# Patient Record
Sex: FEMALE | Race: BLACK OR AFRICAN AMERICAN | NOT HISPANIC OR LATINO | Employment: UNEMPLOYED | URBAN - METROPOLITAN AREA
[De-identification: names, ages, dates, MRNs, and addresses within clinical notes are randomized per-mention and may not be internally consistent; named-entity substitution may affect disease eponyms.]

---

## 2018-02-20 ENCOUNTER — HOSPITAL ENCOUNTER (OUTPATIENT)
Dept: RADIOLOGY | Facility: HOSPITAL | Age: 63
Discharge: HOME | End: 2018-02-20
Attending: NURSE PRACTITIONER
Payer: MEDICARE

## 2018-02-20 DIAGNOSIS — R06.02 SHORTNESS OF BREATH: ICD-10-CM

## 2018-02-20 DIAGNOSIS — R06.02 SHORTNESS OF BREATH: Primary | ICD-10-CM

## 2018-02-20 PROCEDURE — 71046 X-RAY EXAM CHEST 2 VIEWS: CPT | Mod: 26,,, | Performed by: RADIOLOGY

## 2018-02-20 PROCEDURE — 71046 X-RAY EXAM CHEST 2 VIEWS: CPT | Mod: TC,PO

## 2019-03-07 ENCOUNTER — HOSPITAL ENCOUNTER (EMERGENCY)
Facility: HOSPITAL | Age: 64
Discharge: HOME | End: 2019-03-07
Attending: EMERGENCY MEDICINE
Payer: MEDICARE

## 2019-03-07 VITALS
OXYGEN SATURATION: 99 % | TEMPERATURE: 98 F | SYSTOLIC BLOOD PRESSURE: 176 MMHG | BODY MASS INDEX: 35.16 KG/M2 | HEART RATE: 87 BPM | RESPIRATION RATE: 20 BRPM | HEIGHT: 64 IN | DIASTOLIC BLOOD PRESSURE: 88 MMHG | WEIGHT: 205.94 LBS

## 2019-03-07 DIAGNOSIS — R60.9 EDEMA: ICD-10-CM

## 2019-03-07 PROCEDURE — 99284 EMERGENCY DEPT VISIT MOD MDM: CPT | Mod: 25,ER

## 2019-03-07 RX ORDER — IRBESARTAN 150 MG/1
150 TABLET ORAL NIGHTLY
Refills: 3 | Status: ON HOLD | COMMUNITY
Start: 2019-02-06 | End: 2023-01-06 | Stop reason: HOSPADM

## 2019-03-07 RX ORDER — ESCITALOPRAM OXALATE 5 MG/1
5 TABLET ORAL DAILY
Refills: 3 | COMMUNITY
Start: 2019-03-01

## 2019-03-07 RX ORDER — FAMOTIDINE 10 MG/1
10 TABLET ORAL 2 TIMES DAILY
Status: ON HOLD | COMMUNITY
End: 2023-01-06 | Stop reason: SDUPTHER

## 2019-03-07 RX ORDER — ALBUTEROL SULFATE 90 UG/1
2 AEROSOL, METERED RESPIRATORY (INHALATION) 3 TIMES DAILY PRN
Refills: 6 | COMMUNITY
Start: 2019-03-03

## 2019-03-07 RX ORDER — AMLODIPINE BESYLATE 5 MG/1
2.5 TABLET ORAL DAILY
Refills: 6 | Status: ON HOLD | COMMUNITY
Start: 2019-02-15 | End: 2023-01-06 | Stop reason: HOSPADM

## 2019-03-07 NOTE — ED PROVIDER NOTES
Encounter Date: 3/7/2019       History     Chief Complaint   Patient presents with    Leg Swelling     emiliano leg swelling sky left since jan. no change just concerned about wants to make sure left leg does not have clot.     Underlying history of hypertension, end-stage renal disease on dialysis for about 11 years, Monday/ Wednesday/ Friday.  Reports good compliance with dialysis including a normal session yesterday.  Has been experiencing relatively consistent bilateral lower extremity swelling for the last 2 or 3 months without other symptoms.  Denies chest pain, shortness of breath, hemoptysis, fever, trauma, pleurisy or other complaints.  No history of heart failure or venous thromboembolic disease.  Amlodipine was suspected as possibly contributing, dose was recently cut in half without improvement of edema.  Here to make sure she does have a blood clot.  No recent change in edema and no other complaints.      The history is provided by the patient and the spouse. No  was used.     Review of patient's allergies indicates:   Allergen Reactions    Amoxicillin Rash     Past Medical History:   Diagnosis Date    ESRD (end stage renal disease) on dialysis     GERD (gastroesophageal reflux disease)     Hypertension      History reviewed. No pertinent surgical history.  History reviewed. No pertinent family history.  Social History     Tobacco Use    Smoking status: Former Smoker    Smokeless tobacco: Never Used   Substance Use Topics    Alcohol use: Yes     Comment: occassionally    Drug use: No     Review of Systems   Constitutional: Negative for activity change, fatigue and fever.   HENT: Negative for congestion, ear pain, facial swelling, nosebleeds, sinus pressure and sore throat.    Eyes: Negative for pain, discharge, redness and visual disturbance.   Respiratory: Negative for cough, choking, chest tightness, shortness of breath and wheezing.    Cardiovascular: Positive for leg  swelling. Negative for chest pain and palpitations.   Gastrointestinal: Negative for abdominal distention, abdominal pain, nausea and vomiting.   Endocrine: Negative for heat intolerance, polydipsia and polyuria.   Genitourinary: Negative for difficulty urinating, dysuria, flank pain, hematuria and urgency.   Musculoskeletal: Negative for back pain, gait problem, joint swelling and myalgias.   Skin: Negative for color change and rash.   Allergic/Immunologic: Negative for environmental allergies and food allergies.   Neurological: Negative for dizziness, weakness, numbness and headaches.   Hematological: Negative for adenopathy. Does not bruise/bleed easily.   Psychiatric/Behavioral: Negative for agitation and behavioral problems. The patient is not nervous/anxious.    All other systems reviewed and are negative.      Physical Exam     Initial Vitals [03/07/19 1047]   BP Pulse Resp Temp SpO2   (!) 168/94 90 16 98.3 °F (36.8 °C) --      MAP       --         Physical Exam    Nursing note and vitals reviewed.  Constitutional: She appears well-developed and well-nourished. She is not diaphoretic. No distress.   HENT:   Head: Normocephalic and atraumatic.   Mouth/Throat: No oropharyngeal exudate.   Eyes: Conjunctivae and EOM are normal. Pupils are equal, round, and reactive to light. Right eye exhibits no discharge. Left eye exhibits no discharge. No scleral icterus.   Neck: Normal range of motion. Neck supple. No thyromegaly present. No tracheal deviation present. No JVD present.   Cardiovascular: Normal rate, regular rhythm, normal heart sounds and intact distal pulses. Exam reveals no gallop and no friction rub.    No murmur heard.  LUE AV shunt with good thrill/ bruit; no sign of problem   Pulmonary/Chest: Breath sounds normal. No stridor. No respiratory distress. She has no wheezes. She has no rhonchi. She has no rales. She exhibits no tenderness.   Abdominal: Soft. Bowel sounds are normal. She exhibits no distension  and no mass. There is no tenderness. There is no rebound and no guarding.   Musculoskeletal: Normal range of motion. She exhibits edema. She exhibits no tenderness.   2-3(+) symmetric pitting edema BLE's to mid-thighs   Neurological: She is alert and oriented to person, place, and time. She has normal strength.   Skin: Skin is warm and dry. No rash and no abscess noted. No erythema.   Psychiatric: She has a normal mood and affect. Her behavior is normal. Judgment and thought content normal.         ED Course   Procedures  Labs Reviewed - No data to display       Imaging Results          US Lower Extremity Veins Bilateral (In process)               X-Rays:   Independently Interpreted Readings:   Other Readings:  No DVT by u/s.                     12:31 PM Stable, counseled in detail, appropriate for continued outpatient management.             Clinical Impression:       1. Edema            Disposition:   Disposition: Discharged  Condition: Stable                        Tariq Adams MD  03/07/19 4757

## 2019-03-07 NOTE — DISCHARGE INSTRUCTIONS
_________________        As discussed, ultrasound shows no evidence for blood clot in either leg.  Please review this information with your regular doctor or doctors and continue to pursue other approaches for reducing the swelling in your legs.  It is possible that stopping the amlodipine altogether might be a reasonable next step, please discussed this with your doctor.      _________________

## 2019-03-19 ENCOUNTER — HOSPITAL ENCOUNTER (EMERGENCY)
Facility: HOSPITAL | Age: 64
Discharge: HOME | End: 2019-03-19
Attending: EMERGENCY MEDICINE
Payer: MEDICARE

## 2019-03-19 VITALS
BODY MASS INDEX: 34.89 KG/M2 | SYSTOLIC BLOOD PRESSURE: 162 MMHG | WEIGHT: 203.25 LBS | TEMPERATURE: 98 F | DIASTOLIC BLOOD PRESSURE: 74 MMHG | RESPIRATION RATE: 18 BRPM | OXYGEN SATURATION: 97 % | HEART RATE: 76 BPM

## 2019-03-19 DIAGNOSIS — N95.0 POST-MENOPAUSAL BLEEDING: Primary | ICD-10-CM

## 2019-03-19 LAB
ALBUMIN SERPL BCP-MCNC: 3.6 G/DL
ALP SERPL-CCNC: 75 U/L
ALT SERPL W/O P-5'-P-CCNC: 14 U/L
ANION GAP SERPL CALC-SCNC: 8 MMOL/L
APTT BLDCRRT: 28.5 SEC
AST SERPL-CCNC: 25 U/L
BASOPHILS # BLD AUTO: 0.01 K/UL
BASOPHILS NFR BLD: 0.2 %
BILIRUB SERPL-MCNC: 1.3 MG/DL
BUN SERPL-MCNC: 15 MG/DL
CALCIUM SERPL-MCNC: 9.3 MG/DL
CHLORIDE SERPL-SCNC: 102 MMOL/L
CO2 SERPL-SCNC: 29 MMOL/L
CREAT SERPL-MCNC: 5.8 MG/DL
DIFFERENTIAL METHOD: ABNORMAL
EOSINOPHIL # BLD AUTO: 0.1 K/UL
EOSINOPHIL NFR BLD: 0.9 %
ERYTHROCYTE [DISTWIDTH] IN BLOOD BY AUTOMATED COUNT: 18.9 %
EST. GFR  (AFRICAN AMERICAN): 8.3 ML/MIN/1.73 M^2
EST. GFR  (NON AFRICAN AMERICAN): 7.2 ML/MIN/1.73 M^2
GLUCOSE SERPL-MCNC: 137 MG/DL
HCT VFR BLD AUTO: 37.7 %
HGB BLD-MCNC: 12 G/DL
INR PPP: 1.1
LYMPHOCYTES # BLD AUTO: 0.8 K/UL
LYMPHOCYTES NFR BLD: 14.2 %
MCH RBC QN AUTO: 29.4 PG
MCHC RBC AUTO-ENTMCNC: 31.8 G/DL
MCV RBC AUTO: 92 FL
MONOCYTES # BLD AUTO: 0.5 K/UL
MONOCYTES NFR BLD: 8.8 %
NEUTROPHILS # BLD AUTO: 4.2 K/UL
NEUTROPHILS NFR BLD: 75.9 %
PLATELET # BLD AUTO: 116 K/UL
PMV BLD AUTO: 10.6 FL
POTASSIUM SERPL-SCNC: 4.1 MMOL/L
PROT SERPL-MCNC: 7 G/DL
PROTHROMBIN TIME: 10.9 SEC
RBC # BLD AUTO: 4.08 M/UL
SODIUM SERPL-SCNC: 139 MMOL/L
WBC # BLD AUTO: 5.56 K/UL

## 2019-03-19 PROCEDURE — 80053 COMPREHEN METABOLIC PANEL: CPT | Mod: ER

## 2019-03-19 PROCEDURE — 85730 THROMBOPLASTIN TIME PARTIAL: CPT | Mod: ER

## 2019-03-19 PROCEDURE — 99284 EMERGENCY DEPT VISIT MOD MDM: CPT | Mod: ER

## 2019-03-19 PROCEDURE — 85025 COMPLETE CBC W/AUTO DIFF WBC: CPT | Mod: ER

## 2019-03-19 PROCEDURE — 85610 PROTHROMBIN TIME: CPT | Mod: ER

## 2019-03-19 NOTE — ED PROVIDER NOTES
Encounter Date: 3/19/2019       History     Chief Complaint   Patient presents with    Vaginal Bleeding     c/o vag bleeding and nose bleeding that began 15-20 mins ago pta.      States she had an episode of vaginal bleeding about a year ago that ceased spontaneously.  States it happened again today after using the restroom.  Denies any pain.      The history is provided by the patient.   Vaginal Bleeding   This is a recurrent problem. The current episode started less than 1 hour ago. The problem occurs rarely. The problem has been resolved. Pertinent negatives include no chest pain, no abdominal pain, no headaches and no shortness of breath. Nothing aggravates the symptoms. Nothing relieves the symptoms.     Review of patient's allergies indicates:   Allergen Reactions    Amoxicillin Rash     Past Medical History:   Diagnosis Date    ESRD (end stage renal disease) on dialysis     GERD (gastroesophageal reflux disease)     Hypertension      Past Surgical History:   Procedure Laterality Date     SECTION      PLACEMENT OF DIALYSIS ACCESS       History reviewed. No pertinent family history.  Social History     Tobacco Use    Smoking status: Former Smoker    Smokeless tobacco: Never Used   Substance Use Topics    Alcohol use: No     Frequency: Never     Comment: occassionally    Drug use: No     Review of Systems   Constitutional: Negative for fever.   HENT: Negative for sore throat.    Respiratory: Negative for shortness of breath.    Cardiovascular: Negative for chest pain.   Gastrointestinal: Negative for abdominal pain and nausea.   Genitourinary: Positive for vaginal bleeding. Negative for dysuria.   Musculoskeletal: Negative for back pain.   Skin: Negative for rash.   Neurological: Negative for weakness and headaches.   Hematological: Does not bruise/bleed easily.       Physical Exam     Initial Vitals [19 1320]   BP Pulse Resp Temp SpO2   (!) 193/93 101 20 98 °F (36.7 °C) 97 %      MAP        --         Physical Exam    Nursing note and vitals reviewed.  Constitutional: She appears well-developed and well-nourished. No distress.   HENT:   Head: Normocephalic and atraumatic.   Nose: No mucosal edema, rhinorrhea or sinus tenderness. No epistaxis.   Mouth/Throat: Oropharynx is clear and moist.   Eyes: Conjunctivae and EOM are normal. Pupils are equal, round, and reactive to light.   Neck: Normal range of motion. Neck supple.   Cardiovascular: Normal rate, regular rhythm and normal heart sounds. Exam reveals no gallop and no friction rub.    No murmur heard.  Pulmonary/Chest: Breath sounds normal. No respiratory distress. She has no wheezes. She has no rhonchi. She has no rales.   Abdominal: Soft. Bowel sounds are normal. She exhibits no distension and no mass. There is no tenderness. There is no rebound and no guarding.   Genitourinary: There is bleeding (Mild amount of old blood in the vault.  No active bleeding.) in the vagina. No erythema in the vagina. No foreign body in the vagina. No signs of injury around the vagina. No vaginal discharge found.   Musculoskeletal: Normal range of motion. She exhibits no edema or tenderness.   Neurological: She is alert and oriented to person, place, and time. She has normal strength.   Skin: Skin is warm and dry. No rash noted.   Psychiatric: She has a normal mood and affect. Thought content normal.         ED Course   Procedures  Labs Reviewed   COMPREHENSIVE METABOLIC PANEL - Abnormal; Notable for the following components:       Result Value    Glucose 137 (*)     Creatinine 5.8 (*)     Total Bilirubin 1.3 (*)     eGFR if  8.3 (*)     eGFR if non  7.2 (*)     All other components within normal limits   CBC W/ AUTO DIFFERENTIAL - Abnormal; Notable for the following components:    MCHC 31.8 (*)     RDW 18.9 (*)     Platelets 116 (*)     Lymph # 0.8 (*)     Gran% 75.9 (*)     Lymph% 14.2 (*)     All other components within normal  limits   PROTIME-INR   APTT          Imaging Results    None              ED Vital Signs:  Vitals:    03/19/19 1320 03/19/19 1345 03/19/19 1347 03/19/19 1349   BP: (!) 193/93 (!) 171/87 (!) 187/88 (!) 173/90   Pulse: 101 87 88 92   Resp: 20      Temp: 98 °F (36.7 °C)      TempSrc: Oral      SpO2: 97%      Weight: 92.2 kg (203 lb 4.2 oz)            Abnormal Lab Results:  Labs Reviewed   COMPREHENSIVE METABOLIC PANEL - Abnormal; Notable for the following components:       Result Value    Glucose 137 (*)     Creatinine 5.8 (*)     Total Bilirubin 1.3 (*)     eGFR if  8.3 (*)     eGFR if non  7.2 (*)     All other components within normal limits   CBC W/ AUTO DIFFERENTIAL - Abnormal; Notable for the following components:    MCHC 31.8 (*)     RDW 18.9 (*)     Platelets 116 (*)     Lymph # 0.8 (*)     Gran% 75.9 (*)     Lymph% 14.2 (*)     All other components within normal limits   PROTIME-INR   APTT          All Lab Results:  Results for orders placed or performed during the hospital encounter of 03/19/19   Comprehensive metabolic panel   Result Value Ref Range    Sodium 139 136 - 145 mmol/L    Potassium 4.1 3.5 - 5.1 mmol/L    Chloride 102 95 - 110 mmol/L    CO2 29 23 - 29 mmol/L    Glucose 137 (H) 70 - 110 mg/dL    BUN, Bld 15 8 - 23 mg/dL    Creatinine 5.8 (H) 0.5 - 1.4 mg/dL    Calcium 9.3 8.7 - 10.5 mg/dL    Total Protein 7.0 6.0 - 8.4 g/dL    Albumin 3.6 3.5 - 5.2 g/dL    Total Bilirubin 1.3 (H) 0.1 - 1.0 mg/dL    Alkaline Phosphatase 75 55 - 135 U/L    AST 25 10 - 40 U/L    ALT 14 10 - 44 U/L    Anion Gap 8 8 - 16 mmol/L    eGFR if African American 8.3 (A) >60 mL/min/1.73 m^2    eGFR if non  7.2 (A) >60 mL/min/1.73 m^2   CBC auto differential   Result Value Ref Range    WBC 5.56 3.90 - 12.70 K/uL    RBC 4.08 4.00 - 5.40 M/uL    Hemoglobin 12.0 12.0 - 16.0 g/dL    Hematocrit 37.7 37.0 - 48.5 %    MCV 92 82 - 98 fL    MCH 29.4 27.0 - 31.0 pg    MCHC 31.8 (L) 32.0 -  36.0 g/dL    RDW 18.9 (H) 11.5 - 14.5 %    Platelets 116 (L) 150 - 350 K/uL    MPV 10.6 9.2 - 12.9 fL    Gran # (ANC) 4.2 1.8 - 7.7 K/uL    Lymph # 0.8 (L) 1.0 - 4.8 K/uL    Mono # 0.5 0.3 - 1.0 K/uL    Eos # 0.1 0.0 - 0.5 K/uL    Baso # 0.01 0.00 - 0.20 K/uL    Gran% 75.9 (H) 38.0 - 73.0 %    Lymph% 14.2 (L) 18.0 - 48.0 %    Mono% 8.8 4.0 - 15.0 %    Eosinophil% 0.9 0.0 - 8.0 %    Basophil% 0.2 0.0 - 1.9 %    Differential Method Automated    Protime-INR   Result Value Ref Range    Prothrombin Time 10.9 9.0 - 12.5 sec    INR 1.1 0.8 - 1.2   APTT   Result Value Ref Range    aPTT 28.5 21.0 - 32.0 sec           Imaging Results:  Imaging Results    None            The Emergency Provider reviewed the vital signs and test results, which are outlined above.    ED Discussions:  2:53 PM: Reassessed pt at this time.  Pt states her condition has improved at this time. Discussed with pt all pertinent ED information and results. Discussed pt dx of post-menopausal bleeding and plan of tx. Gave pt all f/u and return to the ED instructions. All questions and concerns were addressed at this time. Pt expresses understanding of information and instructions, and is comfortable with plan to discharge. Pt is stable for discharge.                              Clinical Impression:       ICD-10-CM ICD-9-CM   1. Post-menopausal bleeding N95.0 627.1           Disposition:   Disposition: Discharged  Condition: Stable                        Noah Kahn MD  03/19/19 0849

## 2019-05-31 ENCOUNTER — HOSPITAL ENCOUNTER (EMERGENCY)
Facility: HOSPITAL | Age: 64
Discharge: HOME | End: 2019-05-31
Attending: EMERGENCY MEDICINE
Payer: MEDICARE

## 2019-05-31 VITALS
HEIGHT: 64 IN | SYSTOLIC BLOOD PRESSURE: 149 MMHG | TEMPERATURE: 99 F | BODY MASS INDEX: 33.64 KG/M2 | HEART RATE: 83 BPM | RESPIRATION RATE: 18 BRPM | OXYGEN SATURATION: 95 % | DIASTOLIC BLOOD PRESSURE: 86 MMHG | WEIGHT: 197.06 LBS

## 2019-05-31 DIAGNOSIS — R31.9 URINARY TRACT INFECTION WITH HEMATURIA, SITE UNSPECIFIED: Primary | ICD-10-CM

## 2019-05-31 DIAGNOSIS — R10.9 RIGHT FLANK PAIN: ICD-10-CM

## 2019-05-31 DIAGNOSIS — N20.1 URETEROLITHIASIS: ICD-10-CM

## 2019-05-31 DIAGNOSIS — N39.0 URINARY TRACT INFECTION WITH HEMATURIA, SITE UNSPECIFIED: Primary | ICD-10-CM

## 2019-05-31 LAB
ALBUMIN SERPL BCP-MCNC: 3.5 G/DL (ref 3.5–5.2)
ALP SERPL-CCNC: 106 U/L (ref 55–135)
ALT SERPL W/O P-5'-P-CCNC: 17 U/L (ref 10–44)
ANION GAP SERPL CALC-SCNC: 11 MMOL/L (ref 8–16)
AST SERPL-CCNC: 29 U/L (ref 10–40)
BACTERIA #/AREA URNS AUTO: ABNORMAL /HPF
BASOPHILS # BLD AUTO: 0.01 K/UL (ref 0–0.2)
BASOPHILS NFR BLD: 0.2 % (ref 0–1.9)
BILIRUB SERPL-MCNC: 1.6 MG/DL (ref 0.1–1)
BILIRUB UR QL STRIP: ABNORMAL
BUN SERPL-MCNC: 11 MG/DL (ref 8–23)
CALCIUM SERPL-MCNC: 8.8 MG/DL (ref 8.7–10.5)
CHLORIDE SERPL-SCNC: 102 MMOL/L (ref 95–110)
CLARITY UR REFRACT.AUTO: ABNORMAL
CO2 SERPL-SCNC: 24 MMOL/L (ref 23–29)
COLOR UR AUTO: ABNORMAL
CREAT SERPL-MCNC: 3.5 MG/DL (ref 0.5–1.4)
DIFFERENTIAL METHOD: ABNORMAL
EOSINOPHIL # BLD AUTO: 0.1 K/UL (ref 0–0.5)
EOSINOPHIL NFR BLD: 1.3 % (ref 0–8)
ERYTHROCYTE [DISTWIDTH] IN BLOOD BY AUTOMATED COUNT: 18.4 % (ref 11.5–14.5)
EST. GFR  (AFRICAN AMERICAN): 15.1 ML/MIN/1.73 M^2
EST. GFR  (NON AFRICAN AMERICAN): 13.1 ML/MIN/1.73 M^2
GLUCOSE SERPL-MCNC: 80 MG/DL (ref 70–110)
GLUCOSE UR QL STRIP: ABNORMAL
HCT VFR BLD AUTO: 38.2 % (ref 37–48.5)
HGB BLD-MCNC: 12.2 G/DL (ref 12–16)
HGB UR QL STRIP: ABNORMAL
HYALINE CASTS UR QL AUTO: 0 /LPF
KETONES UR QL STRIP: ABNORMAL
LEUKOCYTE ESTERASE UR QL STRIP: ABNORMAL
LYMPHOCYTES # BLD AUTO: 0.9 K/UL (ref 1–4.8)
LYMPHOCYTES NFR BLD: 17.8 % (ref 18–48)
MCH RBC QN AUTO: 29.7 PG (ref 27–31)
MCHC RBC AUTO-ENTMCNC: 31.9 G/DL (ref 32–36)
MCV RBC AUTO: 93 FL (ref 82–98)
MICROSCOPIC COMMENT: ABNORMAL
MONOCYTES # BLD AUTO: 0.5 K/UL (ref 0.3–1)
MONOCYTES NFR BLD: 10.9 % (ref 4–15)
NEUTROPHILS # BLD AUTO: 3.3 K/UL (ref 1.8–7.7)
NEUTROPHILS NFR BLD: 69.6 % (ref 38–73)
NITRITE UR QL STRIP: POSITIVE
NON-SQ EPI CELLS #/AREA URNS AUTO: 0 /HPF
PH UR STRIP: 8 [PH] (ref 5–8)
PLATELET # BLD AUTO: 148 K/UL (ref 150–350)
PMV BLD AUTO: 10.9 FL (ref 9.2–12.9)
POTASSIUM SERPL-SCNC: 4.5 MMOL/L (ref 3.5–5.1)
PROT SERPL-MCNC: 7.6 G/DL (ref 6–8.4)
PROT UR QL STRIP: ABNORMAL
RBC # BLD AUTO: 4.11 M/UL (ref 4–5.4)
RBC #/AREA URNS AUTO: >100 /HPF (ref 0–4)
SODIUM SERPL-SCNC: 137 MMOL/L (ref 136–145)
SP GR UR STRIP: 1.01 (ref 1–1.03)
SQUAMOUS #/AREA URNS AUTO: 2 /HPF
URN SPEC COLLECT METH UR: ABNORMAL
UROBILINOGEN UR STRIP-ACNC: <2 EU/DL
WBC # BLD AUTO: 4.77 K/UL (ref 3.9–12.7)
WBC #/AREA URNS AUTO: 6 /HPF (ref 0–5)

## 2019-05-31 PROCEDURE — 96375 TX/PRO/DX INJ NEW DRUG ADDON: CPT | Mod: ER

## 2019-05-31 PROCEDURE — 99285 EMERGENCY DEPT VISIT HI MDM: CPT | Mod: 25,ER

## 2019-05-31 PROCEDURE — P9612 CATHETERIZE FOR URINE SPEC: HCPCS | Mod: ER

## 2019-05-31 PROCEDURE — 85025 COMPLETE CBC W/AUTO DIFF WBC: CPT | Mod: ER

## 2019-05-31 PROCEDURE — 25000003 PHARM REV CODE 250: Mod: ER | Performed by: PHYSICIAN ASSISTANT

## 2019-05-31 PROCEDURE — 81000 URINALYSIS NONAUTO W/SCOPE: CPT | Mod: ER

## 2019-05-31 PROCEDURE — 63600175 PHARM REV CODE 636 W HCPCS: Mod: ER | Performed by: PHYSICIAN ASSISTANT

## 2019-05-31 PROCEDURE — 96361 HYDRATE IV INFUSION ADD-ON: CPT | Mod: ER

## 2019-05-31 PROCEDURE — 96374 THER/PROPH/DIAG INJ IV PUSH: CPT | Mod: ER

## 2019-05-31 PROCEDURE — 80053 COMPREHEN METABOLIC PANEL: CPT | Mod: ER

## 2019-05-31 RX ORDER — MORPHINE SULFATE 4 MG/ML
2 INJECTION, SOLUTION INTRAMUSCULAR; INTRAVENOUS
Status: COMPLETED | OUTPATIENT
Start: 2019-05-31 | End: 2019-05-31

## 2019-05-31 RX ORDER — HYDROCODONE BITARTRATE AND ACETAMINOPHEN 5; 325 MG/1; MG/1
1 TABLET ORAL EVERY 4 HOURS PRN
Qty: 10 TABLET | Refills: 0 | Status: ON HOLD | OUTPATIENT
Start: 2019-05-31 | End: 2023-01-06 | Stop reason: HOSPADM

## 2019-05-31 RX ORDER — ONDANSETRON 2 MG/ML
4 INJECTION INTRAMUSCULAR; INTRAVENOUS
Status: COMPLETED | OUTPATIENT
Start: 2019-05-31 | End: 2019-05-31

## 2019-05-31 RX ORDER — LEVOFLOXACIN 250 MG/1
250 TABLET ORAL DAILY
Qty: 5 TABLET | Refills: 0 | Status: SHIPPED | OUTPATIENT
Start: 2019-05-31 | End: 2019-06-05

## 2019-05-31 RX ADMIN — SODIUM CHLORIDE 250 ML: 0.9 INJECTION, SOLUTION INTRAVENOUS at 01:05

## 2019-05-31 RX ADMIN — ONDANSETRON 4 MG: 2 INJECTION INTRAMUSCULAR; INTRAVENOUS at 12:05

## 2019-05-31 RX ADMIN — MORPHINE SULFATE 2 MG: 4 INJECTION, SOLUTION INTRAMUSCULAR; INTRAVENOUS at 12:05

## 2019-05-31 NOTE — ED NOTES
Pt sitting up in stretcher, NAD. Resp e/u. Family member at bedside. Aware of discharge plan of care. No further questions at this time.

## 2019-05-31 NOTE — ED PROVIDER NOTES
History      Chief Complaint   Patient presents with    Flank Pain     R flank pain x2 days; intermittent pain       Review of patient's allergies indicates:   Allergen Reactions    Amoxicillin Rash        HPI   HPI    2019, 1:13 PM   History obtained from the patient      History of Present Illness: Meaghan Potter is a 64 y.o. female patient who presents to the Emergency Department for right flank pain for a month, worse over last 2 days.  Dialysis patient M,W,F, just finished dialysis session today. She says she does still urinate several times per day.  Symptoms are constant and moderate in severity.  The patient describes the symptoms as achy.  Denies bladder/bowel dysfunction, fever, n/v, saddle anesthesia, or focal weakness.   No further complaints or concerns at this time.           PCP: Tiesha Mueller MD       Past Medical History:  Past Medical History:   Diagnosis Date    ESRD (end stage renal disease) on dialysis     GERD (gastroesophageal reflux disease)     Hypertension          Past Surgical History:  Past Surgical History:   Procedure Laterality Date     SECTION      PLACEMENT OF DIALYSIS ACCESS             Family History:  No family history on file.        Social History:  Social History     Tobacco Use    Smoking status: Former Smoker    Smokeless tobacco: Never Used   Substance and Sexual Activity    Alcohol use: No     Frequency: Never     Comment: occassionally    Drug use: No    Sexual activity: Yes       ROS   Review of Systems   Constitutional: Negative for chills and fever.   HENT: Negative for sore throat.    Respiratory: Negative for shortness of breath.    Cardiovascular: Negative for chest pain.   Gastrointestinal: Negative for nausea and vomiting.   Genitourinary: Negative for dysuria and vaginal discharge.   Musculoskeletal: Positive for flank pain. Negative for neck stiffness.   Skin: Negative for rash and wound.   Neurological: Negative for weakness and  "numbness.   Hematological: Does not bruise/bleed easily.   All other systems reviewed and are negative.    Review of Systems    Physical Exam      Initial Vitals [05/31/19 1218]   BP Pulse Resp Temp SpO2   (!) 181/87 84 18 97.8 °F (36.6 °C) 98 %      MAP       --         Physical Exam  Vital signs and nursing notes reviewed.  Constitutional: Patient is in NAD. Awake and alert. Well-developed and well-nourished.  Head: Atraumatic. Normocephalic.  Eyes: PERRL. EOM intact. Conjunctivae nl. No scleral icterus.  ENT: Mucous membranes are moist. Oropharynx is clear.  Neck: Supple. No JVD. No lymphadenopathy.  No meningismus.  Nontender  Cardiovascular: Regular rate and rhythm. No murmurs, rubs, or gallops. Distal pulses are 2+ and symmetric.  Pulmonary/Chest: No respiratory distress. Clear to auscultation bilaterally. No wheezing, rales, or rhonchi.  Abdominal: Soft. Non-distended. No TTP. No rebound, guarding, or rigidity. Good bowel sounds.  Genitourinary: No CVA tenderness  Musculoskeletal: Moves all extremities. No edema.   Non tender c/t spine.  Lumbar spine with mild bilateral paraspinous ttp, no midline ttp or step.  Skin: Warm and dry.  Neurological: Awake and alert. No acute focal neurological deficits are appreciated.  5/5 x 4 strength.  Strong and equal foot plantar and dorsiflexion.  Psychiatric: Normal affect. Good eye contact. Appropriate in content.      ED Course      Procedures  ED Vital Signs:  Vitals:    05/31/19 1218 05/31/19 1330 05/31/19 1457   BP: (!) 181/87 (!) 172/90 (!) 149/86   Pulse: 84 86 83   Resp: 18 18 18   Temp: 97.8 °F (36.6 °C)  98.6 °F (37 °C)   TempSrc: Oral  Oral   SpO2: 98% 96% 95%   Weight: 89.4 kg (197 lb 1.5 oz)     Height: 5' 4" (1.626 m)           Results for orders placed or performed during the hospital encounter of 05/31/19   CBC auto differential   Result Value Ref Range    WBC 4.77 3.90 - 12.70 K/uL    RBC 4.11 4.00 - 5.40 M/uL    Hemoglobin 12.2 12.0 - 16.0 g/dL    " Hematocrit 38.2 37.0 - 48.5 %    Mean Corpuscular Volume 93 82 - 98 fL    Mean Corpuscular Hemoglobin 29.7 27.0 - 31.0 pg    Mean Corpuscular Hemoglobin Conc 31.9 (L) 32.0 - 36.0 g/dL    RDW 18.4 (H) 11.5 - 14.5 %    Platelets 148 (L) 150 - 350 K/uL    MPV 10.9 9.2 - 12.9 fL    Gran # (ANC) 3.3 1.8 - 7.7 K/uL    Lymph # 0.9 (L) 1.0 - 4.8 K/uL    Mono # 0.5 0.3 - 1.0 K/uL    Eos # 0.1 0.0 - 0.5 K/uL    Baso # 0.01 0.00 - 0.20 K/uL    Gran% 69.6 38.0 - 73.0 %    Lymph% 17.8 (L) 18.0 - 48.0 %    Mono% 10.9 4.0 - 15.0 %    Eosinophil% 1.3 0.0 - 8.0 %    Basophil% 0.2 0.0 - 1.9 %    Differential Method Automated    Comprehensive metabolic panel   Result Value Ref Range    Sodium 137 136 - 145 mmol/L    Potassium 4.5 3.5 - 5.1 mmol/L    Chloride 102 95 - 110 mmol/L    CO2 24 23 - 29 mmol/L    Glucose 80 70 - 110 mg/dL    BUN, Bld 11 8 - 23 mg/dL    Creatinine 3.5 (H) 0.5 - 1.4 mg/dL    Calcium 8.8 8.7 - 10.5 mg/dL    Total Protein 7.6 6.0 - 8.4 g/dL    Albumin 3.5 3.5 - 5.2 g/dL    Total Bilirubin 1.6 (H) 0.1 - 1.0 mg/dL    Alkaline Phosphatase 106 55 - 135 U/L    AST 29 10 - 40 U/L    ALT 17 10 - 44 U/L    Anion Gap 11 8 - 16 mmol/L    eGFR if African American 15.1 (A) >60 mL/min/1.73 m^2    eGFR if non  13.1 (A) >60 mL/min/1.73 m^2   Urinalysis, Reflex to Urine Culture Urine, Catheterized   Result Value Ref Range    Specimen UA Urine, Clean Catch     Color, UA Red (A) Yellow, Straw, Melissa    Appearance, UA Hazy (A) Clear    pH, UA 8.0 5.0 - 8.0    Specific Gravity, UA 1.015 1.005 - 1.030    Protein, UA 3+ (A) Negative    Glucose, UA 1+ (A) Negative    Ketones, UA Trace (A) Negative    Bilirubin (UA) 2+ (A) Negative    Occult Blood UA 3+ (A) Negative    Nitrite, UA Positive (A) Negative    Urobilinogen, UA <2.0 <2.0 EU/dL    Leukocytes, UA 3+ (A) Negative   Urinalysis Microscopic   Result Value Ref Range    RBC, UA >100 (H) 0 - 4 /hpf    WBC, UA 6 (H) 0 - 5 /hpf    Bacteria Few (A) None-Occ /hpf    Squam  Epithel, UA 2 /hpf    Non-Squam Epith 0 <1/hpf /hpf    Hyaline Casts, UA 0 0-1/lpf /lpf    Microscopic Comment SEE COMMENT              Imaging Results:  Imaging Results          CT Renal Stone Study ABD Pelvis WO (Final result)  Result time 05/31/19 13:16:30    Final result by Marquis Lainez MD (05/31/19 13:16:30)                 Impression:      Diffuse anasarca.    Punctate calcifications are also suspected within the renal pelvis and proximal right ureter without producing significant hydronephrosis.    See above for additional findings.    All CT scans at this facility use dose modulation, iterative reconstruction and/or weight based dosing when appropriate to reduce radiation dose to as low as reasonably achievable.      Electronically signed by: Marquis Lainez MD  Date:    05/31/2019  Time:    13:16             Narrative:    EXAMINATION:  CT RENAL STONE STUDY ABD PELVIS WO    CLINICAL HISTORY:  Flank pain, stone disease suspected;    TECHNIQUE:  Routine 5 mm non-contrast images of the abdomen and pelvis were done.  Sagittal and coronal reformats were also submitted for interpretation.    COMPARISON:  None    FINDINGS:  The lung bases are unremarkable.  Trace right pleural effusion.  Mild atelectasis at both lung bases.  Cardiomegaly and small to moderate pericardial effusion.  Coronary artery disease.    Diffuse anasarca.    The liver is normal in size and attenuation with no focal hepatic abnormality.  The gallbladder shows no evidence of stones or pericholecystic fluid.  There is no intra-or extrahepatic biliary ductal dilatation.    The spleen, pancreas, and adrenal glands are unremarkable.  Mild pancreatic ductal dilatation.  No pancreatic mass is identified.    Kidneys are atrophic bilaterally with multiple cysts bilaterally.  Punctate nonobstructing stones are seen within both kidneys.  Punctate calcifications are also suspected within the renal pelvis and proximal right ureter without producing  significant hydronephrosis.  Bladder is grossly unremarkable.    Stomach is unremarkable.   The visualized loops of small bowel show no evidence of obstruction or inflammation. Large bowel demonstrates moderate constipation.  No evidence of appendicitis.  Scattered fluid is seen throughout the abdomen consistent with mild ascites.    There is no evidence of lymph node enlargement in the abdomen or pelvis.    The abdominal aorta is normal in course and caliber with moderate atherosclerotic calcifications.    Advanced degenerative changes throughout the lumbar spine greatest at L5/S1 with posterior disc bulge producing mild to moderate canal stenosis and mild bilateral neural foraminal narrowing.  Endplate degenerative changes and spondylosis seen throughout the lumbar spine.  Moderate SI joint degenerative changes.                                   The Emergency Provider reviewed the vital signs and test results, which are outlined above.    ED Discussion         Medication(s) given in the ER:  Medications   morphine injection 2 mg (2 mg Intravenous Given 5/31/19 1237)   ondansetron injection 4 mg (4 mg Intravenous Given 5/31/19 1237)   sodium chloride 0.9% bolus 250 mL (0 mLs Intravenous Stopped 5/31/19 1354)           Follow-up Information     Tiesha Mueller MD In 2 days.    Specialty:  Family Medicine  Contact information:  [address] [place] [id number]  [phone]             [facility].    Specialty:  Emergency Medicine  Why:  If symptoms worsen, fever, vomiting, feeling ill or weak  Contact information:  [address] [address] [address] [phone]                     New Prescriptions    HYDROCODONE-ACETAMINOPHEN (NORCO) 5-325 MG PER TABLET    Take 1 tablet by mouth every 4 (four) hours as needed for Pain.    LEVOFLOXACIN (LEVAQUIN) 250 MG TABLET    Take 1 tablet (250 mg total) by mouth once daily. for 5 days          Medical Decision Making      1:39 PM  CONSULT Discussed case with Dr Boo, who agrees with the treatment plan and recommends dosing for dialysis patient is levaquin 250mg daily for 5 days.    Pt says her nephrologist is a Dr Baer in [place].  She agrees to fu with him Monday.  I described concerning symptoms to watch for, fever, feeling ill, n/v, and she agrees to rter if any occur.     All findings were reviewed with the patient/family in detail.   All remaining questions and concerns were addressed at that time.  Patient/family has been counseled regarding the need for follow-up as well as the indication to return to the emergency room should new or worrisome developments occur.          MDM               Clinical Impression:        ICD-10-CM ICD-9-CM   1. Urinary tract infection with hematuria, site unspecified N39.0 599.0    R31.9 599.70   2. Ureterolithiasis N20.1 592.1   3. Right flank pain R10.9 789.09             Lulu Prakash PA-C  05/31/19 1504

## 2019-05-31 NOTE — ED NOTES
Patient complains of right flank pain. Reports onset of symptoms 1 moth ago but worse yesterday.     Level of Consciousness: Patient is awake, alert, and oriented to person, place, time, and situation. Speech is clear.   HEENT: Symmetrical face, PERRLA, moist mucous membranes, no congestion/drainage, no JVD, pt able to swallow   Appearance: Patient resting comfortably in bed, hygiene and clothing are both intact and appropriate.   Skin: Skin is warm, dry, and intact. Skin is of normal color, free of any skin breakdown. Mucus membranes pink and moist.   Musculoskeletal: Moves all extremities well. Full active ROM. No deformities noted. Denies any weakness. Gait steady, patient ambulates independently, without assistive device. Right flank pain worse with movement per pt   Respiratory: Airway open and patent. Respirations equal and unlabored. Lung sounds clear upon auscultation. Patient denies any SOB.   Cardiac: Regular rate and rhythm. No peripheral edema noted to bilateral lower extremities. Denies any chest pain or discomfort.   GI: Abdomen soft, non-tender. Bowel sounds present and active in all quadrants x 4. No distention noted. Denies any nausea or vomiting.   : HD pt with left UA Shunt. +bruit/thrill. Oliguria per pt   Neurological: Normal sensatm with urinationion reported to all extremities. Symmetrical expressions noted to face. No obvious neurological deficits noted.   Psychosocial: Patient is calm and cooperative, appropriate to situation. Family is involved in patient care.     Patient informed of plan of care, verbalizes understanding, and has no questions or concern at this time. Bed is in the lowest position and locked. Call bell within reach of the patient. Will continue to monitor.

## 2019-07-16 ENCOUNTER — TELEPHONE (OUTPATIENT)
Dept: OBSTETRICS AND GYNECOLOGY | Facility: CLINIC | Age: 64
End: 2019-07-16

## 2020-06-03 ENCOUNTER — HOSPITAL ENCOUNTER (EMERGENCY)
Facility: HOSPITAL | Age: 65
Discharge: HOME | End: 2020-06-03
Attending: EMERGENCY MEDICINE
Payer: MEDICARE

## 2020-06-03 VITALS
DIASTOLIC BLOOD PRESSURE: 86 MMHG | WEIGHT: 193 LBS | HEART RATE: 88 BPM | BODY MASS INDEX: 33.13 KG/M2 | RESPIRATION RATE: 20 BRPM | TEMPERATURE: 99 F | OXYGEN SATURATION: 99 % | SYSTOLIC BLOOD PRESSURE: 177 MMHG

## 2020-06-03 DIAGNOSIS — T82.838A BLEEDING FROM DIALYSIS SHUNT, INITIAL ENCOUNTER: Primary | ICD-10-CM

## 2020-06-03 DIAGNOSIS — I10 HYPERTENSION, UNSPECIFIED TYPE: ICD-10-CM

## 2020-06-03 PROCEDURE — 99283 EMERGENCY DEPT VISIT LOW MDM: CPT | Mod: ER

## 2020-06-03 PROCEDURE — 25000003 PHARM REV CODE 250: Mod: ER | Performed by: EMERGENCY MEDICINE

## 2020-06-03 RX ORDER — HYDRALAZINE HYDROCHLORIDE 25 MG/1
25 TABLET, FILM COATED ORAL
Status: COMPLETED | OUTPATIENT
Start: 2020-06-03 | End: 2020-06-03

## 2020-06-03 RX ORDER — ERGOCALCIFEROL 1.25 MG/1
50000 CAPSULE ORAL
COMMUNITY

## 2020-06-03 RX ADMIN — HYDRALAZINE HYDROCHLORIDE 25 MG: 25 TABLET, FILM COATED ORAL at 02:06

## 2020-06-03 NOTE — ED NOTES
Examined shunt with Dr. Davis. A small amount of blood coming from the site of the needle stick. Dressed with 4x4 and ACE. Will continue to monitor.

## 2020-06-03 NOTE — ED PROVIDER NOTES
Encounter Date: 6/3/2020       History     Chief Complaint   Patient presents with    Shunt Problem     finished dialysis 2+ hours ago, shunt to upper left arm won't stop bleeding.     The history is provided by the patient.   Pt presents with bleeding shunt to left UE after dialysis today. Pt states bleeding started after she got home drom dialysis. Bleeding is controlled with pressure.    Review of patient's allergies indicates:   Allergen Reactions    Amoxicillin Rash     Past Medical History:   Diagnosis Date    ESRD (end stage renal disease) on dialysis     GERD (gastroesophageal reflux disease)     Hypertension      Past Surgical History:   Procedure Laterality Date     SECTION      PLACEMENT OF DIALYSIS ACCESS       No family history on file.  Social History     Tobacco Use    Smoking status: Former Smoker    Smokeless tobacco: Never Used   Substance Use Topics    Alcohol use: No     Frequency: Never     Comment: occassionally    Drug use: No     Review of Systems   Musculoskeletal:        Left UE dialysis shunt bleeding   All other systems reviewed and are negative.      Physical Exam     Initial Vitals [20 1343]   BP Pulse Resp Temp SpO2   (!) 200/91 88 20 98.6 °F (37 °C) 99 %      MAP       --         Physical Exam    Nursing note and vitals reviewed.  Constitutional: She appears well-developed and well-nourished. No distress.   HENT:   Head: Normocephalic and atraumatic.   Mouth/Throat: Oropharynx is clear and moist.   Eyes: Conjunctivae and EOM are normal. Pupils are equal, round, and reactive to light.   Neck: Normal range of motion. Neck supple.   Cardiovascular: Normal rate, regular rhythm and normal heart sounds.   Pulmonary/Chest: Breath sounds normal. No respiratory distress.   Abdominal: Soft. Bowel sounds are normal. She exhibits no distension. There is no tenderness.   Musculoskeletal: Normal range of motion.   Left UE shunt +thrill /bleeding controlled after removal of  bandage   Neurological: She is alert and oriented to person, place, and time. She has normal strength.   Skin: Skin is warm and dry.   Psychiatric: She has a normal mood and affect. Thought content normal.         ED Course   Procedures  Labs Reviewed - No data to display       Imaging Results    None     Pre-hypertension/Hypertension: The pt has been informed that they may have pre-hypertension or hypertension based on a blood pressure reading in the ED. I recommend that the pt call the PCP listed on their discharge instructions or a physician of their choice this week to arrange f/u for further evaluation of possible pre-hypertension or hypertension.     Pt treated c Hydralazine 25 mg po for elevated blood pressure 200/91.    2:22 PM - Counseling: Spoke with the patient and discussed todays findings, in addition to providing specific details for the plan of care and counseling regarding the diagnosis and prognosis. Questions are answered at this time.                                            Clinical Impression:       ICD-10-CM ICD-9-CM   1. Bleeding from dialysis shunt, initial encounter T82.838A 996.73     459.0   2. Hypertension, unspecified type I10 401.9         Disposition:   Disposition: Discharged  Condition: Stable                        Raimundo Davis MD  06/03/20 1422       Raimundo Davis MD  06/03/20 1449

## 2021-01-04 ENCOUNTER — TELEPHONE (OUTPATIENT)
Dept: OBSTETRICS AND GYNECOLOGY | Facility: CLINIC | Age: 66
End: 2021-01-04

## 2022-12-19 ENCOUNTER — HOSPITAL ENCOUNTER (INPATIENT)
Facility: HOSPITAL | Age: 67
LOS: 16 days | Discharge: NURSING, REHAB OR LONG-TERM CARE | DRG: 480 | End: 2023-01-06
Attending: EMERGENCY MEDICINE | Admitting: STUDENT IN AN ORGANIZED HEALTH CARE EDUCATION/TRAINING PROGRAM
Payer: COMMERCIAL

## 2022-12-19 DIAGNOSIS — I31.39 PERICARDIAL EFFUSION: ICD-10-CM

## 2022-12-19 DIAGNOSIS — I50.9 CHF (CONGESTIVE HEART FAILURE): ICD-10-CM

## 2022-12-19 DIAGNOSIS — S73.192A TEAR OF LEFT ACETABULAR LABRUM, INITIAL ENCOUNTER: ICD-10-CM

## 2022-12-19 DIAGNOSIS — I31.39 PERICARDIAL EFFUSION (NONINFLAMMATORY): ICD-10-CM

## 2022-12-19 DIAGNOSIS — I48.91 ATRIAL FIBRILLATION WITH RVR: ICD-10-CM

## 2022-12-19 DIAGNOSIS — R07.9 CHEST PAIN: ICD-10-CM

## 2022-12-19 DIAGNOSIS — I48.91 ATRIAL FIBRILLATION: ICD-10-CM

## 2022-12-19 DIAGNOSIS — R00.0 TACHYCARDIA: ICD-10-CM

## 2022-12-19 DIAGNOSIS — N18.6 ESRD (END STAGE RENAL DISEASE) ON DIALYSIS: ICD-10-CM

## 2022-12-19 DIAGNOSIS — M85.651 BONE CYST OF RIGHT FEMUR: ICD-10-CM

## 2022-12-19 DIAGNOSIS — R78.81 BACTEREMIA: ICD-10-CM

## 2022-12-19 DIAGNOSIS — M25.552 LEFT HIP PAIN: ICD-10-CM

## 2022-12-19 DIAGNOSIS — R78.81 STREPTOCOCCAL BACTEREMIA: ICD-10-CM

## 2022-12-19 DIAGNOSIS — I10 PRIMARY HYPERTENSION: ICD-10-CM

## 2022-12-19 DIAGNOSIS — Z99.2 ESRD (END STAGE RENAL DISEASE) ON DIALYSIS: ICD-10-CM

## 2022-12-19 DIAGNOSIS — M00.9 PYOGENIC ARTHRITIS OF LEFT HIP: ICD-10-CM

## 2022-12-19 DIAGNOSIS — B95.5 STREPTOCOCCAL BACTEREMIA: ICD-10-CM

## 2022-12-19 DIAGNOSIS — M00.9 PYOGENIC ARTHRITIS OF LEFT HIP, DUE TO UNSPECIFIED ORGANISM: Primary | ICD-10-CM

## 2022-12-19 DIAGNOSIS — I48.91 A-FIB: ICD-10-CM

## 2022-12-19 LAB
ALBUMIN SERPL BCP-MCNC: 3.9 G/DL (ref 3.5–5.2)
ALP SERPL-CCNC: 112 U/L (ref 55–135)
ALT SERPL W/O P-5'-P-CCNC: 22 U/L (ref 10–44)
ANION GAP SERPL CALC-SCNC: 16 MMOL/L (ref 8–16)
AST SERPL-CCNC: 32 U/L (ref 10–40)
BASOPHILS # BLD AUTO: 0.02 K/UL (ref 0–0.2)
BASOPHILS NFR BLD: 0.3 % (ref 0–1.9)
BILIRUB SERPL-MCNC: 1.3 MG/DL (ref 0.1–1)
BNP SERPL-MCNC: 4792 PG/ML (ref 0–99)
BUN SERPL-MCNC: 54 MG/DL (ref 8–23)
CALCIUM SERPL-MCNC: 9.6 MG/DL (ref 8.7–10.5)
CHLORIDE SERPL-SCNC: 102 MMOL/L (ref 95–110)
CK SERPL-CCNC: 121 U/L (ref 20–180)
CO2 SERPL-SCNC: 21 MMOL/L (ref 23–29)
CREAT SERPL-MCNC: 9.6 MG/DL (ref 0.5–1.4)
DIFFERENTIAL METHOD: ABNORMAL
EOSINOPHIL # BLD AUTO: 0 K/UL (ref 0–0.5)
EOSINOPHIL NFR BLD: 0 % (ref 0–8)
ERYTHROCYTE [DISTWIDTH] IN BLOOD BY AUTOMATED COUNT: 14.9 % (ref 11.5–14.5)
EST. GFR  (NO RACE VARIABLE): 4.1 ML/MIN/1.73 M^2
GLUCOSE SERPL-MCNC: 106 MG/DL (ref 70–110)
HCT VFR BLD AUTO: 38.8 % (ref 37–48.5)
HGB BLD-MCNC: 12.7 G/DL (ref 12–16)
IMM GRANULOCYTES # BLD AUTO: 0.02 K/UL (ref 0–0.04)
IMM GRANULOCYTES NFR BLD AUTO: 0.3 % (ref 0–0.5)
LYMPHOCYTES # BLD AUTO: 0.6 K/UL (ref 1–4.8)
LYMPHOCYTES NFR BLD: 9.6 % (ref 18–48)
MCH RBC QN AUTO: 31.4 PG (ref 27–31)
MCHC RBC AUTO-ENTMCNC: 32.7 G/DL (ref 32–36)
MCV RBC AUTO: 96 FL (ref 82–98)
MONOCYTES # BLD AUTO: 0.7 K/UL (ref 0.3–1)
MONOCYTES NFR BLD: 11.2 % (ref 4–15)
NEUTROPHILS # BLD AUTO: 5.1 K/UL (ref 1.8–7.7)
NEUTROPHILS NFR BLD: 78.6 % (ref 38–73)
NRBC BLD-RTO: 0 /100 WBC
PLATELET # BLD AUTO: 103 K/UL (ref 150–450)
PMV BLD AUTO: 12.6 FL (ref 9.2–12.9)
POTASSIUM SERPL-SCNC: 5.6 MMOL/L (ref 3.5–5.1)
PROT SERPL-MCNC: 7.9 G/DL (ref 6–8.4)
RBC # BLD AUTO: 4.05 M/UL (ref 4–5.4)
SODIUM SERPL-SCNC: 139 MMOL/L (ref 136–145)
TROPONIN I SERPL DL<=0.01 NG/ML-MCNC: 0.08 NG/ML (ref 0–0.03)
TROPONIN I SERPL DL<=0.01 NG/ML-MCNC: 0.1 NG/ML (ref 0–0.03)
WBC # BLD AUTO: 6.44 K/UL (ref 3.9–12.7)

## 2022-12-19 PROCEDURE — 80053 COMPREHEN METABOLIC PANEL: CPT | Mod: ER | Performed by: EMERGENCY MEDICINE

## 2022-12-19 PROCEDURE — 82550 ASSAY OF CK (CPK): CPT | Mod: ER | Performed by: EMERGENCY MEDICINE

## 2022-12-19 PROCEDURE — 99900035 HC TECH TIME PER 15 MIN (STAT)

## 2022-12-19 PROCEDURE — 25000003 PHARM REV CODE 250: Mod: ER | Performed by: EMERGENCY MEDICINE

## 2022-12-19 PROCEDURE — 27000221 HC OXYGEN, UP TO 24 HOURS: Mod: ER

## 2022-12-19 PROCEDURE — 96372 THER/PROPH/DIAG INJ SC/IM: CPT | Performed by: EMERGENCY MEDICINE

## 2022-12-19 PROCEDURE — 96372 THER/PROPH/DIAG INJ SC/IM: CPT | Performed by: HOSPITALIST

## 2022-12-19 PROCEDURE — 83880 ASSAY OF NATRIURETIC PEPTIDE: CPT | Mod: ER | Performed by: EMERGENCY MEDICINE

## 2022-12-19 PROCEDURE — 94761 N-INVAS EAR/PLS OXIMETRY MLT: CPT | Mod: ER

## 2022-12-19 PROCEDURE — 99900035 HC TECH TIME PER 15 MIN (STAT): Mod: ER

## 2022-12-19 PROCEDURE — 84484 ASSAY OF TROPONIN QUANT: CPT | Mod: 91 | Performed by: EMERGENCY MEDICINE

## 2022-12-19 PROCEDURE — 63600175 PHARM REV CODE 636 W HCPCS: Performed by: HOSPITALIST

## 2022-12-19 PROCEDURE — 93010 EKG 12-LEAD: ICD-10-PCS | Mod: ,,, | Performed by: INTERNAL MEDICINE

## 2022-12-19 PROCEDURE — 99285 EMERGENCY DEPT VISIT HI MDM: CPT | Mod: 25,ER

## 2022-12-19 PROCEDURE — 93005 ELECTROCARDIOGRAM TRACING: CPT | Mod: ER

## 2022-12-19 PROCEDURE — 96365 THER/PROPH/DIAG IV INF INIT: CPT | Mod: ER

## 2022-12-19 PROCEDURE — 93010 ELECTROCARDIOGRAM REPORT: CPT | Mod: ,,, | Performed by: INTERNAL MEDICINE

## 2022-12-19 PROCEDURE — 96375 TX/PRO/DX INJ NEW DRUG ADDON: CPT | Mod: ER

## 2022-12-19 PROCEDURE — G0378 HOSPITAL OBSERVATION PER HR: HCPCS

## 2022-12-19 PROCEDURE — 25000003 PHARM REV CODE 250: Performed by: EMERGENCY MEDICINE

## 2022-12-19 PROCEDURE — 84484 ASSAY OF TROPONIN QUANT: CPT | Mod: ER | Performed by: EMERGENCY MEDICINE

## 2022-12-19 PROCEDURE — 63600175 PHARM REV CODE 636 W HCPCS: Mod: ER | Performed by: EMERGENCY MEDICINE

## 2022-12-19 PROCEDURE — 36415 COLL VENOUS BLD VENIPUNCTURE: CPT | Performed by: EMERGENCY MEDICINE

## 2022-12-19 PROCEDURE — 96376 TX/PRO/DX INJ SAME DRUG ADON: CPT | Mod: ER

## 2022-12-19 PROCEDURE — 25000003 PHARM REV CODE 250: Performed by: HOSPITALIST

## 2022-12-19 PROCEDURE — 85025 COMPLETE CBC W/AUTO DIFF WBC: CPT | Mod: ER | Performed by: EMERGENCY MEDICINE

## 2022-12-19 RX ORDER — MORPHINE SULFATE 4 MG/ML
4 INJECTION, SOLUTION INTRAMUSCULAR; INTRAVENOUS
Status: COMPLETED | OUTPATIENT
Start: 2022-12-19 | End: 2022-12-19

## 2022-12-19 RX ORDER — NALOXONE HCL 0.4 MG/ML
0.02 VIAL (ML) INJECTION
Status: DISCONTINUED | OUTPATIENT
Start: 2022-12-19 | End: 2023-01-07 | Stop reason: HOSPADM

## 2022-12-19 RX ORDER — MORPHINE SULFATE 2 MG/ML
2 INJECTION, SOLUTION INTRAMUSCULAR; INTRAVENOUS EVERY 4 HOURS PRN
Status: DISCONTINUED | OUTPATIENT
Start: 2022-12-19 | End: 2023-01-03

## 2022-12-19 RX ORDER — GLUCAGON 1 MG
1 KIT INJECTION
Status: DISCONTINUED | OUTPATIENT
Start: 2022-12-19 | End: 2023-01-07 | Stop reason: HOSPADM

## 2022-12-19 RX ORDER — ACETAMINOPHEN 650 MG/1
650 SUPPOSITORY RECTAL EVERY 6 HOURS PRN
Status: DISCONTINUED | OUTPATIENT
Start: 2022-12-19 | End: 2023-01-07 | Stop reason: HOSPADM

## 2022-12-19 RX ORDER — CYCLOBENZAPRINE HCL 10 MG
10 TABLET ORAL NIGHTLY PRN
Status: DISCONTINUED | OUTPATIENT
Start: 2022-12-19 | End: 2022-12-20

## 2022-12-19 RX ORDER — SODIUM CHLORIDE 0.9 % (FLUSH) 0.9 %
3 SYRINGE (ML) INJECTION EVERY 12 HOURS PRN
Status: DISCONTINUED | OUTPATIENT
Start: 2022-12-19 | End: 2023-01-07 | Stop reason: HOSPADM

## 2022-12-19 RX ORDER — BRIMONIDINE TARTRATE 2 MG/ML
1 SOLUTION/ DROPS OPHTHALMIC 2 TIMES DAILY
COMMUNITY
Start: 2022-12-11

## 2022-12-19 RX ORDER — IBUPROFEN 200 MG
24 TABLET ORAL
Status: DISCONTINUED | OUTPATIENT
Start: 2022-12-19 | End: 2023-01-07 | Stop reason: HOSPADM

## 2022-12-19 RX ORDER — HYDROCODONE BITARTRATE AND ACETAMINOPHEN 5; 325 MG/1; MG/1
1 TABLET ORAL EVERY 6 HOURS PRN
Status: DISCONTINUED | OUTPATIENT
Start: 2022-12-19 | End: 2023-01-07 | Stop reason: HOSPADM

## 2022-12-19 RX ORDER — TALC
6 POWDER (GRAM) TOPICAL NIGHTLY PRN
Status: DISCONTINUED | OUTPATIENT
Start: 2022-12-19 | End: 2023-01-07 | Stop reason: HOSPADM

## 2022-12-19 RX ORDER — HYDRALAZINE HYDROCHLORIDE 25 MG/1
25 TABLET, FILM COATED ORAL 3 TIMES DAILY
Status: DISCONTINUED | OUTPATIENT
Start: 2022-12-20 | End: 2022-12-22

## 2022-12-19 RX ORDER — LOSARTAN POTASSIUM 50 MG/1
50 TABLET ORAL DAILY
Status: DISCONTINUED | OUTPATIENT
Start: 2022-12-20 | End: 2022-12-22

## 2022-12-19 RX ORDER — PROMETHAZINE HYDROCHLORIDE 25 MG/1
25 TABLET ORAL EVERY 6 HOURS PRN
Status: DISCONTINUED | OUTPATIENT
Start: 2022-12-19 | End: 2023-01-07 | Stop reason: HOSPADM

## 2022-12-19 RX ORDER — IBUPROFEN 200 MG
16 TABLET ORAL
Status: DISCONTINUED | OUTPATIENT
Start: 2022-12-19 | End: 2023-01-07 | Stop reason: HOSPADM

## 2022-12-19 RX ORDER — CYCLOBENZAPRINE HCL 10 MG
10 TABLET ORAL NIGHTLY PRN
Status: ON HOLD | COMMUNITY
Start: 2022-12-11 | End: 2023-01-06 | Stop reason: HOSPADM

## 2022-12-19 RX ORDER — MAG HYDROX/ALUMINUM HYD/SIMETH 200-200-20
30 SUSPENSION, ORAL (FINAL DOSE FORM) ORAL 4 TIMES DAILY PRN
Status: DISCONTINUED | OUTPATIENT
Start: 2022-12-19 | End: 2023-01-03

## 2022-12-19 RX ORDER — ERGOCALCIFEROL 1.25 MG/1
50000 CAPSULE ORAL
Status: DISCONTINUED | OUTPATIENT
Start: 2022-12-26 | End: 2023-01-07 | Stop reason: HOSPADM

## 2022-12-19 RX ORDER — HYDRALAZINE HYDROCHLORIDE 25 MG/1
25 TABLET, FILM COATED ORAL 3 TIMES DAILY
Status: ON HOLD | COMMUNITY
Start: 2022-12-12 | End: 2023-01-06 | Stop reason: HOSPADM

## 2022-12-19 RX ORDER — BRIMONIDINE TARTRATE 2 MG/ML
1 SOLUTION/ DROPS OPHTHALMIC 2 TIMES DAILY
Status: DISCONTINUED | OUTPATIENT
Start: 2022-12-19 | End: 2023-01-07 | Stop reason: HOSPADM

## 2022-12-19 RX ORDER — DILTIAZEM HYDROCHLORIDE 5 MG/ML
15 INJECTION INTRAVENOUS
Status: COMPLETED | OUTPATIENT
Start: 2022-12-19 | End: 2022-12-19

## 2022-12-19 RX ORDER — IPRATROPIUM BROMIDE AND ALBUTEROL SULFATE 2.5; .5 MG/3ML; MG/3ML
3 SOLUTION RESPIRATORY (INHALATION) EVERY 6 HOURS PRN
Status: DISCONTINUED | OUTPATIENT
Start: 2022-12-19 | End: 2023-01-07 | Stop reason: HOSPADM

## 2022-12-19 RX ORDER — DILTIAZEM HCL 1 MG/ML
0-15 INJECTION, SOLUTION INTRAVENOUS CONTINUOUS
Status: DISCONTINUED | OUTPATIENT
Start: 2022-12-19 | End: 2022-12-22

## 2022-12-19 RX ORDER — MORPHINE SULFATE 4 MG/ML
4 INJECTION, SOLUTION INTRAMUSCULAR; INTRAVENOUS
Status: DISCONTINUED | OUTPATIENT
Start: 2022-12-19 | End: 2022-12-19

## 2022-12-19 RX ORDER — HEPARIN SODIUM 5000 [USP'U]/ML
5000 INJECTION, SOLUTION INTRAVENOUS; SUBCUTANEOUS EVERY 8 HOURS
Status: DISCONTINUED | OUTPATIENT
Start: 2022-12-19 | End: 2022-12-20

## 2022-12-19 RX ORDER — ACETAMINOPHEN 325 MG/1
650 TABLET ORAL EVERY 8 HOURS PRN
Status: DISCONTINUED | OUTPATIENT
Start: 2022-12-19 | End: 2023-01-07 | Stop reason: HOSPADM

## 2022-12-19 RX ORDER — ONDANSETRON 2 MG/ML
4 INJECTION INTRAMUSCULAR; INTRAVENOUS EVERY 8 HOURS PRN
Status: DISCONTINUED | OUTPATIENT
Start: 2022-12-19 | End: 2023-01-07 | Stop reason: HOSPADM

## 2022-12-19 RX ORDER — AMOXICILLIN 250 MG
1 CAPSULE ORAL 2 TIMES DAILY PRN
Status: DISCONTINUED | OUTPATIENT
Start: 2022-12-19 | End: 2023-01-07 | Stop reason: HOSPADM

## 2022-12-19 RX ADMIN — MORPHINE SULFATE 4 MG: 4 INJECTION INTRAVENOUS at 12:12

## 2022-12-19 RX ADMIN — HYDROCODONE BITARTRATE AND ACETAMINOPHEN 1 TABLET: 5; 325 TABLET ORAL at 10:12

## 2022-12-19 RX ADMIN — HEPARIN SODIUM 5000 UNITS: 5000 INJECTION INTRAVENOUS; SUBCUTANEOUS at 10:12

## 2022-12-19 RX ADMIN — DILTIAZEM HYDROCHLORIDE 15 MG: 5 INJECTION INTRAVENOUS at 01:12

## 2022-12-19 RX ADMIN — MORPHINE SULFATE 4 MG: 4 INJECTION INTRAVENOUS at 10:12

## 2022-12-19 RX ADMIN — Medication 12.5 MG/HR: at 08:12

## 2022-12-19 RX ADMIN — BRIMONIDINE TARTRATE 1 DROP: 2 SOLUTION/ DROPS OPHTHALMIC at 11:12

## 2022-12-19 RX ADMIN — Medication 5 MG/HR: at 02:12

## 2022-12-19 NOTE — ED PROVIDER NOTES
Encounter Date: 2022       History     Chief Complaint   Patient presents with    Hip Pain     Left hi; pt states she was doing a lot of exercises (leg lifts) last week while laying in bed.     The history is provided by the patient.   Hip Pain  This is a recurrent problem. The current episode started yesterday. The problem occurs constantly. The problem has not changed since onset.Pertinent negatives include no chest pain, no abdominal pain, no headaches and no shortness of breath. Nothing aggravates the symptoms. Nothing relieves the symptoms.   Review of patient's allergies indicates:   Allergen Reactions    Amoxicillin Rash     Past Medical History:   Diagnosis Date    ESRD (end stage renal disease) on dialysis     GERD (gastroesophageal reflux disease)     Hypertension      Past Surgical History:   Procedure Laterality Date     SECTION      PLACEMENT OF DIALYSIS ACCESS       History reviewed. No pertinent family history.  Social History     Tobacco Use    Smoking status: Former    Smokeless tobacco: Never   Substance Use Topics    Alcohol use: No     Comment: occassionally    Drug use: No     Review of Systems   Constitutional:  Negative for fever.   HENT:  Negative for sore throat.    Respiratory:  Negative for shortness of breath.    Cardiovascular:  Negative for chest pain.   Gastrointestinal:  Negative for abdominal pain and nausea.   Genitourinary:  Negative for dysuria.   Musculoskeletal:  Negative for back pain.   Skin:  Negative for rash.   Neurological:  Negative for weakness and headaches.   Hematological:  Does not bruise/bleed easily.     Physical Exam     Initial Vitals   BP Pulse Resp Temp SpO2   22 0930 22 0930 22 0930 22 0930 22 0931   (!) 140/91 106 17 99.2 °F (37.3 °C) 96 %      MAP       --                Physical Exam    Nursing note and vitals reviewed.  Constitutional: She appears well-developed and well-nourished. No distress.   HENT:   Head:  Normocephalic and atraumatic.   Mouth/Throat: Oropharynx is clear and moist.   Eyes: Conjunctivae and EOM are normal. Pupils are equal, round, and reactive to light.   Neck: Neck supple.   Normal range of motion.  Cardiovascular:  Normal rate, regular rhythm and normal heart sounds.     Exam reveals no gallop and no friction rub.       No murmur heard.  Shunt to LUE with a positive thrill.   Pulmonary/Chest: Breath sounds normal. No respiratory distress. She has no wheezes. She has no rhonchi. She has no rales.   Abdominal: Abdomen is soft. Bowel sounds are normal. She exhibits no distension and no mass. There is no abdominal tenderness. There is no rebound and no guarding.   Musculoskeletal:         General: No tenderness or edema. Normal range of motion.      Cervical back: Normal range of motion and neck supple.     Neurological: She is alert and oriented to person, place, and time. She has normal strength.   Skin: Skin is warm and dry. No rash noted.   Psychiatric: She has a normal mood and affect. Thought content normal.       ED Course   Critical Care    Date/Time: 12/19/2022 3:29 PM  Performed by: Noah Kahn MD  Authorized by: Noah Kahn MD   Direct patient critical care time: 25 minutes  Additional history critical care time: 15 minutes  Ordering / reviewing critical care time: 12 minutes  Documentation critical care time: 10 minutes  Consulting other physicians critical care time: 8 minutes  Total critical care time (exclusive of procedural time) : 70 minutes  Critical care was necessary to treat or prevent imminent or life-threatening deterioration of the following conditions: cardiac failure and circulatory failure.      Labs Reviewed   CBC W/ AUTO DIFFERENTIAL - Abnormal; Notable for the following components:       Result Value    MCH 31.4 (*)     RDW 14.9 (*)     Platelets 103 (*)     Lymph # 0.6 (*)     Gran % 78.6 (*)     Lymph % 9.6 (*)     All other components within normal limits    COMPREHENSIVE METABOLIC PANEL - Abnormal; Notable for the following components:    Potassium 5.6 (*)     CO2 21 (*)     BUN 54 (*)     Creatinine 9.6 (*)     Total Bilirubin 1.3 (*)     eGFR 4.1 (*)     All other components within normal limits   B-TYPE NATRIURETIC PEPTIDE - Abnormal; Notable for the following components:    BNP 4,792 (*)     All other components within normal limits   TROPONIN I - Abnormal; Notable for the following components:    Troponin I 0.076 (*)     All other components within normal limits   CK     EKG Readings: (Independently Interpreted)   Rhythm: Atrial Fibrillation. Heart Rate: 176. Ectopy: No Ectopy. Conduction: Normal. ST Segments: Normal ST Segments. T Waves: Normal. Clinical Impression: Atrial Fibrillation with RVR   ECG Results              EKG 12-lead (In process)  Result time 12/19/22 13:55:53      In process by Interface, Lab In [facility] (12/19/22 13:55:53)                   Narrative:    Test Reason : INCREASED HEART RATE    Vent. Rate : 176 BPM     Atrial Rate : 192 BPM     P-R Int : 000 ms          QRS Dur : 116 ms      QT Int : 260 ms       P-R-T Axes : 000 -89 099 degrees     QTc Int : 445 ms    Atrial fibrillation with rapid ventricular response  Left axis deviation  Abnormal QRS-T angle, consider primary T wave abnormality  Abnormal ECG  No previous ECGs available    Referred By: [email or web address]   SELF           Confirmed By:                                   Imaging Results              X-Ray Chest AP Portable (Final result)  Result time 12/19/22 14:24:39      Final result by Juan M Panda MD (12/19/22 14:24:39)                   Impression:      Severe cardiomegaly. Left lung base obscured by the heart. There may be a small left pleural effusion. Consider further evaluation with PA and lateral chest radiographs. No pneumothorax.      Electronically signed by: Juan M Panda  Date:    12/19/2022  Time:    14:24               Narrative:    EXAMINATION:  XR CHEST AP  PORTABLE    CLINICAL HISTORY:  sob;.    TECHNIQUE:  Single frontal portable view of the chest was performed.    COMPARISON:  02/20/2018    FINDINGS:  Severe cardiomegaly. Left lung base obscured by the heart. There may be a small left pleural effusion. Consider further evaluation with PA and lateral chest radiographs. No pneumothorax. Right lung is clear.  Pulmonary vascular congestion without chyna pulmonary edema.                                       MRI Hip Without Contrast Left (Final result)  Result time 12/19/22 14:32:48      Final result by Bob Chester MD (12/19/22 14:32:48)                   Impression:      1. No left hip fracture.  2. Asymmetric large left hip joint effusion.  Small right hip joint effusion.  3. Paralabral cyst along the superior border of the acetabulum suspicious for a superior labral tear.      Electronically signed by: Bob Chester MD  Date:    12/19/2022  Time:    14:32               Narrative:    EXAMINATION:  MRI HIP WITHOUT CONTRAST LEFT    CLINICAL HISTORY:  Fracture, hip;    TECHNIQUE:  Multiplanar multisequence imaging of the left hip is performed without intravenous contrast.    COMPARISON:  Pelvic CT, 12/19/2022 and pelvic x-ray, 12/19/2022    FINDINGS:  The exam is limited due to patient motion artifact.    There is a small right hip joint effusion and a large left hip joint effusion.  There is no bone marrow edema of the left femur.  No left hip fracture.  No evidence for avascular necrosis of the left femoral head.  Paralabral cysts are seen around the superior aspect of the left acetabular labrum which raises concern for an acetabular labral tear.    There is a moderate volume of ascites in the pelvis.                                       CT Pelvis Without Contrast (Final result)  Result time 12/19/22 11:29:24      Final result by Juan M Panda MD (12/19/22 11:29:24)                   Impression:      No femur fracture. Small left hip joint effusion. Focal  calcification lateral to the left acetabulum raising question of avulsion of the rectus femoris ligament or iliofemoral ligament. Consider further evaluation with MRI left hip without IV contrast on a nonemergent basis, or as clinically warranted.      Electronically signed by: Juan M Panda  Date:    12/19/2022  Time:    11:29               Narrative:    EXAMINATION:  CT PELVIS WITHOUT CONTRAST    CLINICAL HISTORY:  Pelvic fracture;    TECHNIQUE:  Low dose axial images, sagittal and coronal reformations were obtained from the lower abdomen to the pubic symphysis.  Contrast was not administered.    All CT scans at this location are performed using dose modulation techniques as appropriate to a performed exam including the following: Automated exposure control; adjustment of the mA and/or kV according to patient size.    COMPARISON:  Pelvic radiograph same day    FINDINGS:  Bladder: No evidence of wall thickening.    GI Tract/Mesentery: Severe renal atrophy.  Advanced atherosclerotic calcification.  Sigmoid diverticulosis.  Trace free fluid in the pelvis.  Appendix is not well visualized.  No evidence of appendicitis.  Mild body wall anasarca.    Peritoneal Space:  No free air.    Retroperitoneum: No significant adenopathy.    Abdominal wall: As above    Bones: No femur fracture.  Small left hip joint effusion.  Focal calcification lateral to the left acetabulum raising question of avulsion of the rectus femoris ligament or iliofemoral ligament.    Severe hypertrophic facet arthropathy lower lumbar spine.  Severe discogenic degenerative change L5-S1.  Chronic degenerative changes of the SI joints.                                       X-Ray Pelvis Routine AP (Final result)  Result time 12/19/22 10:17:15      Final result by Juan M Panda MD (12/19/22 10:17:15)                   Impression:      No acute abnormality.      Electronically signed by: Juan M Panda  Date:    12/19/2022  Time:    10:17                Narrative:    EXAMINATION:  XR PELVIS ROUTINE AP    CLINICAL HISTORY:  pelvic pain;    TECHNIQUE:  AP view of the pelvis was performed.    COMPARISON:  None.    FINDINGS:  Bones appear osteopenic.  Advanced atherosclerotic calcification.  No evidence of fracture or dislocation.  SI joints unremarkable.  Mild discogenic degenerative change lower lumbar spine.  Soft tissues unremarkable.  Moderate volume stool throughout the colon.                                       Medications   diltiaZEM 125 mg in D5W 125 mL infusion (12.5 mg/hr Intravenous Rate/Dose Change 12/19/22 1524)   morphine injection 4 mg (4 mg Intramuscular Given 12/19/22 1010)   morphine injection 4 mg (4 mg Intravenous Given 12/19/22 1213)   diltiaZEM injection 15 mg (15 mg Intravenous Given 12/19/22 1356)                       ED Vital Signs:  Vitals:    12/19/22 1252 12/19/22 1345 12/19/22 1346 12/19/22 1358   BP: (!) 160/95      Pulse: 110 (!) 165 (!) 176 (!) 174   Resp:  (!) 23  (!) 22   Temp:       TempSrc:       SpO2: (!) 93% (!) 91%  98%   Weight:       Height:        12/19/22 1402 12/19/22 1412 12/19/22 1422 12/19/22 1438   BP: (!) 140/78 139/72 (!) 141/67 (!) 141/84   Pulse: 93 94 95 104   Resp: 19 18 18 18   Temp:       TempSrc:  Oral     SpO2: 98% 96% 95% 98%   Weight:       Height:        12/19/22 1444 12/19/22 1453 12/19/22 1503 12/19/22 1506   BP: (!) 148/90 (!) 141/82 (!) 149/88 (!) 149/88   Pulse: (!) 111 106 104    Resp: 16 16 16    Temp:       TempSrc:       SpO2: 99% 99% 100%    Weight:       Height:        12/19/22 1518 12/19/22 1522 12/19/22 1524   BP: (!) 146/84 (!) 143/72 (!) 143/72   Pulse: 102 107    Resp: 15 16    Temp:      TempSrc:      SpO2: 100% 99%    Weight:      Height:            Abnormal Lab Results:  Labs Reviewed   CBC W/ AUTO DIFFERENTIAL - Abnormal; Notable for the following components:       Result Value    MCH 31.4 (*)     RDW 14.9 (*)     Platelets 103 (*)     Lymph # 0.6 (*)     Gran % 78.6 (*)     Lymph %  9.6 (*)     All other components within normal limits   COMPREHENSIVE METABOLIC PANEL - Abnormal; Notable for the following components:    Potassium 5.6 (*)     CO2 21 (*)     BUN 54 (*)     Creatinine 9.6 (*)     Total Bilirubin 1.3 (*)     eGFR 4.1 (*)     All other components within normal limits   B-TYPE NATRIURETIC PEPTIDE - Abnormal; Notable for the following components:    BNP 4,792 (*)     All other components within normal limits   TROPONIN I - Abnormal; Notable for the following components:    Troponin I 0.076 (*)     All other components within normal limits   CK          All Lab Results:  Results for orders placed or performed during the hospital encounter of 12/19/22   CBC Auto Differential   Result Value Ref Range    WBC 6.44 3.90 - 12.70 K/uL    RBC 4.05 4.00 - 5.40 M/uL    Hemoglobin 12.7 12.0 - 16.0 g/dL    Hematocrit 38.8 37.0 - 48.5 %    MCV 96 82 - 98 fL    MCH 31.4 (H) 27.0 - 31.0 pg    MCHC 32.7 32.0 - 36.0 g/dL    RDW 14.9 (H) 11.5 - 14.5 %    Platelets 103 (L) 150 - 450 K/uL    MPV 12.6 9.2 - 12.9 fL    Immature Granulocytes 0.3 0.0 - 0.5 %    Gran # (ANC) 5.1 1.8 - 7.7 K/uL    Immature Grans (Abs) 0.02 0.00 - 0.04 K/uL    Lymph # 0.6 (L) 1.0 - 4.8 K/uL    Mono # 0.7 0.3 - 1.0 K/uL    Eos # 0.0 0.0 - 0.5 K/uL    Baso # 0.02 0.00 - 0.20 K/uL    nRBC 0 0 /100 WBC    Gran % 78.6 (H) 38.0 - 73.0 %    Lymph % 9.6 (L) 18.0 - 48.0 %    Mono % 11.2 4.0 - 15.0 %    Eosinophil % 0.0 0.0 - 8.0 %    Basophil % 0.3 0.0 - 1.9 %    Differential Method Automated    Comprehensive Metabolic Panel   Result Value Ref Range    Sodium 139 136 - 145 mmol/L    Potassium 5.6 (H) 3.5 - 5.1 mmol/L    Chloride 102 95 - 110 mmol/L    CO2 21 (L) 23 - 29 mmol/L    Glucose 106 70 - 110 mg/dL    BUN 54 (H) 8 - 23 mg/dL    Creatinine 9.6 (H) 0.5 - 1.4 mg/dL    Calcium 9.6 8.7 - 10.5 mg/dL    Total Protein 7.9 6.0 - 8.4 g/dL    Albumin 3.9 3.5 - 5.2 g/dL    Total Bilirubin 1.3 (H) 0.1 - 1.0 mg/dL    Alkaline Phosphatase 112 55  - 135 U/L    AST 32 10 - 40 U/L    ALT 22 10 - 44 U/L    Anion Gap 16 8 - 16 mmol/L    eGFR 4.1 (A) >60 mL/min/1.73 m^2   BNP   Result Value Ref Range    BNP 4,792 (H) 0 - 99 pg/mL   CK   Result Value Ref Range     20 - 180 U/L   Troponin I   Result Value Ref Range    Troponin I 0.076 (H) 0.000 - 0.026 ng/mL           Imaging Results:  Imaging Results              X-Ray Chest AP Portable (Final result)  Result time 12/19/22 14:24:39      Final result by Juan M Panda MD (12/19/22 14:24:39)                   Impression:      Severe cardiomegaly. Left lung base obscured by the heart. There may be a small left pleural effusion. Consider further evaluation with PA and lateral chest radiographs. No pneumothorax.      Electronically signed by: Juan M Panda  Date:    12/19/2022  Time:    14:24               Narrative:    EXAMINATION:  XR CHEST AP PORTABLE    CLINICAL HISTORY:  sob;.    TECHNIQUE:  Single frontal portable view of the chest was performed.    COMPARISON:  02/20/2018    FINDINGS:  Severe cardiomegaly. Left lung base obscured by the heart. There may be a small left pleural effusion. Consider further evaluation with PA and lateral chest radiographs. No pneumothorax. Right lung is clear.  Pulmonary vascular congestion without chyna pulmonary edema.                                       MRI Hip Without Contrast Left (Final result)  Result time 12/19/22 14:32:48      Final result by Bob Chester MD (12/19/22 14:32:48)                   Impression:      1. No left hip fracture.  2. Asymmetric large left hip joint effusion.  Small right hip joint effusion.  3. Paralabral cyst along the superior border of the acetabulum suspicious for a superior labral tear.      Electronically signed by: Bob Chester MD  Date:    12/19/2022  Time:    14:32               Narrative:    EXAMINATION:  MRI HIP WITHOUT CONTRAST LEFT    CLINICAL HISTORY:  Fracture, hip;    TECHNIQUE:  Multiplanar multisequence imaging of the left  hip is performed without intravenous contrast.    COMPARISON:  Pelvic CT, 12/19/2022 and pelvic x-ray, 12/19/2022    FINDINGS:  The exam is limited due to patient motion artifact.    There is a small right hip joint effusion and a large left hip joint effusion.  There is no bone marrow edema of the left femur.  No left hip fracture.  No evidence for avascular necrosis of the left femoral head.  Paralabral cysts are seen around the superior aspect of the left acetabular labrum which raises concern for an acetabular labral tear.    There is a moderate volume of ascites in the pelvis.                                       CT Pelvis Without Contrast (Final result)  Result time 12/19/22 11:29:24      Final result by Juan M Panda MD (12/19/22 11:29:24)                   Impression:      No femur fracture. Small left hip joint effusion. Focal calcification lateral to the left acetabulum raising question of avulsion of the rectus femoris ligament or iliofemoral ligament. Consider further evaluation with MRI left hip without IV contrast on a nonemergent basis, or as clinically warranted.      Electronically signed by: Juan M Panda  Date:    12/19/2022  Time:    11:29               Narrative:    EXAMINATION:  CT PELVIS WITHOUT CONTRAST    CLINICAL HISTORY:  Pelvic fracture;    TECHNIQUE:  Low dose axial images, sagittal and coronal reformations were obtained from the lower abdomen to the pubic symphysis.  Contrast was not administered.    All CT scans at this location are performed using dose modulation techniques as appropriate to a performed exam including the following: Automated exposure control; adjustment of the mA and/or kV according to patient size.    COMPARISON:  Pelvic radiograph same day    FINDINGS:  Bladder: No evidence of wall thickening.    GI Tract/Mesentery: Severe renal atrophy.  Advanced atherosclerotic calcification.  Sigmoid diverticulosis.  Trace free fluid in the pelvis.  Appendix is not well  visualized.  No evidence of appendicitis.  Mild body wall anasarca.    Peritoneal Space:  No free air.    Retroperitoneum: No significant adenopathy.    Abdominal wall: As above    Bones: No femur fracture.  Small left hip joint effusion.  Focal calcification lateral to the left acetabulum raising question of avulsion of the rectus femoris ligament or iliofemoral ligament.    Severe hypertrophic facet arthropathy lower lumbar spine.  Severe discogenic degenerative change L5-S1.  Chronic degenerative changes of the SI joints.                                       X-Ray Pelvis Routine AP (Final result)  Result time 12/19/22 10:17:15      Final result by Juan M Panda MD (12/19/22 10:17:15)                   Impression:      No acute abnormality.      Electronically signed by: Juan M Panda  Date:    12/19/2022  Time:    10:17               Narrative:    EXAMINATION:  XR PELVIS ROUTINE AP    CLINICAL HISTORY:  pelvic pain;    TECHNIQUE:  AP view of the pelvis was performed.    COMPARISON:  None.    FINDINGS:  Bones appear osteopenic.  Advanced atherosclerotic calcification.  No evidence of fracture or dislocation.  SI joints unremarkable.  Mild discogenic degenerative change lower lumbar spine.  Soft tissues unremarkable.  Moderate volume stool throughout the colon.                                         The Emergency Provider reviewed the vital signs and test results, which are outlined above.    ED Discussions:  2:42 PM: Re-evaluated pt. I have discussed test results, shared treatment plan, and the need for admission with patient and family at bedside. Pt and family express understanding at this time and agree with all information. All questions answered. Pt and family have no further questions or concerns at this time. Pt is ready for admit.    All historical, clinical, radiographic, and laboratory findings were reviewed with the patient/family in detail along with the indications for transport to the facility in  [place] in order to receive admission.  All remaining questions and concerns were addressed at this time and the patient/family communicates understanding and agrees to proceed accordingly.  Similarly, all pertinent details of the encounter were discussed with Dr. Haq via Alexa Haddad who agrees to receive the patient at [facility] for further care as outlined above.  The patient will be transferred by [facility] ambulance services secondary to a need for ongoing cardiac monitoring en route.  Noah Kahn MD  3:00 PM                 Clinical Impression:   Final diagnoses:  [R07.9] Chest pain  [M25.552] Left hip pain (Primary)  [I48.91] Atrial fibrillation with RVR        ED Disposition Condition    Observation Stable                Noah Kahn MD  12/19/22 [id number]

## 2022-12-20 PROBLEM — N18.6 ESRD (END STAGE RENAL DISEASE) ON DIALYSIS: Status: ACTIVE | Noted: 2022-12-20

## 2022-12-20 PROBLEM — R06.02 SHORTNESS OF BREATH: Status: ACTIVE | Noted: 2022-12-20

## 2022-12-20 PROBLEM — S73.192A ACETABULAR LABRUM TEAR, LEFT, INITIAL ENCOUNTER: Status: ACTIVE | Noted: 2022-12-20

## 2022-12-20 PROBLEM — Z99.2 ESRD (END STAGE RENAL DISEASE) ON DIALYSIS: Status: ACTIVE | Noted: 2022-12-20

## 2022-12-20 PROBLEM — K21.9 GASTROESOPHAGEAL REFLUX DISEASE WITHOUT ESOPHAGITIS: Status: ACTIVE | Noted: 2022-12-20

## 2022-12-20 PROBLEM — I10 PRIMARY HYPERTENSION: Status: ACTIVE | Noted: 2022-12-20

## 2022-12-20 PROBLEM — M85.651 BONE CYST OF RIGHT FEMUR: Status: ACTIVE | Noted: 2022-12-20

## 2022-12-20 PROBLEM — M25.552 LEFT HIP PAIN: Status: ACTIVE | Noted: 2022-12-20

## 2022-12-20 LAB
ALBUMIN SERPL BCP-MCNC: 3.4 G/DL (ref 3.5–5.2)
ALP SERPL-CCNC: 102 U/L (ref 55–135)
ALT SERPL W/O P-5'-P-CCNC: 16 U/L (ref 10–44)
ANION GAP SERPL CALC-SCNC: 15 MMOL/L (ref 8–16)
ANION GAP SERPL CALC-SCNC: 18 MMOL/L (ref 8–16)
AORTIC ROOT ANNULUS: 3.06 CM
APTT BLDCRRT: 35.4 SEC (ref 21–32)
ASCENDING AORTA: 3.42 CM
AST SERPL-CCNC: 23 U/L (ref 10–40)
AV INDEX (PROSTH): 0.61
AV MEAN GRADIENT: 9 MMHG
AV PEAK GRADIENT: 18 MMHG
AV VALVE AREA: 2.09 CM2
AV VELOCITY RATIO: 0.51
BASOPHILS # BLD AUTO: 0.02 K/UL (ref 0–0.2)
BASOPHILS NFR BLD: 0.3 % (ref 0–1.9)
BILIRUB SERPL-MCNC: 1.5 MG/DL (ref 0.1–1)
BSA FOR ECHO PROCEDURE: 1.83 M2
BUN SERPL-MCNC: 31 MG/DL (ref 8–23)
BUN SERPL-MCNC: 66 MG/DL (ref 8–23)
CALCIUM SERPL-MCNC: 9.4 MG/DL (ref 8.7–10.5)
CALCIUM SERPL-MCNC: 9.8 MG/DL (ref 8.7–10.5)
CHLORIDE SERPL-SCNC: 102 MMOL/L (ref 95–110)
CHLORIDE SERPL-SCNC: 95 MMOL/L (ref 95–110)
CO2 SERPL-SCNC: 20 MMOL/L (ref 23–29)
CO2 SERPL-SCNC: 24 MMOL/L (ref 23–29)
CREAT SERPL-MCNC: 10.6 MG/DL (ref 0.5–1.4)
CREAT SERPL-MCNC: 6.3 MG/DL (ref 0.5–1.4)
CV ECHO LV RWT: 0.56 CM
DIFFERENTIAL METHOD: ABNORMAL
DOP CALC AO PEAK VEL: 2.12 M/S
DOP CALC AO VTI: 32.5 CM
DOP CALC LVOT AREA: 3.4 CM2
DOP CALC LVOT DIAMETER: 2.09 CM
DOP CALC LVOT PEAK VEL: 1.08 M/S
DOP CALC LVOT STROKE VOLUME: 67.89 CM3
DOP CALC RVOT PEAK VEL: 0.54 M/S
DOP CALC RVOT VTI: 13.9 CM
DOP CALCLVOT PEAK VEL VTI: 19.8 CM
E WAVE DECELERATION TIME: 148.05 MSEC
E/A RATIO: 4.38
E/E' RATIO: 15.33 M/S
ECHO LV POSTERIOR WALL: 1.49 CM (ref 0.6–1.1)
EJECTION FRACTION: 55 %
EOSINOPHIL # BLD AUTO: 0 K/UL (ref 0–0.5)
EOSINOPHIL NFR BLD: 0 % (ref 0–8)
ERYTHROCYTE [DISTWIDTH] IN BLOOD BY AUTOMATED COUNT: 14.7 % (ref 11.5–14.5)
EST. GFR  (NO RACE VARIABLE): 4 ML/MIN/1.73 M^2
EST. GFR  (NO RACE VARIABLE): 7 ML/MIN/1.73 M^2
FRACTIONAL SHORTENING: 29 % (ref 28–44)
GLUCOSE SERPL-MCNC: 119 MG/DL (ref 70–110)
GLUCOSE SERPL-MCNC: 87 MG/DL (ref 70–110)
HCT VFR BLD AUTO: 35.7 % (ref 37–48.5)
HGB BLD-MCNC: 11.6 G/DL (ref 12–16)
IMM GRANULOCYTES # BLD AUTO: 0.02 K/UL (ref 0–0.04)
IMM GRANULOCYTES NFR BLD AUTO: 0.3 % (ref 0–0.5)
INR PPP: 1.1 (ref 0.8–1.2)
INTERVENTRICULAR SEPTUM: 1.67 CM (ref 0.6–1.1)
IVC DIAMETER: 2.82 CM
IVRT: 87.54 MSEC
LA MAJOR: 6.87 CM
LA MINOR: 6.48 CM
LA WIDTH: 3.8 CM
LEFT ATRIUM SIZE: 4.15 CM
LEFT ATRIUM VOLUME INDEX: 49.7 ML/M2
LEFT ATRIUM VOLUME: 89.4 CM3
LEFT INTERNAL DIMENSION IN SYSTOLE: 3.78 CM (ref 2.1–4)
LEFT VENTRICLE DIASTOLIC VOLUME INDEX: 75.56 ML/M2
LEFT VENTRICLE DIASTOLIC VOLUME: 136 ML
LEFT VENTRICLE MASS INDEX: 212 G/M2
LEFT VENTRICLE SYSTOLIC VOLUME INDEX: 34 ML/M2
LEFT VENTRICLE SYSTOLIC VOLUME: 61.16 ML
LEFT VENTRICULAR INTERNAL DIMENSION IN DIASTOLE: 5.31 CM (ref 3.5–6)
LEFT VENTRICULAR MASS: 381.59 G
LV LATERAL E/E' RATIO: 15.33 M/S
LV SEPTAL E/E' RATIO: 15.33 M/S
LVOT MG: 2.01 MMHG
LVOT MV: 0.63 CM/S
LYMPHOCYTES # BLD AUTO: 1 K/UL (ref 1–4.8)
LYMPHOCYTES NFR BLD: 15.1 % (ref 18–48)
MCH RBC QN AUTO: 30.9 PG (ref 27–31)
MCHC RBC AUTO-ENTMCNC: 32.5 G/DL (ref 32–36)
MCV RBC AUTO: 95 FL (ref 82–98)
MONOCYTES # BLD AUTO: 0.9 K/UL (ref 0.3–1)
MONOCYTES NFR BLD: 14.6 % (ref 4–15)
MV PEAK A VEL: 0.21 M/S
MV PEAK E VEL: 0.92 M/S
MV STENOSIS PRESSURE HALF TIME: 42.93 MS
MV VALVE AREA P 1/2 METHOD: 5.12 CM2
NEUTROPHILS # BLD AUTO: 4.4 K/UL (ref 1.8–7.7)
NEUTROPHILS NFR BLD: 69.7 % (ref 38–73)
NRBC BLD-RTO: 0 /100 WBC
PISA MRMAX VEL: 5.03 M/S
PISA TR MAX VEL: 3.79 M/S
PLATELET # BLD AUTO: 95 K/UL (ref 150–450)
PMV BLD AUTO: 12.7 FL (ref 9.2–12.9)
POTASSIUM SERPL-SCNC: 4.6 MMOL/L (ref 3.5–5.1)
POTASSIUM SERPL-SCNC: 6.1 MMOL/L (ref 3.5–5.1)
PROT SERPL-MCNC: 7.4 G/DL (ref 6–8.4)
PROTHROMBIN TIME: 11.7 SEC (ref 9–12.5)
PV MEAN GRADIENT: 0.7 MMHG
RA MAJOR: 5.77 CM
RA PRESSURE: 3 MMHG
RA WIDTH: 3.67 CM
RBC # BLD AUTO: 3.75 M/UL (ref 4–5.4)
SINUS: 3.26 CM
SODIUM SERPL-SCNC: 137 MMOL/L (ref 136–145)
SODIUM SERPL-SCNC: 137 MMOL/L (ref 136–145)
STJ: 3.37 CM
TDI LATERAL: 0.06 M/S
TDI SEPTAL: 0.06 M/S
TDI: 0.06 M/S
TR MAX PG: 57 MMHG
TRICUSPID ANNULAR PLANE SYSTOLIC EXCURSION: 2.27 CM
TROPONIN I SERPL DL<=0.01 NG/ML-MCNC: 0.09 NG/ML (ref 0–0.03)
TV REST PULMONARY ARTERY PRESSURE: 60 MMHG
WBC # BLD AUTO: 6.35 K/UL (ref 3.9–12.7)

## 2022-12-20 PROCEDURE — 99223 1ST HOSP IP/OBS HIGH 75: CPT | Mod: ,,, | Performed by: PHYSICIAN ASSISTANT

## 2022-12-20 PROCEDURE — 94761 N-INVAS EAR/PLS OXIMETRY MLT: CPT

## 2022-12-20 PROCEDURE — 99204 OFFICE O/P NEW MOD 45 MIN: CPT | Mod: 25,,, | Performed by: INTERNAL MEDICINE

## 2022-12-20 PROCEDURE — 90935 HEMODIALYSIS ONE EVALUATION: CPT | Mod: ,,, | Performed by: INTERNAL MEDICINE

## 2022-12-20 PROCEDURE — 90935 PR HEMODIALYSIS, ONE EVALUATION: ICD-10-PCS | Mod: ,,, | Performed by: INTERNAL MEDICINE

## 2022-12-20 PROCEDURE — 99223 PR INITIAL HOSPITAL CARE,LEVL III: ICD-10-PCS | Mod: ,,, | Performed by: PHYSICIAN ASSISTANT

## 2022-12-20 PROCEDURE — 36415 COLL VENOUS BLD VENIPUNCTURE: CPT | Performed by: HOSPITALIST

## 2022-12-20 PROCEDURE — 80074 ACUTE HEPATITIS PANEL: CPT | Performed by: INTERNAL MEDICINE

## 2022-12-20 PROCEDURE — 99220 PR INITIAL OBSERVATION CARE,LEVL III: ICD-10-PCS | Mod: 25,,, | Performed by: STUDENT IN AN ORGANIZED HEALTH CARE EDUCATION/TRAINING PROGRAM

## 2022-12-20 PROCEDURE — 90935 HEMODIALYSIS ONE EVALUATION: CPT

## 2022-12-20 PROCEDURE — 93005 ELECTROCARDIOGRAM TRACING: CPT

## 2022-12-20 PROCEDURE — 25000003 PHARM REV CODE 250: Performed by: EMERGENCY MEDICINE

## 2022-12-20 PROCEDURE — 25000003 PHARM REV CODE 250: Performed by: HOSPITALIST

## 2022-12-20 PROCEDURE — 85025 COMPLETE CBC W/AUTO DIFF WBC: CPT | Performed by: HOSPITALIST

## 2022-12-20 PROCEDURE — 27000221 HC OXYGEN, UP TO 24 HOURS

## 2022-12-20 PROCEDURE — 99900035 HC TECH TIME PER 15 MIN (STAT)

## 2022-12-20 PROCEDURE — 85730 THROMBOPLASTIN TIME PARTIAL: CPT

## 2022-12-20 PROCEDURE — 63600175 PHARM REV CODE 636 W HCPCS

## 2022-12-20 PROCEDURE — 84484 ASSAY OF TROPONIN QUANT: CPT | Performed by: NURSE PRACTITIONER

## 2022-12-20 PROCEDURE — 93010 ELECTROCARDIOGRAM REPORT: CPT | Mod: 76,,, | Performed by: STUDENT IN AN ORGANIZED HEALTH CARE EDUCATION/TRAINING PROGRAM

## 2022-12-20 PROCEDURE — 36415 COLL VENOUS BLD VENIPUNCTURE: CPT | Performed by: NURSE PRACTITIONER

## 2022-12-20 PROCEDURE — 99204 PR OFFICE/OUTPT VISIT, NEW, LEVL IV, 45-59 MIN: ICD-10-PCS | Mod: 25,,, | Performed by: INTERNAL MEDICINE

## 2022-12-20 PROCEDURE — 85610 PROTHROMBIN TIME: CPT

## 2022-12-20 PROCEDURE — 80048 BASIC METABOLIC PNL TOTAL CA: CPT | Mod: XB | Performed by: NURSE PRACTITIONER

## 2022-12-20 PROCEDURE — 25000003 PHARM REV CODE 250: Performed by: INTERNAL MEDICINE

## 2022-12-20 PROCEDURE — 99220 PR INITIAL OBSERVATION CARE,LEVL III: CPT | Mod: 25,,, | Performed by: STUDENT IN AN ORGANIZED HEALTH CARE EDUCATION/TRAINING PROGRAM

## 2022-12-20 PROCEDURE — 96372 THER/PROPH/DIAG INJ SC/IM: CPT | Performed by: HOSPITALIST

## 2022-12-20 PROCEDURE — G0378 HOSPITAL OBSERVATION PER HR: HCPCS

## 2022-12-20 PROCEDURE — 80053 COMPREHEN METABOLIC PANEL: CPT | Performed by: HOSPITALIST

## 2022-12-20 PROCEDURE — 86706 HEP B SURFACE ANTIBODY: CPT | Performed by: INTERNAL MEDICINE

## 2022-12-20 PROCEDURE — 93010 EKG 12-LEAD: ICD-10-PCS | Mod: ,,, | Performed by: STUDENT IN AN ORGANIZED HEALTH CARE EDUCATION/TRAINING PROGRAM

## 2022-12-20 PROCEDURE — 36415 COLL VENOUS BLD VENIPUNCTURE: CPT

## 2022-12-20 PROCEDURE — 93010 ELECTROCARDIOGRAM REPORT: CPT | Mod: ,,, | Performed by: STUDENT IN AN ORGANIZED HEALTH CARE EDUCATION/TRAINING PROGRAM

## 2022-12-20 PROCEDURE — 63600175 PHARM REV CODE 636 W HCPCS: Performed by: HOSPITALIST

## 2022-12-20 RX ORDER — HEPARIN SODIUM,PORCINE/D5W 25000/250
0-40 INTRAVENOUS SOLUTION INTRAVENOUS CONTINUOUS
Status: DISCONTINUED | OUTPATIENT
Start: 2022-12-20 | End: 2022-12-23

## 2022-12-20 RX ORDER — CALCITRIOL 0.25 UG/1
0.5 CAPSULE ORAL
Status: DISCONTINUED | OUTPATIENT
Start: 2022-12-21 | End: 2023-01-07 | Stop reason: HOSPADM

## 2022-12-20 RX ORDER — MUPIROCIN 20 MG/G
OINTMENT TOPICAL 2 TIMES DAILY
Status: DISPENSED | OUTPATIENT
Start: 2022-12-20 | End: 2022-12-25

## 2022-12-20 RX ADMIN — Medication 12.5 MG/HR: at 12:12

## 2022-12-20 RX ADMIN — LOSARTAN POTASSIUM 50 MG: 50 TABLET, FILM COATED ORAL at 09:12

## 2022-12-20 RX ADMIN — MORPHINE SULFATE 2 MG: 2 INJECTION, SOLUTION INTRAMUSCULAR; INTRAVENOUS at 05:12

## 2022-12-20 RX ADMIN — MORPHINE SULFATE 2 MG: 2 INJECTION, SOLUTION INTRAMUSCULAR; INTRAVENOUS at 08:12

## 2022-12-20 RX ADMIN — HEPARIN SODIUM 12 UNITS/KG/HR: 10000 INJECTION, SOLUTION INTRAVENOUS at 05:12

## 2022-12-20 RX ADMIN — BRIMONIDINE TARTRATE 1 DROP: 2 SOLUTION/ DROPS OPHTHALMIC at 09:12

## 2022-12-20 RX ADMIN — HEPARIN SODIUM 5000 UNITS: 5000 INJECTION INTRAVENOUS; SUBCUTANEOUS at 05:12

## 2022-12-20 RX ADMIN — HYDROCODONE BITARTRATE AND ACETAMINOPHEN 1 TABLET: 5; 325 TABLET ORAL at 04:12

## 2022-12-20 RX ADMIN — Medication 5 MG/HR: at 09:12

## 2022-12-20 RX ADMIN — MUPIROCIN: 20 OINTMENT TOPICAL at 08:12

## 2022-12-20 RX ADMIN — HYDRALAZINE HYDROCHLORIDE 25 MG: 25 TABLET, FILM COATED ORAL at 03:12

## 2022-12-20 RX ADMIN — HYDRALAZINE HYDROCHLORIDE 25 MG: 25 TABLET, FILM COATED ORAL at 09:12

## 2022-12-20 RX ADMIN — BRIMONIDINE TARTRATE 1 DROP: 2 SOLUTION/ DROPS OPHTHALMIC at 08:12

## 2022-12-20 RX ADMIN — HYDROCODONE BITARTRATE AND ACETAMINOPHEN 1 TABLET: 5; 325 TABLET ORAL at 05:12

## 2022-12-20 NOTE — H&P (VIEW-ONLY)
[facility] - Tele[facility] ([facility])  Nephrology  Consult Note    Patient Name: Meaghan Potter  MRN: [id number]  Admission Date: 2022  Hospital Length of Stay: 0 days  Attending Provider: Afshan Márquez MD   Primary Care Physician: Primary Doctor No  Principal Problem:<principal problem not specified>  Reason for Consult: Management of ESRD  Primary Nephrologist: Dr. Ribera/[facility]     Inpatient consult to Nephrology  Consult performed by: William Alfonso MD  Consult ordered by: Afshan Márquez MD  Reason for consult: Management of ESRD      Subjective:     HPI: 68 yo female with ESRD on MWF last HD last Friday () admitted as a transfer for Cardiology, Ortho and Nephrology. She is currently seen on dialysis, using a LUE AVF. Dialysis vintage is 13 years. Her only complaint to me is pain in both hips described as 'stiff'     No current SOB, no chest pain but describes poor appetite since in pain.     Past Medical History:   Diagnosis Date    ESRD (end stage renal disease) on dialysis     GERD (gastroesophageal reflux disease)     Hypertension        Past Surgical History:   Procedure Laterality Date     SECTION      PLACEMENT OF DIALYSIS ACCESS         Review of patient's allergies indicates:   Allergen Reactions    Amoxicillin Rash     Current Facility-Administered Medications   Medication Frequency    acetaminophen suppository 650 mg Q6H PRN    acetaminophen tablet 650 mg Q8H PRN    albuterol-ipratropium 2.5 mg-0.5 mg/3 mL nebulizer solution 3 mL Q6H PRN    aluminum-magnesium hydroxide-simethicone 200-200-20 mg/5 mL suspension 30 mL QID PRN    brimonidine 0.2% ophthalmic solution 1 drop BID    dextrose 10% bolus 125 mL 125 mL PRN    dextrose 10% bolus 250 mL 250 mL PRN    diltiaZEM 125 mg in D5W 125 mL infusion Continuous    [START ON 2022] ergocalciferol capsule 50,000 Units Q7 Days    glucagon (human recombinant) injection 1 mg PRN    glucose chewable tablet 16 g PRN     glucose chewable tablet 24 g PRN    heparin 25,000 units in dextrose 5% (100 units/ml) IV bolus from bag - ADDITIONAL PRN BOLUS - 30 units/kg PRN    heparin 25,000 units in dextrose 5% (100 units/ml) IV bolus from bag - ADDITIONAL PRN BOLUS - 60 units/kg PRN    heparin 25,000 units in dextrose 5% 250 mL (100 units/mL) infusion LOW INTENSITY nomogram - [facility] Continuous    hydrALAZINE tablet 25 mg TID    HYDROcodone-acetaminophen 5-325 mg per tablet 1 tablet Q6H PRN    losartan tablet 50 mg Daily    melatonin tablet 6 mg Nightly PRN    morphine injection 2 mg Q4H PRN    mupirocin 2 % ointment BID    naloxone 0.4 mg/mL injection 0.02 mg PRN    ondansetron injection 4 mg Q8H PRN    promethazine tablet 25 mg Q6H PRN    senna-docusate 8.6-50 mg per tablet 1 tablet BID PRN    sodium chloride 0.9% bolus 250 mL 250 mL PRN    sodium chloride 0.9% flush 3 mL Q12H PRN     Family History       Problem Relation (Age of Onset)    Diabetes Mother, Father    Hypertension Mother, Father          Tobacco Use    Smoking status: Former    Smokeless tobacco: Never   Substance and Sexual Activity    Alcohol use: No     Comment: occassionally    Drug use: No    Sexual activity: Yes     Review of Systems   Constitutional:  Positive for activity change, appetite change and fatigue.   HENT:  Negative for facial swelling.    Respiratory:  Negative for shortness of breath.    Cardiovascular:  Negative for chest pain and leg swelling.   Gastrointestinal:  Negative for abdominal pain, nausea and vomiting.   Genitourinary:  Positive for decreased urine volume (anuric).   Musculoskeletal:  Positive for arthralgias, gait problem and joint swelling.   Allergic/Immunologic: Positive for immunocompromised state.   Neurological:  Negative for weakness and light-headedness.   Psychiatric/Behavioral:  Negative for confusion and decreased concentration.    Objective:     Vital Signs (Most Recent):  Temp: 98 °F (36.7 °C) (12/20/22 0921)  Pulse: 80 (12/20/22  0921)  Resp: 18 (12/20/22 0921)  BP: 135/68 (12/20/22 0921)  SpO2: 96 % (12/20/22 0921) Vital Signs (24h Range):  Temp:  [98 °F (36.7 °C)-99.2 °F (37.3 °C)] 98 °F (36.7 °C)  Pulse:  [] 80  Resp:  [13-23] 18  SpO2:  [91 %-100 %] 96 %  BP: (105-149)/(58-90) 135/68     Weight: 74.4 kg (164 lb) (12/20/22 0401)  Body mass index is 28.15 kg/m².  Body surface area is 1.83 meters squared.    No intake/output data recorded.    Physical Exam  Vitals and nursing note reviewed.   Constitutional:       General: She is not in acute distress.     Appearance: Normal appearance. She is obese. She is not ill-appearing.   HENT:      Head: Atraumatic.   Eyes:      General: No scleral icterus.  Cardiovascular:      Rate and Rhythm: Normal rate.      Heart sounds:     No friction rub.   Pulmonary:      Effort: Pulmonary effort is normal. No respiratory distress.      Breath sounds: No wheezing.   Abdominal:      Palpations: Abdomen is soft.      Tenderness: There is no abdominal tenderness.   Musculoskeletal:      Right lower leg: Edema present.      Left lower leg: Edema present.   Skin:     General: Skin is warm and dry.   Neurological:      Mental Status: She is alert and oriented to person, place, and time.   Psychiatric:         Mood and Affect: Mood normal.       Significant Labs: reviewed    Significant Imaging: CXR 12/19 reviewed, comparison 2018     Assessment/Plan:     Active Diagnoses:    Diagnosis Date Noted POA    ESRD (end stage renal disease) on dialysis [N18.6, Z99.2] 12/20/2022 Not Applicable    Primary hypertension [I10] 12/20/2022 Yes    Gastroesophageal reflux disease without esophagitis [K21.9] 12/20/2022 Yes    Left hip pain [M25.552] 12/20/2022 Yes    Tear of left acetabular labrum [S73.192A] 12/20/2022 Yes    Shortness of breath [R06.02] 12/20/2022 Yes    Bone cyst of right femur [M85.651] 12/20/2022 Unknown    Atrial fibrillation [I48.91] 12/19/2022 Yes      Problems Resolved During this Admission:        ESRD on MWF  - HD today with 2K bath and UF to target weight of 72kg, recently decreased from 75kg  - pending hospital course will attempt to resume MWF schedule     Mineral and Bone Disease  - resume calcitriol 0.5mcg MWF  - continue D2 weekly   - reports no phos binders  - continue low phos diet    HTN  - resume home medication and monitor    Anemia of ESRD  - no ESAs indicated yet   - monitor H&H     Cardiomegaly, quite severe on recent CXR  - follow up TTE for ?effusion    Afib with RVR  - rate much improved on Cardizem gtts  - per Cardiology    Hip pain  - Ortho note reviewed      Thank you for your consult. I will follow-up with patient. Please contact us if you have any additional questions.    William Alfonso MD  Nephrology

## 2022-12-20 NOTE — HPI
Ms. Potter is a 66y/o female with PMHx ESRD on dialysis, HTN and GERD who presented to [facility] ED transfer from [facility] for cardiac workup following elevated troponin and BNP in the emergency department as well as orthopedic care after patient sustained left superior labral tear of her acetabulum noted on MRI. Cardiology was consulted for new onset afib on diltiazem gtt. Pt seen and examined today resting in bed. Patient reported being in her usual state of health prior to onset of symptoms and reported acute onset of left hip pain while performing leg exercises in bed earlier today. While at outside facility, patient became short of breath while lying flat while obtaining MRI of hip and was found to be in atrial fibrillation with RVR requiring diltiazem drip. She denied endorsing any chest pain, lightheadedness, dizziness, visual disturbances, fever, chills, sweats, nausea, vomiting, abdominal pain, changes in bowel/bladder habits, or onset neurological deficits.  All other review of systems negative except as noted above.  Patient reports being back at her baseline and continues to complain only of left hip pain. Pt missed HD yesterday, planned for today. Labs reviewed K 6.1, Crt 10.6 troponin .076->.095 BNP 4702. Echo pending, repeat EKG ordered

## 2022-12-20 NOTE — PROGRESS NOTES
12/20/22 1215   Vital Signs   Temp 97.7 °F (36.5 °C)   Temp src Axillary   Pulse 87   Heart Rate Source Monitor   Resp (!) 21   SpO2 97 %   Pulse Oximetry Type Intermittent   Flow (L/min) 4   Oxygen Concentration (%) 36   Device (Oxygen Therapy) nasal cannula   BP (!) 144/77   MAP (mmHg) 98   BP Location Right arm   BP Method Automatic   Patient Position Lying        Hemodialysis AV Fistula Left upper arm   No placement date or time found.   Present Prior to Hospital Arrival?: Yes  Location: Left upper arm   Needle Size 15ga   Site Assessment Clean;Dry;Intact;No redness;No swelling;No warmth;No drainage   Patency Bruit;Thrill;Present   Status Accessed   Flows Good   Dressing Intervention Integrity maintained   Dressing Status Clean;Dry;Intact   Site Condition No complications   Dressing Gauze     3 hour  hd tx started

## 2022-12-20 NOTE — ASSESSMENT & PLAN NOTE
Patient presented with acute onset left hip pain x1 day duration in absence of trauma while doing leg exercises in bed and was found to asymmetric large left hip joint effusion, small right hip joint effusion, and evidence of superior labral tear of her left acetabulum noted on MRI.  Patient noted to have 5/5 strength throughout with sensation to light touch grossly intact throughout however, TTP throughout left groin.  Orthopedics consulted and awaiting further evaluation/recommendations.  Plan:  -optimize pain control  -bowel regimen  -bed rest  -nonweightbearing  -PT/OT  -f/u ortho

## 2022-12-20 NOTE — H&P (VIEW-ONLY)
[facility] - Telemetry ([facility])  Cardiology  Consult Note    Patient Name: Meaghan Potter  MRN: [id number]  Admission Date: 12/19/2022  Hospital Length of Stay: 0 days  Code Status: Full Code   Attending Provider: Afshan Márquez MD   Consulting Provider: Rissa Yang NP  Primary Care Physician: Primary Doctor No  Principal Problem:<principal problem not specified>    Patient information was obtained from patient and ER records.     Inpatient consult to Cardiology  Consult performed by: Rissa Yang NP  Consult ordered by: Robert Bryan MD        Subjective:     Chief Complaint:  SOB     HPI:   Ms. Potter is a 66y/o female with PMHx ESRD on dialysis, HTN and GERD who presented to [facility] ED transfer from [facility] for cardiac workup following elevated troponin and BNP in the emergency department as well as orthopedic care after patient sustained left superior labral tear of her acetabulum noted on MRI. Cardiology was consulted for new onset afib on diltiazem gtt. Pt seen and examined today resting in bed. Patient reported being in her usual state of health prior to onset of symptoms and reported acute onset of left hip pain while performing leg exercises in bed earlier today. While at outside facility, patient became short of breath while lying flat while obtaining MRI of hip and was found to be in atrial fibrillation with RVR requiring diltiazem drip. She denied endorsing any chest pain, lightheadedness, dizziness, visual disturbances, fever, chills, sweats, nausea, vomiting, abdominal pain, changes in bowel/bladder habits, or onset neurological deficits.  All other review of systems negative except as noted above.  Patient reports being back at her baseline and continues to complain only of left hip pain. Pt missed HD yesterday, planned for today. Labs reviewed K 6.1, Crt 10.6 troponin .076->.095 BNP 4702. Echo pending, repeat EKG ordered            Past Medical History:   Diagnosis Date    ESRD (end  stage renal disease) on dialysis     GERD (gastroesophageal reflux disease)     Hypertension        Past Surgical History:   Procedure Laterality Date     SECTION      PLACEMENT OF DIALYSIS ACCESS         Review of patient's allergies indicates:   Allergen Reactions    Amoxicillin Rash       No current facility-administered medications on file prior to encounter.     Current Outpatient Medications on File Prior to Encounter   Medication Sig    brimonidine 0.2% (ALPHAGAN) 0.2 % Drop Place 1 drop into both eyes 2 (two) times daily.    cyclobenzaprine (FLEXERIL) 10 MG tablet Take 10 mg by mouth nightly as needed.    ergocalciferol (ERGOCALCIFEROL) 50,000 unit Cap Take 50,000 Units by mouth every 7 days.    hydrALAZINE (APRESOLINE) 25 MG tablet Take 25 mg by mouth 3 (three) times daily.    irbesartan (AVAPRO) 150 MG tablet Take 150 mg by mouth every evening.    amLODIPine (NORVASC) 5 MG tablet Take 2.5 mg by mouth once daily.     escitalopram oxalate (LEXAPRO) 5 MG Tab Take 5 mg by mouth once daily.    famotidine (PEPCID) 10 MG tablet Take 10 mg by mouth 2 (two) times daily.    HYDROcodone-acetaminophen (NORCO) 5-325 mg per tablet Take 1 tablet by mouth every 4 (four) hours as needed for Pain.    VENTOLIN HFA 90 mcg/actuation inhaler 2 puffs 3 (three) times daily as needed.     Family History       Problem Relation (Age of Onset)    Diabetes Mother, Father    Hypertension Mother, Father          Tobacco Use    Smoking status: Former    Smokeless tobacco: Never   Substance and Sexual Activity    Alcohol use: No     Comment: occassionally    Drug use: No    Sexual activity: Yes     Review of Systems   Constitutional: Negative.   HENT: Negative.     Eyes: Negative.    Cardiovascular: Negative.    Respiratory:  Positive for shortness of breath.    Skin: Negative.    Musculoskeletal:  Positive for joint pain and muscle cramps.   Gastrointestinal: Negative.    Genitourinary: Negative.    Neurological: Negative.     Psychiatric/Behavioral: Negative.     Objective:     Vital Signs (Most Recent):  Temp: 98 °F (36.7 °C) (12/20/22 0921)  Pulse: 80 (12/20/22 0921)  Resp: 18 (12/20/22 0921)  BP: 135/68 (12/20/22 0921)  SpO2: 96 % (12/20/22 0921) Vital Signs (24h Range):  Temp:  [98 °F (36.7 °C)-99.2 °F (37.3 °C)] 98 °F (36.7 °C)  Pulse:  [] 80  Resp:  [13-23] 18  SpO2:  [91 %-100 %] 96 %  BP: (105-149)/(58-90) 135/68     Weight: 74.4 kg (164 lb)  Body mass index is 28.15 kg/m².    SpO2: 96 %       No intake or output data in the 24 hours ending 12/20/22 1303    Lines/Drains/Airways       Peripheral Intravenous Line  Duration                  Peripheral IV - Single Lumen 12/19/22 1155 22 G Posterior;Right Hand 1 day                    Physical Exam  Vitals and nursing note reviewed.   Constitutional:       Appearance: Normal appearance.   HENT:      Head: Normocephalic.   Eyes:      Pupils: Pupils are equal, round, and reactive to light.   Cardiovascular:      Rate and Rhythm: Normal rate and regular rhythm.      Heart sounds: S1 normal and S2 normal.     No S3 or S4 sounds.   Pulmonary:      Effort: Pulmonary effort is normal.      Breath sounds: Normal breath sounds.   Abdominal:      General: Bowel sounds are normal.      Palpations: Abdomen is soft.   Musculoskeletal:         General: Normal range of motion.      Cervical back: Normal range of motion.   Skin:     Capillary Refill: Capillary refill takes less than 2 seconds.   Neurological:      General: No focal deficit present.      Mental Status: She is alert and oriented to person, place, and time.   Psychiatric:         Mood and Affect: Mood normal.         Behavior: Behavior normal.         Thought Content: Thought content normal.       Significant Labs: BMP:   Recent Labs   Lab 12/19/22  1200 12/20/22  0457    87    137   K 5.6* 6.1*    102   CO2 21* 20*   BUN 54* 66*   CREATININE 9.6* 10.6*   CALCIUM 9.6 9.8   , CMP   Recent Labs   Lab  12/19/22  1200 12/20/22  0457    137   K 5.6* 6.1*    102   CO2 21* 20*    87   BUN 54* 66*   CREATININE 9.6* 10.6*   CALCIUM 9.6 9.8   PROT 7.9 7.4   ALBUMIN 3.9 3.4*   BILITOT 1.3* 1.5*   ALKPHOS 112 102   AST 32 23   ALT 22 16   ANIONGAP 16 15   , CBC   Recent Labs   Lab 12/19/22  1158 12/20/22  0457   WBC 6.44 6.35   HGB 12.7 11.6*   HCT 38.8 35.7*   * 95*   , INR No results for input(s): INR, PROTIME in the last 48 hours., Lipid Panel No results for input(s): CHOL, HDL, LDLCALC, TRIG, CHOLHDL in the last 48 hours., Troponin   Recent Labs   Lab 12/19/22  1357 12/19/22 2009   TROPONINI 0.076* 0.095*   , and All pertinent lab results from the last 24 hours have been reviewed.    Significant Imaging: Cardiac Cath: reviewed, Echocardiogram: Transthoracic echo (TTE) complete (Cupid Only):   Results for orders placed or performed during the hospital encounter of 12/19/22   Echo   Result Value Ref Range    BSA 1.83 m2    TDI SEPTAL 0.06 m/s    LV LATERAL E/E' RATIO 15.33 m/s    LV SEPTAL E/E' RATIO 15.33 m/s    LA WIDTH 3.80 cm    IVC diameter 2.82 cm    Left Ventricular Outflow Tract Mean Velocity 0.63 cm/s    Left Ventricular Outflow Tract Mean Gradient 2.01 mmHg    TDI LATERAL 0.06 m/s    LVIDd 5.31 3.5 - 6.0 cm    IVS 1.67 (A) 0.6 - 1.1 cm    Posterior Wall 1.49 (A) 0.6 - 1.1 cm    Ao root annulus 3.06 cm    LVIDs 3.78 2.1 - 4.0 cm    FS 29 28 - 44 %    LA volume 89.40 cm3    Sinus 3.26 cm    STJ 3.37 cm    Ascending aorta 3.42 cm    LV mass 381.59 g    LA size 4.15 cm    TAPSE 2.27 cm    Left Ventricle Relative Wall Thickness 0.56 cm    AV mean gradient 9 mmHg    AV valve area 2.09 cm2    AV Velocity Ratio 0.51     AV index (prosthetic) 0.61     MV valve area p 1/2 method 5.12 cm2    E/A ratio 4.38     Mean e' 0.06 m/s    E wave deceleration time 148.05 msec    IVRT 87.54 msec    LVOT diameter 2.09 cm    LVOT area 3.4 cm2    LVOT peak dorys 1.08 m/s    LVOT peak VTI 19.80 cm    Ao peak dorys  2.12 m/s    Ao VTI 32.5 cm    RVOT peak krunal 0.54 m/s    RVOT peak VTI 13.9 cm    Mr max krunal 5.03 m/s    LVOT stroke volume 67.89 cm3    AV peak gradient 18 mmHg    PV mean gradient 0.70 mmHg    E/E' ratio 15.33 m/s    MV Peak E Krunal 0.92 m/s    TR Max Krunal 3.79 m/s    MV stenosis pressure 1/2 time 42.93 ms    MV Peak A Krunal 0.21 m/s    LV Systolic Volume 61.16 mL    LV Systolic Volume Index 34.0 mL/m2    LV Diastolic Volume 136.00 mL    LV Diastolic Volume Index 75.56 mL/m2    LA Volume Index 49.7 mL/m2    LV Mass Index 212 g/m2    RA Major Axis 5.77 cm    Left Atrium Minor Axis 6.48 cm    Left Atrium Major Axis 6.87 cm    Triscuspid Valve Regurgitation Peak Gradient 57 mmHg    RA Width 3.67 cm    Right Atrial Pressure (from IVC) 3 mmHg    TV rest pulmonary artery pressure 60 mmHg   , EKG: reviewed, Stress Test: reviewed, and X-Ray: CXR: X-Ray Chest 1 View (CXR): No results found for this visit on 12/19/22.    Assessment and Plan:     Shortness of breath  Likely secondary to afib and fluid overload  HD today  Echo pending    Left hip pain  Ortho consulted    Primary hypertension  Treatment per primary team    ESRD (end stage renal disease) on dialysis  Management as per primary team    Atrial fibrillation  EKG reviewed afib  Repeat EKG ordered  Will need AC, hep gtt ordered        VTE Risk Mitigation (From admission, onward)           Ordered     heparin (porcine) injection 5,000 Units  Every 8 hours         12/19/22 2119     Place sequential compression device  Until discontinued         12/19/22 2119     IP VTE LOW RISK PATIENT  Once         12/19/22 2119                    Thank you for your consult. I will follow-up with patient. Please contact us if you have any additional questions.    Rissa Yang, NP  Cardiology   [facility] - Telemetry ([facility])

## 2022-12-20 NOTE — PLAN OF CARE
3 hour I-HDTx in progress as ordered for Dr Alfonso; planned UF = up to 3L as tolerated, will continue to monitor for changes and trends.

## 2022-12-20 NOTE — ASSESSMENT & PLAN NOTE
BP currently ranging from 120s-130s systolic.    Plan:  -Optimize pain control   -Continue home medications, titrate as needed   -Monitor BP  -Low salt/renal diet when not NPO  -IV hydralazine prn for SBP>160 or DBP>90   -f/u nephrology for HD inpatient

## 2022-12-20 NOTE — CONSULTS
[facility]  Orthopedics  Consult Note    Patient Name: Meaghan Potter  MRN: [id number]  Admission Date: 2022  Hospital Length of Stay: 0 days  Attending Provider: Afshan Márquez MD  Primary Care Provider: Primary Doctor No    Patient information was obtained from patient, spouse/SO, relative(s), past medical records, ER records, and primary team.     Inpatient consult to Orthopedic Surgery  Consult performed by: Julien Azul PA-C  Consult ordered by: Robert Bryan MD      Subjective:     Principal Problem:<principal problem not specified>    Chief Complaint:   Chief Complaint   Patient presents with    Hip Pain     Left hi; pt states she was doing a lot of exercises (leg lifts) last week while laying in bed.        HPI: Meaghan Potter is a 67-year-old female with a past medical history significant for end-stage renal disease on dialysis, hypertension, and GERD who is admitted for cardiac workup following elevated troponin and BNP in the emergency department.  Orthopedics has been consulted for left hip pain.  Patient reports that last Thursday she was doing some leg raises in bed and a few days later her hip began to hurt severely.  She only has pain in her left hip and denies any symptoms on the right side.  She says the pain is worse in the groin and increases with prolonged standing and activity.  She denies numbness or tingling in the extremity.  She has not had similar symptoms in the past.  She did not try any treatment for this problem other than over-the-counter medication    Past Medical History:   Diagnosis Date    ESRD (end stage renal disease) on dialysis     GERD (gastroesophageal reflux disease)     Hypertension        Past Surgical History:   Procedure Laterality Date     SECTION      PLACEMENT OF DIALYSIS ACCESS         Review of patient's allergies indicates:   Allergen Reactions    Amoxicillin Rash       Current Facility-Administered Medications   Medication     acetaminophen suppository 650 mg    acetaminophen tablet 650 mg    albuterol-ipratropium 2.5 mg-0.5 mg/3 mL nebulizer solution 3 mL    aluminum-magnesium hydroxide-simethicone 200-200-20 mg/5 mL suspension 30 mL    brimonidine 0.2% ophthalmic solution 1 drop    dextrose 10% bolus 125 mL 125 mL    dextrose 10% bolus 250 mL 250 mL    diltiaZEM 125 mg in D5W 125 mL infusion    [START ON 12/26/2022] ergocalciferol capsule 50,000 Units    glucagon (human recombinant) injection 1 mg    glucose chewable tablet 16 g    glucose chewable tablet 24 g    heparin (porcine) injection 5,000 Units    hydrALAZINE tablet 25 mg    HYDROcodone-acetaminophen 5-325 mg per tablet 1 tablet    losartan tablet 50 mg    melatonin tablet 6 mg    morphine injection 2 mg    mupirocin 2 % ointment    naloxone 0.4 mg/mL injection 0.02 mg    ondansetron injection 4 mg    promethazine tablet 25 mg    senna-docusate 8.6-50 mg per tablet 1 tablet    sodium chloride 0.9% bolus 250 mL 250 mL    sodium chloride 0.9% flush 3 mL     Family History       Problem Relation (Age of Onset)    Diabetes Mother, Father    Hypertension Mother, Father          Tobacco Use    Smoking status: Former    Smokeless tobacco: Never   Substance and Sexual Activity    Alcohol use: No     Comment: occassionally    Drug use: No    Sexual activity: Yes     Review of Systems   Constitutional: Negative for chills, decreased appetite, fever and weight loss.   HENT:  Negative for congestion, hoarse voice and sore throat.    Eyes:  Negative for blurred vision, double vision, vision loss in left eye and vision loss in right eye.   Cardiovascular:  Negative for chest pain, palpitations and syncope.   Respiratory:  Negative for cough, shortness of breath and wheezing.    Endocrine: Negative for cold intolerance and heat intolerance.   Hematologic/Lymphatic: Negative for bleeding problem. Does not bruise/bleed easily.   Skin:  Negative for dry skin, flushing, itching and suspicious  "lesions.   Musculoskeletal:  Positive for joint pain and joint swelling. Negative for falls.   Gastrointestinal:  Negative for abdominal pain, diarrhea, nausea and vomiting.   Genitourinary:  Negative for dysuria, frequency and urgency.   Neurological:  Negative for dizziness, headaches, numbness and paresthesias.   Psychiatric/Behavioral:  Negative for altered mental status and memory loss.    Objective:     Vital Signs (Most Recent):  Temp: 98 °F (36.7 °C) (12/20/22 0921)  Pulse: 80 (12/20/22 0921)  Resp: 18 (12/20/22 0921)  BP: 135/68 (12/20/22 0921)  SpO2: 96 % (12/20/22 0921) Vital Signs (24h Range):  Temp:  [98 °F (36.7 °C)-99.2 °F (37.3 °C)] 98 °F (36.7 °C)  Pulse:  [] 80  Resp:  [13-23] 18  SpO2:  [91 %-100 %] 96 %  BP: (105-160)/(58-95) 135/68     Weight: 74.4 kg (164 lb)  Height: 5' 4" (162.6 cm)  Body mass index is 28.15 kg/m².    No intake or output data in the 24 hours ending 12/20/22 1202    Ortho/SPM Exam  Left hip:  Skin is intact   No edema   Moderate TTP in the groin   No pain with flexion/extension   Pain increases with internal/external rotation  Calf and compartments are soft and compressible   Motor exam normal   Sensation and pulses intact   Cap refill brisk     Right hip:  Skin is intact   No edema   No TTP   No pain with flexion/extension   No pain with internal/external rotation  Calf and compartments are soft and compressible   Motor exam normal   Sensation and pulses intact  Cap refill brisk    GEN: Well developed, well nourished female. AAOX3. No acute distress.   Head: Normocephalic, atraumatic.   Eyes: RIGO  Neck: Trachea is midline, no adenopathy  Resp: Breathing unlabored.  Neuro: Motor function normal, Cranial nerves intact  Psych: Mood and affect appropriate.      Significant Labs:   Recent Lab Results         12/20/22  0457   12/19/22 2009 12/19/22  1357        Albumin 3.4           Alkaline Phosphatase 102           ALT 16           ANION GAP 15           AST 23        "    Baso # 0.02           Basophil % 0.3           BILIRUBIN TOTAL 1.5  Comment: For infants and newborns, interpretation of results should be based  on gestational age, weight and in agreement with clinical  observations.    Premature Infant recommended reference ranges:  Up to 24 hours.............<8.0 mg/dL  Up to 48 hours............<12.0 mg/dL  3-5 days..................<15.0 mg/dL  6-29 days.................<15.0 mg/dL             BNP     4,792  Comment: Values of less than 100 pg/ml are consistent with non-CHF populations.       BUN 66           Calcium 9.8           Chloride 102           CO2 20           CPK     121       Creatinine 10.6           Differential Method Automated           eGFR 4           Eos # 0.0           Eosinophil % 0.0           Glucose 87           Gran # (ANC) 4.4           Gran % 69.7           HEMATOCRIT 35.7           HEMOGLOBIN 11.6           Immature Grans (Abs) 0.02  Comment: Mild elevation in immature granulocytes is non specific and   can be seen in a variety of conditions including stress response,   acute inflammation, trauma and pregnancy. Correlation with other   laboratory and clinical findings is essential.             Immature Granulocytes 0.3           Lymph # 1.0           Lymph % 15.1           MCH 30.9           MCHC 32.5           MCV 95           Mono # 0.9           Mono % 14.6           MPV 12.7           nRBC 0           Platelets 95           Potassium 6.1           PROTEIN TOTAL 7.4           RBC 3.75           RDW 14.7           Sodium 137           Troponin I   0.095  Comment: The reference interval for Troponin I represents the 99th percentile   cutoff   for our facility and is consistent with 3rd generation assay   performance.     0.076  Comment: The reference interval for Troponin I represents the 99th percentile   cutoff   for our facility and is consistent with 3rd generation assay   performance.         WBC 6.35                 All pertinent labs  within the past 24 hours have been reviewed.    Significant Imaging: CT: I have reviewed all pertinent results/findings and my personal findings are:  CT of the pelvis shows no acute fracture or dislocation.  There is calcification lateral to the left acetabulum.  There is a small left hip effusion.  There is a large cyst in the right femoral neck that makes up about 1/3 of the diameter of the bone  X-Ray: I have reviewed all pertinent results/findings and my personal findings are:  X-ray pelvis shows no acute fracture dislocation.  There is evidence of mild degenerative changes throughout the lower back as well as advanced atherosclerotic calcifications  MRI: I have reviewed all pertinent results/findings and my personal findings are:  MRI of the left hip shows no fracture.  There is a large left hip joint effusion.  There is also a paralabral cyst along the superior acetabulum that may be consistent with superior labral tear    Assessment/Plan:     Active Diagnoses:    Diagnosis Date Noted POA    ESRD (end stage renal disease) on dialysis [N18.6, Z99.2] 12/20/2022 Not Applicable    Primary hypertension [I10] 12/20/2022 Yes    Gastroesophageal reflux disease without esophagitis [K21.9] 12/20/2022 Yes    Left hip pain [M25.552] 12/20/2022 Yes    Acetabular labrum tear, left, initial encounter [S73.192A] 12/20/2022 Yes    Shortness of breath [R06.02] 12/20/2022 Yes    Atrial fibrillation [I48.91] 12/19/2022 Yes      Problems Resolved During this Admission:       Assessment:   67-year-old female with a past medical history significant for end-stage renal disease on dialysis, hypertension, and GERD who is admitted for cardiac workup following elevated troponin and BNP in the emergency department.  Orthopedics has been consulted for left hip pain.    Plan:   Recommend MRI of the right hip to evaluate for femoral neck cyst.  As far as the left hip is concerned patient may treat with therapy and anti-inflammatories.   Aspiration/injection of the left hip by Interventional Radiology may be considered if patient is interested  We will follow the results of the MRI and discuss next steps with the patient   Patient is awaiting cardiac workup.  If that is completed then she may be discharged and further orthopedic workup can be done as an outpatient  We will continue to follow the patient while she is admitted    Julien Azul PA-C  Orthopedics  [facility]

## 2022-12-20 NOTE — PLAN OF CARE
Ongoing (interventions implemented as appropriate)  Pt AAO x4.  VSS  Pt able to make needs known.  Pt remained afebrile throughout this shift.   Pt ambulates in room, gait unsteady   Pt remained free of falls this shift.   Pt denies pain this shift.  Plan of care reviewed. Patient verbalizes understanding.   Pt moving/turing independent. Frequent weight shifting encouraged.  Patient Afib on monitor.   Bed low, side rails up x 2, wheels locked, call light in reach.   Hourly rounding completed.   Will continue to monitor.

## 2022-12-20 NOTE — PROGRESS NOTES
12/20/22 1530   Required for all Hemodialysis Patients   Hepatitis Status other (see comments)   Handoff Report   Received From Dave Tariq RN   Given To Humberto Joyce RN   Treatment Type   Treatment Type Acute   Vital Signs   Temp 98.1 °F (36.7 °C)   Temp src Axillary   Pulse 64   Heart Rate Source Monitor   Resp 19   SpO2 98 %   Pulse Oximetry Type Intermittent   Flow (L/min) 4   Oxygen Concentration (%) 36   Device (Oxygen Therapy) nasal cannula   /72   MAP (mmHg) 93   BP Location Right arm   BP Method Automatic   Patient Position Lying   Post-Hemodialysis Assessment   Rinseback Volume (mL) 250 mL   Blood Volume Processed (Liters) 70.6 L   Dialyzer Clearance Moderately streaked   Duration of Treatment 180 minutes   Additional Fluid Intake (mL) 0 mL   Total UF (mL) 3601 mL   Net Fluid Removal 3101   Patient Response to Treatment well tolerated   Post-Hemodialysis Comments blood reperfused and pt de accessed according to P&P. RTF with primary RN as same. no issues with dialysis tx.

## 2022-12-20 NOTE — SUBJECTIVE & OBJECTIVE
Interval History: pt in bed upon exam with no signs of acute distress noted. MRI reviewed. HD performed per Nephrology. NSR noted on monitor with Cardizem continued. Troponin trended upward with Heparin infusion in place. Orthopedic Surgery, Cardiology, and Nephrology following.     Review of Systems   Constitutional:  Positive for activity change. Negative for fatigue.   HENT:  Negative for congestion, postnasal drip, sinus pressure, sore throat and trouble swallowing.    Respiratory:  Negative for cough, shortness of breath and wheezing.    Cardiovascular:  Positive for palpitations. Negative for chest pain and leg swelling.   Gastrointestinal:  Negative for abdominal distention, abdominal pain, nausea and vomiting.   Genitourinary:  Negative for difficulty urinating, dysuria, flank pain, frequency and urgency.   Musculoskeletal:  Positive for arthralgias and gait problem. Negative for back pain, joint swelling and myalgias.   Skin:  Negative for color change and wound.   Neurological:  Negative for dizziness, weakness and headaches.   Psychiatric/Behavioral:  Negative for agitation, confusion and sleep disturbance. The patient is not nervous/anxious.    All other systems reviewed and are negative.  Objective:     Vital Signs (Most Recent):  Temp: 98 °F (36.7 °C) (12/20/22 0921)  Pulse: 80 (12/20/22 0921)  Resp: 18 (12/20/22 0921)  BP: 135/68 (12/20/22 0921)  SpO2: 96 % (12/20/22 0921) Vital Signs (24h Range):  Temp:  [98 °F (36.7 °C)-99.2 °F (37.3 °C)] 98 °F (36.7 °C)  Pulse:  [65-94] 80  Resp:  [13-20] 18  SpO2:  [91 %-100 %] 96 %  BP: (105-148)/(58-86) 135/68     Weight: 74.4 kg (164 lb)  Body mass index is 28.15 kg/m².  No intake or output data in the 24 hours ending 12/20/22 1549   Physical Exam  Vitals reviewed.   Constitutional:       General: She is not in acute distress.     Appearance: Normal appearance. She is normal weight. She is not ill-appearing, toxic-appearing or diaphoretic.   HENT:      Head:  Normocephalic and atraumatic.      Right Ear: External ear normal.      Left Ear: External ear normal.      Nose: Nose normal. No congestion or rhinorrhea.      Mouth/Throat:      Mouth: Mucous membranes are moist.      Pharynx: Oropharynx is clear. No oropharyngeal exudate or posterior oropharyngeal erythema.      Comments: Poor dentition   Eyes:      General: No scleral icterus.     Extraocular Movements: Extraocular movements intact.      Conjunctiva/sclera: Conjunctivae normal.      Pupils: Pupils are equal, round, and reactive to light.   Neck:      Vascular: No carotid bruit.   Cardiovascular:      Rate and Rhythm: Normal rate and regular rhythm.      Pulses: Normal pulses.      Heart sounds: Normal heart sounds. No murmur heard.    No friction rub. No gallop.      Comments: Left upper extremity fistula noted with palpable thrill and bruit present.  Pulmonary:      Effort: Pulmonary effort is normal. No respiratory distress.      Breath sounds: Normal breath sounds. No stridor. No wheezing, rhonchi or rales.   Chest:      Chest wall: No tenderness.   Abdominal:      General: Abdomen is flat. Bowel sounds are normal. There is no distension.      Palpations: Abdomen is soft.      Tenderness: There is no abdominal tenderness. There is no right CVA tenderness, left CVA tenderness, guarding or rebound.      Hernia: No hernia is present.   Genitourinary:     Comments: HD  Musculoskeletal:         General: Tenderness present. No swelling, deformity or signs of injury. Normal range of motion.      Cervical back: Normal range of motion and neck supple. No rigidity or tenderness.      Right lower leg: No edema.      Left lower leg: No edema.   Lymphadenopathy:      Cervical: No cervical adenopathy.   Skin:     General: Skin is warm and dry.      Capillary Refill: Capillary refill takes less than 2 seconds.      Coloration: Skin is not jaundiced or pale.      Findings: No bruising, erythema, lesion or rash.    Neurological:      General: No focal deficit present.      Mental Status: She is alert and oriented to person, place, and time. Mental status is at baseline.      Cranial Nerves: No cranial nerve deficit.      Sensory: No sensory deficit.      Motor: No weakness.      Coordination: Coordination normal.   Psychiatric:         Mood and Affect: Mood normal.         Behavior: Behavior normal.         Thought Content: Thought content normal.         Judgment: Judgment normal.       Significant Labs: All pertinent labs within the past 24 hours have been reviewed.  CBC:   Recent Labs   Lab 12/19/22  1158 12/20/22  0457   WBC 6.44 6.35   HGB 12.7 11.6*   HCT 38.8 35.7*   * 95*     CMP:   Recent Labs   Lab 12/19/22  1200 12/20/22  0457    137   K 5.6* 6.1*    102   CO2 21* 20*    87   BUN 54* 66*   CREATININE 9.6* 10.6*   CALCIUM 9.6 9.8   PROT 7.9 7.4   ALBUMIN 3.9 3.4*   BILITOT 1.3* 1.5*   ALKPHOS 112 102   AST 32 23   ALT 22 16   ANIONGAP 16 15       Significant Imaging: I have reviewed all pertinent imaging results/findings within the past 24 hours.

## 2022-12-20 NOTE — ASSESSMENT & PLAN NOTE
Patient with new onset Paroxysmal (<7 days) atrial fibrillation with RVR which is uncontrolled currently with Calcium Channel Blocker. Patient is currently in atrial fibrillation with rate in the 70s-80s on diltiazem. [id number] Score: 2. HASBLED Score: 3. Anticoagulation indicated. Anticoagulation done with heparin .  Troponin measuring 0.095 with EKG obtained at outside facility revealing SVT/atrial fibrillation with RVR at rate of 176. Troponin likely elevated in setting of ESRD and demand ischemia from arrhythmia as patient denied endorsing chest pain.  Plan:  -telemetry  -continue drip  -f/u troponin   -f/u cardiology   -nephrology for HD

## 2022-12-20 NOTE — SUBJECTIVE & OBJECTIVE
Past Medical History:   Diagnosis Date    ESRD (end stage renal disease) on dialysis     GERD (gastroesophageal reflux disease)     Hypertension        Past Surgical History:   Procedure Laterality Date     SECTION      PLACEMENT OF DIALYSIS ACCESS         Review of patient's allergies indicates:   Allergen Reactions    Amoxicillin Rash       No current facility-administered medications on file prior to encounter.     Current Outpatient Medications on File Prior to Encounter   Medication Sig    brimonidine 0.2% (ALPHAGAN) 0.2 % Drop Place 1 drop into both eyes 2 (two) times daily.    cyclobenzaprine (FLEXERIL) 10 MG tablet Take 10 mg by mouth nightly as needed.    ergocalciferol (ERGOCALCIFEROL) 50,000 unit Cap Take 50,000 Units by mouth every 7 days.    hydrALAZINE (APRESOLINE) 25 MG tablet Take 25 mg by mouth 3 (three) times daily.    irbesartan (AVAPRO) 150 MG tablet Take 150 mg by mouth every evening.    amLODIPine (NORVASC) 5 MG tablet Take 2.5 mg by mouth once daily.     escitalopram oxalate (LEXAPRO) 5 MG Tab Take 5 mg by mouth once daily.    famotidine (PEPCID) 10 MG tablet Take 10 mg by mouth 2 (two) times daily.    HYDROcodone-acetaminophen (NORCO) 5-325 mg per tablet Take 1 tablet by mouth every 4 (four) hours as needed for Pain.    VENTOLIN HFA 90 mcg/actuation inhaler 2 puffs 3 (three) times daily as needed.     Family History       Problem Relation (Age of Onset)    Diabetes Mother, Father    Hypertension Mother, Father          Tobacco Use    Smoking status: Former    Smokeless tobacco: Never   Substance and Sexual Activity    Alcohol use: No     Comment: occassionally    Drug use: No    Sexual activity: Yes     Review of Systems   All other systems reviewed and are negative.  Objective:     Vital Signs (Most Recent):  Temp: 99.2 °F (37.3 °C) (22)  Pulse: 84 (22)  Resp: 18 (22)  BP: 129/68 (22)  SpO2: (!) 94 % (22)   Vital Signs (24h  Range):  Temp:  [99.2 °F (37.3 °C)] 99.2 °F (37.3 °C)  Pulse:  [] 84  Resp:  [13-23] 18  SpO2:  [91 %-100 %] 94 %  BP: (126-160)/(67-95) 129/68     Weight: 68 kg (150 lb)  Body mass index is 25.75 kg/m².    Physical Exam  Vitals reviewed.   Constitutional:       General: She is not in acute distress.     Appearance: Normal appearance. She is normal weight. She is not ill-appearing, toxic-appearing or diaphoretic.   HENT:      Head: Normocephalic and atraumatic.      Right Ear: External ear normal.      Left Ear: External ear normal.      Nose: Nose normal. No congestion or rhinorrhea.      Mouth/Throat:      Mouth: Mucous membranes are moist.      Pharynx: Oropharynx is clear. No oropharyngeal exudate or posterior oropharyngeal erythema.      Comments: Poor dentition   Eyes:      General: No scleral icterus.     Extraocular Movements: Extraocular movements intact.      Conjunctiva/sclera: Conjunctivae normal.      Pupils: Pupils are equal, round, and reactive to light.   Neck:      Vascular: No carotid bruit.   Cardiovascular:      Rate and Rhythm: Normal rate and regular rhythm.      Pulses: Normal pulses.      Heart sounds: Normal heart sounds. No murmur heard.    No friction rub. No gallop.      Comments: Left upper extremity fistula noted with palpable thrill and bruit present.  Pulmonary:      Effort: Pulmonary effort is normal. No respiratory distress.      Breath sounds: Normal breath sounds. No stridor. No wheezing, rhonchi or rales.   Chest:      Chest wall: No tenderness.   Abdominal:      General: Abdomen is flat. Bowel sounds are normal. There is no distension.      Palpations: Abdomen is soft.      Tenderness: There is no abdominal tenderness. There is no right CVA tenderness, left CVA tenderness, guarding or rebound.      Hernia: No hernia is present.   Musculoskeletal:         General: Tenderness present. No swelling, deformity or signs of injury. Normal range of motion.      Cervical back:  Normal range of motion and neck supple. No rigidity or tenderness.      Right lower leg: No edema.      Left lower leg: No edema.   Lymphadenopathy:      Cervical: No cervical adenopathy.   Skin:     General: Skin is warm and dry.      Capillary Refill: Capillary refill takes less than 2 seconds.      Coloration: Skin is not jaundiced or pale.      Findings: No bruising, erythema, lesion or rash.   Neurological:      General: No focal deficit present.      Mental Status: She is alert and oriented to person, place, and time. Mental status is at baseline.      Cranial Nerves: No cranial nerve deficit.      Sensory: No sensory deficit.      Motor: No weakness.      Coordination: Coordination normal.   Psychiatric:         Mood and Affect: Mood normal.         Behavior: Behavior normal.         Thought Content: Thought content normal.         Judgment: Judgment normal.         CRANIAL NERVES     CN III, IV, VI   Pupils are equal, round, and reactive to light.     Significant Labs: All pertinent labs within the past 24 hours have been reviewed.    Significant Imaging: I have reviewed all pertinent imaging results/findings within the past 24 hours.    LABS:  Recent Results (from the past 24 hour(s))   CBC Auto Differential    Collection Time: 12/19/22 11:58 AM   Result Value Ref Range    WBC 6.44 3.90 - 12.70 K/uL    RBC 4.05 4.00 - 5.40 M/uL    Hemoglobin 12.7 12.0 - 16.0 g/dL    Hematocrit 38.8 37.0 - 48.5 %    MCV 96 82 - 98 fL    MCH 31.4 (H) 27.0 - 31.0 pg    MCHC 32.7 32.0 - 36.0 g/dL    RDW 14.9 (H) 11.5 - 14.5 %    Platelets 103 (L) 150 - 450 K/uL    MPV 12.6 9.2 - 12.9 fL    Immature Granulocytes 0.3 0.0 - 0.5 %    Gran # (ANC) 5.1 1.8 - 7.7 K/uL    Immature Grans (Abs) 0.02 0.00 - 0.04 K/uL    Lymph # 0.6 (L) 1.0 - 4.8 K/uL    Mono # 0.7 0.3 - 1.0 K/uL    Eos # 0.0 0.0 - 0.5 K/uL    Baso # 0.02 0.00 - 0.20 K/uL    nRBC 0 0 /100 WBC    Gran % 78.6 (H) 38.0 - 73.0 %    Lymph % 9.6 (L) 18.0 - 48.0 %    Mono % 11.2  4.0 - 15.0 %    Eosinophil % 0.0 0.0 - 8.0 %    Basophil % 0.3 0.0 - 1.9 %    Differential Method Automated    Comprehensive Metabolic Panel    Collection Time: 12/19/22 12:00 PM   Result Value Ref Range    Sodium 139 136 - 145 mmol/L    Potassium 5.6 (H) 3.5 - 5.1 mmol/L    Chloride 102 95 - 110 mmol/L    CO2 21 (L) 23 - 29 mmol/L    Glucose 106 70 - 110 mg/dL    BUN 54 (H) 8 - 23 mg/dL    Creatinine 9.6 (H) 0.5 - 1.4 mg/dL    Calcium 9.6 8.7 - 10.5 mg/dL    Total Protein 7.9 6.0 - 8.4 g/dL    Albumin 3.9 3.5 - 5.2 g/dL    Total Bilirubin 1.3 (H) 0.1 - 1.0 mg/dL    Alkaline Phosphatase 112 55 - 135 U/L    AST 32 10 - 40 U/L    ALT 22 10 - 44 U/L    Anion Gap 16 8 - 16 mmol/L    eGFR 4.1 (A) >60 mL/min/1.73 m^2   BNP    Collection Time: 12/19/22  1:57 PM   Result Value Ref Range    BNP 4,792 (H) 0 - 99 pg/mL   CK    Collection Time: 12/19/22  1:57 PM   Result Value Ref Range     20 - 180 U/L   Troponin I    Collection Time: 12/19/22  1:57 PM   Result Value Ref Range    Troponin I 0.076 (H) 0.000 - 0.026 ng/mL   Troponin I    Collection Time: 12/19/22  8:09 PM   Result Value Ref Range    Troponin I 0.095 (H) 0.000 - 0.026 ng/mL       RADIOLOGY  CT Pelvis Without Contrast    Result Date: 12/19/2022  EXAMINATION: CT PELVIS WITHOUT CONTRAST CLINICAL HISTORY: Pelvic fracture; TECHNIQUE: Low dose axial images, sagittal and coronal reformations were obtained from the lower abdomen to the pubic symphysis.  Contrast was not administered. All CT scans at this location are performed using dose modulation techniques as appropriate to a performed exam including the following: Automated exposure control; adjustment of the mA and/or kV according to patient size. COMPARISON: Pelvic radiograph same day FINDINGS: Bladder: No evidence of wall thickening. GI Tract/Mesentery: Severe renal atrophy.  Advanced atherosclerotic calcification.  Sigmoid diverticulosis.  Trace free fluid in the pelvis.  Appendix is not well visualized.   No evidence of appendicitis.  Mild body wall anasarca. Peritoneal Space:  No free air. Retroperitoneum: No significant adenopathy. Abdominal wall: As above Bones: No femur fracture.  Small left hip joint effusion.  Focal calcification lateral to the left acetabulum raising question of avulsion of the rectus femoris ligament or iliofemoral ligament. Severe hypertrophic facet arthropathy lower lumbar spine.  Severe discogenic degenerative change L5-S1.  Chronic degenerative changes of the SI joints.     No femur fracture. Small left hip joint effusion. Focal calcification lateral to the left acetabulum raising question of avulsion of the rectus femoris ligament or iliofemoral ligament. Consider further evaluation with MRI left hip without IV contrast on a nonemergent basis, or as clinically warranted. Electronically signed by: Juan M Panda Date:    12/19/2022 Time:    11:29    X-Ray Chest AP Portable    Result Date: 12/19/2022  EXAMINATION: XR CHEST AP PORTABLE CLINICAL HISTORY: sob;. TECHNIQUE: Single frontal portable view of the chest was performed. COMPARISON: 02/20/2018 FINDINGS: Severe cardiomegaly. Left lung base obscured by the heart. There may be a small left pleural effusion. Consider further evaluation with PA and lateral chest radiographs. No pneumothorax. Right lung is clear.  Pulmonary vascular congestion without chyna pulmonary edema.     Severe cardiomegaly. Left lung base obscured by the heart. There may be a small left pleural effusion. Consider further evaluation with PA and lateral chest radiographs. No pneumothorax. Electronically signed by: Juan M Panda Date:    12/19/2022 Time:    14:24    X-Ray Pelvis Routine AP    Result Date: 12/19/2022  EXAMINATION: XR PELVIS ROUTINE AP CLINICAL HISTORY: pelvic pain; TECHNIQUE: AP view of the pelvis was performed. COMPARISON: None. FINDINGS: Bones appear osteopenic.  Advanced atherosclerotic calcification.  No evidence of fracture or dislocation.  SI joints  unremarkable.  Mild discogenic degenerative change lower lumbar spine.  Soft tissues unremarkable.  Moderate volume stool throughout the colon.     No acute abnormality. Electronically signed by: Juan M Panda Date:    12/19/2022 Time:    10:17    MRI Hip Without Contrast Left    Result Date: 12/19/2022  EXAMINATION: MRI HIP WITHOUT CONTRAST LEFT CLINICAL HISTORY: Fracture, hip; TECHNIQUE: Multiplanar multisequence imaging of the left hip is performed without intravenous contrast. COMPARISON: Pelvic CT, 12/19/2022 and pelvic x-ray, 12/19/2022 FINDINGS: The exam is limited due to patient motion artifact. There is a small right hip joint effusion and a large left hip joint effusion.  There is no bone marrow edema of the left femur.  No left hip fracture.  No evidence for avascular necrosis of the left femoral head.  Paralabral cysts are seen around the superior aspect of the left acetabular labrum which raises concern for an acetabular labral tear. There is a moderate volume of ascites in the pelvis.     1. No left hip fracture. 2. Asymmetric large left hip joint effusion.  Small right hip joint effusion. 3. Paralabral cyst along the superior border of the acetabulum suspicious for a superior labral tear. Electronically signed by: Bob Chester MD Date:    12/19/2022 Time:    14:32      EKG    MICROBIOLOGY    MDM

## 2022-12-20 NOTE — ASSESSMENT & PLAN NOTE
Patient reported acute onset shortness a breath while obtaining MRI and was found to have evidence of severe cardiomegaly with small left pleural effusion but negative for infectious processes noted on CXR. Patient afebrile without leukocytosis. BNP elevated at 4792. Troponin 0.095. Exam positive for bilateral edema but negative for elevated JVD or crackles on lung ausculation. Patient currently saturating % on 2L via NC in no acute distress and without use of accessory muscles noted.  Patient without history of CHF.  Cardiology consulted.  Echo ordered and pending.  Nephrology consulted to resume HD inpatient.  Will trial dose of Lasix if clinical status worsens.  Plan:  -titrate oxygen therapy as needed  -incentive spirometry  -f/u echo  -f/u cardiology and nephrology  -monitor Is/Os  -low salt/cardiac/renal diet    -Duonebs prn

## 2022-12-20 NOTE — CONSULTS
[facility] - Telemetry ([facility])  Nephrology  Consult Note    Patient Name: Meaghan Potter  MRN: [id number]  Admission Date: 2022  Hospital Length of Stay: 0 days  Attending Provider: Afshan Márquez MD   Primary Care Physician: Primary Doctor No  Principal Problem:<principal problem not specified>  Reason for Consult: Management of ESRD  Primary Nephrologist: Dr. Ribera/[facility]     Inpatient consult to Nephrology  Consult performed by: William Alfonso MD  Consult ordered by: Afshan Márquez MD  Reason for consult: Management of ESRD      Subjective:     HPI: 68 yo female with ESRD on MWF last HD last Friday () admitted as a transfer for Cardiology, Ortho and Nephrology. She is currently seen on dialysis, using a LUE AVF. Dialysis vintage is 13 years. Her only complaint to me is pain in both hips described as 'stiff'     No current SOB, no chest pain but describes poor appetite since in pain.     Past Medical History:   Diagnosis Date    ESRD (end stage renal disease) on dialysis     GERD (gastroesophageal reflux disease)     Hypertension        Past Surgical History:   Procedure Laterality Date     SECTION      PLACEMENT OF DIALYSIS ACCESS         Review of patient's allergies indicates:   Allergen Reactions    Amoxicillin Rash     Current Facility-Administered Medications   Medication Frequency    acetaminophen suppository 650 mg Q6H PRN    acetaminophen tablet 650 mg Q8H PRN    albuterol-ipratropium 2.5 mg-0.5 mg/3 mL nebulizer solution 3 mL Q6H PRN    aluminum-magnesium hydroxide-simethicone 200-200-20 mg/5 mL suspension 30 mL QID PRN    brimonidine 0.2% ophthalmic solution 1 drop BID    dextrose 10% bolus 125 mL 125 mL PRN    dextrose 10% bolus 250 mL 250 mL PRN    diltiaZEM 125 mg in D5W 125 mL infusion Continuous    [START ON 2022] ergocalciferol capsule 50,000 Units Q7 Days    glucagon (human recombinant) injection 1 mg PRN    glucose chewable tablet 16 g PRN     glucose chewable tablet 24 g PRN    heparin 25,000 units in dextrose 5% (100 units/ml) IV bolus from bag - ADDITIONAL PRN BOLUS - 30 units/kg PRN    heparin 25,000 units in dextrose 5% (100 units/ml) IV bolus from bag - ADDITIONAL PRN BOLUS - 60 units/kg PRN    heparin 25,000 units in dextrose 5% 250 mL (100 units/mL) infusion LOW INTENSITY nomogram - [facility] Continuous    hydrALAZINE tablet 25 mg TID    HYDROcodone-acetaminophen 5-325 mg per tablet 1 tablet Q6H PRN    losartan tablet 50 mg Daily    melatonin tablet 6 mg Nightly PRN    morphine injection 2 mg Q4H PRN    mupirocin 2 % ointment BID    naloxone 0.4 mg/mL injection 0.02 mg PRN    ondansetron injection 4 mg Q8H PRN    promethazine tablet 25 mg Q6H PRN    senna-docusate 8.6-50 mg per tablet 1 tablet BID PRN    sodium chloride 0.9% bolus 250 mL 250 mL PRN    sodium chloride 0.9% flush 3 mL Q12H PRN     Family History       Problem Relation (Age of Onset)    Diabetes Mother, Father    Hypertension Mother, Father          Tobacco Use    Smoking status: Former    Smokeless tobacco: Never   Substance and Sexual Activity    Alcohol use: No     Comment: occassionally    Drug use: No    Sexual activity: Yes     Review of Systems   Constitutional:  Positive for activity change, appetite change and fatigue.   HENT:  Negative for facial swelling.    Respiratory:  Negative for shortness of breath.    Cardiovascular:  Negative for chest pain and leg swelling.   Gastrointestinal:  Negative for abdominal pain, nausea and vomiting.   Genitourinary:  Positive for decreased urine volume (anuric).   Musculoskeletal:  Positive for arthralgias, gait problem and joint swelling.   Allergic/Immunologic: Positive for immunocompromised state.   Neurological:  Negative for weakness and light-headedness.   Psychiatric/Behavioral:  Negative for confusion and decreased concentration.    Objective:     Vital Signs (Most Recent):  Temp: 98 °F (36.7 °C) (12/20/22 0921)  Pulse: 80 (12/20/22  0921)  Resp: 18 (12/20/22 0921)  BP: 135/68 (12/20/22 0921)  SpO2: 96 % (12/20/22 0921) Vital Signs (24h Range):  Temp:  [98 °F (36.7 °C)-99.2 °F (37.3 °C)] 98 °F (36.7 °C)  Pulse:  [] 80  Resp:  [13-23] 18  SpO2:  [91 %-100 %] 96 %  BP: (105-149)/(58-90) 135/68     Weight: 74.4 kg (164 lb) (12/20/22 0401)  Body mass index is 28.15 kg/m².  Body surface area is 1.83 meters squared.    No intake/output data recorded.    Physical Exam  Vitals and nursing note reviewed.   Constitutional:       General: She is not in acute distress.     Appearance: Normal appearance. She is obese. She is not ill-appearing.   HENT:      Head: Atraumatic.   Eyes:      General: No scleral icterus.  Cardiovascular:      Rate and Rhythm: Normal rate.      Heart sounds:     No friction rub.   Pulmonary:      Effort: Pulmonary effort is normal. No respiratory distress.      Breath sounds: No wheezing.   Abdominal:      Palpations: Abdomen is soft.      Tenderness: There is no abdominal tenderness.   Musculoskeletal:      Right lower leg: Edema present.      Left lower leg: Edema present.   Skin:     General: Skin is warm and dry.   Neurological:      Mental Status: She is alert and oriented to person, place, and time.   Psychiatric:         Mood and Affect: Mood normal.       Significant Labs: reviewed    Significant Imaging: CXR 12/19 reviewed, comparison 2018     Assessment/Plan:     Active Diagnoses:    Diagnosis Date Noted POA    ESRD (end stage renal disease) on dialysis [N18.6, Z99.2] 12/20/2022 Not Applicable    Primary hypertension [I10] 12/20/2022 Yes    Gastroesophageal reflux disease without esophagitis [K21.9] 12/20/2022 Yes    Left hip pain [M25.552] 12/20/2022 Yes    Tear of left acetabular labrum [S73.192A] 12/20/2022 Yes    Shortness of breath [R06.02] 12/20/2022 Yes    Bone cyst of right femur [M85.651] 12/20/2022 Unknown    Atrial fibrillation [I48.91] 12/19/2022 Yes      Problems Resolved During this Admission:        ESRD on MWF  - HD today with 2K bath and UF to target weight of 72kg, recently decreased from 75kg  - pending hospital course will attempt to resume MWF schedule     Mineral and Bone Disease  - resume calcitriol 0.5mcg MWF  - continue D2 weekly   - reports no phos binders  - continue low phos diet    HTN  - resume home medication and monitor    Anemia of ESRD  - no ESAs indicated yet   - monitor H&H     Cardiomegaly, quite severe on recent CXR  - follow up TTE for ?effusion    Afib with RVR  - rate much improved on Cardizem gtts  - per Cardiology    Hip pain  - Ortho note reviewed      Thank you for your consult. I will follow-up with patient. Please contact us if you have any additional questions.    William Alfonso MD  Nephrology

## 2022-12-20 NOTE — ASSESSMENT & PLAN NOTE
Patient with new onset Paroxysmal (<7 days) atrial fibrillation with RVR which is uncontrolled currently with Calcium Channel Blocker. Patient is currently in atrial fibrillation with rate in the 70s-80s on diltiazem. [id number] Score: 2. HASBLED Score: 3. Anticoagulation indicated. Anticoagulation done with heparin .  Troponin measuring 0.095 with EKG obtained at outside facility revealing SVT/atrial fibrillation with RVR at rate of 176. Troponin likely elevated in setting of ESRD and demand ischemia from arrhythmia as patient denied endorsing chest pain.  Plan:  -telemetry  -cardizem drip  - troponin- upward trend   -f/u cardiology   -nephrology for HD  -Cardizem infusion   -Heparin gtt -  -CHADS-VASc Score- 3

## 2022-12-20 NOTE — CONSULTS
[facility] - Telemetry ([facility])  Cardiology  Consult Note    Patient Name: Meaghan Potter  MRN: [id number]  Admission Date: 12/19/2022  Hospital Length of Stay: 0 days  Code Status: Full Code   Attending Provider: Afshan Márquez MD   Consulting Provider: Rissa Yang NP  Primary Care Physician: Primary Doctor No  Principal Problem:<principal problem not specified>    Patient information was obtained from patient and ER records.     Inpatient consult to Cardiology  Consult performed by: Rissa Yang NP  Consult ordered by: Robert Bryan MD        Subjective:     Chief Complaint:  SOB     HPI:   Ms. Potter is a 68y/o female with PMHx ESRD on dialysis, HTN and GERD who presented to [facility] ED transfer from [facility] for cardiac workup following elevated troponin and BNP in the emergency department as well as orthopedic care after patient sustained left superior labral tear of her acetabulum noted on MRI. Cardiology was consulted for new onset afib on diltiazem gtt. Pt seen and examined today resting in bed. Patient reported being in her usual state of health prior to onset of symptoms and reported acute onset of left hip pain while performing leg exercises in bed earlier today. While at outside facility, patient became short of breath while lying flat while obtaining MRI of hip and was found to be in atrial fibrillation with RVR requiring diltiazem drip. She denied endorsing any chest pain, lightheadedness, dizziness, visual disturbances, fever, chills, sweats, nausea, vomiting, abdominal pain, changes in bowel/bladder habits, or onset neurological deficits.  All other review of systems negative except as noted above.  Patient reports being back at her baseline and continues to complain only of left hip pain. Pt missed HD yesterday, planned for today. Labs reviewed K 6.1, Crt 10.6 troponin .076->.095 BNP 4702. Echo pending, repeat EKG ordered            Past Medical History:   Diagnosis Date    ESRD (end  stage renal disease) on dialysis     GERD (gastroesophageal reflux disease)     Hypertension        Past Surgical History:   Procedure Laterality Date     SECTION      PLACEMENT OF DIALYSIS ACCESS         Review of patient's allergies indicates:   Allergen Reactions    Amoxicillin Rash       No current facility-administered medications on file prior to encounter.     Current Outpatient Medications on File Prior to Encounter   Medication Sig    brimonidine 0.2% (ALPHAGAN) 0.2 % Drop Place 1 drop into both eyes 2 (two) times daily.    cyclobenzaprine (FLEXERIL) 10 MG tablet Take 10 mg by mouth nightly as needed.    ergocalciferol (ERGOCALCIFEROL) 50,000 unit Cap Take 50,000 Units by mouth every 7 days.    hydrALAZINE (APRESOLINE) 25 MG tablet Take 25 mg by mouth 3 (three) times daily.    irbesartan (AVAPRO) 150 MG tablet Take 150 mg by mouth every evening.    amLODIPine (NORVASC) 5 MG tablet Take 2.5 mg by mouth once daily.     escitalopram oxalate (LEXAPRO) 5 MG Tab Take 5 mg by mouth once daily.    famotidine (PEPCID) 10 MG tablet Take 10 mg by mouth 2 (two) times daily.    HYDROcodone-acetaminophen (NORCO) 5-325 mg per tablet Take 1 tablet by mouth every 4 (four) hours as needed for Pain.    VENTOLIN HFA 90 mcg/actuation inhaler 2 puffs 3 (three) times daily as needed.     Family History       Problem Relation (Age of Onset)    Diabetes Mother, Father    Hypertension Mother, Father          Tobacco Use    Smoking status: Former    Smokeless tobacco: Never   Substance and Sexual Activity    Alcohol use: No     Comment: occassionally    Drug use: No    Sexual activity: Yes     Review of Systems   Constitutional: Negative.   HENT: Negative.     Eyes: Negative.    Cardiovascular: Negative.    Respiratory:  Positive for shortness of breath.    Skin: Negative.    Musculoskeletal:  Positive for joint pain and muscle cramps.   Gastrointestinal: Negative.    Genitourinary: Negative.    Neurological: Negative.     Psychiatric/Behavioral: Negative.     Objective:     Vital Signs (Most Recent):  Temp: 98 °F (36.7 °C) (12/20/22 0921)  Pulse: 80 (12/20/22 0921)  Resp: 18 (12/20/22 0921)  BP: 135/68 (12/20/22 0921)  SpO2: 96 % (12/20/22 0921) Vital Signs (24h Range):  Temp:  [98 °F (36.7 °C)-99.2 °F (37.3 °C)] 98 °F (36.7 °C)  Pulse:  [] 80  Resp:  [13-23] 18  SpO2:  [91 %-100 %] 96 %  BP: (105-149)/(58-90) 135/68     Weight: 74.4 kg (164 lb)  Body mass index is 28.15 kg/m².    SpO2: 96 %       No intake or output data in the 24 hours ending 12/20/22 1303    Lines/Drains/Airways       Peripheral Intravenous Line  Duration                  Peripheral IV - Single Lumen 12/19/22 1155 22 G Posterior;Right Hand 1 day                    Physical Exam  Vitals and nursing note reviewed.   Constitutional:       Appearance: Normal appearance.   HENT:      Head: Normocephalic.   Eyes:      Pupils: Pupils are equal, round, and reactive to light.   Cardiovascular:      Rate and Rhythm: Normal rate and regular rhythm.      Heart sounds: S1 normal and S2 normal.     No S3 or S4 sounds.   Pulmonary:      Effort: Pulmonary effort is normal.      Breath sounds: Normal breath sounds.   Abdominal:      General: Bowel sounds are normal.      Palpations: Abdomen is soft.   Musculoskeletal:         General: Normal range of motion.      Cervical back: Normal range of motion.   Skin:     Capillary Refill: Capillary refill takes less than 2 seconds.   Neurological:      General: No focal deficit present.      Mental Status: She is alert and oriented to person, place, and time.   Psychiatric:         Mood and Affect: Mood normal.         Behavior: Behavior normal.         Thought Content: Thought content normal.       Significant Labs: BMP:   Recent Labs   Lab 12/19/22  1200 12/20/22  0457    87    137   K 5.6* 6.1*    102   CO2 21* 20*   BUN 54* 66*   CREATININE 9.6* 10.6*   CALCIUM 9.6 9.8   , CMP   Recent Labs   Lab  12/19/22  1200 12/20/22  0457    137   K 5.6* 6.1*    102   CO2 21* 20*    87   BUN 54* 66*   CREATININE 9.6* 10.6*   CALCIUM 9.6 9.8   PROT 7.9 7.4   ALBUMIN 3.9 3.4*   BILITOT 1.3* 1.5*   ALKPHOS 112 102   AST 32 23   ALT 22 16   ANIONGAP 16 15   , CBC   Recent Labs   Lab 12/19/22  1158 12/20/22  0457   WBC 6.44 6.35   HGB 12.7 11.6*   HCT 38.8 35.7*   * 95*   , INR No results for input(s): INR, PROTIME in the last 48 hours., Lipid Panel No results for input(s): CHOL, HDL, LDLCALC, TRIG, CHOLHDL in the last 48 hours., Troponin   Recent Labs   Lab 12/19/22  1357 12/19/22 2009   TROPONINI 0.076* 0.095*   , and All pertinent lab results from the last 24 hours have been reviewed.    Significant Imaging: Cardiac Cath: reviewed, Echocardiogram: Transthoracic echo (TTE) complete ([facility] Only):   Results for orders placed or performed during the hospital encounter of 12/19/22   Echo   Result Value Ref Range    BSA 1.83 m2    TDI SEPTAL 0.06 m/s    LV LATERAL E/E' RATIO 15.33 m/s    LV SEPTAL E/E' RATIO 15.33 m/s    LA WIDTH 3.80 cm    IVC diameter 2.82 cm    Left Ventricular Outflow Tract Mean Velocity 0.63 cm/s    Left Ventricular Outflow Tract Mean Gradient 2.01 mmHg    TDI LATERAL 0.06 m/s    LVIDd 5.31 3.5 - 6.0 cm    IVS 1.67 (A) 0.6 - 1.1 cm    Posterior Wall 1.49 (A) 0.6 - 1.1 cm    Ao root annulus 3.06 cm    LVIDs 3.78 2.1 - 4.0 cm    FS 29 28 - 44 %    LA volume 89.40 cm3    Sinus 3.26 cm    STJ 3.37 cm    Ascending aorta 3.42 cm    LV mass 381.59 g    LA size 4.15 cm    TAPSE 2.27 cm    Left Ventricle Relative Wall Thickness 0.56 cm    AV mean gradient 9 mmHg    AV valve area 2.09 cm2    AV Velocity Ratio 0.51     AV index (prosthetic) 0.61     MV valve area p 1/2 method 5.12 cm2    E/A ratio 4.38     Mean e' 0.06 m/s    E wave deceleration time 148.05 msec    IVRT 87.54 msec    LVOT diameter 2.09 cm    LVOT area 3.4 cm2    LVOT peak dorys 1.08 m/s    LVOT peak VTI 19.80 cm    Ao peak dorys  2.12 m/s    Ao VTI 32.5 cm    RVOT peak krunal 0.54 m/s    RVOT peak VTI 13.9 cm    Mr max krunal 5.03 m/s    LVOT stroke volume 67.89 cm3    AV peak gradient 18 mmHg    PV mean gradient 0.70 mmHg    E/E' ratio 15.33 m/s    MV Peak E Krunal 0.92 m/s    TR Max Krunal 3.79 m/s    MV stenosis pressure 1/2 time 42.93 ms    MV Peak A Krunal 0.21 m/s    LV Systolic Volume 61.16 mL    LV Systolic Volume Index 34.0 mL/m2    LV Diastolic Volume 136.00 mL    LV Diastolic Volume Index 75.56 mL/m2    LA Volume Index 49.7 mL/m2    LV Mass Index 212 g/m2    RA Major Axis 5.77 cm    Left Atrium Minor Axis 6.48 cm    Left Atrium Major Axis 6.87 cm    Triscuspid Valve Regurgitation Peak Gradient 57 mmHg    RA Width 3.67 cm    Right Atrial Pressure (from IVC) 3 mmHg    TV rest pulmonary artery pressure 60 mmHg   , EKG: reviewed, Stress Test: reviewed, and X-Ray: CXR: X-Ray Chest 1 View (CXR): No results found for this visit on 12/19/22.    Assessment and Plan:     Shortness of breath  Likely secondary to afib and fluid overload  HD today  Echo pending    Left hip pain  Ortho consulted    Primary hypertension  Treatment per primary team    ESRD (end stage renal disease) on dialysis  Management as per primary team    Atrial fibrillation  EKG reviewed afib  Repeat EKG ordered  Will need AC, hep gtt ordered        VTE Risk Mitigation (From admission, onward)           Ordered     heparin (porcine) injection 5,000 Units  Every 8 hours         12/19/22 2119     Place sequential compression device  Until discontinued         12/19/22 2119     IP VTE LOW RISK PATIENT  Once         12/19/22 2119                    Thank you for your consult. I will follow-up with patient. Please contact us if you have any additional questions.    Rissa Yang, NP  Cardiology   [facility] - Telemetry ([facility])

## 2022-12-20 NOTE — HPI
Meaghan Potter is a 67 y.o. female with a PMH  has a past medical history of ESRD (end stage renal disease) on dialysis, GERD (gastroesophageal reflux disease), and Hypertension. who presented as a transfer from [facility] for higher level cardiology and orthopedic care after patient was found to have sustained a left superior labral tear of her acetabulum noted on MRI in addition to new onset atrial fibrillation with RVR requiring diltiazem drip.  Patient reported being in her usual state of health prior to onset of symptoms and reported acute onset of left hip pain while performing leg exercises in bed earlier today.  Patient reported hearing and feeling a pop in her left hip followed by immediate pain which has remained constant and currently rated 6/10 in severity. Pain was described as throbbing/pulling in nature with no known alleviating factors and aggravating factors including weight-bearing, movement, and palpation to the affected area.  She denied endorsing any numbness, tingling, discoloration, or penetrating trauma, but did express decreased range of motion and muscle strength secondary to exacerbation of pain.  While at outside facility, patient became short of breath while lying flat while obtaining MRI of hip and was found to be in atrial fibrillation with RVR requiring diltiazem drip. She denied endorsing any chest pain, lightheadedness, dizziness, visual disturbances, fever, chills, sweats, nausea, vomiting, abdominal pain, changes in bowel/bladder habits, or onset neurological deficits.  All other review of systems negative except as noted above.  Patient reports being back at her baseline and continues to complain only of left hip pain.  Orthopedics and cardiology consulted and awaiting further evaluation/recommendations.  Of note, patient missed hemodialysis today secondary to pain and going to the emergency room and usually receives HD via left upper extremity fistula on Monday/Wednesday/Friday.   Follows Dr. Ribera outpatient. Nephrology consulted to continue HD inpatient.     PCP: Primary Doctor No

## 2022-12-20 NOTE — ASSESSMENT & PLAN NOTE
Patient reported acute onset shortness a breath while obtaining MRI and was found to have evidence of severe cardiomegaly with small left pleural effusion but negative for infectious processes noted on CXR. Patient afebrile without leukocytosis. BNP elevated at 4792. Troponin 0.095. Exam positive for bilateral edema but negative for elevated JVD or crackles on lung ausculation. Patient currently saturating % on 2L via NC in no acute distress and without use of accessory muscles noted.  Patient without history of CHF.  Cardiology consulted.  Echo ordered and pending.  Nephrology consulted to resume HD inpatient.  Will trial dose of Lasix if clinical status worsens.  Plan:  -titrate oxygen therapy as needed  -incentive spirometry  -echo  -f/u cardiology and nephrology  -monitor Is/Os  -low salt/cardiac/renal diet    -Duonebs prn

## 2022-12-20 NOTE — H&P
"[facility] Medicine  History & Physical    Patient Name: Meaghan Potter  MRN: [id number]  Patient Class: OP- Observation  Admission Date: 12/19/2022  Attending Physician: Moni Haq MD   Primary Care Provider: Primary Doctor No         Patient information was obtained from patient, past medical records and ER records.     Subjective:     Principal Problem:<principal problem not specified>    Chief Complaint:   Chief Complaint   Patient presents with    Hip Pain     Left hi; pt states she was doing a lot of exercises (leg lifts) last week while laying in bed.      "As clarification, on 12/19/22, patient should be placed in hospital observation services under my care".      HPI: Meaghan Potter is a 67 y.o. female with a PMH  has a past medical history of ESRD (end stage renal disease) on dialysis, GERD (gastroesophageal reflux disease), and Hypertension. who presented as a transfer from [facility] for higher level cardiology and orthopedic care after patient was found to have sustained a left superior labral tear of her acetabulum noted on MRI in addition to new onset atrial fibrillation with RVR requiring diltiazem drip.  Patient reported being in her usual state of health prior to onset of symptoms and reported acute onset of left hip pain while performing leg exercises in bed earlier today.  Patient reported hearing and feeling a pop in her left hip followed by immediate pain which has remained constant and currently rated 6/10 in severity. Pain was described as throbbing/pulling in nature with no known alleviating factors and aggravating factors including weight-bearing, movement, and palpation to the affected area.  She denied endorsing any numbness, tingling, discoloration, or penetrating trauma, but did express decreased range of motion and muscle strength secondary to exacerbation of pain.  While at outside facility, patient became short of breath while lying flat while obtaining " MRI of hip and was found to be in atrial fibrillation with RVR requiring diltiazem drip. She denied endorsing any chest pain, lightheadedness, dizziness, visual disturbances, fever, chills, sweats, nausea, vomiting, abdominal pain, changes in bowel/bladder habits, or onset neurological deficits.  All other review of systems negative except as noted above.  Patient reports being back at her baseline and continues to complain only of left hip pain.  Orthopedics and cardiology consulted and awaiting further evaluation/recommendations.  Of note, patient missed hemodialysis today secondary to pain and going to the emergency room and usually receives HD via left upper extremity fistula on Monday/Wednesday/Friday.  Follows Dr. Ribera outpatient. Nephrology consulted to continue HD inpatient.     PCP: Primary Doctor No        Past Medical History:   Diagnosis Date    ESRD (end stage renal disease) on dialysis     GERD (gastroesophageal reflux disease)     Hypertension        Past Surgical History:   Procedure Laterality Date     SECTION      PLACEMENT OF DIALYSIS ACCESS         Review of patient's allergies indicates:   Allergen Reactions    Amoxicillin Rash       No current facility-administered medications on file prior to encounter.     Current Outpatient Medications on File Prior to Encounter   Medication Sig    brimonidine 0.2% (ALPHAGAN) 0.2 % Drop Place 1 drop into both eyes 2 (two) times daily.    cyclobenzaprine (FLEXERIL) 10 MG tablet Take 10 mg by mouth nightly as needed.    ergocalciferol (ERGOCALCIFEROL) 50,000 unit Cap Take 50,000 Units by mouth every 7 days.    hydrALAZINE (APRESOLINE) 25 MG tablet Take 25 mg by mouth 3 (three) times daily.    irbesartan (AVAPRO) 150 MG tablet Take 150 mg by mouth every evening.    amLODIPine (NORVASC) 5 MG tablet Take 2.5 mg by mouth once daily.     escitalopram oxalate (LEXAPRO) 5 MG Tab Take 5 mg by mouth once daily.    famotidine (PEPCID) 10 MG tablet Take 10 mg by  mouth 2 (two) times daily.    HYDROcodone-acetaminophen (NORCO) 5-325 mg per tablet Take 1 tablet by mouth every 4 (four) hours as needed for Pain.    VENTOLIN HFA 90 mcg/actuation inhaler 2 puffs 3 (three) times daily as needed.     Family History       Problem Relation (Age of Onset)    Diabetes Mother, Father    Hypertension Mother, Father          Tobacco Use    Smoking status: Former    Smokeless tobacco: Never   Substance and Sexual Activity    Alcohol use: No     Comment: occassionally    Drug use: No    Sexual activity: Yes     Review of Systems   All other systems reviewed and are negative.  Objective:     Vital Signs (Most Recent):  Temp: 99.2 °F (37.3 °C) (12/19/22 2004)  Pulse: 84 (12/19/22 2100)  Resp: 18 (12/19/22 2210)  BP: 129/68 (12/19/22 2004)  SpO2: (!) 94 % (12/19/22 2100)   Vital Signs (24h Range):  Temp:  [99.2 °F (37.3 °C)] 99.2 °F (37.3 °C)  Pulse:  [] 84  Resp:  [13-23] 18  SpO2:  [91 %-100 %] 94 %  BP: (126-160)/(67-95) 129/68     Weight: 68 kg (150 lb)  Body mass index is 25.75 kg/m².    Physical Exam  Vitals reviewed.   Constitutional:       General: She is not in acute distress.     Appearance: Normal appearance. She is normal weight. She is not ill-appearing, toxic-appearing or diaphoretic.   HENT:      Head: Normocephalic and atraumatic.      Right Ear: External ear normal.      Left Ear: External ear normal.      Nose: Nose normal. No congestion or rhinorrhea.      Mouth/Throat:      Mouth: Mucous membranes are moist.      Pharynx: Oropharynx is clear. No oropharyngeal exudate or posterior oropharyngeal erythema.      Comments: Poor dentition   Eyes:      General: No scleral icterus.     Extraocular Movements: Extraocular movements intact.      Conjunctiva/sclera: Conjunctivae normal.      Pupils: Pupils are equal, round, and reactive to light.   Neck:      Vascular: No carotid bruit.   Cardiovascular:      Rate and Rhythm: Normal rate and regular rhythm.      Pulses: Normal  pulses.      Heart sounds: Normal heart sounds. No murmur heard.    No friction rub. No gallop.      Comments: Left upper extremity fistula noted with palpable thrill and bruit present.  Pulmonary:      Effort: Pulmonary effort is normal. No respiratory distress.      Breath sounds: Normal breath sounds. No stridor. No wheezing, rhonchi or rales.   Chest:      Chest wall: No tenderness.   Abdominal:      General: Abdomen is flat. Bowel sounds are normal. There is no distension.      Palpations: Abdomen is soft.      Tenderness: There is no abdominal tenderness. There is no right CVA tenderness, left CVA tenderness, guarding or rebound.      Hernia: No hernia is present.   Musculoskeletal:         General: Tenderness present. No swelling, deformity or signs of injury. Normal range of motion.      Cervical back: Normal range of motion and neck supple. No rigidity or tenderness.      Right lower leg: No edema.      Left lower leg: No edema.   Lymphadenopathy:      Cervical: No cervical adenopathy.   Skin:     General: Skin is warm and dry.      Capillary Refill: Capillary refill takes less than 2 seconds.      Coloration: Skin is not jaundiced or pale.      Findings: No bruising, erythema, lesion or rash.   Neurological:      General: No focal deficit present.      Mental Status: She is alert and oriented to person, place, and time. Mental status is at baseline.      Cranial Nerves: No cranial nerve deficit.      Sensory: No sensory deficit.      Motor: No weakness.      Coordination: Coordination normal.   Psychiatric:         Mood and Affect: Mood normal.         Behavior: Behavior normal.         Thought Content: Thought content normal.         Judgment: Judgment normal.         CRANIAL NERVES     CN III, IV, VI   Pupils are equal, round, and reactive to light.     Significant Labs: All pertinent labs within the past 24 hours have been reviewed.    Significant Imaging: I have reviewed all pertinent imaging  results/findings within the past 24 hours.    LABS:  Recent Results (from the past 24 hour(s))   CBC Auto Differential    Collection Time: 12/19/22 11:58 AM   Result Value Ref Range    WBC 6.44 3.90 - 12.70 K/uL    RBC 4.05 4.00 - 5.40 M/uL    Hemoglobin 12.7 12.0 - 16.0 g/dL    Hematocrit 38.8 37.0 - 48.5 %    MCV 96 82 - 98 fL    MCH 31.4 (H) 27.0 - 31.0 pg    MCHC 32.7 32.0 - 36.0 g/dL    RDW 14.9 (H) 11.5 - 14.5 %    Platelets 103 (L) 150 - 450 K/uL    MPV 12.6 9.2 - 12.9 fL    Immature Granulocytes 0.3 0.0 - 0.5 %    Gran # (ANC) 5.1 1.8 - 7.7 K/uL    Immature Grans (Abs) 0.02 0.00 - 0.04 K/uL    Lymph # 0.6 (L) 1.0 - 4.8 K/uL    Mono # 0.7 0.3 - 1.0 K/uL    Eos # 0.0 0.0 - 0.5 K/uL    Baso # 0.02 0.00 - 0.20 K/uL    nRBC 0 0 /100 WBC    Gran % 78.6 (H) 38.0 - 73.0 %    Lymph % 9.6 (L) 18.0 - 48.0 %    Mono % 11.2 4.0 - 15.0 %    Eosinophil % 0.0 0.0 - 8.0 %    Basophil % 0.3 0.0 - 1.9 %    Differential Method Automated    Comprehensive Metabolic Panel    Collection Time: 12/19/22 12:00 PM   Result Value Ref Range    Sodium 139 136 - 145 mmol/L    Potassium 5.6 (H) 3.5 - 5.1 mmol/L    Chloride 102 95 - 110 mmol/L    CO2 21 (L) 23 - 29 mmol/L    Glucose 106 70 - 110 mg/dL    BUN 54 (H) 8 - 23 mg/dL    Creatinine 9.6 (H) 0.5 - 1.4 mg/dL    Calcium 9.6 8.7 - 10.5 mg/dL    Total Protein 7.9 6.0 - 8.4 g/dL    Albumin 3.9 3.5 - 5.2 g/dL    Total Bilirubin 1.3 (H) 0.1 - 1.0 mg/dL    Alkaline Phosphatase 112 55 - 135 U/L    AST 32 10 - 40 U/L    ALT 22 10 - 44 U/L    Anion Gap 16 8 - 16 mmol/L    eGFR 4.1 (A) >60 mL/min/1.73 m^2   BNP    Collection Time: 12/19/22  1:57 PM   Result Value Ref Range    BNP 4,792 (H) 0 - 99 pg/mL   CK    Collection Time: 12/19/22  1:57 PM   Result Value Ref Range     20 - 180 U/L   Troponin I    Collection Time: 12/19/22  1:57 PM   Result Value Ref Range    Troponin I 0.076 (H) 0.000 - 0.026 ng/mL   Troponin I    Collection Time: 12/19/22  8:09 PM   Result Value Ref Range     Troponin I 0.095 (H) 0.000 - 0.026 ng/mL       RADIOLOGY  CT Pelvis Without Contrast    Result Date: 12/19/2022  EXAMINATION: CT PELVIS WITHOUT CONTRAST CLINICAL HISTORY: Pelvic fracture; TECHNIQUE: Low dose axial images, sagittal and coronal reformations were obtained from the lower abdomen to the pubic symphysis.  Contrast was not administered. All CT scans at this location are performed using dose modulation techniques as appropriate to a performed exam including the following: Automated exposure control; adjustment of the mA and/or kV according to patient size. COMPARISON: Pelvic radiograph same day FINDINGS: Bladder: No evidence of wall thickening. GI Tract/Mesentery: Severe renal atrophy.  Advanced atherosclerotic calcification.  Sigmoid diverticulosis.  Trace free fluid in the pelvis.  Appendix is not well visualized.  No evidence of appendicitis.  Mild body wall anasarca. Peritoneal Space:  No free air. Retroperitoneum: No significant adenopathy. Abdominal wall: As above Bones: No femur fracture.  Small left hip joint effusion.  Focal calcification lateral to the left acetabulum raising question of avulsion of the rectus femoris ligament or iliofemoral ligament. Severe hypertrophic facet arthropathy lower lumbar spine.  Severe discogenic degenerative change L5-S1.  Chronic degenerative changes of the SI joints.     No femur fracture. Small left hip joint effusion. Focal calcification lateral to the left acetabulum raising question of avulsion of the rectus femoris ligament or iliofemoral ligament. Consider further evaluation with MRI left hip without IV contrast on a nonemergent basis, or as clinically warranted. Electronically signed by: Juan M Panda Date:    12/19/2022 Time:    11:29    X-Ray Chest AP Portable    Result Date: 12/19/2022  EXAMINATION: XR CHEST AP PORTABLE CLINICAL HISTORY: sob;. TECHNIQUE: Single frontal portable view of the chest was performed. COMPARISON: 02/20/2018 FINDINGS: Severe  cardiomegaly. Left lung base obscured by the heart. There may be a small left pleural effusion. Consider further evaluation with PA and lateral chest radiographs. No pneumothorax. Right lung is clear.  Pulmonary vascular congestion without chyna pulmonary edema.     Severe cardiomegaly. Left lung base obscured by the heart. There may be a small left pleural effusion. Consider further evaluation with PA and lateral chest radiographs. No pneumothorax. Electronically signed by: Juan M Panda Date:    12/19/2022 Time:    14:24    X-Ray Pelvis Routine AP    Result Date: 12/19/2022  EXAMINATION: XR PELVIS ROUTINE AP CLINICAL HISTORY: pelvic pain; TECHNIQUE: AP view of the pelvis was performed. COMPARISON: None. FINDINGS: Bones appear osteopenic.  Advanced atherosclerotic calcification.  No evidence of fracture or dislocation.  SI joints unremarkable.  Mild discogenic degenerative change lower lumbar spine.  Soft tissues unremarkable.  Moderate volume stool throughout the colon.     No acute abnormality. Electronically signed by: Juan M Panda Date:    12/19/2022 Time:    10:17    MRI Hip Without Contrast Left    Result Date: 12/19/2022  EXAMINATION: MRI HIP WITHOUT CONTRAST LEFT CLINICAL HISTORY: Fracture, hip; TECHNIQUE: Multiplanar multisequence imaging of the left hip is performed without intravenous contrast. COMPARISON: Pelvic CT, 12/19/2022 and pelvic x-ray, 12/19/2022 FINDINGS: The exam is limited due to patient motion artifact. There is a small right hip joint effusion and a large left hip joint effusion.  There is no bone marrow edema of the left femur.  No left hip fracture.  No evidence for avascular necrosis of the left femoral head.  Paralabral cysts are seen around the superior aspect of the left acetabular labrum which raises concern for an acetabular labral tear. There is a moderate volume of ascites in the pelvis.     1. No left hip fracture. 2. Asymmetric large left hip joint effusion.  Small right hip joint  effusion. 3. Paralabral cyst along the superior border of the acetabulum suspicious for a superior labral tear. Electronically signed by: Bob Chester MD Date:    12/19/2022 Time:    14:32      EKG    MICROBIOLOGY    MDM        Assessment/Plan:     Atrial fibrillation  Patient with new onset Paroxysmal (<7 days) atrial fibrillation with RVR which is uncontrolled currently with Calcium Channel Blocker. Patient is currently in atrial fibrillation with rate in the 70s-80s on diltiazem. [id number] Score: 2. HASBLED Score: 3. Anticoagulation indicated. Anticoagulation done with heparin .  Troponin measuring 0.095 with EKG obtained at outside facility revealing SVT/atrial fibrillation with RVR at rate of 176. Troponin likely elevated in setting of ESRD and demand ischemia from arrhythmia as patient denied endorsing chest pain.  Plan:  -telemetry  -continue drip  -f/u troponin   -f/u cardiology   -nephrology for HD      Acetabular labrum tear, left, initial encounter        Left hip pain  Patient presented with acute onset left hip pain x1 day duration in absence of trauma while doing leg exercises in bed and was found to asymmetric large left hip joint effusion, small right hip joint effusion, and evidence of superior labral tear of her left acetabulum noted on MRI.  Patient noted to have 5/5 strength throughout with sensation to light touch grossly intact throughout however, TTP throughout left groin.  Orthopedics consulted and awaiting further evaluation/recommendations.  Plan:  -optimize pain control  -bowel regimen  -bed rest  -nonweightbearing  -PT/OT  -f/u ortho        Shortness of breath  Patient reported acute onset shortness a breath while obtaining MRI and was found to have evidence of severe cardiomegaly with small left pleural effusion but negative for infectious processes noted on CXR. Patient afebrile without leukocytosis. BNP elevated at 4792. Troponin 0.095. Exam positive for bilateral edema but negative  for elevated JVD or crackles on lung ausculation. Patient currently saturating % on 2L via NC in no acute distress and without use of accessory muscles noted.  Patient without history of CHF.  Cardiology consulted.  Echo ordered and pending.  Nephrology consulted to resume HD inpatient.  Will trial dose of Lasix if clinical status worsens.  Plan:  -titrate oxygen therapy as needed  -incentive spirometry  -f/u echo  -f/u cardiology and nephrology  -monitor Is/Os  -low salt/cardiac/renal diet    -Duonebs prn       ESRD (end stage renal disease) on dialysis  Patient currently follows Dr. Ribera from nephrology with last reported dialysis completed last Friday. Patient currently on M/W/F HD via LUE fistula but missed Monday due to not feeling well and being in the ED. Labs consistent with history of ESRD on HD.   Plan:  -continue home medications, titrate as needed  -nephrology consulted to resume HD inpatient       Primary hypertension  BP currently ranging from 120s-130s systolic.    Plan:  -Optimize pain control   -Continue home medications, titrate as needed   -Monitor BP  -Low salt/renal diet when not NPO  -IV hydralazine prn for SBP>160 or DBP>90   -f/u nephrology for HD inpatient         Gastroesophageal reflux disease without esophagitis  Chronic. Stable. Currently asymptomatic. Home medications include PPI/Antacids as needed.  Plan:  -Continue PPI/Antacids as needed       VTE Risk Mitigation (From admission, onward)           Ordered     heparin (porcine) injection 5,000 Units  Every 8 hours         12/19/22 2119     Place sequential compression device  Until discontinued         12/19/22 2119     IP VTE LOW RISK PATIENT  Once         12/19/22 2119                  //Core Measures   -DVT proph: SCDs, withholding anticoagulation pending surgical intervention   -Code status Full    -Surrogate: spouse      Components of this note were documented using a voice recognition system and are subject to errors not  corrected at the time the document was proof read. Please contact the author for any clarifications.        Robert Bryan MD  Department of Hospital Medicine   '[facility] - Telemetry ([facility])

## 2022-12-20 NOTE — ASSESSMENT & PLAN NOTE
Chronic. Stable. Currently asymptomatic. Home medications include PPI/Antacids as needed.  Plan:  -Continue PPI/Antacids as needed

## 2022-12-20 NOTE — SUBJECTIVE & OBJECTIVE
Past Medical History:   Diagnosis Date    ESRD (end stage renal disease) on dialysis     GERD (gastroesophageal reflux disease)     Hypertension        Past Surgical History:   Procedure Laterality Date     SECTION      PLACEMENT OF DIALYSIS ACCESS         Review of patient's allergies indicates:   Allergen Reactions    Amoxicillin Rash       No current facility-administered medications on file prior to encounter.     Current Outpatient Medications on File Prior to Encounter   Medication Sig    brimonidine 0.2% (ALPHAGAN) 0.2 % Drop Place 1 drop into both eyes 2 (two) times daily.    cyclobenzaprine (FLEXERIL) 10 MG tablet Take 10 mg by mouth nightly as needed.    ergocalciferol (ERGOCALCIFEROL) 50,000 unit Cap Take 50,000 Units by mouth every 7 days.    hydrALAZINE (APRESOLINE) 25 MG tablet Take 25 mg by mouth 3 (three) times daily.    irbesartan (AVAPRO) 150 MG tablet Take 150 mg by mouth every evening.    amLODIPine (NORVASC) 5 MG tablet Take 2.5 mg by mouth once daily.     escitalopram oxalate (LEXAPRO) 5 MG Tab Take 5 mg by mouth once daily.    famotidine (PEPCID) 10 MG tablet Take 10 mg by mouth 2 (two) times daily.    HYDROcodone-acetaminophen (NORCO) 5-325 mg per tablet Take 1 tablet by mouth every 4 (four) hours as needed for Pain.    VENTOLIN HFA 90 mcg/actuation inhaler 2 puffs 3 (three) times daily as needed.     Family History       Problem Relation (Age of Onset)    Diabetes Mother, Father    Hypertension Mother, Father          Tobacco Use    Smoking status: Former    Smokeless tobacco: Never   Substance and Sexual Activity    Alcohol use: No     Comment: occassionally    Drug use: No    Sexual activity: Yes     Review of Systems   Constitutional: Negative.   HENT: Negative.     Eyes: Negative.    Cardiovascular: Negative.    Respiratory:  Positive for shortness of breath.    Skin: Negative.    Musculoskeletal:  Positive for joint pain and muscle cramps.   Gastrointestinal: Negative.     Genitourinary: Negative.    Neurological: Negative.    Psychiatric/Behavioral: Negative.     Objective:     Vital Signs (Most Recent):  Temp: 98 °F (36.7 °C) (12/20/22 0921)  Pulse: 80 (12/20/22 0921)  Resp: 18 (12/20/22 0921)  BP: 135/68 (12/20/22 0921)  SpO2: 96 % (12/20/22 0921) Vital Signs (24h Range):  Temp:  [98 °F (36.7 °C)-99.2 °F (37.3 °C)] 98 °F (36.7 °C)  Pulse:  [] 80  Resp:  [13-23] 18  SpO2:  [91 %-100 %] 96 %  BP: (105-149)/(58-90) 135/68     Weight: 74.4 kg (164 lb)  Body mass index is 28.15 kg/m².    SpO2: 96 %       No intake or output data in the 24 hours ending 12/20/22 1303    Lines/Drains/Airways       Peripheral Intravenous Line  Duration                  Peripheral IV - Single Lumen 12/19/22 1155 22 G Posterior;Right Hand 1 day                    Physical Exam  Vitals and nursing note reviewed.   Constitutional:       Appearance: Normal appearance.   HENT:      Head: Normocephalic.   Eyes:      Pupils: Pupils are equal, round, and reactive to light.   Cardiovascular:      Rate and Rhythm: Normal rate and regular rhythm.      Heart sounds: S1 normal and S2 normal.     No S3 or S4 sounds.   Pulmonary:      Effort: Pulmonary effort is normal.      Breath sounds: Normal breath sounds.   Abdominal:      General: Bowel sounds are normal.      Palpations: Abdomen is soft.   Musculoskeletal:         General: Normal range of motion.      Cervical back: Normal range of motion.   Skin:     Capillary Refill: Capillary refill takes less than 2 seconds.   Neurological:      General: No focal deficit present.      Mental Status: She is alert and oriented to person, place, and time.   Psychiatric:         Mood and Affect: Mood normal.         Behavior: Behavior normal.         Thought Content: Thought content normal.       Significant Labs: BMP:   Recent Labs   Lab 12/19/22  1200 12/20/22  0457    87    137   K 5.6* 6.1*    102   CO2 21* 20*   BUN 54* 66*   CREATININE 9.6* 10.6*    CALCIUM 9.6 9.8   , CMP   Recent Labs   Lab 12/19/22  1200 12/20/22  0457    137   K 5.6* 6.1*    102   CO2 21* 20*    87   BUN 54* 66*   CREATININE 9.6* 10.6*   CALCIUM 9.6 9.8   PROT 7.9 7.4   ALBUMIN 3.9 3.4*   BILITOT 1.3* 1.5*   ALKPHOS 112 102   AST 32 23   ALT 22 16   ANIONGAP 16 15   , CBC   Recent Labs   Lab 12/19/22  1158 12/20/22  0457   WBC 6.44 6.35   HGB 12.7 11.6*   HCT 38.8 35.7*   * 95*   , INR No results for input(s): INR, PROTIME in the last 48 hours., Lipid Panel No results for input(s): CHOL, HDL, LDLCALC, TRIG, CHOLHDL in the last 48 hours., Troponin   Recent Labs   Lab 12/19/22  1357 12/19/22 2009   TROPONINI 0.076* 0.095*   , and All pertinent lab results from the last 24 hours have been reviewed.    Significant Imaging: Cardiac Cath: reviewed, Echocardiogram: Transthoracic echo (TTE) complete (Cupid Only):   Results for orders placed or performed during the hospital encounter of 12/19/22   Echo   Result Value Ref Range    BSA 1.83 m2    TDI SEPTAL 0.06 m/s    LV LATERAL E/E' RATIO 15.33 m/s    LV SEPTAL E/E' RATIO 15.33 m/s    LA WIDTH 3.80 cm    IVC diameter 2.82 cm    Left Ventricular Outflow Tract Mean Velocity 0.63 cm/s    Left Ventricular Outflow Tract Mean Gradient 2.01 mmHg    TDI LATERAL 0.06 m/s    LVIDd 5.31 3.5 - 6.0 cm    IVS 1.67 (A) 0.6 - 1.1 cm    Posterior Wall 1.49 (A) 0.6 - 1.1 cm    Ao root annulus 3.06 cm    LVIDs 3.78 2.1 - 4.0 cm    FS 29 28 - 44 %    LA volume 89.40 cm3    Sinus 3.26 cm    STJ 3.37 cm    Ascending aorta 3.42 cm    LV mass 381.59 g    LA size 4.15 cm    TAPSE 2.27 cm    Left Ventricle Relative Wall Thickness 0.56 cm    AV mean gradient 9 mmHg    AV valve area 2.09 cm2    AV Velocity Ratio 0.51     AV index (prosthetic) 0.61     MV valve area p 1/2 method 5.12 cm2    E/A ratio 4.38     Mean e' 0.06 m/s    E wave deceleration time 148.05 msec    IVRT 87.54 msec    LVOT diameter 2.09 cm    LVOT area 3.4 cm2    LVOT peak dorys  1.08 m/s    LVOT peak VTI 19.80 cm    Ao peak krunal 2.12 m/s    Ao VTI 32.5 cm    RVOT peak krunal 0.54 m/s    RVOT peak VTI 13.9 cm    Mr max krunal 5.03 m/s    LVOT stroke volume 67.89 cm3    AV peak gradient 18 mmHg    PV mean gradient 0.70 mmHg    E/E' ratio 15.33 m/s    MV Peak E Krunal 0.92 m/s    TR Max Krunal 3.79 m/s    MV stenosis pressure 1/2 time 42.93 ms    MV Peak A Krunal 0.21 m/s    LV Systolic Volume 61.16 mL    LV Systolic Volume Index 34.0 mL/m2    LV Diastolic Volume 136.00 mL    LV Diastolic Volume Index 75.56 mL/m2    LA Volume Index 49.7 mL/m2    LV Mass Index 212 g/m2    RA Major Axis 5.77 cm    Left Atrium Minor Axis 6.48 cm    Left Atrium Major Axis 6.87 cm    Triscuspid Valve Regurgitation Peak Gradient 57 mmHg    RA Width 3.67 cm    Right Atrial Pressure (from IVC) 3 mmHg    TV rest pulmonary artery pressure 60 mmHg   , EKG: reviewed, Stress Test: reviewed, and X-Ray: CXR: X-Ray Chest 1 View (CXR): No results found for this visit on 12/19/22.

## 2022-12-20 NOTE — ASSESSMENT & PLAN NOTE
-Orthopedic surgery following  -MRI showed no fracture and asymmetric large left hip joint effusion.  Small right hip joint effusion. Paralabral cyst along the superior border of the acetabulum suspicious for a superior labral tear.   -analgesia as needed   -antiemetics as needed   - the left hip may treat with therapy and anti-inflammatories (per Orthopedic Surgery)   -Aspiration/injection of the left hip by Interventional Radiology may be considered if patient is interested (per Orthopedic Surgery)-outpatient follow up

## 2022-12-20 NOTE — HOSPITAL COURSE
"Pt admitted to Observation for Atrial fibrillation (new onset) with RVR with . Cardiology consulted with Cardizem gtt initiated. NSR on monitor with HR in 80's.  BNP elevated and Troponin trended upward (0.076>0.095) with Heparin infusion initiated. Echo showed with concentric hypertrophy and normal systolic function. Atrial fibrillation observed.Biatrial atrial enlargement. Grade III left ventricular diastolic dysfunction. PA systolic pressure is 60 mmHg. Moderate right ventricular enlargement with normal right ventricular systolic function. Pulmonary hypertension. EF 55%. Moderate to severe tricuspid regurgitation. Mild-to-moderate mitral regurgitation. Severe right atrial enlargement. Large posterior pericardial effusion. No evidence of tamponade. Troponin elevated and flat with no cardiac symptoms reported. Pt also reported L groin pain upon admit. Orthopedic Surgery consulted and pt found to have sustained a left superior labral tear of her acetabulum noted on MRI with further orthopedic work up to be completed outpatient. Hx of ESRD noted with last HD on 12/16/22- Nephrology consulted with HD performed today. AVG to LUE with +bruit/+thrill present. On 12/21/22, MRI of R hip results pending. IR consulted for evaluation of aspiration/injection of left hip. Rate and rhythm controlled on Cardizem gtt. Heparin infusion continued pending joint aspiration.  Pericardial effusion discussed with Nephrology and Cardiology for recommendations.   Patient went to have aspiration of left hip however upon transferring from bed to table had an episode described as seizure-like" seems more that it was hypotension related.  Medications adjusted  She remains on p.o. Cardizem as well as Cardizem drip.  Patient underwent arthrocentesis with 4 cc of pus removed 12/23.  Sent for Gram stain, culture, crystals.  Discussed with Dr. Johnson , orthopedics,crystals are negative patient underwent washout with  Cultures 12/24.  Blood " cultures and culture from left hip reveal Gram-positive and Gram-negative.  Patient paced on ceftazidime and vancomycin empirically until cultures return.  12/26 She is complaining of weakness . The blood and drainage cx are (+) for STREPTOCOCCUS GORDONII  sensitive  to rocephin .  ID consulted .  12/27 NAEON . ID consulted and rec IV rocephin qd x 4 week from the last negative blood cx . Cardiology consulted for KATE .   12/28 Plan  for a KATE tomorrow am . S/P HD today  . She is complaining od left hip pain .   12/29 S/P KATE which show negative for IE and mild to moderate pericardial effusion .  Pt  will need 4 week of IVAB  from the last negative blood cx . She  is complaining of left hip pain  . Plan to consult CM for SNF placement .   12/30 CT maxillofacial show Multifocal periodontal disease without definite associated dental abscess.  S/P Tunneled PICC line yesterday . Plan to d/c to SNF  for IVAB rocephin qd x 4 weeks .   12/31 NAEON . Awaiting SNF placement.    1/1/23 C/O nausea and vomit . Abd XR show constipation. Plan to cont miralax colace and start enemas .  1/2: constipation improved, BM overnight; awaiting snf placement  01/03: BP low normal, pt reports some lightheadedness, given 250 cc bolus. Denies hip pain. Awaiting SNF placement.   01/04/23: HD today. BP remains low normal. Awaiting SNF placement.   01/05: Had dizziness/nausea with PT, given small fluid bolus   01/06: Started midodrine for hypotension- discussed with renal. Underwent HD   01/07: BP remains  systolic but pt reports improvement in dizziness with initiation of midodrine. Accepted to [facility] for discharge.

## 2022-12-20 NOTE — ASSESSMENT & PLAN NOTE
Patient currently follows Dr. Ribera from nephrology with last reported dialysis completed last Friday. Patient currently on M/W/F HD via LUE fistula but missed Monday due to not feeling well and being in the ED. Labs consistent with history of ESRD on HD.   Plan:  -continue home medications, titrate as needed  -nephrology consulted to resume HD inpatient

## 2022-12-20 NOTE — ASSESSMENT & PLAN NOTE
Patient presented with acute onset left hip pain x1 day duration in absence of trauma while doing leg exercises in bed and was found to asymmetric large left hip joint effusion, small right hip joint effusion, and evidence of superior labral tear of her left acetabulum noted on MRI.  Patient noted to have 5/5 strength throughout with sensation to light touch grossly intact throughout however, TTP throughout left groin.  Orthopedics consulted and awaiting further evaluation/recommendations.  Plan:  -optimize pain control  -bowel regimen  -bed rest  -nonweightbearing  -PT/OT  -ortho surgery following  -plan as previously discussed

## 2022-12-20 NOTE — ASSESSMENT & PLAN NOTE
Patient currently follows Dr. Ribera from nephrology with last reported dialysis completed last Friday. Patient currently on M/W/F HD via LUE fistula but missed Monday due to not feeling well and being in the ED. Labs consistent with history of ESRD on HD.   Plan:  -continue home medications, titrate as needed  -nephrology consulted -HD performed on 12/20/22

## 2022-12-20 NOTE — PROGRESS NOTES
[facility] Medicine  Progress Note    Patient Name: Meaghan Potter  MRN: [id number]  Patient Class: OP- Observation   Admission Date: 12/19/2022  Length of Stay: 0 days  Attending Physician: Afshan Márquez MD  Primary Care Provider: Primary Doctor No        Subjective:     Principal Problem: Atrial Fibrillation         HPI:  Meaghan Potter is a 67 y.o. female with a PMH  has a past medical history of ESRD (end stage renal disease) on dialysis, GERD (gastroesophageal reflux disease), and Hypertension. who presented as a transfer from [facility] for higher level cardiology and orthopedic care after patient was found to have sustained a left superior labral tear of her acetabulum noted on MRI in addition to new onset atrial fibrillation with RVR requiring diltiazem drip.  Patient reported being in her usual state of health prior to onset of symptoms and reported acute onset of left hip pain while performing leg exercises in bed earlier today.  Patient reported hearing and feeling a pop in her left hip followed by immediate pain which has remained constant and currently rated 6/10 in severity. Pain was described as throbbing/pulling in nature with no known alleviating factors and aggravating factors including weight-bearing, movement, and palpation to the affected area.  She denied endorsing any numbness, tingling, discoloration, or penetrating trauma, but did express decreased range of motion and muscle strength secondary to exacerbation of pain.  While at outside facility, patient became short of breath while lying flat while obtaining MRI of hip and was found to be in atrial fibrillation with RVR requiring diltiazem drip. She denied endorsing any chest pain, lightheadedness, dizziness, visual disturbances, fever, chills, sweats, nausea, vomiting, abdominal pain, changes in bowel/bladder habits, or onset neurological deficits.  All other review of systems negative except as noted above.   Patient reports being back at her baseline and continues to complain only of left hip pain.  Orthopedics and cardiology consulted and awaiting further evaluation/recommendations.  Of note, patient missed hemodialysis today secondary to pain and going to the emergency room and usually receives HD via left upper extremity fistula on Monday/Wednesday/Friday.  Follows Dr. Ribera outpatient. Nephrology consulted to continue HD inpatient.     PCP: Primary Doctor No        Overview/Hospital Course:  Pt admitted to Observation for Atrial fibrillation (new onset) with RVR with . Cardiology consulted with Cardizem gtt initiated. NSR on monitor with HR in 80's.  BNP elevated and Troponin trended upward (0.076>0.095) with Heparin infusion initiated. Echo showed with concentric hypertrophy and normal systolic function. Atrial fibrillation observed.Biatrial atrial enlargement. Grade III left ventricular diastolic dysfunction. PA systolic pressure is 60 mmHg. Moderate right ventricular enlargement with normal right ventricular systolic function. Pulmonary hypertension. EF 55%. Moderate to severe tricuspid regurgitation. Mild-to-moderate mitral regurgitation. Severe right atrial enlargement.  Large posterior pericardial effusion. No evidence of tamponade. Pt also reported L groin pain upon admit. Orthopedic Surgery consulted and pt found to have sustained a left superior labral tear of her acetabulum noted on MRI with further orthopedic work up to be completed outpatient. Hx of ESRD noted with last HD on 12/16/22- Nephrology consulted with HD performed today. AVG to LUE with +bruit/+thrill present.       Interval History: pt in bed upon exam with no signs of acute distress noted. MRI reviewed. HD performed per Nephrology. NSR noted on monitor with Cardizem continued. Troponin trended upward with Heparin infusion in place. Orthopedic Surgery, Cardiology, and Nephrology following.     Review of Systems   Constitutional:   Positive for activity change. Negative for fatigue.   HENT:  Negative for congestion, postnasal drip, sinus pressure, sore throat and trouble swallowing.    Respiratory:  Negative for cough, shortness of breath and wheezing.    Cardiovascular:  Positive for palpitations. Negative for chest pain and leg swelling.   Gastrointestinal:  Negative for abdominal distention, abdominal pain, nausea and vomiting.   Genitourinary:  Negative for difficulty urinating, dysuria, flank pain, frequency and urgency.   Musculoskeletal:  Positive for arthralgias and gait problem. Negative for back pain, joint swelling and myalgias.   Skin:  Negative for color change and wound.   Neurological:  Negative for dizziness, weakness and headaches.   Psychiatric/Behavioral:  Negative for agitation, confusion and sleep disturbance. The patient is not nervous/anxious.    All other systems reviewed and are negative.  Objective:     Vital Signs (Most Recent):  Temp: 98 °F (36.7 °C) (12/20/22 0921)  Pulse: 80 (12/20/22 0921)  Resp: 18 (12/20/22 0921)  BP: 135/68 (12/20/22 0921)  SpO2: 96 % (12/20/22 0921) Vital Signs (24h Range):  Temp:  [98 °F (36.7 °C)-99.2 °F (37.3 °C)] 98 °F (36.7 °C)  Pulse:  [65-94] 80  Resp:  [13-20] 18  SpO2:  [91 %-100 %] 96 %  BP: (105-148)/(58-86) 135/68     Weight: 74.4 kg (164 lb)  Body mass index is 28.15 kg/m².  No intake or output data in the 24 hours ending 12/20/22 1549   Physical Exam  Vitals reviewed.   Constitutional:       General: She is not in acute distress.     Appearance: Normal appearance. She is normal weight. She is not ill-appearing, toxic-appearing or diaphoretic.   HENT:      Head: Normocephalic and atraumatic.      Right Ear: External ear normal.      Left Ear: External ear normal.      Nose: Nose normal. No congestion or rhinorrhea.      Mouth/Throat:      Mouth: Mucous membranes are moist.      Pharynx: Oropharynx is clear. No oropharyngeal exudate or posterior oropharyngeal erythema.       Comments: Poor dentition   Eyes:      General: No scleral icterus.     Extraocular Movements: Extraocular movements intact.      Conjunctiva/sclera: Conjunctivae normal.      Pupils: Pupils are equal, round, and reactive to light.   Neck:      Vascular: No carotid bruit.   Cardiovascular:      Rate and Rhythm: Normal rate and regular rhythm.      Pulses: Normal pulses.      Heart sounds: Normal heart sounds. No murmur heard.    No friction rub. No gallop.      Comments: Left upper extremity fistula noted with palpable thrill and bruit present.  Pulmonary:      Effort: Pulmonary effort is normal. No respiratory distress.      Breath sounds: Normal breath sounds. No stridor. No wheezing, rhonchi or rales.   Chest:      Chest wall: No tenderness.   Abdominal:      General: Abdomen is flat. Bowel sounds are normal. There is no distension.      Palpations: Abdomen is soft.      Tenderness: There is no abdominal tenderness. There is no right CVA tenderness, left CVA tenderness, guarding or rebound.      Hernia: No hernia is present.   Genitourinary:     Comments: HD  Musculoskeletal:         General: Tenderness present. No swelling, deformity or signs of injury. Normal range of motion.      Cervical back: Normal range of motion and neck supple. No rigidity or tenderness.      Right lower leg: No edema.      Left lower leg: No edema.   Lymphadenopathy:      Cervical: No cervical adenopathy.   Skin:     General: Skin is warm and dry.      Capillary Refill: Capillary refill takes less than 2 seconds.      Coloration: Skin is not jaundiced or pale.      Findings: No bruising, erythema, lesion or rash.   Neurological:      General: No focal deficit present.      Mental Status: She is alert and oriented to person, place, and time. Mental status is at baseline.      Cranial Nerves: No cranial nerve deficit.      Sensory: No sensory deficit.      Motor: No weakness.      Coordination: Coordination normal.   Psychiatric:          Mood and Affect: Mood normal.         Behavior: Behavior normal.         Thought Content: Thought content normal.         Judgment: Judgment normal.       Significant Labs: All pertinent labs within the past 24 hours have been reviewed.  CBC:   Recent Labs   Lab 12/19/22  1158 12/20/22  0457   WBC 6.44 6.35   HGB 12.7 11.6*   HCT 38.8 35.7*   * 95*     CMP:   Recent Labs   Lab 12/19/22  1200 12/20/22  0457    137   K 5.6* 6.1*    102   CO2 21* 20*    87   BUN 54* 66*   CREATININE 9.6* 10.6*   CALCIUM 9.6 9.8   PROT 7.9 7.4   ALBUMIN 3.9 3.4*   BILITOT 1.3* 1.5*   ALKPHOS 112 102   AST 32 23   ALT 22 16   ANIONGAP 16 15       Significant Imaging: I have reviewed all pertinent imaging results/findings within the past 24 hours.      Assessment/Plan:      Bone cyst of right femur  -Orthopedic Surgery following   -further imaging recommended         Shortness of breath  Patient reported acute onset shortness a breath while obtaining MRI and was found to have evidence of severe cardiomegaly with small left pleural effusion but negative for infectious processes noted on CXR. Patient afebrile without leukocytosis. BNP elevated at 4792. Troponin 0.095. Exam positive for bilateral edema but negative for elevated JVD or crackles on lung ausculation. Patient currently saturating % on 2L via NC in no acute distress and without use of accessory muscles noted.  Patient without history of CHF.  Cardiology consulted.  Echo ordered and pending.  Nephrology consulted to resume HD inpatient.  Will trial dose of Lasix if clinical status worsens.  Plan:  -titrate oxygen therapy as needed  -incentive spirometry  -echo  -f/u cardiology and nephrology  -monitor Is/Os  -low salt/cardiac/renal diet    -Duonebs prn       Tear of left acetabular labrum  -Orthopedic surgery following  -MRI showed no fracture and asymmetric large left hip joint effusion.  Small right hip joint effusion. Paralabral cyst along the  superior border of the acetabulum suspicious for a superior labral tear.   -analgesia as needed   -antiemetics as needed   - the left hip may treat with therapy and anti-inflammatories (per Orthopedic Surgery)   -Aspiration/injection of the left hip by Interventional Radiology may be considered if patient is interested (per Orthopedic Surgery)-outpatient follow up       Left hip pain  Patient presented with acute onset left hip pain x1 day duration in absence of trauma while doing leg exercises in bed and was found to asymmetric large left hip joint effusion, small right hip joint effusion, and evidence of superior labral tear of her left acetabulum noted on MRI.  Patient noted to have 5/5 strength throughout with sensation to light touch grossly intact throughout however, TTP throughout left groin.  Orthopedics consulted and awaiting further evaluation/recommendations.  Plan:  -optimize pain control  -bowel regimen  -bed rest  -nonweightbearing  -PT/OT  -ortho surgery following  -plan as previously discussed       Gastroesophageal reflux disease without esophagitis  Chronic. Stable. Currently asymptomatic. Home medications include PPI/Antacids as needed.  Plan:  -Continue PPI/Antacids as needed       Primary hypertension  BP currently ranging from 120s-130s systolic.    Plan:  -Optimize pain control   -Continue home medications, titrate as needed   -Monitor BP  -Low salt/renal diet when not NPO  -IV hydralazine prn for SBP>160 or DBP>90   -f/u nephrology for HD inpatient- HD performed on 12/20/22       ESRD (end stage renal disease) on dialysis  Patient currently follows Dr. Ribera from nephrology with last reported dialysis completed last Friday. Patient currently on M/W/F HD via LUE fistula but missed Monday due to not feeling well and being in the ED. Labs consistent with history of ESRD on HD.   Plan:  -continue home medications, titrate as needed  -nephrology consulted -HD performed on 12/20/22      Atrial  fibrillation w/ RVR  Patient with new onset Paroxysmal (<7 days) atrial fibrillation with RVR which is uncontrolled currently with Calcium Channel Blocker. Patient is currently in atrial fibrillation with rate in the 70s-80s on diltiazem. [id number] Score: 2. HASBLED Score: 3. Anticoagulation indicated. Anticoagulation done with heparin .  Troponin measuring 0.095 with EKG obtained at outside facility revealing SVT/atrial fibrillation with RVR at rate of 176. Troponin likely elevated in setting of ESRD and demand ischemia from arrhythmia as patient denied endorsing chest pain.  Plan:  -telemetry  -cardizem drip  - troponin- upward trend   -f/u cardiology   -nephrology for HD  -Cardizem infusion   -Heparin gtt -  -CHADS-VASc Score- 3         VTE Risk Mitigation (From admission, onward)         Ordered     heparin 25,000 units in dextrose 5% (100 units/ml) IV bolus from bag - ADDITIONAL PRN BOLUS - 60 units/kg  As needed (PRN)        Question:  Heparin Infusion Adjustment (DO NOT MODIFY ANSWER)  Answer:  \[email or web address]\[email or web address]\Images\Pharmacy\HeparinInfusions\heparin LOW INTENSITY nomogram for [facility] [id number].pdf    12/20/22 1315     heparin 25,000 units in dextrose 5% (100 units/ml) IV bolus from bag - ADDITIONAL PRN BOLUS - 30 units/kg  As needed (PRN)        Question:  Heparin Infusion Adjustment (DO NOT MODIFY ANSWER)  Answer:  \\[email or web address]\epic\Images\Pharmacy\HeparinInfusions\heparin LOW INTENSITY nomogram for [facility] [id number].pdf    12/20/22 1315     heparin 25,000 units in dextrose 5% 250 mL (100 units/mL) infusion LOW INTENSITY nomogram - [facility]  Continuous        Question Answer Comment   Heparin Infusion Adjustment (DO NOT MODIFY ANSWER) \\[email or web address]\epic\Images\Pharmacy\HeparinInfusions\heparin LOW INTENSITY nomogram for [facility] [id number].pdf    Begin at (in units/kg/hr) 12        12/20/22 1315     Place sequential compression device  Until discontinued         12/19/22 2119     IP VTE LOW RISK PATIENT  Once         12/19/22  2119                Discharge Planning   ISAAK:      Code Status: Full Code   Is the patient medically ready for discharge?:     Reason for patient still in hospital (select all that apply): Patient trending condition, Laboratory test and Treatment                     Carmen Ayon NP  Department of Hospital Medicine   [facility] ([employer] [facility])

## 2022-12-21 PROBLEM — I31.39 PERICARDIAL EFFUSION: Status: ACTIVE | Noted: 2022-12-21

## 2022-12-21 LAB
ALBUMIN SERPL BCP-MCNC: 3.1 G/DL (ref 3.5–5.2)
ALP SERPL-CCNC: 91 U/L (ref 55–135)
ALT SERPL W/O P-5'-P-CCNC: 16 U/L (ref 10–44)
ANION GAP SERPL CALC-SCNC: 16 MMOL/L (ref 8–16)
APTT BLDCRRT: 45.3 SEC (ref 21–32)
APTT BLDCRRT: 59 SEC (ref 21–32)
AST SERPL-CCNC: 35 U/L (ref 10–40)
BASOPHILS # BLD AUTO: 0.01 K/UL (ref 0–0.2)
BASOPHILS # BLD AUTO: 0.01 K/UL (ref 0–0.2)
BASOPHILS NFR BLD: 0.2 % (ref 0–1.9)
BASOPHILS NFR BLD: 0.2 % (ref 0–1.9)
BILIRUB SERPL-MCNC: 1.5 MG/DL (ref 0.1–1)
BUN SERPL-MCNC: 43 MG/DL (ref 8–23)
CALCIUM SERPL-MCNC: 8.8 MG/DL (ref 8.7–10.5)
CHLORIDE SERPL-SCNC: 94 MMOL/L (ref 95–110)
CO2 SERPL-SCNC: 27 MMOL/L (ref 23–29)
CREAT SERPL-MCNC: 7.8 MG/DL (ref 0.5–1.4)
DIFFERENTIAL METHOD: ABNORMAL
DIFFERENTIAL METHOD: ABNORMAL
EOSINOPHIL # BLD AUTO: 0 K/UL (ref 0–0.5)
EOSINOPHIL # BLD AUTO: 0 K/UL (ref 0–0.5)
EOSINOPHIL NFR BLD: 0.2 % (ref 0–8)
EOSINOPHIL NFR BLD: 0.2 % (ref 0–8)
ERYTHROCYTE [DISTWIDTH] IN BLOOD BY AUTOMATED COUNT: 14.5 % (ref 11.5–14.5)
ERYTHROCYTE [DISTWIDTH] IN BLOOD BY AUTOMATED COUNT: 14.5 % (ref 11.5–14.5)
EST. GFR  (NO RACE VARIABLE): 5 ML/MIN/1.73 M^2
GLUCOSE SERPL-MCNC: 97 MG/DL (ref 70–110)
HAV IGM SERPL QL IA: NORMAL
HBV CORE IGM SERPL QL IA: NORMAL
HBV SURFACE AG SERPL QL IA: NORMAL
HCT VFR BLD AUTO: 35.6 % (ref 37–48.5)
HCT VFR BLD AUTO: 35.6 % (ref 37–48.5)
HCV AB SERPL QL IA: NORMAL
HGB BLD-MCNC: 11.3 G/DL (ref 12–16)
HGB BLD-MCNC: 11.3 G/DL (ref 12–16)
IMM GRANULOCYTES # BLD AUTO: 0.02 K/UL (ref 0–0.04)
IMM GRANULOCYTES # BLD AUTO: 0.02 K/UL (ref 0–0.04)
IMM GRANULOCYTES NFR BLD AUTO: 0.3 % (ref 0–0.5)
IMM GRANULOCYTES NFR BLD AUTO: 0.3 % (ref 0–0.5)
LYMPHOCYTES # BLD AUTO: 0.5 K/UL (ref 1–4.8)
LYMPHOCYTES # BLD AUTO: 0.5 K/UL (ref 1–4.8)
LYMPHOCYTES NFR BLD: 7.3 % (ref 18–48)
LYMPHOCYTES NFR BLD: 7.3 % (ref 18–48)
MCH RBC QN AUTO: 30.5 PG (ref 27–31)
MCH RBC QN AUTO: 30.5 PG (ref 27–31)
MCHC RBC AUTO-ENTMCNC: 31.7 G/DL (ref 32–36)
MCHC RBC AUTO-ENTMCNC: 31.7 G/DL (ref 32–36)
MCV RBC AUTO: 96 FL (ref 82–98)
MCV RBC AUTO: 96 FL (ref 82–98)
MONOCYTES # BLD AUTO: 0.7 K/UL (ref 0.3–1)
MONOCYTES # BLD AUTO: 0.7 K/UL (ref 0.3–1)
MONOCYTES NFR BLD: 11.4 % (ref 4–15)
MONOCYTES NFR BLD: 11.4 % (ref 4–15)
NEUTROPHILS # BLD AUTO: 5 K/UL (ref 1.8–7.7)
NEUTROPHILS # BLD AUTO: 5 K/UL (ref 1.8–7.7)
NEUTROPHILS NFR BLD: 80.6 % (ref 38–73)
NEUTROPHILS NFR BLD: 80.6 % (ref 38–73)
NRBC BLD-RTO: 0 /100 WBC
NRBC BLD-RTO: 0 /100 WBC
PHOSPHATE SERPL-MCNC: 6 MG/DL (ref 2.7–4.5)
PLATELET # BLD AUTO: 100 K/UL (ref 150–450)
PLATELET # BLD AUTO: 100 K/UL (ref 150–450)
PMV BLD AUTO: 12.5 FL (ref 9.2–12.9)
PMV BLD AUTO: 12.5 FL (ref 9.2–12.9)
POTASSIUM SERPL-SCNC: 5.3 MMOL/L (ref 3.5–5.1)
PROT SERPL-MCNC: 6.7 G/DL (ref 6–8.4)
RBC # BLD AUTO: 3.7 M/UL (ref 4–5.4)
RBC # BLD AUTO: 3.7 M/UL (ref 4–5.4)
SODIUM SERPL-SCNC: 137 MMOL/L (ref 136–145)
WBC # BLD AUTO: 6.15 K/UL (ref 3.9–12.7)
WBC # BLD AUTO: 6.15 K/UL (ref 3.9–12.7)

## 2022-12-21 PROCEDURE — 25000003 PHARM REV CODE 250: Performed by: HOSPITALIST

## 2022-12-21 PROCEDURE — 99232 PR SUBSEQUENT HOSPITAL CARE,LEVL II: ICD-10-PCS | Mod: ,,, | Performed by: INTERNAL MEDICINE

## 2022-12-21 PROCEDURE — 63600175 PHARM REV CODE 636 W HCPCS: Performed by: HOSPITALIST

## 2022-12-21 PROCEDURE — 99232 SBSQ HOSP IP/OBS MODERATE 35: CPT | Mod: ,,, | Performed by: PHYSICIAN ASSISTANT

## 2022-12-21 PROCEDURE — 94761 N-INVAS EAR/PLS OXIMETRY MLT: CPT

## 2022-12-21 PROCEDURE — 25000003 PHARM REV CODE 250: Performed by: PHYSICIAN ASSISTANT

## 2022-12-21 PROCEDURE — 84100 ASSAY OF PHOSPHORUS: CPT | Performed by: INTERNAL MEDICINE

## 2022-12-21 PROCEDURE — 80100016 HC MAINTENANCE HEMODIALYSIS

## 2022-12-21 PROCEDURE — 99232 PR SUBSEQUENT HOSPITAL CARE,LEVL II: ICD-10-PCS | Mod: ,,, | Performed by: PHYSICIAN ASSISTANT

## 2022-12-21 PROCEDURE — 80053 COMPREHEN METABOLIC PANEL: CPT | Performed by: HOSPITALIST

## 2022-12-21 PROCEDURE — 85730 THROMBOPLASTIN TIME PARTIAL: CPT | Performed by: INTERNAL MEDICINE

## 2022-12-21 PROCEDURE — 27000221 HC OXYGEN, UP TO 24 HOURS

## 2022-12-21 PROCEDURE — 25000003 PHARM REV CODE 250: Performed by: INTERNAL MEDICINE

## 2022-12-21 PROCEDURE — 36415 COLL VENOUS BLD VENIPUNCTURE: CPT | Performed by: INTERNAL MEDICINE

## 2022-12-21 PROCEDURE — 85025 COMPLETE CBC W/AUTO DIFF WBC: CPT | Performed by: HOSPITALIST

## 2022-12-21 PROCEDURE — 36415 COLL VENOUS BLD VENIPUNCTURE: CPT | Performed by: HOSPITALIST

## 2022-12-21 PROCEDURE — 99232 SBSQ HOSP IP/OBS MODERATE 35: CPT | Mod: ,,, | Performed by: INTERNAL MEDICINE

## 2022-12-21 PROCEDURE — 21400001 HC TELEMETRY ROOM

## 2022-12-21 PROCEDURE — 94799 UNLISTED PULMONARY SVC/PX: CPT

## 2022-12-21 PROCEDURE — 99900035 HC TECH TIME PER 15 MIN (STAT)

## 2022-12-21 RX ORDER — BUPIVACAINE HYDROCHLORIDE 2.5 MG/ML
5 INJECTION, SOLUTION EPIDURAL; INFILTRATION; INTRACAUDAL ONCE
Status: DISCONTINUED | OUTPATIENT
Start: 2022-12-21 | End: 2023-01-06

## 2022-12-21 RX ORDER — TRIAMCINOLONE ACETONIDE 40 MG/ML
40 INJECTION, SUSPENSION INTRA-ARTICULAR; INTRAMUSCULAR ONCE
Status: DISCONTINUED | OUTPATIENT
Start: 2022-12-21 | End: 2022-12-23

## 2022-12-21 RX ORDER — SODIUM CHLORIDE 9 MG/ML
INJECTION, SOLUTION INTRAVENOUS ONCE
Status: DISCONTINUED | OUTPATIENT
Start: 2022-12-21 | End: 2023-01-07 | Stop reason: HOSPADM

## 2022-12-21 RX ORDER — DILTIAZEM HYDROCHLORIDE 30 MG/1
30 TABLET, FILM COATED ORAL EVERY 6 HOURS
Status: DISCONTINUED | OUTPATIENT
Start: 2022-12-21 | End: 2022-12-23

## 2022-12-21 RX ADMIN — CALCITRIOL CAPSULES 0.25 MCG 0.5 MCG: 0.25 CAPSULE ORAL at 08:12

## 2022-12-21 RX ADMIN — BRIMONIDINE TARTRATE 1 DROP: 2 SOLUTION/ DROPS OPHTHALMIC at 08:12

## 2022-12-21 RX ADMIN — DILTIAZEM HYDROCHLORIDE 30 MG: 30 TABLET, FILM COATED ORAL at 05:12

## 2022-12-21 RX ADMIN — HYDROCODONE BITARTRATE AND ACETAMINOPHEN 1 TABLET: 5; 325 TABLET ORAL at 11:12

## 2022-12-21 RX ADMIN — ACETAMINOPHEN 650 MG: 325 TABLET ORAL at 08:12

## 2022-12-21 RX ADMIN — MORPHINE SULFATE 2 MG: 2 INJECTION, SOLUTION INTRAMUSCULAR; INTRAVENOUS at 08:12

## 2022-12-21 RX ADMIN — MUPIROCIN: 20 OINTMENT TOPICAL at 08:12

## 2022-12-21 RX ADMIN — HYDRALAZINE HYDROCHLORIDE 25 MG: 25 TABLET, FILM COATED ORAL at 08:12

## 2022-12-21 RX ADMIN — Medication 6 MG: at 11:12

## 2022-12-21 RX ADMIN — LOSARTAN POTASSIUM 50 MG: 50 TABLET, FILM COATED ORAL at 08:12

## 2022-12-21 RX ADMIN — NEPHROCAP 1 CAPSULE: 1 CAP ORAL at 08:12

## 2022-12-21 RX ADMIN — DILTIAZEM HYDROCHLORIDE 30 MG: 30 TABLET, FILM COATED ORAL at 11:12

## 2022-12-21 NOTE — SUBJECTIVE & OBJECTIVE
Interval History: pt in bed and reports pain to L hip mildly controlled on prescribed narcotic regime. MRI of R hip pending. IR consulted for aspiration/injection of left hip. Pt tolerated HD yesterday. Nephrology and Cardiology following.     Review of Systems   Constitutional:  Positive for activity change. Negative for fatigue.   HENT:  Negative for congestion, postnasal drip, sinus pressure, sore throat and trouble swallowing.    Respiratory:  Negative for cough, shortness of breath and wheezing.    Cardiovascular:  Positive for palpitations (resolved). Negative for chest pain and leg swelling.   Gastrointestinal:  Negative for abdominal distention, abdominal pain, nausea and vomiting.   Genitourinary:  Negative for difficulty urinating, dysuria, flank pain, frequency and urgency.   Musculoskeletal:  Positive for arthralgias and gait problem. Negative for back pain, joint swelling and myalgias.   Skin:  Negative for color change and wound.   Neurological:  Negative for dizziness, weakness and headaches.   Psychiatric/Behavioral:  Negative for agitation, confusion and sleep disturbance. The patient is not nervous/anxious.    All other systems reviewed and are negative.  Objective:     Vital Signs (Most Recent):  Temp: 99.6 °F (37.6 °C) (12/21/22 1204)  Pulse: 88 (12/21/22 1204)  Resp: 17 (12/21/22 1204)  BP: 115/60 (12/21/22 1204)  SpO2: 98 % (12/21/22 1204) Vital Signs (24h Range):  Temp:  [98.1 °F (36.7 °C)-99.6 °F (37.6 °C)] 99.6 °F (37.6 °C)  Pulse:  [64-94] 88  Resp:  [16-20] 17  SpO2:  [97 %-99 %] 98 %  BP: (106-136)/(56-74) 115/60     Weight: 70.9 kg (156 lb 4.9 oz)  Body mass index is 26.83 kg/m².    Intake/Output Summary (Last 24 hours) at 12/21/2022 1221  Last data filed at 12/21/2022 0519  Gross per 24 hour   Intake 359.79 ml   Output 3601 ml   Net -3241.21 ml      Physical Exam  Vitals reviewed.   Constitutional:       General: She is not in acute distress.     Appearance: Normal appearance. She is  normal weight. She is not ill-appearing, toxic-appearing or diaphoretic.   HENT:      Head: Normocephalic and atraumatic.      Right Ear: External ear normal.      Left Ear: External ear normal.      Nose: Nose normal. No congestion or rhinorrhea.      Mouth/Throat:      Mouth: Mucous membranes are moist.      Pharynx: Oropharynx is clear. No oropharyngeal exudate or posterior oropharyngeal erythema.      Comments: Poor dentition   Eyes:      General: No scleral icterus.     Extraocular Movements: Extraocular movements intact.      Conjunctiva/sclera: Conjunctivae normal.      Pupils: Pupils are equal, round, and reactive to light.   Neck:      Vascular: No carotid bruit.   Cardiovascular:      Rate and Rhythm: Normal rate and regular rhythm.      Pulses: Normal pulses.      Heart sounds: Normal heart sounds. No murmur heard.    No friction rub. No gallop.      Comments: Left upper extremity fistula noted with palpable thrill and bruit present.  Pulmonary:      Effort: Pulmonary effort is normal. No respiratory distress.      Breath sounds: Normal breath sounds. No stridor. No wheezing, rhonchi or rales.   Chest:      Chest wall: No tenderness.   Abdominal:      General: Abdomen is flat. Bowel sounds are normal. There is no distension.      Palpations: Abdomen is soft.      Tenderness: There is no abdominal tenderness. There is no right CVA tenderness, left CVA tenderness, guarding or rebound.      Hernia: No hernia is present.   Genitourinary:     Comments: HD  Musculoskeletal:         General: Tenderness present. No swelling, deformity or signs of injury. Normal range of motion.      Cervical back: Normal range of motion and neck supple. No rigidity or tenderness.      Right lower leg: No edema.      Left lower leg: No edema.      Comments: Limited ROM due to LLE pain    Lymphadenopathy:      Cervical: No cervical adenopathy.   Skin:     General: Skin is warm and dry.      Capillary Refill: Capillary refill takes  less than 2 seconds.      Coloration: Skin is not jaundiced or pale.      Findings: No bruising, erythema, lesion or rash.      Comments: AVG to LAMONTJENNA   Neurological:      General: No focal deficit present.      Mental Status: She is alert and oriented to person, place, and time. Mental status is at baseline.      Cranial Nerves: No cranial nerve deficit.      Sensory: No sensory deficit.      Motor: No weakness.      Coordination: Coordination normal.   Psychiatric:         Mood and Affect: Mood normal.         Behavior: Behavior normal.         Thought Content: Thought content normal.         Judgment: Judgment normal.       Significant Labs: All pertinent labs within the past 24 hours have been reviewed.  CBC:   Recent Labs   Lab 12/20/22  0457 12/21/22  0618   WBC 6.35 6.15  6.15   HGB 11.6* 11.3*  11.3*   HCT 35.7* 35.6*  35.6*   PLT 95* 100*  100*     CMP:   Recent Labs   Lab 12/20/22  0457 12/20/22  1658 12/21/22  0618    137 137   K 6.1* 4.6 5.3*    95 94*   CO2 20* 24 27   GLU 87 119* 97   BUN 66* 31* 43*   CREATININE 10.6* 6.3* 7.8*   CALCIUM 9.8 9.4 8.8   PROT 7.4  --  6.7   ALBUMIN 3.4*  --  3.1*   BILITOT 1.5*  --  1.5*   ALKPHOS 102  --  91   AST 23  --  35   ALT 16  --  16   ANIONGAP 15 18* 16       Significant Imaging: I have reviewed all pertinent imaging results/findings within the past 24 hours.

## 2022-12-21 NOTE — HOSPITAL COURSE
12/21/22-Patient seen and examined today, resting in bed. Feels ok. Still complains of hip pain. No SOB/chest pain. Still in afib but HR controlled. Cardizem switched to po. Will re-evaluate pericardial effusion with repeat echo IP vs OP post additional HD.    12/22/22- Patient seen and examined today. Feels ok, fatigue. Pt went down to have hip drained but had seizure like episode on the table. CT negative. Pt still in afib tachycardic during exam, denies any CP, SOB at this time, repeat Echo tomorrow to assess effusion. Will need eliquis once done with hip procedure, cont hept gtt    12/23/22-Patient seen and examined today, sitting up in bedside chair. Feeling better, s/p hip aspiration, will likely need surgery/washout tmw. Remains in afib but HR controlled. HD scheduled today, will plan on repeat echo after volume status optimized. BC pending.    12/27/22- Patient seen and examined today, reconsulted for KATE. Left hip fluid drained, Blood cultures and culture from left hip reveal Gram-positive and Gram-negative. Labs reviewed Crt 9.1 BUN 69. Will plan for KATE tomorrow morning 9am

## 2022-12-21 NOTE — ASSESSMENT & PLAN NOTE
-large posterior pericardial effusion   -pt appears asymptomatic   -Nephrology following with HD performed on 12/20/22  -Cardiology following- will follow and evaluate after next HD      rolling walker

## 2022-12-21 NOTE — PLAN OF CARE
[facility] - Telemetry (Hospital)  Discharge Assessment    Primary Care Provider: Primary Doctor No     Discharge Assessment (most recent)       BRIEF DISCHARGE ASSESSMENT - 12/21/22 5098          Discharge Planning    Assessment Type Discharge Planning Brief Assessment     Resource/Environmental Concerns none     Support Systems Family members     Assistance Needed TBD     Current Living Arrangements home     Patient/Family Anticipates Transition to home     Patient/Family Anticipated Services at Transition none     DME Needed Upon Discharge  none     Discharge Plan A Home     Discharge Plan B Home                    SW attempted SW assessment two days in a row. Pt not present in room at time of SW assessment. SW contacted pt's emergency contacts with no answer.  SW to continue following to assist with discharge needs.

## 2022-12-21 NOTE — ASSESSMENT & PLAN NOTE
BP currently ranging from 120s-130s systolic.    Plan:  -Optimize pain control   -Continue home medications, titrate as needed   -Monitor BP  -Low salt/renal diet when not NPO  -IV hydralazine prn for SBP>160 or DBP>90   -f/u nephrology for HD inpatient- HD performed on 12/20/22

## 2022-12-21 NOTE — PROGRESS NOTES
[facility] Medicine  Progress Note    Patient Name: Meaghan Potter  MRN: [id number]  Patient Class: OP- Observation   Admission Date: 12/19/2022  Length of Stay: 0 days  Attending Physician: Afshan Márquez MD  Primary Care Provider: Primary Doctor No        Subjective:     Principal Problem: Atrial fibrillation w/ RVR        HPI:  Meaghan Potter is a 67 y.o. female with a PMH  has a past medical history of ESRD (end stage renal disease) on dialysis, GERD (gastroesophageal reflux disease), and Hypertension. who presented as a transfer from [facility] for higher level cardiology and orthopedic care after patient was found to have sustained a left superior labral tear of her acetabulum noted on MRI in addition to new onset atrial fibrillation with RVR requiring diltiazem drip.  Patient reported being in her usual state of health prior to onset of symptoms and reported acute onset of left hip pain while performing leg exercises in bed earlier today.  Patient reported hearing and feeling a pop in her left hip followed by immediate pain which has remained constant and currently rated 6/10 in severity. Pain was described as throbbing/pulling in nature with no known alleviating factors and aggravating factors including weight-bearing, movement, and palpation to the affected area.  She denied endorsing any numbness, tingling, discoloration, or penetrating trauma, but did express decreased range of motion and muscle strength secondary to exacerbation of pain.  While at outside facility, patient became short of breath while lying flat while obtaining MRI of hip and was found to be in atrial fibrillation with RVR requiring diltiazem drip. She denied endorsing any chest pain, lightheadedness, dizziness, visual disturbances, fever, chills, sweats, nausea, vomiting, abdominal pain, changes in bowel/bladder habits, or onset neurological deficits.  All other review of systems negative except as noted  above.  Patient reports being back at her baseline and continues to complain only of left hip pain.  Orthopedics and cardiology consulted and awaiting further evaluation/recommendations.  Of note, patient missed hemodialysis today secondary to pain and going to the emergency room and usually receives HD via left upper extremity fistula on Monday/Wednesday/Friday.  Follows Dr. Ribera outpatient. Nephrology consulted to continue HD inpatient.     PCP: Primary Doctor No        Overview/Hospital Course:  Pt admitted to Observation for Atrial fibrillation (new onset) with RVR with . Cardiology consulted with Cardizem gtt initiated. NSR on monitor with HR in 80's.  BNP elevated and Troponin trended upward (0.076>0.095) with Heparin infusion initiated. Echo showed with concentric hypertrophy and normal systolic function. Atrial fibrillation observed.Biatrial atrial enlargement. Grade III left ventricular diastolic dysfunction. PA systolic pressure is 60 mmHg. Moderate right ventricular enlargement with normal right ventricular systolic function. Pulmonary hypertension. EF 55%. Moderate to severe tricuspid regurgitation. Mild-to-moderate mitral regurgitation. Severe right atrial enlargement. Large posterior pericardial effusion. No evidence of tamponade. Troponin elevated and flat with no cardiac symptoms reported. Pt also reported L groin pain upon admit. Orthopedic Surgery consulted and pt found to have sustained a left superior labral tear of her acetabulum noted on MRI with further orthopedic work up to be completed outpatient. Hx of ESRD noted with last HD on 12/16/22- Nephrology consulted with HD performed today. AVG to LUE with +bruit/+thrill present. On 12/21/22, MRI of R hip results pending. IR consulted for evaluation of aspiration/injection of left hip. Rate and rhythm controlled on Cardizem gtt. Heparin infusion continued pending joint aspiration.  Pericardial effusion discussed with Nephrology and  Cardiology for recommendations.       Interval History: pt in bed and reports pain to L hip mildly controlled on prescribed narcotic regime. MRI of R hip pending. IR consulted for aspiration/injection of left hip. Pt tolerated HD yesterday. Nephrology and Cardiology following.     Review of Systems   Constitutional:  Positive for activity change. Negative for fatigue.   HENT:  Negative for congestion, postnasal drip, sinus pressure, sore throat and trouble swallowing.    Respiratory:  Negative for cough, shortness of breath and wheezing.    Cardiovascular:  Positive for palpitations (resolved). Negative for chest pain and leg swelling.   Gastrointestinal:  Negative for abdominal distention, abdominal pain, nausea and vomiting.   Genitourinary:  Negative for difficulty urinating, dysuria, flank pain, frequency and urgency.   Musculoskeletal:  Positive for arthralgias and gait problem. Negative for back pain, joint swelling and myalgias.   Skin:  Negative for color change and wound.   Neurological:  Negative for dizziness, weakness and headaches.   Psychiatric/Behavioral:  Negative for agitation, confusion and sleep disturbance. The patient is not nervous/anxious.    All other systems reviewed and are negative.  Objective:     Vital Signs (Most Recent):  Temp: 99.6 °F (37.6 °C) (12/21/22 1204)  Pulse: 88 (12/21/22 1204)  Resp: 17 (12/21/22 1204)  BP: 115/60 (12/21/22 1204)  SpO2: 98 % (12/21/22 1204) Vital Signs (24h Range):  Temp:  [98.1 °F (36.7 °C)-99.6 °F (37.6 °C)] 99.6 °F (37.6 °C)  Pulse:  [64-94] 88  Resp:  [16-20] 17  SpO2:  [97 %-99 %] 98 %  BP: (106-136)/(56-74) 115/60     Weight: 70.9 kg (156 lb 4.9 oz)  Body mass index is 26.83 kg/m².    Intake/Output Summary (Last 24 hours) at 12/21/2022 1221  Last data filed at 12/21/2022 0519  Gross per 24 hour   Intake 359.79 ml   Output 3601 ml   Net -3241.21 ml      Physical Exam  Vitals reviewed.   Constitutional:       General: She is not in acute distress.      Appearance: Normal appearance. She is normal weight. She is not ill-appearing, toxic-appearing or diaphoretic.   HENT:      Head: Normocephalic and atraumatic.      Right Ear: External ear normal.      Left Ear: External ear normal.      Nose: Nose normal. No congestion or rhinorrhea.      Mouth/Throat:      Mouth: Mucous membranes are moist.      Pharynx: Oropharynx is clear. No oropharyngeal exudate or posterior oropharyngeal erythema.      Comments: Poor dentition   Eyes:      General: No scleral icterus.     Extraocular Movements: Extraocular movements intact.      Conjunctiva/sclera: Conjunctivae normal.      Pupils: Pupils are equal, round, and reactive to light.   Neck:      Vascular: No carotid bruit.   Cardiovascular:      Rate and Rhythm: Normal rate and regular rhythm.      Pulses: Normal pulses.      Heart sounds: Normal heart sounds. No murmur heard.    No friction rub. No gallop.      Comments: Left upper extremity fistula noted with palpable thrill and bruit present.  Pulmonary:      Effort: Pulmonary effort is normal. No respiratory distress.      Breath sounds: Normal breath sounds. No stridor. No wheezing, rhonchi or rales.   Chest:      Chest wall: No tenderness.   Abdominal:      General: Abdomen is flat. Bowel sounds are normal. There is no distension.      Palpations: Abdomen is soft.      Tenderness: There is no abdominal tenderness. There is no right CVA tenderness, left CVA tenderness, guarding or rebound.      Hernia: No hernia is present.   Genitourinary:     Comments: HD  Musculoskeletal:         General: Tenderness present. No swelling, deformity or signs of injury. Normal range of motion.      Cervical back: Normal range of motion and neck supple. No rigidity or tenderness.      Right lower leg: No edema.      Left lower leg: No edema.      Comments: Limited ROM due to LLE pain    Lymphadenopathy:      Cervical: No cervical adenopathy.   Skin:     General: Skin is warm and dry.       Capillary Refill: Capillary refill takes less than 2 seconds.      Coloration: Skin is not jaundiced or pale.      Findings: No bruising, erythema, lesion or rash.      Comments: AVG to MARIPOSA   Neurological:      General: No focal deficit present.      Mental Status: She is alert and oriented to person, place, and time. Mental status is at baseline.      Cranial Nerves: No cranial nerve deficit.      Sensory: No sensory deficit.      Motor: No weakness.      Coordination: Coordination normal.   Psychiatric:         Mood and Affect: Mood normal.         Behavior: Behavior normal.         Thought Content: Thought content normal.         Judgment: Judgment normal.       Significant Labs: All pertinent labs within the past 24 hours have been reviewed.  CBC:   Recent Labs   Lab 12/20/22  0457 12/21/22  0618   WBC 6.35 6.15  6.15   HGB 11.6* 11.3*  11.3*   HCT 35.7* 35.6*  35.6*   PLT 95* 100*  100*     CMP:   Recent Labs   Lab 12/20/22  0457 12/20/22  1658 12/21/22  0618    137 137   K 6.1* 4.6 5.3*    95 94*   CO2 20* 24 27   GLU 87 119* 97   BUN 66* 31* 43*   CREATININE 10.6* 6.3* 7.8*   CALCIUM 9.8 9.4 8.8   PROT 7.4  --  6.7   ALBUMIN 3.4*  --  3.1*   BILITOT 1.5*  --  1.5*   ALKPHOS 102  --  91   AST 23  --  35   ALT 16  --  16   ANIONGAP 15 18* 16       Significant Imaging: I have reviewed all pertinent imaging results/findings within the past 24 hours.      Assessment/Plan:      Pericardial effusion  -large posterior pericardial effusion   -pt appears asymptomatic   -Nephrology following with HD performed on 12/20/22  -Cardiology following- will follow and evaluate after next HD       Bone cyst of right femur  -Orthopedic Surgery following   -further imaging recommended   -MRI of R hip pending         Shortness of breath  Patient reported acute onset shortness a breath while obtaining MRI and was found to have evidence of severe cardiomegaly with small left pleural effusion but negative for  infectious processes noted on CXR. Patient afebrile without leukocytosis. BNP elevated at 4792. Troponin 0.095. Exam positive for bilateral edema but negative for elevated JVD or crackles on lung ausculation. Patient currently saturating % on 2L via NC in no acute distress and without use of accessory muscles noted.  Patient without history of CHF.  Cardiology consulted.  Echo ordered and pending.  Nephrology consulted to resume HD inpatient.  Will trial dose of Lasix if clinical status worsens.  Plan:  -titrate oxygen therapy as needed  -incentive spirometry  -echo reviewed   -f/u cardiology and nephrology  -monitor Is/Os  -low salt/cardiac/renal diet    -Duonebs prn       Tear of left acetabular labrum  -Orthopedic surgery following  -MRI showed no fracture and asymmetric large left hip joint effusion.  Small right hip joint effusion. Paralabral cyst along the superior border of the acetabulum suspicious for a superior labral tear.   -analgesia as needed   -antiemetics as needed   - the left hip may treat with therapy and anti-inflammatories (per Orthopedic Surgery)   -Aspiration/injection of the left hip by Interventional Radiology may be considered if patient is interested (per Orthopedic Surgery)-outpatient follow up   -IR consulted to aspiration/injection L hip       Left hip pain  Patient presented with acute onset left hip pain x1 day duration in absence of trauma while doing leg exercises in bed and was found to asymmetric large left hip joint effusion, small right hip joint effusion, and evidence of superior labral tear of her left acetabulum noted on MRI.  Patient noted to have 5/5 strength throughout with sensation to light touch grossly intact throughout however, TTP throughout left groin.  Orthopedics consulted and awaiting further evaluation/recommendations.  Plan:  -optimize pain control  -bowel regimen  -bed rest  -nonweightbearing  -PT/OT  -ortho surgery following  -plan as previously  discussed       Gastroesophageal reflux disease without esophagitis  Chronic. Stable. Currently asymptomatic. Home medications include PPI/Antacids as needed.  Plan:  -Continue PPI/Antacids as needed       Primary hypertension  BP currently ranging from 120s-130s systolic.    Plan:  -Optimize pain control   -Continue home medications, titrate as needed   -Monitor BP  -Low salt/renal diet when not NPO  -IV hydralazine prn for SBP>160 or DBP>90   -f/u nephrology for HD inpatient- HD performed on 12/20/22       ESRD (end stage renal disease) on dialysis  Patient currently follows Dr. Ribera from nephrology with last reported dialysis completed last Friday. Patient currently on M/W/F HD via LUE fistula but missed Monday due to not feeling well and being in the ED. Labs consistent with history of ESRD on HD.   Plan:  -continue home medications, titrate as needed  -nephrology consulted -HD performed on 12/20/22      Atrial fibrillation  Patient with new onset Paroxysmal (<7 days) atrial fibrillation with RVR which is uncontrolled currently with Calcium Channel Blocker. Patient is currently in atrial fibrillation with rate in the 70s-80s on diltiazem. [id number] Score: 2. HASBLED Score: 3. Anticoagulation indicated. Anticoagulation done with heparin .  Troponin measuring 0.095 with EKG obtained at outside facility revealing SVT/atrial fibrillation with RVR at rate of 176. Troponin likely elevated in setting of ESRD and demand ischemia from arrhythmia as patient denied endorsing chest pain.  Plan:  -telemetry- NSR on monitor with HR 90  -cardizem drip- transition to oral dosing  - troponin- upward trend   -f/u cardiology   -nephrology for HD  -Cardizem infusion   -Heparin gtt - continued pending aspiration/injection of L hip- will transition to oral dosing when appropriate   -CHADS-VASc Score- 3         VTE Risk Mitigation (From admission, onward)         Ordered     heparin 25,000 units in dextrose 5% (100 units/ml) IV  bolus from bag - ADDITIONAL PRN BOLUS - 60 units/kg  As needed (PRN)        Question:  Heparin Infusion Adjustment (DO NOT MODIFY ANSWER)  Answer:  \\[email or web address]\epic\Images\Pharmacy\HeparinInfusions\heparin LOW INTENSITY nomogram for [facility] [id number]    12/20/22 1315     heparin 25,000 units in dextrose 5% (100 units/ml) IV bolus from bag - ADDITIONAL PRN BOLUS - 30 units/kg  As needed (PRN)        Question:  Heparin Infusion Adjustment (DO NOT MODIFY ANSWER)  Answer:  \\[email or web address]\epic\Images\Pharmacy\HeparinInfusions\heparin LOW INTENSITY nomogram for [facility] [id number].pdf    12/20/22 1315     heparin 25,000 units in dextrose 5% 250 mL (100 units/mL) infusion LOW INTENSITY nomogram - [facility]  Continuous        Question Answer Comment   Heparin Infusion Adjustment (DO NOT MODIFY ANSWER) \\[email or web address]\epic\Images\Pharmacy\HeparinInfusions\heparin LOW INTENSITY nomogram for [facility] [id number].pdf    Begin at (in units/kg/hr) 12        12/20/22 1315     Place sequential compression device  Until discontinued         12/19/22 2119     IP VTE LOW RISK PATIENT  Once         12/19/22 2119                Discharge Planning   ISAAK:      Code Status: Full Code   Is the patient medically ready for discharge?:     Reason for patient still in hospital (select all that apply): Patient trending condition, Laboratory test, Treatment, Imaging and Consult recommendations                     Carmen Ayon NP  Department of Hospital Medicine   [facility] ([facility])

## 2022-12-21 NOTE — PROGRESS NOTES
[facility] ([facility])  Nephrology  Consult Note    Patient Name: Meaghan Potter  MRN: [id number]  Admission Date: 12/19/2022  Hospital Length of Stay: 0 days  Attending Provider: Afshan Márquez MD   Primary Care Physician: Primary Doctor No  Principal Problem:<principal problem not specified>  Reason for Consult: Management of ESRD  Primary Nephrologist: Dr. Ribera/[facility]       Subjective:     HPI: 68 yo female with ESRD on MWF last HD last Friday (12/16) admitted as a transfer for Cardiology, Ortho and Nephrology. She is currently seen on dialysis, using a LUE AVF. Dialysis vintage is 13 years. Her only complaint to me is pain in both hips described as 'stiff'   No current SOB, no chest pain but describes poor appetite since in pain.     12/21  - describes left hip pain as shooting pain  - eating lunch, not great appetite            Review of patient's allergies indicates:   Allergen Reactions    Amoxicillin Rash     Current Facility-Administered Medications   Medication Frequency    acetaminophen suppository 650 mg Q6H PRN    acetaminophen tablet 650 mg Q8H PRN    albuterol-ipratropium 2.5 mg-0.5 mg/3 mL nebulizer solution 3 mL Q6H PRN    aluminum-magnesium hydroxide-simethicone 200-200-20 mg/5 mL suspension 30 mL QID PRN    brimonidine 0.2% ophthalmic solution 1 drop BID    calcitRIOL capsule 0.5 mcg Every Mon, Wed, Fri    dextrose 10% bolus 125 mL 125 mL PRN    dextrose 10% bolus 250 mL 250 mL PRN    diltiaZEM 125 mg in D5W 125 mL infusion Continuous    [START ON 12/26/2022] ergocalciferol capsule 50,000 Units Q7 Days    glucagon (human recombinant) injection 1 mg PRN    glucose chewable tablet 16 g PRN    glucose chewable tablet 24 g PRN    heparin 25,000 units in dextrose 5% (100 units/ml) IV bolus from bag - ADDITIONAL PRN BOLUS - 30 units/kg PRN    heparin 25,000 units in dextrose 5% (100 units/ml) IV bolus from bag - ADDITIONAL PRN BOLUS - 60 units/kg PRN    heparin 25,000 units in  dextrose 5% 250 mL (100 units/mL) infusion LOW INTENSITY nomogram - [facility] Continuous    hydrALAZINE tablet 25 mg TID    HYDROcodone-acetaminophen 5-325 mg per tablet 1 tablet Q6H PRN    losartan tablet 50 mg Daily    melatonin tablet 6 mg Nightly PRN    morphine injection 2 mg Q4H PRN    mupirocin 2 % ointment BID    naloxone 0.4 mg/mL injection 0.02 mg PRN    ondansetron injection 4 mg Q8H PRN    promethazine tablet 25 mg Q6H PRN    senna-docusate 8.6-50 mg per tablet 1 tablet BID PRN    sodium chloride 0.9% bolus 250 mL 250 mL PRN    sodium chloride 0.9% flush 3 mL Q12H PRN    vitamin renal formula (B-complex-vitamin c-folic acid) 1 mg per capsule 1 capsule Daily           Review of Systems   Constitutional:  Negative for fever.   Respiratory:  Negative for shortness of breath.    Musculoskeletal:  Positive for arthralgias.   Allergic/Immunologic: Positive for immunocompromised state.   Objective:     Vital Signs (Most Recent):  Temp: 99.6 °F (37.6 °C) (12/21/22 1204)  Pulse: 88 (12/21/22 1204)  Resp: 17 (12/21/22 1204)  BP: 115/60 (12/21/22 1204)  SpO2: 98 % (12/21/22 1204) Vital Signs (24h Range):  Temp:  [98.1 °F (36.7 °C)-99.6 °F (37.6 °C)] 99.6 °F (37.6 °C)  Pulse:  [64-94] 88  Resp:  [16-20] 17  SpO2:  [97 %-99 %] 98 %  BP: (106-136)/(56-74) 115/60     Weight: 70.9 kg (156 lb 4.9 oz) (12/20/22 2347)  Body mass index is 26.83 kg/m².  Body surface area is 1.79 meters squared.    I/O last 3 completed shifts:  In: 359.8 [P.O.:240; I.V.:119.8]  Out: 3601 [Other:3601]    Physical Exam  Vitals and nursing note reviewed.   Constitutional:       General: She is not in acute distress.     Appearance: She is obese.   Cardiovascular:      Rate and Rhythm: Normal rate.   Pulmonary:      Effort: Pulmonary effort is normal. No respiratory distress.   Neurological:      Mental Status: She is alert and oriented to person, place, and time.   Psychiatric:         Mood and Affect: Mood normal.       Significant Labs:  reviewed        Assessment/Plan:     Active Diagnoses:    Diagnosis Date Noted POA    ESRD (end stage renal disease) on dialysis [N18.6, Z99.2] 12/20/2022 Not Applicable    Primary hypertension [I10] 12/20/2022 Yes    Gastroesophageal reflux disease without esophagitis [K21.9] 12/20/2022 Yes    Left hip pain [M25.552] 12/20/2022 Yes    Tear of left acetabular labrum [S73.192A] 12/20/2022 Yes    Shortness of breath [R06.02] 12/20/2022 Yes    Bone cyst of right femur [M85.651] 12/20/2022 Unknown    Atrial fibrillation [I48.91] 12/19/2022 Yes      Problems Resolved During this Admission:       ESRD on MWF  - HD today with 2K bath and resume MWF schedule     Mineral and Bone Disease  - calcitriol 0.5mcg MWF  - continue D2 weekly   - reports no phos binders at home but elevated here, monitor phos level and continue low phos diet    HTN  - resume home medication and monitor    Anemia of ESRD  - no ESAs indicated yet   - monitor H&H     Cardiomegaly, quite severe on recent CXR  - pericardial effusion noted on Echo, discussed with HM    Afib with RVR  - rate much improved on Cardizem gtts  - per Cardiology    Hip pain  - Ortho notes reviewed        William Alfonso MD  Nephrology

## 2022-12-21 NOTE — PROGRESS NOTES
[facility] ([facility])  Orthopedics  Progress Note    Patient Name: Meaghan Potter  MRN: [id number]  Admission Date: 12/19/2022  Hospital Length of Stay: 0 days  Attending Provider: Afshan Márquez MD  Primary Care Provider: Primary Doctor No    Subjective:     Principal Problem:  Atrial fibrillation    Principal Orthopedic Problem:  Left hip pain, right hip cyst seen on CT    Interval History: Meaghan Potter is a 67-year-old female with past medical history significant for end-stage renal disease on dialysis, hypertension, and GERD who is being treated by orthopedics for left hip pain.  She denies pain in the right hip, but she did have a CT of her pelvis done that showed a significant cyst in her right femoral neck.    Review of patient's allergies indicates:   Allergen Reactions    Amoxicillin Rash       Current Facility-Administered Medications   Medication    acetaminophen suppository 650 mg    acetaminophen tablet 650 mg    albuterol-ipratropium 2.5 mg-0.5 mg/3 mL nebulizer solution 3 mL    aluminum-magnesium hydroxide-simethicone 200-200-20 mg/5 mL suspension 30 mL    brimonidine 0.2% ophthalmic solution 1 drop    calcitRIOL capsule 0.5 mcg    dextrose 10% bolus 125 mL 125 mL    dextrose 10% bolus 250 mL 250 mL    diltiaZEM 125 mg in D5W 125 mL infusion    [START ON 12/26/2022] ergocalciferol capsule 50,000 Units    glucagon (human recombinant) injection 1 mg    glucose chewable tablet 16 g    glucose chewable tablet 24 g    heparin 25,000 units in dextrose 5% (100 units/ml) IV bolus from bag - ADDITIONAL PRN BOLUS - 30 units/kg    heparin 25,000 units in dextrose 5% (100 units/ml) IV bolus from bag - ADDITIONAL PRN BOLUS - 60 units/kg    heparin 25,000 units in dextrose 5% 250 mL (100 units/mL) infusion LOW INTENSITY nomogram - [facility]    hydrALAZINE tablet 25 mg    HYDROcodone-acetaminophen 5-325 mg per tablet 1 tablet    losartan tablet 50 mg    melatonin tablet 6 mg    morphine injection 2 mg     "mupirocin 2 % ointment    naloxone 0.4 mg/mL injection 0.02 mg    ondansetron injection 4 mg    promethazine tablet 25 mg    senna-docusate 8.6-50 mg per tablet 1 tablet    sodium chloride 0.9% bolus 250 mL 250 mL    sodium chloride 0.9% flush 3 mL    vitamin renal formula (B-complex-vitamin c-folic acid) 1 mg per capsule 1 capsule     Objective:     Vital Signs (Most Recent):  Temp: 98.9 °F (37.2 °C) (12/21/22 0727)  Pulse: 85 (12/21/22 0750)  Resp: 20 (12/21/22 0750)  BP: 112/73 (12/21/22 0727)  SpO2: 97 % (12/21/22 0750) Vital Signs (24h Range):  Temp:  [97.7 °F (36.5 °C)-99.1 °F (37.3 °C)] 98.9 °F (37.2 °C)  Pulse:  [64-94] 85  Resp:  [16-22] 20  SpO2:  [94 %-99 %] 97 %  BP: (106-144)/(56-82) 112/73     Weight: 70.9 kg (156 lb 4.9 oz)  Height: 5' 4" (162.6 cm)  Body mass index is 26.83 kg/m².      Intake/Output Summary (Last 24 hours) at 12/21/2022 0829  Last data filed at 12/21/2022 0519  Gross per 24 hour   Intake 359.79 ml   Output 3601 ml   Net -3241.21 ml       Ortho/SPM Exam  Left hip:  Skin is intact   No edema   Moderate TTP in the groin   No pain with flexion/extension   Pain increases with internal/external rotation  Calf and compartments are soft and compressible   Motor exam normal   Sensation and pulses intact   Cap refill brisk     Right hip:  Skin is intact   No edema   No TTP   No pain with flexion/extension   No pain with internal/external rotation  Calf and compartments are soft and compressible   Motor exam normal   Sensation and pulses intact  Cap refill brisk     GEN: Well developed, well nourished female. AAOX3. No acute distress.   Head: Normocephalic, atraumatic.   Eyes: RIGO  Neck: Trachea is midline, no adenopathy  Resp: Breathing unlabored.  Neuro: Motor function normal, Cranial nerves intact  Psych: Mood and affect appropriate.      Significant Labs:   Recent Lab Results  (Last 5 results in the past 24 hours)        12/21/22  0618   12/21/22  0143   12/20/22  1702   12/20/22  1658   " 12/20/22  1359        Albumin 3.1               Alkaline Phosphatase 91               ALT 16               ANION GAP 16       18         aPTT   45.3  Comment: aPTT therapeutic range = 39-69 seconds       35.4  Comment: aPTT therapeutic range = 39-69 seconds       AST 35               Baso # 0.01                0.01               Basophil % 0.2                0.2               BILIRUBIN TOTAL 1.5  Comment: For infants and newborns, interpretation of results should be based  on gestational age, weight and in agreement with clinical  observations.    Premature Infant recommended reference ranges:  Up to 24 hours.............<8.0 mg/dL  Up to 48 hours............<12.0 mg/dL  3-5 days..................<15.0 mg/dL  6-29 days.................<15.0 mg/dL                 BUN 43       31         Calcium 8.8       9.4         Chloride 94       95         CO2 27       24         Creatinine 7.8       6.3         Differential Method Automated                Automated               eGFR 5       7         Eos # 0.0                0.0               Eosinophil % 0.2                0.2               Glucose 97       119         Gran # (ANC) 5.0                5.0               Gran % 80.6                80.6               HEMATOCRIT 35.6                35.6               HEMOGLOBIN 11.3                11.3               Hep A IgM     Non-reactive           Hep B C IgM     Non-reactive           Hepatitis B Surface Ag     Non-reactive           Hepatitis C Ab     Non-reactive           Immature Grans (Abs) 0.02  Comment: Mild elevation in immature granulocytes is non specific and   can be seen in a variety of conditions including stress response,   acute inflammation, trauma and pregnancy. Correlation with other   laboratory and clinical findings is essential.                  0.02  Comment: Mild elevation in immature granulocytes is non specific and   can be seen in a variety of conditions including stress response,   acute  inflammation, trauma and pregnancy. Correlation with other   laboratory and clinical findings is essential.                 Immature Granulocytes 0.3                0.3               INR         1.1  Comment: Coumadin Therapy:  2.0 - 3.0 for INR for all indicators except mechanical heart valves  and antiphospholipid syndromes which should use 2.5 - 3.5.         Lymph # 0.5                0.5               Lymph % 7.3                7.3               MCH 30.5                30.5               MCHC 31.7                31.7               MCV 96                96               Mono # 0.7                0.7               Mono % 11.4                11.4               MPV 12.5                12.5               nRBC 0                0               Phosphorus 6.0               Platelets 100                100               Potassium 5.3       4.6         PROTEIN TOTAL 6.7               Protime         11.7       RBC 3.70                3.70               RDW 14.5                14.5               Sodium 137       137         Troponin I       0.091  Comment: The reference interval for Troponin I represents the 99th percentile   cutoff   for our facility and is consistent with 3rd generation assay   performance.           WBC 6.15                6.15                                    All pertinent labs within the past 24 hours have been reviewed.    Significant Imaging: I have reviewed and interpreted all pertinent imaging results/findings.    Assessment/Plan:     Active Diagnoses:    Diagnosis Date Noted POA    ESRD (end stage renal disease) on dialysis [N18.6, Z99.2] 12/20/2022 Not Applicable    Primary hypertension [I10] 12/20/2022 Yes    Gastroesophageal reflux disease without esophagitis [K21.9] 12/20/2022 Yes    Left hip pain [M25.552] 12/20/2022 Yes    Tear of left acetabular labrum [S73.192A] 12/20/2022 Yes    Shortness of breath [R06.02] 12/20/2022 Yes    Bone cyst of right femur [M85.651] 12/20/2022 Unknown     Atrial fibrillation [I48.91] 12/19/2022 Yes      Problems Resolved During this Admission:     Assessment:  67-year-old female with a past medical history significant for end-stage renal disease on hemodialysis, hypertension, and GERD who is being treated for left hip pain and right hip cyst seen on CT of her pelvis    Plan:   Awaiting MRI of the right hip to be performed to further evaluate the femoral neck cyst   Recommend therapy and anti-inflammatories for the left hip, aspiration/injection of the left hip by Interventional Radiology may be considered if patient is interested and initial therapy and anti-inflammatories fail   We will continue to follow the results of the MRI and proceed from there    Julien Azul PA-C  Orthopedics  [facility] - Telemetry ([facility])

## 2022-12-21 NOTE — CONSULTS
Chart reviewed by Dr. Lainez.       ASSESSMENT/PLAN:    Left hip pain    The order for a Left hip injection with 40 mg kenalog has been placed and the procedure will be performed asap.          Thank you for the consult.

## 2022-12-21 NOTE — SUBJECTIVE & OBJECTIVE
Review of Systems   Constitutional: Positive for malaise/fatigue.   Eyes: Negative.    Cardiovascular: Negative.    Respiratory: Negative.     Endocrine: Negative.    Hematologic/Lymphatic: Negative.    Skin: Negative.    Musculoskeletal:  Positive for joint pain (hip pain).   Gastrointestinal: Negative.    Genitourinary: Negative.    Neurological: Negative.    Psychiatric/Behavioral: Negative.     Allergic/Immunologic: Negative.    Objective:     Vital Signs (Most Recent):  Temp: 99.6 °F (37.6 °C) (12/21/22 1204)  Pulse: 88 (12/21/22 1204)  Resp: 17 (12/21/22 1204)  BP: 115/60 (12/21/22 1204)  SpO2: 98 % (12/21/22 1204) Vital Signs (24h Range):  Temp:  [98.1 °F (36.7 °C)-99.6 °F (37.6 °C)] 99.6 °F (37.6 °C)  Pulse:  [64-94] 88  Resp:  [16-20] 17  SpO2:  [97 %-99 %] 98 %  BP: (106-136)/(56-74) 115/60     Weight: 70.9 kg (156 lb 4.9 oz)  Body mass index is 26.83 kg/m².     SpO2: 98 %         Intake/Output Summary (Last 24 hours) at 12/21/2022 1359  Last data filed at 12/21/2022 0519  Gross per 24 hour   Intake 359.79 ml   Output 3601 ml   Net -3241.21 ml       Lines/Drains/Airways       Drain  Duration                  Hemodialysis AV Fistula Left upper arm -- days              Peripheral Intravenous Line  Duration                  Peripheral IV - Single Lumen 12/19/22 1155 22 G Posterior;Right Hand 2 days                    Physical Exam  Vitals and nursing note reviewed.   Constitutional:       General: She is not in acute distress.     Appearance: Normal appearance. She is well-developed. She is not diaphoretic.      Comments: On supplemental O2   HENT:      Head: Normocephalic and atraumatic.   Eyes:      General:         Right eye: No discharge.         Left eye: No discharge.      Pupils: Pupils are equal, round, and reactive to light.   Neck:      Thyroid: No thyromegaly.      Vascular: No JVD.      Trachea: No tracheal deviation.   Cardiovascular:      Rate and Rhythm: Normal rate. Rhythm irregularly  irregular.      Heart sounds: Normal heart sounds, S1 normal and S2 normal. No murmur heard.  Pulmonary:      Effort: Pulmonary effort is normal. No respiratory distress.      Breath sounds: Normal breath sounds. No wheezing or rales.   Abdominal:      General: There is no distension.      Tenderness: There is no rebound.   Musculoskeletal:      Cervical back: Neck supple.      Right lower leg: No edema.      Left lower leg: No edema.   Skin:     General: Skin is warm and dry.      Findings: No erythema.   Neurological:      General: No focal deficit present.      Mental Status: She is alert and oriented to person, place, and time.   Psychiatric:         Mood and Affect: Mood normal.         Behavior: Behavior normal.         Thought Content: Thought content normal.       Significant Labs: CMP   Recent Labs   Lab 12/20/22 0457 12/20/22 1658 12/21/22  0618    137 137   K 6.1* 4.6 5.3*    95 94*   CO2 20* 24 27   GLU 87 119* 97   BUN 66* 31* 43*   CREATININE 10.6* 6.3* 7.8*   CALCIUM 9.8 9.4 8.8   PROT 7.4  --  6.7   ALBUMIN 3.4*  --  3.1*   BILITOT 1.5*  --  1.5*   ALKPHOS 102  --  91   AST 23  --  35   ALT 16  --  16   ANIONGAP 15 18* 16   , CBC   Recent Labs   Lab 12/20/22 0457 12/21/22  0618   WBC 6.35 6.15  6.15   HGB 11.6* 11.3*  11.3*   HCT 35.7* 35.6*  35.6*   PLT 95* 100*  100*   , INR   Recent Labs   Lab 12/20/22  1359   INR 1.1   , Troponin   Recent Labs   Lab 12/19/22 2009 12/20/22  1658   TROPONINI 0.095* 0.091*   , and All pertinent lab results from the last 24 hours have been reviewed.    Significant Imaging: Echocardiogram: Transthoracic echo (TTE) complete ([facility] Only):   Results for orders placed or performed during the hospital encounter of 12/19/22   Echo   Result Value Ref Range    BSA 1.83 m2    TDI SEPTAL 0.06 m/s    LV LATERAL E/E' RATIO 15.33 m/s    LV SEPTAL E/E' RATIO 15.33 m/s    LA WIDTH 3.80 cm    IVC diameter 2.82 cm    Left Ventricular Outflow Tract Mean Velocity  0.63 cm/s    Left Ventricular Outflow Tract Mean Gradient 2.01 mmHg    TDI LATERAL 0.06 m/s    LVIDd 5.31 3.5 - 6.0 cm    IVS 1.67 (A) 0.6 - 1.1 cm    Posterior Wall 1.49 (A) 0.6 - 1.1 cm    Ao root annulus 3.06 cm    LVIDs 3.78 2.1 - 4.0 cm    FS 29 28 - 44 %    LA volume 89.40 cm3    Sinus 3.26 cm    STJ 3.37 cm    Ascending aorta 3.42 cm    LV mass 381.59 g    LA size 4.15 cm    TAPSE 2.27 cm    Left Ventricle Relative Wall Thickness 0.56 cm    AV mean gradient 9 mmHg    AV valve area 2.09 cm2    AV Velocity Ratio 0.51     AV index (prosthetic) 0.61     MV valve area p 1/2 method 5.12 cm2    E/A ratio 4.38     Mean e' 0.06 m/s    E wave deceleration time 148.05 msec    IVRT 87.54 msec    LVOT diameter 2.09 cm    LVOT area 3.4 cm2    LVOT peak krunal 1.08 m/s    LVOT peak VTI 19.80 cm    Ao peak krunal 2.12 m/s    Ao VTI 32.5 cm    RVOT peak krunal 0.54 m/s    RVOT peak VTI 13.9 cm    Mr max krunal 5.03 m/s    LVOT stroke volume 67.89 cm3    AV peak gradient 18 mmHg    PV mean gradient 0.70 mmHg    E/E' ratio 15.33 m/s    MV Peak E Krunal 0.92 m/s    TR Max Krunal 3.79 m/s    MV stenosis pressure 1/2 time 42.93 ms    MV Peak A Krunal 0.21 m/s    LV Systolic Volume 61.16 mL    LV Systolic Volume Index 34.0 mL/m2    LV Diastolic Volume 136.00 mL    LV Diastolic Volume Index 75.56 mL/m2    LA Volume Index 49.7 mL/m2    LV Mass Index 212 g/m2    RA Major Axis 5.77 cm    Left Atrium Minor Axis 6.48 cm    Left Atrium Major Axis 6.87 cm    Triscuspid Valve Regurgitation Peak Gradient 57 mmHg    RA Width 3.67 cm    Right Atrial Pressure (from IVC) 3 mmHg    TV rest pulmonary artery pressure 60 mmHg    EF 55 %    Narrative    · The left ventricle is normal in size with concentric hypertrophy and   normal systolic function.  · Atrial fibrillation observed.  · Biatrial atrial enlargement.  · Grade III left ventricular diastolic dysfunction.  · The estimated PA systolic pressure is 60 mmHg.  · Moderate right ventricular enlargement with normal  right ventricular   systolic function.  · There is pulmonary hypertension.  · Normal central venous pressure (3 mmHg).  · The estimated ejection fraction is 55%.  · Moderate to severe tricuspid regurgitation.  · Mild-to-moderate mitral regurgitation.  · Severe right atrial enlargement.  · Large posterior pericardial effusion. No evidence of tamponade.      , EKG: Reviewed, and X-Ray: CXR: X-Ray Chest 1 View (CXR): No results found for this visit on 12/19/22. and X-Ray Chest PA and Lateral (CXR): No results found for this visit on 12/19/22.

## 2022-12-21 NOTE — ASSESSMENT & PLAN NOTE
Patient reported acute onset shortness a breath while obtaining MRI and was found to have evidence of severe cardiomegaly with small left pleural effusion but negative for infectious processes noted on CXR. Patient afebrile without leukocytosis. BNP elevated at 4792. Troponin 0.095. Exam positive for bilateral edema but negative for elevated JVD or crackles on lung ausculation. Patient currently saturating % on 2L via NC in no acute distress and without use of accessory muscles noted.  Patient without history of CHF.  Cardiology consulted.  Echo ordered and pending.  Nephrology consulted to resume HD inpatient.  Will trial dose of Lasix if clinical status worsens.  Plan:  -titrate oxygen therapy as needed  -incentive spirometry  -echo reviewed   -f/u cardiology and nephrology  -monitor Is/Os  -low salt/cardiac/renal diet    -Duonebs prn

## 2022-12-21 NOTE — ASSESSMENT & PLAN NOTE
EKG reviewed afib  Repeat EKG ordered  Will need AC    12/21/22  -Still in afib but HR controlled  -Cardizem switched to po  -Continue heparin gtt for now, will need OAC upon d/c

## 2022-12-21 NOTE — ASSESSMENT & PLAN NOTE
-Orthopedic surgery following  -MRI showed no fracture and asymmetric large left hip joint effusion.  Small right hip joint effusion. Paralabral cyst along the superior border of the acetabulum suspicious for a superior labral tear.   -analgesia as needed   -antiemetics as needed   - the left hip may treat with therapy and anti-inflammatories (per Orthopedic Surgery)   -Aspiration/injection of the left hip by Interventional Radiology may be considered if patient is interested (per Orthopedic Surgery)-outpatient follow up   -IR consulted to aspiration/injection L hip

## 2022-12-21 NOTE — ASSESSMENT & PLAN NOTE
Patient with new onset Paroxysmal (<7 days) atrial fibrillation with RVR which is uncontrolled currently with Calcium Channel Blocker. Patient is currently in atrial fibrillation with rate in the 70s-80s on diltiazem. [id number] Score: 2. HASBLED Score: 3. Anticoagulation indicated. Anticoagulation done with heparin .  Troponin measuring 0.095 with EKG obtained at outside facility revealing SVT/atrial fibrillation with RVR at rate of 176. Troponin likely elevated in setting of ESRD and demand ischemia from arrhythmia as patient denied endorsing chest pain.  Plan:  -telemetry- NSR on monitor with HR 90  -cardizem drip- transition to oral dosing  - troponin- upward trend   -f/u cardiology   -nephrology for HD  -Cardizem infusion   -Heparin gtt -  -CHADS-VASc Score- 3

## 2022-12-21 NOTE — PLAN OF CARE
Pt AAOx4. VSS. Pt remained free of falls this shift. Pt complained of l. Hip pain. Medications administered as ordered. Pt is a-fib on monitor. Hourly rounding completed. Pt instructed to call for assistance. POC reviewed. Pt verbalized understanding.

## 2022-12-21 NOTE — PROGRESS NOTES
12/21/22 1645   Post-Hemodialysis Assessment   Rinseback Volume (mL) 250 mL   Blood Volume Processed (Liters) 70 L   Dialyzer Clearance Lightly streaked   Duration of Treatment 180 minutes   Total UF (mL) 1500 mL   Net Fluid Removal 1000   Patient Response to Treatment tolerated well   Post-Hemodialysis Comments Pt completed HD tx. Removed 1L of fluid. Pt almost clotted off Venous chamber towards end of HD tx

## 2022-12-22 LAB
ALBUMIN SERPL BCP-MCNC: 2.7 G/DL (ref 3.5–5.2)
ALP SERPL-CCNC: 99 U/L (ref 55–135)
ALT SERPL W/O P-5'-P-CCNC: 16 U/L (ref 10–44)
ANION GAP SERPL CALC-SCNC: 15 MMOL/L (ref 8–16)
APTT BLDCRRT: 54.7 SEC (ref 21–32)
AST SERPL-CCNC: 36 U/L (ref 10–40)
BASOPHILS # BLD AUTO: 0.01 K/UL (ref 0–0.2)
BASOPHILS # BLD AUTO: 0.01 K/UL (ref 0–0.2)
BASOPHILS NFR BLD: 0.2 % (ref 0–1.9)
BASOPHILS NFR BLD: 0.2 % (ref 0–1.9)
BILIRUB SERPL-MCNC: 1.5 MG/DL (ref 0.1–1)
BUN SERPL-MCNC: 36 MG/DL (ref 8–23)
CALCIUM SERPL-MCNC: 9.5 MG/DL (ref 8.7–10.5)
CHLORIDE SERPL-SCNC: 95 MMOL/L (ref 95–110)
CO2 SERPL-SCNC: 27 MMOL/L (ref 23–29)
CREAT SERPL-MCNC: 6.5 MG/DL (ref 0.5–1.4)
DIFFERENTIAL METHOD: ABNORMAL
DIFFERENTIAL METHOD: ABNORMAL
EOSINOPHIL # BLD AUTO: 0 K/UL (ref 0–0.5)
EOSINOPHIL # BLD AUTO: 0 K/UL (ref 0–0.5)
EOSINOPHIL NFR BLD: 0.2 % (ref 0–8)
EOSINOPHIL NFR BLD: 0.2 % (ref 0–8)
ERYTHROCYTE [DISTWIDTH] IN BLOOD BY AUTOMATED COUNT: 14.4 % (ref 11.5–14.5)
ERYTHROCYTE [DISTWIDTH] IN BLOOD BY AUTOMATED COUNT: 14.4 % (ref 11.5–14.5)
EST. GFR  (NO RACE VARIABLE): 7 ML/MIN/1.73 M^2
GLUCOSE SERPL-MCNC: 113 MG/DL (ref 70–110)
HCT VFR BLD AUTO: 36.7 % (ref 37–48.5)
HCT VFR BLD AUTO: 36.7 % (ref 37–48.5)
HGB BLD-MCNC: 11.8 G/DL (ref 12–16)
HGB BLD-MCNC: 11.8 G/DL (ref 12–16)
IMM GRANULOCYTES # BLD AUTO: 0.03 K/UL (ref 0–0.04)
IMM GRANULOCYTES # BLD AUTO: 0.03 K/UL (ref 0–0.04)
IMM GRANULOCYTES NFR BLD AUTO: 0.5 % (ref 0–0.5)
IMM GRANULOCYTES NFR BLD AUTO: 0.5 % (ref 0–0.5)
LYMPHOCYTES # BLD AUTO: 0.6 K/UL (ref 1–4.8)
LYMPHOCYTES # BLD AUTO: 0.6 K/UL (ref 1–4.8)
LYMPHOCYTES NFR BLD: 8.9 % (ref 18–48)
LYMPHOCYTES NFR BLD: 8.9 % (ref 18–48)
MCH RBC QN AUTO: 31 PG (ref 27–31)
MCH RBC QN AUTO: 31 PG (ref 27–31)
MCHC RBC AUTO-ENTMCNC: 32.2 G/DL (ref 32–36)
MCHC RBC AUTO-ENTMCNC: 32.2 G/DL (ref 32–36)
MCV RBC AUTO: 96 FL (ref 82–98)
MCV RBC AUTO: 96 FL (ref 82–98)
MONOCYTES # BLD AUTO: 0.8 K/UL (ref 0.3–1)
MONOCYTES # BLD AUTO: 0.8 K/UL (ref 0.3–1)
MONOCYTES NFR BLD: 12.3 % (ref 4–15)
MONOCYTES NFR BLD: 12.3 % (ref 4–15)
NEUTROPHILS # BLD AUTO: 5.2 K/UL (ref 1.8–7.7)
NEUTROPHILS # BLD AUTO: 5.2 K/UL (ref 1.8–7.7)
NEUTROPHILS NFR BLD: 77.9 % (ref 38–73)
NEUTROPHILS NFR BLD: 77.9 % (ref 38–73)
NRBC BLD-RTO: 0 /100 WBC
NRBC BLD-RTO: 0 /100 WBC
PLATELET # BLD AUTO: 130 K/UL (ref 150–450)
PLATELET # BLD AUTO: 130 K/UL (ref 150–450)
PMV BLD AUTO: 11.4 FL (ref 9.2–12.9)
PMV BLD AUTO: 11.4 FL (ref 9.2–12.9)
POTASSIUM SERPL-SCNC: 4.5 MMOL/L (ref 3.5–5.1)
PROT SERPL-MCNC: 7 G/DL (ref 6–8.4)
RBC # BLD AUTO: 3.81 M/UL (ref 4–5.4)
RBC # BLD AUTO: 3.81 M/UL (ref 4–5.4)
SODIUM SERPL-SCNC: 137 MMOL/L (ref 136–145)
WBC # BLD AUTO: 6.66 K/UL (ref 3.9–12.7)
WBC # BLD AUTO: 6.66 K/UL (ref 3.9–12.7)

## 2022-12-22 PROCEDURE — 36415 COLL VENOUS BLD VENIPUNCTURE: CPT | Performed by: INTERNAL MEDICINE

## 2022-12-22 PROCEDURE — 99232 PR SUBSEQUENT HOSPITAL CARE,LEVL II: ICD-10-PCS | Mod: ,,, | Performed by: PHYSICIAN ASSISTANT

## 2022-12-22 PROCEDURE — 99232 SBSQ HOSP IP/OBS MODERATE 35: CPT | Mod: ,,, | Performed by: PHYSICIAN ASSISTANT

## 2022-12-22 PROCEDURE — 25000003 PHARM REV CODE 250: Performed by: PHYSICIAN ASSISTANT

## 2022-12-22 PROCEDURE — 85025 COMPLETE CBC W/AUTO DIFF WBC: CPT | Performed by: HOSPITALIST

## 2022-12-22 PROCEDURE — 99232 SBSQ HOSP IP/OBS MODERATE 35: CPT | Mod: ,,, | Performed by: INTERNAL MEDICINE

## 2022-12-22 PROCEDURE — 94799 UNLISTED PULMONARY SVC/PX: CPT

## 2022-12-22 PROCEDURE — 99232 PR SUBSEQUENT HOSPITAL CARE,LEVL II: ICD-10-PCS | Mod: ,,, | Performed by: INTERNAL MEDICINE

## 2022-12-22 PROCEDURE — 85730 THROMBOPLASTIN TIME PARTIAL: CPT | Performed by: INTERNAL MEDICINE

## 2022-12-22 PROCEDURE — 25000003 PHARM REV CODE 250

## 2022-12-22 PROCEDURE — 99233 PR SUBSEQUENT HOSPITAL CARE,LEVL III: ICD-10-PCS | Mod: ,,, | Performed by: STUDENT IN AN ORGANIZED HEALTH CARE EDUCATION/TRAINING PROGRAM

## 2022-12-22 PROCEDURE — 25000003 PHARM REV CODE 250: Performed by: INTERNAL MEDICINE

## 2022-12-22 PROCEDURE — 27000221 HC OXYGEN, UP TO 24 HOURS

## 2022-12-22 PROCEDURE — 99233 SBSQ HOSP IP/OBS HIGH 50: CPT | Mod: ,,, | Performed by: STUDENT IN AN ORGANIZED HEALTH CARE EDUCATION/TRAINING PROGRAM

## 2022-12-22 PROCEDURE — 21400001 HC TELEMETRY ROOM

## 2022-12-22 PROCEDURE — 80053 COMPREHEN METABOLIC PANEL: CPT | Performed by: HOSPITALIST

## 2022-12-22 PROCEDURE — 25000003 PHARM REV CODE 250: Performed by: HOSPITALIST

## 2022-12-22 PROCEDURE — 94761 N-INVAS EAR/PLS OXIMETRY MLT: CPT

## 2022-12-22 PROCEDURE — 63600175 PHARM REV CODE 636 W HCPCS

## 2022-12-22 RX ORDER — DILTIAZEM HCL/D5W 125 MG/125
5 PLASTIC BAG, INJECTION (ML) INTRAVENOUS CONTINUOUS
Status: DISCONTINUED | OUTPATIENT
Start: 2022-12-22 | End: 2022-12-23

## 2022-12-22 RX ORDER — SODIUM CHLORIDE 9 MG/ML
INJECTION, SOLUTION INTRAVENOUS ONCE
Status: DISCONTINUED | OUTPATIENT
Start: 2022-12-22 | End: 2023-01-07 | Stop reason: HOSPADM

## 2022-12-22 RX ORDER — MAGNESIUM SULFATE HEPTAHYDRATE 40 MG/ML
2 INJECTION, SOLUTION INTRAVENOUS ONCE
Status: DISCONTINUED | OUTPATIENT
Start: 2022-12-22 | End: 2022-12-22

## 2022-12-22 RX ORDER — LORAZEPAM 1 MG/1
1 TABLET ORAL ONCE
Status: COMPLETED | OUTPATIENT
Start: 2022-12-22 | End: 2022-12-22

## 2022-12-22 RX ORDER — LOSARTAN POTASSIUM 25 MG/1
25 TABLET ORAL DAILY
Status: DISCONTINUED | OUTPATIENT
Start: 2022-12-23 | End: 2022-12-25

## 2022-12-22 RX ADMIN — LORAZEPAM 1 MG: 1 TABLET ORAL at 09:12

## 2022-12-22 RX ADMIN — HYDROCODONE BITARTRATE AND ACETAMINOPHEN 1 TABLET: 5; 325 TABLET ORAL at 08:12

## 2022-12-22 RX ADMIN — HEPARIN SODIUM 12 UNITS/KG/HR: 10000 INJECTION, SOLUTION INTRAVENOUS at 02:12

## 2022-12-22 RX ADMIN — Medication 5 MG/HR: at 03:12

## 2022-12-22 RX ADMIN — DILTIAZEM HYDROCHLORIDE 30 MG: 30 TABLET, FILM COATED ORAL at 11:12

## 2022-12-22 RX ADMIN — MUPIROCIN: 20 OINTMENT TOPICAL at 08:12

## 2022-12-22 RX ADMIN — DILTIAZEM HYDROCHLORIDE 30 MG: 30 TABLET, FILM COATED ORAL at 05:12

## 2022-12-22 RX ADMIN — LOSARTAN POTASSIUM 50 MG: 50 TABLET, FILM COATED ORAL at 08:12

## 2022-12-22 RX ADMIN — NEPHROCAP 1 CAPSULE: 1 CAP ORAL at 08:12

## 2022-12-22 RX ADMIN — Medication 6 MG: at 08:12

## 2022-12-22 RX ADMIN — HYDRALAZINE HYDROCHLORIDE 25 MG: 25 TABLET, FILM COATED ORAL at 08:12

## 2022-12-22 RX ADMIN — BRIMONIDINE TARTRATE 1 DROP: 2 SOLUTION/ DROPS OPHTHALMIC at 08:12

## 2022-12-22 RX ADMIN — BRIMONIDINE TARTRATE 1 DROP: 2 SOLUTION/ DROPS OPHTHALMIC at 09:12

## 2022-12-22 NOTE — SUBJECTIVE & OBJECTIVE
Interval History:  Patient seen after having returned from attempted arthrocentesis.  Radiology reported patient had episode syncope after transferring from bed to stretcher.  Patient had no seizure-like activity.  Episode lasted less than 30 seconds and patient no postictal period.  Patient's blood pressure has been in the 90s today we will hold hydralazine and decrease losartan as patient is requiring higher dose of Cardizem to control rate    Review of Systems   Constitutional:  Positive for activity change. Negative for fatigue.   Respiratory:  Negative for cough and shortness of breath.    Cardiovascular:  Positive for palpitations (resolved).   Genitourinary:  Negative for urgency.   Musculoskeletal:  Positive for arthralgias and gait problem. Negative for back pain, joint swelling and myalgias.   Neurological:  Negative for dizziness, seizures, weakness and headaches.   Psychiatric/Behavioral:  Negative for sleep disturbance.    All other systems reviewed and are negative.  Objective:     Vital Signs (Most Recent):  Temp: 98.6 °F (37 °C) (12/22/22 1200)  Pulse: 105 (12/22/22 1200)  Resp: 17 (12/22/22 1200)  BP: 132/81 (12/22/22 1200)  SpO2: 96 % (12/22/22 1200) Vital Signs (24h Range):  Temp:  [97.7 °F (36.5 °C)-100.7 °F (38.2 °C)] 98.6 °F (37 °C)  Pulse:  [] 105  Resp:  [16-20] 17  SpO2:  [90 %-99 %] 96 %  BP: ()/(57-81) 132/81     Weight: 71.5 kg (157 lb 10.1 oz)  Body mass index is 27.06 kg/m².    Intake/Output Summary (Last 24 hours) at 12/22/2022 1608  Last data filed at 12/21/2022 1645  Gross per 24 hour   Intake --   Output 1500 ml   Net -1500 ml      Physical Exam  Vitals reviewed.   Constitutional:       Appearance: Normal appearance.   HENT:      Head: Normocephalic and atraumatic.      Mouth/Throat:      Mouth: Mucous membranes are moist.      Pharynx: Oropharynx is clear.   Eyes:      Extraocular Movements: Extraocular movements intact.      Conjunctiva/sclera: Conjunctivae normal.    Cardiovascular:      Rate and Rhythm: Normal rate and regular rhythm.      Pulses: Normal pulses.      Heart sounds: Normal heart sounds.      Comments: Left upper extremity AV access with thrill and bruit  Pulmonary:      Effort: Pulmonary effort is normal.      Breath sounds: Normal breath sounds.   Abdominal:      General: Bowel sounds are normal.      Palpations: Abdomen is soft.   Musculoskeletal:         General: Normal range of motion.      Cervical back: Normal range of motion and neck supple.   Skin:     General: Skin is warm and dry.   Neurological:      General: No focal deficit present.      Mental Status: She is alert and oriented to person, place, and time. Mental status is at baseline.   Psychiatric:         Mood and Affect: Mood normal.         Behavior: Behavior normal.         Thought Content: Thought content normal.       Significant Labs: All pertinent labs within the past 24 hours have been reviewed.  CBC:   Recent Labs   Lab 12/21/22  0618 12/22/22  0508   WBC 6.15  6.15 6.66  6.66   HGB 11.3*  11.3* 11.8*  11.8*   HCT 35.6*  35.6* 36.7*  36.7*   *  100* 130*  130*     CMP:   Recent Labs   Lab 12/20/22  1658 12/21/22  0618 12/22/22  0508    137 137   K 4.6 5.3* 4.5   CL 95 94* 95   CO2 24 27 27   * 97 113*   BUN 31* 43* 36*   CREATININE 6.3* 7.8* 6.5*   CALCIUM 9.4 8.8 9.5   PROT  --  6.7 7.0   ALBUMIN  --  3.1* 2.7*   BILITOT  --  1.5* 1.5*   ALKPHOS  --  91 99   AST  --  35 36   ALT  --  16 16   ANIONGAP 18* 16 15       Significant Imaging: I have reviewed all pertinent imaging results/findings within the past 24 hours.

## 2022-12-22 NOTE — ASSESSMENT & PLAN NOTE
-large posterior pericardial effusion   -pt appears asymptomatic   -Nephrology following with HD performed on 12/21/22  -Cardiology following- will follow and evaluate after next HD

## 2022-12-22 NOTE — PROGRESS NOTES
Patient placed on table and techs noticed she had what seemed to be a seizure for a few seconds, non responsive.  She is responsive now.  Patient immediately stabilized and back up to her room for evaluation.  Hip aspiration not performed at this time.  Will reevaluate and perform at a later time.

## 2022-12-22 NOTE — ASSESSMENT & PLAN NOTE
Patient currently follows Dr. Ribera from nephrology with last reported dialysis completed last Friday. Patient currently on M/W/F HD via LUE fistula but missed Monday due to not feeling well and being in the ED. Labs consistent with history of ESRD on HD.   Plan:  -continue home medications, titrate as needed  -nephrology consulted -HD performed on 12/20/22 and now back on MWF schedule

## 2022-12-22 NOTE — PROGRESS NOTES
[facility] - Telemetry ([facility])  Cardiology  Progress Note    Patient Name: Meaghan Potter  MRN: [id number]  Admission Date: 12/19/2022  Hospital Length of Stay: 1 days  Code Status: Full Code   Attending Physician: Afshan Márquez MD   Primary Care Physician: Primary Doctor No  Expected Discharge Date:   Principal Problem:Left hip pain    Subjective:   HPI:  Ms. Potter is a 68y/o female with PMHx ESRD on dialysis, HTN and GERD who presented to [facility] ED transfer from [facility] for cardiac workup following elevated troponin and BNP in the emergency department as well as orthopedic care after patient sustained left superior labral tear of her acetabulum noted on MRI. Cardiology was consulted for new onset afib on diltiazem gtt. Pt seen and examined today resting in bed. Patient reported being in her usual state of health prior to onset of symptoms and reported acute onset of left hip pain while performing leg exercises in bed earlier today. While at outside facility, patient became short of breath while lying flat while obtaining MRI of hip and was found to be in atrial fibrillation with RVR requiring diltiazem drip. She denied endorsing any chest pain, lightheadedness, dizziness, visual disturbances, fever, chills, sweats, nausea, vomiting, abdominal pain, changes in bowel/bladder habits, or onset neurological deficits.  All other review of systems negative except as noted above.  Patient reports being back at her baseline and continues to complain only of left hip pain. Pt missed HD yesterday, planned for today. Labs reviewed K 6.1, Crt 10.6 troponin .076->.095 BNP 4702. Echo pending, repeat EKG ordered  Hospital Course:   12/21/22-Patient seen and examined today, resting in bed. Feels ok. Still complains of hip pain. No SOB/chest pain. Still in afib but HR controlled. Cardizem switched to po. Will re-evaluate pericardial effusion with repeat echo IP vs OP post additional HD.    12/22/22- Patient seen and examined today.  Feels ok, fatigue. Pt went down to have hip drained but had seizure like episode on the table. CT negative. Pt still in afib tachycardic during exam, denies any CP, SOB at this time, repeat Echo tomorrow to assess effusion. Will need eliquis once done with hip procedure, cont hept gtt          Review of Systems   Constitutional: Positive for malaise/fatigue.   HENT: Negative.     Eyes: Negative.    Cardiovascular: Negative.    Respiratory: Negative.     Skin: Negative.    Musculoskeletal: Negative.    Gastrointestinal: Negative.    Genitourinary: Negative.    Neurological:  Positive for weakness.   Psychiatric/Behavioral: Negative.     Objective:     Vital Signs (Most Recent):  Temp: 98.6 °F (37 °C) (12/22/22 1200)  Pulse: 105 (12/22/22 1200)  Resp: 17 (12/22/22 1200)  BP: 132/81 (12/22/22 1200)  SpO2: 96 % (12/22/22 1200) Vital Signs (24h Range):  Temp:  [97.7 °F (36.5 °C)-100.7 °F (38.2 °C)] 98.6 °F (37 °C)  Pulse:  [] 105  Resp:  [16-20] 17  SpO2:  [90 %-99 %] 96 %  BP: ()/(57-81) 132/81     Weight: 71.5 kg (157 lb 10.1 oz)  Body mass index is 27.06 kg/m².     SpO2: 96 %         Intake/Output Summary (Last 24 hours) at 12/22/2022 1418  Last data filed at 12/21/2022 1645  Gross per 24 hour   Intake --   Output 1500 ml   Net -1500 ml       Lines/Drains/Airways       Drain  Duration                  Hemodialysis AV Fistula Left upper arm -- days              Peripheral Intravenous Line  Duration                  Peripheral IV - Single Lumen 12/19/22 1155 22 G Posterior;Right Hand 3 days                    Physical Exam  Vitals and nursing note reviewed.   Constitutional:       Appearance: Normal appearance.   HENT:      Head: Normocephalic.   Eyes:      Pupils: Pupils are equal, round, and reactive to light.   Cardiovascular:      Rate and Rhythm: Normal rate. Rhythm irregular.      Heart sounds: Normal heart sounds, S1 normal and S2 normal. No murmur heard.    No S3 or S4 sounds.   Pulmonary:       Effort: Pulmonary effort is normal.      Breath sounds: Normal breath sounds.   Abdominal:      General: Bowel sounds are normal.      Palpations: Abdomen is soft.   Musculoskeletal:         General: Normal range of motion.      Cervical back: Normal range of motion.   Skin:     Capillary Refill: Capillary refill takes less than 2 seconds.   Neurological:      General: No focal deficit present.      Mental Status: She is alert and oriented to person, place, and time.      Motor: Weakness present.   Psychiatric:         Thought Content: Thought content normal.       Significant Labs: BMP:   Recent Labs   Lab 12/20/22 1658 12/21/22 0618 12/22/22  0508   * 97 113*    137 137   K 4.6 5.3* 4.5   CL 95 94* 95   CO2 24 27 27   BUN 31* 43* 36*   CREATININE 6.3* 7.8* 6.5*   CALCIUM 9.4 8.8 9.5   , CMP   Recent Labs   Lab 12/20/22 1658 12/21/22 0618 12/22/22  0508    137 137   K 4.6 5.3* 4.5   CL 95 94* 95   CO2 24 27 27   * 97 113*   BUN 31* 43* 36*   CREATININE 6.3* 7.8* 6.5*   CALCIUM 9.4 8.8 9.5   PROT  --  6.7 7.0   ALBUMIN  --  3.1* 2.7*   BILITOT  --  1.5* 1.5*   ALKPHOS  --  91 99   AST  --  35 36   ALT  --  16 16   ANIONGAP 18* 16 15   , CBC   Recent Labs   Lab 12/21/22 0618 12/22/22  0508   WBC 6.15  6.15 6.66  6.66   HGB 11.3*  11.3* 11.8*  11.8*   HCT 35.6*  35.6* 36.7*  36.7*   *  100* 130*  130*   , INR No results for input(s): INR, PROTIME in the last 48 hours., Lipid Panel No results for input(s): CHOL, HDL, LDLCALC, TRIG, CHOLHDL in the last 48 hours., Troponin   Recent Labs   Lab 12/20/22  1658   TROPONINI 0.091*   , and All pertinent lab results from the last 24 hours have been reviewed.    Significant Imaging: Echocardiogram: Transthoracic echo (TTE) complete (Cupid Only):   Results for orders placed or performed during the hospital encounter of 12/19/22   Echo   Result Value Ref Range    BSA 1.83 m2    TDI SEPTAL 0.06 m/s    LV LATERAL E/E' RATIO 15.33 m/s     LV SEPTAL E/E' RATIO 15.33 m/s    LA WIDTH 3.80 cm    IVC diameter 2.82 cm    Left Ventricular Outflow Tract Mean Velocity 0.63 cm/s    Left Ventricular Outflow Tract Mean Gradient 2.01 mmHg    TDI LATERAL 0.06 m/s    LVIDd 5.31 3.5 - 6.0 cm    IVS 1.67 (A) 0.6 - 1.1 cm    Posterior Wall 1.49 (A) 0.6 - 1.1 cm    Ao root annulus 3.06 cm    LVIDs 3.78 2.1 - 4.0 cm    FS 29 28 - 44 %    LA volume 89.40 cm3    Sinus 3.26 cm    STJ 3.37 cm    Ascending aorta 3.42 cm    LV mass 381.59 g    LA size 4.15 cm    TAPSE 2.27 cm    Left Ventricle Relative Wall Thickness 0.56 cm    AV mean gradient 9 mmHg    AV valve area 2.09 cm2    AV Velocity Ratio 0.51     AV index (prosthetic) 0.61     MV valve area p 1/2 method 5.12 cm2    E/A ratio 4.38     Mean e' 0.06 m/s    E wave deceleration time 148.05 msec    IVRT 87.54 msec    LVOT diameter 2.09 cm    LVOT area 3.4 cm2    LVOT peak krunal 1.08 m/s    LVOT peak VTI 19.80 cm    Ao peak krunal 2.12 m/s    Ao VTI 32.5 cm    RVOT peak krunal 0.54 m/s    RVOT peak VTI 13.9 cm    Mr max krunal 5.03 m/s    LVOT stroke volume 67.89 cm3    AV peak gradient 18 mmHg    PV mean gradient 0.70 mmHg    E/E' ratio 15.33 m/s    MV Peak E Krunal 0.92 m/s    TR Max Krunal 3.79 m/s    MV stenosis pressure 1/2 time 42.93 ms    MV Peak A Krunal 0.21 m/s    LV Systolic Volume 61.16 mL    LV Systolic Volume Index 34.0 mL/m2    LV Diastolic Volume 136.00 mL    LV Diastolic Volume Index 75.56 mL/m2    LA Volume Index 49.7 mL/m2    LV Mass Index 212 g/m2    RA Major Axis 5.77 cm    Left Atrium Minor Axis 6.48 cm    Left Atrium Major Axis 6.87 cm    Triscuspid Valve Regurgitation Peak Gradient 57 mmHg    RA Width 3.67 cm    Right Atrial Pressure (from IVC) 3 mmHg    TV rest pulmonary artery pressure 60 mmHg    EF 55 %    Narrative    · The left ventricle is normal in size with concentric hypertrophy and   normal systolic function.  · Atrial fibrillation observed.  · Biatrial atrial enlargement.  · Grade III left ventricular  diastolic dysfunction.  · The estimated PA systolic pressure is 60 mmHg.  · Moderate right ventricular enlargement with normal right ventricular   systolic function.  · There is pulmonary hypertension.  · Normal central venous pressure (3 mmHg).  · The estimated ejection fraction is 55%.  · Moderate to severe tricuspid regurgitation.  · Mild-to-moderate mitral regurgitation.  · Severe right atrial enlargement.  · Large posterior pericardial effusion. No evidence of tamponade.      , EKG: reviewed, and Stress Test: reviewed    Assessment and Plan:         * Left hip pain  Ortho consulted    Pericardial effusion  -No evidence of tamponade  -Will reassess with echo after additional session of HD    12/22/22  Repeat Echo tomorrow    Shortness of breath  Likely secondary to afib and fluid overload  HD today    Primary hypertension  Treatment per primary team    ESRD (end stage renal disease) on dialysis  Management as per primary team    Atrial fibrillation  EKG reviewed afib  Repeat EKG ordered  Will need AC    12/21/22  -Still in afib but HR controlled  -Cardizem switched to po  -Continue heparin gtt for now, will need OAC upon d/c    12/22/22  Afib fast  Cont diltiazem PO  Added back cardizem gtt  Cont hep gtt for now, OAC upon DC  Follow up in clinic        VTE Risk Mitigation (From admission, onward)         Ordered     heparin 25,000 units in dextrose 5% (100 units/ml) IV bolus from bag - ADDITIONAL PRN BOLUS - 60 units/kg  As needed (PRN)        Question:  Heparin Infusion Adjustment (DO NOT MODIFY ANSWER)  Answer:  \\[email or web address]\Images\Pharmacy\HeparinInfusions\heparin LOW INTENSITY nomogram for [facility] [id number].pdf    12/20/22 1315     heparin 25,000 units in dextrose 5% (100 units/ml) IV bolus from bag - ADDITIONAL PRN BOLUS - 30 units/kg  As needed (PRN)        Question:  Heparin Infusion Adjustment (DO NOT MODIFY ANSWER)  Answer:  \\[email or web address]\Images\Pharmacy\HeparinInfusions\heparin LOW INTENSITY  nomogram for [facility] [id number].pdf    12/20/22 1315     heparin 25,000 units in dextrose 5% 250 mL (100 units/mL) infusion LOW INTENSITY nomogram - [facility]  Continuous        Question Answer Comment   Heparin Infusion Adjustment (DO NOT MODIFY ANSWER) [email or web address]\epic\Images\Pharmacy\HeparinInfusions\heparin LOW INTENSITY nomogram for [facility] [id number].pdf    Begin at (in units/kg/hr) 12        12/20/22 1315     Place sequential compression device  Until discontinued         12/19/22 2119     IP VTE LOW RISK PATIENT  Once         12/19/22 2119                Rissa Yang NP  Cardiology  [facility] - Telemetry ([facility])

## 2022-12-22 NOTE — PROGRESS NOTES
[facility] Medicine  Progress Note    Patient Name: Meaghan Potter  MRN: [id number]  Patient Class: IP- Inpatient   Admission Date: 12/19/2022  Length of Stay: 1 days  Attending Physician: Afshan Márquez MD  Primary Care Provider: Primary Doctor No        Subjective:     Principal Problem:Left hip pain        HPI:  Meaghan Potter is a 67 y.o. female with a PMH  has a past medical history of ESRD (end stage renal disease) on dialysis, GERD (gastroesophageal reflux disease), and Hypertension. who presented as a transfer from [facility] for higher level cardiology and orthopedic care after patient was found to have sustained a left superior labral tear of her acetabulum noted on MRI in addition to new onset atrial fibrillation with RVR requiring diltiazem drip.  Patient reported being in her usual state of health prior to onset of symptoms and reported acute onset of left hip pain while performing leg exercises in bed earlier today.  Patient reported hearing and feeling a pop in her left hip followed by immediate pain which has remained constant and currently rated 6/10 in severity. Pain was described as throbbing/pulling in nature with no known alleviating factors and aggravating factors including weight-bearing, movement, and palpation to the affected area.  She denied endorsing any numbness, tingling, discoloration, or penetrating trauma, but did express decreased range of motion and muscle strength secondary to exacerbation of pain.  While at outside facility, patient became short of breath while lying flat while obtaining MRI of hip and was found to be in atrial fibrillation with RVR requiring diltiazem drip. She denied endorsing any chest pain, lightheadedness, dizziness, visual disturbances, fever, chills, sweats, nausea, vomiting, abdominal pain, changes in bowel/bladder habits, or onset neurological deficits.  All other review of systems negative except as noted above.  Patient  reports being back at her baseline and continues to complain only of left hip pain.  Orthopedics and cardiology consulted and awaiting further evaluation/recommendations.  Of note, patient missed hemodialysis today secondary to pain and going to the emergency room and usually receives HD via left upper extremity fistula on Monday/Wednesday/Friday.  Follows Dr. Ribera outpatient. Nephrology consulted to continue HD inpatient.     PCP: Primary Doctor No        Overview/Hospital Course:  Pt admitted to Observation for Atrial fibrillation (new onset) with RVR with . Cardiology consulted with Cardizem gtt initiated. NSR on monitor with HR in 80's.  BNP elevated and Troponin trended upward (0.076>0.095) with Heparin infusion initiated. Echo showed with concentric hypertrophy and normal systolic function. Atrial fibrillation observed.Biatrial atrial enlargement. Grade III left ventricular diastolic dysfunction. PA systolic pressure is 60 mmHg. Moderate right ventricular enlargement with normal right ventricular systolic function. Pulmonary hypertension. EF 55%. Moderate to severe tricuspid regurgitation. Mild-to-moderate mitral regurgitation. Severe right atrial enlargement. Large posterior pericardial effusion. No evidence of tamponade. Troponin elevated and flat with no cardiac symptoms reported. Pt also reported L groin pain upon admit. Orthopedic Surgery consulted and pt found to have sustained a left superior labral tear of her acetabulum noted on MRI with further orthopedic work up to be completed outpatient. Hx of ESRD noted with last HD on 12/16/22- Nephrology consulted with HD performed today. AVG to LUE with +bruit/+thrill present. On 12/21/22, MRI of R hip results pending. IR consulted for evaluation of aspiration/injection of left hip. Rate and rhythm controlled on Cardizem gtt. Heparin infusion continued pending joint aspiration.  Pericardial effusion discussed with Nephrology and Cardiology for  "recommendations.   Patient went to have aspiration of left hip however upon transferring from bed to table had an episode described as seizure-like" seems more that it was hypotension related.  Medications adjusted      Interval History:  Patient seen after having returned from attempted arthrocentesis.  Radiology reported patient had episode syncope after transferring from bed to stretcher.  Patient had no seizure-like activity.  Episode lasted less than 30 seconds and patient no postictal period.  Patient's blood pressure has been in the 90s today we will hold hydralazine and decrease losartan as patient is requiring higher dose of Cardizem to control rate    Review of Systems   Constitutional:  Positive for activity change. Negative for fatigue.   Respiratory:  Negative for cough and shortness of breath.    Cardiovascular:  Positive for palpitations (resolved).   Genitourinary:  Negative for urgency.   Musculoskeletal:  Positive for arthralgias and gait problem. Negative for back pain, joint swelling and myalgias.   Neurological:  Negative for dizziness, seizures, weakness and headaches.   Psychiatric/Behavioral:  Negative for sleep disturbance.    All other systems reviewed and are negative.  Objective:     Vital Signs (Most Recent):  Temp: 98.6 °F (37 °C) (12/22/22 1200)  Pulse: 105 (12/22/22 1200)  Resp: 17 (12/22/22 1200)  BP: 132/81 (12/22/22 1200)  SpO2: 96 % (12/22/22 1200) Vital Signs (24h Range):  Temp:  [97.7 °F (36.5 °C)-100.7 °F (38.2 °C)] 98.6 °F (37 °C)  Pulse:  [] 105  Resp:  [16-20] 17  SpO2:  [90 %-99 %] 96 %  BP: ()/(57-81) 132/81     Weight: 71.5 kg (157 lb 10.1 oz)  Body mass index is 27.06 kg/m².    Intake/Output Summary (Last 24 hours) at 12/22/2022 1608  Last data filed at 12/21/2022 1645  Gross per 24 hour   Intake --   Output 1500 ml   Net -1500 ml      Physical Exam  Vitals reviewed.   Constitutional:       Appearance: Normal appearance.   HENT:      Head: Normocephalic and " atraumatic.      Mouth/Throat:      Mouth: Mucous membranes are moist.      Pharynx: Oropharynx is clear.   Eyes:      Extraocular Movements: Extraocular movements intact.      Conjunctiva/sclera: Conjunctivae normal.   Cardiovascular:      Rate and Rhythm: Normal rate and regular rhythm.      Pulses: Normal pulses.      Heart sounds: Normal heart sounds.      Comments: Left upper extremity AV access with thrill and bruit  Pulmonary:      Effort: Pulmonary effort is normal.      Breath sounds: Normal breath sounds.   Abdominal:      General: Bowel sounds are normal.      Palpations: Abdomen is soft.   Musculoskeletal:         General: Normal range of motion.      Cervical back: Normal range of motion and neck supple.   Skin:     General: Skin is warm and dry.   Neurological:      General: No focal deficit present.      Mental Status: She is alert and oriented to person, place, and time. Mental status is at baseline.   Psychiatric:         Mood and Affect: Mood normal.         Behavior: Behavior normal.         Thought Content: Thought content normal.       Significant Labs: All pertinent labs within the past 24 hours have been reviewed.  CBC:   Recent Labs   Lab 12/21/22  0618 12/22/22  0508   WBC 6.15  6.15 6.66  6.66   HGB 11.3*  11.3* 11.8*  11.8*   HCT 35.6*  35.6* 36.7*  36.7*   *  100* 130*  130*     CMP:   Recent Labs   Lab 12/20/22  1658 12/21/22  0618 12/22/22  0508    137 137   K 4.6 5.3* 4.5   CL 95 94* 95   CO2 24 27 27   * 97 113*   BUN 31* 43* 36*   CREATININE 6.3* 7.8* 6.5*   CALCIUM 9.4 8.8 9.5   PROT  --  6.7 7.0   ALBUMIN  --  3.1* 2.7*   BILITOT  --  1.5* 1.5*   ALKPHOS  --  91 99   AST  --  35 36   ALT  --  16 16   ANIONGAP 18* 16 15       Significant Imaging: I have reviewed all pertinent imaging results/findings within the past 24 hours.      Assessment/Plan:      * Left hip pain  Patient presented with acute onset left hip pain x1 day duration in absence of trauma  while doing leg exercises in bed and was found to asymmetric large left hip joint effusion, small right hip joint effusion, and evidence of superior labral tear of her left acetabulum noted on MRI.  Patient noted to have 5/5 strength throughout with sensation to light touch grossly intact throughout however, TTP throughout left groin.  Orthopedics consulted and awaiting further evaluation/recommendations.  Plan:  -optimize pain control  -bowel regimen  -bed rest  -nonweightbearing  -PT/OT  -ortho surgery following  -plan as previously discussed   Plan for arthrocentesis with steroid injection in a.m.      Pericardial effusion  -large posterior pericardial effusion   -pt appears asymptomatic   -Nephrology following with HD performed on 12/21/22  -Cardiology following- will follow and evaluate after next HD       Bone cyst of right femur  -Orthopedic Surgery following   -further imaging recommended   -MRI of R hip pending         Shortness of breath  Patient reported acute onset shortness a breath while obtaining MRI and was found to have evidence of severe cardiomegaly with small left pleural effusion but negative for infectious processes noted on CXR. Patient afebrile without leukocytosis. BNP elevated at 4792. Troponin 0.095. Exam positive for bilateral edema but negative for elevated JVD or crackles on lung ausculation. Patient currently saturating % on 2L via NC in no acute distress and without use of accessory muscles noted.  Patient without history of CHF.  Cardiology consulted.  Echo ordered and pending.  Nephrology consulted to resume HD inpatient.  Will trial dose of Lasix if clinical status worsens.  Plan:  -titrate oxygen therapy as needed  -incentive spirometry  -echo reviewed   -f/u cardiology and nephrology  -monitor Is/Os  -low salt/cardiac/renal diet    -Duonebs prn       Tear of left acetabular labrum  -Orthopedic surgery following  -MRI showed no fracture and asymmetric large left hip joint  effusion.  Small right hip joint effusion. Paralabral cyst along the superior border of the acetabulum suspicious for a superior labral tear.   -analgesia as needed   -antiemetics as needed   - the left hip may treat with therapy and anti-inflammatories (per Orthopedic Surgery)   -Aspiration/injection of the left hip by Interventional Radiology may be considered if patient is interested (per Orthopedic Surgery)-outpatient follow up   -IR consulted to aspiration/injection L hip       Gastroesophageal reflux disease without esophagitis  Chronic. Stable. Currently asymptomatic. Home medications include PPI/Antacids as needed.  Plan:  -Continue PPI/Antacids as needed       Primary hypertension  BP currently ranging from 90s systolic.    Plan:  -Optimize pain control   -Continue home medications, titrate as needed   -Monitor BP  -Low salt/renal diet when not NPO  -IV hydralazine prn for SBP>160 or DBP>90   -f/u nephrology for HD inpatient- HD performed on 12/20/22       ESRD (end stage renal disease) on dialysis  Patient currently follows Dr. Ribera from nephrology with last reported dialysis completed last Friday. Patient currently on M/W/F HD via LUE fistula but missed Monday due to not feeling well and being in the ED. Labs consistent with history of ESRD on HD.   Plan:  -continue home medications, titrate as needed  -nephrology consulted -HD performed on 12/20/22 and now back on MWF schedule      Atrial fibrillation  Patient with new onset Paroxysmal (<7 days) atrial fibrillation with RVR which is uncontrolled currently with Calcium Channel Blocker. Patient is currently in atrial fibrillation with rate in the 105-130s on diltiazem. [id number] Score: 2. HASBLED Score: 3. Anticoagulation indicated. Anticoagulation done with heparin .  Troponin measuring 0.095 with EKG obtained at outside facility revealing SVT/atrial fibrillation with RVR at rate of 176. Troponin likely elevated in setting of ESRD and demand ischemia  from arrhythmia as patient denied endorsing chest pain.  Plan:  -telemetry- afib with rates 105-130  -cardizem drip transitioned to oral dosing  -f/u cardiology   -nephrology for HD   -Heparin gtt -  -CHADS-VASc Score- 3         VTE Risk Mitigation (From admission, onward)         Ordered     heparin 25,000 units in dextrose 5% (100 units/ml) IV bolus from bag - ADDITIONAL PRN BOLUS - 60 units/kg  As needed (PRN)        Question:  Heparin Infusion Adjustment (DO NOT MODIFY ANSWER)  Answer:  [email or web address]\Images\Pharmacy\HeparinInfusions\heparin LOW INTENSITY nomogram for [facility] [id number].pdf    12/20/22 1315     heparin 25,000 units in dextrose 5% (100 units/ml) IV bolus from bag - ADDITIONAL PRN BOLUS - 30 units/kg  As needed (PRN)        Question:  Heparin Infusion Adjustment (DO NOT MODIFY ANSWER)  Answer:  \\[email or web address]\Images\Pharmacy\HeparinInfusions\heparin LOW INTENSITY nomogram for [facility] [id number].pdf    12/20/22 1315     heparin 25,000 units in dextrose 5% 250 mL (100 units/mL) infusion LOW INTENSITY nomogram - [facility]  Continuous        Question Answer Comment   Heparin Infusion Adjustment (DO NOT MODIFY ANSWER) [email or web address]\Images\Pharmacy\HeparinInfusions\heparin LOW INTENSITY nomogram for [facility] [id number].pdf    Begin at (in units/kg/hr) 12        12/20/22 1315     Place sequential compression device  Until discontinued         12/19/22 2119     IP VTE LOW RISK PATIENT  Once         12/19/22 2119                Discharge Planning   ISAAK:      Code Status: Full Code   Is the patient medically ready for discharge?:     Reason for patient still in hospital (select all that apply): Patient unstable  Discharge Plan A: Home                  Afshan Jacobs MD  Department of Hospital Medicine   [facility] - Telemetry ([facility])

## 2022-12-22 NOTE — ASSESSMENT & PLAN NOTE
-No evidence of tamponade  -Will reassess with echo after additional session of HD    12/22/22  Repeat Echo tomorrow

## 2022-12-22 NOTE — ASSESSMENT & PLAN NOTE
BP currently ranging from 90s systolic.    Plan:  -Optimize pain control   -Continue home medications, titrate as needed   -Monitor BP  -Low salt/renal diet when not NPO  -IV hydralazine prn for SBP>160 or DBP>90   -f/u nephrology for HD inpatient- HD performed on 12/20/22

## 2022-12-22 NOTE — PLAN OF CARE
Ongoing (interventions implemented as appropriate)  Pt AAO x4.  VSS  Pt able to make needs known.  Pt remained afebrile throughout this shift.   Pt up to chair with assistance, gait unsteady   Pt remained free of falls this shift.   Plan of care reviewed. Patient verbalizes understanding.   Pt moving/turing independent. Frequent weight shifting encouraged.  Patient afib rhythm on monitor.   Bed low, side rails up x 2, wheels locked, call light in reach.   Hourly rounding completed.   Will continue to monitor.

## 2022-12-22 NOTE — PROGRESS NOTES
[facility]  Orthopedics  Progress Note    Patient Name: Meaghan Potter  MRN: [id number]  Admission Date: 12/19/2022  Hospital Length of Stay: 1 days  Attending Provider: Afshan Márquez MD  Primary Care Provider: Primary Doctor No    Subjective:     Principal Problem:Left hip pain    Principal Orthopedic Problem:  Left hip pain, right hip cyst seen on CT    Interval History: Meaghan Potter is a 67-year-old female past medical history significant for end-stage renal disease on hemodialysis, hypertension, and GERD who is being treated by orthopedics for left hip pain.  She reports this pain is about the same.  Of note, the patient has a cyst in the right femoral neck that was seen on CT of her pelvis, but she denies any pain in the right hip    Review of patient's allergies indicates:   Allergen Reactions    Amoxicillin Rash       Current Facility-Administered Medications   Medication    0.9%  NaCl infusion    acetaminophen suppository 650 mg    acetaminophen tablet 650 mg    albuterol-ipratropium 2.5 mg-0.5 mg/3 mL nebulizer solution 3 mL    aluminum-magnesium hydroxide-simethicone 200-200-20 mg/5 mL suspension 30 mL    brimonidine 0.2% ophthalmic solution 1 drop    BUPivacaine (PF) 0.25% (2.5 mg/ml) injection 12.5 mg    calcitRIOL capsule 0.5 mcg    dextrose 10% bolus 125 mL 125 mL    dextrose 10% bolus 250 mL 250 mL    diltiaZEM tablet 30 mg    [START ON 12/26/2022] ergocalciferol capsule 50,000 Units    glucagon (human recombinant) injection 1 mg    glucose chewable tablet 16 g    glucose chewable tablet 24 g    heparin 25,000 units in dextrose 5% (100 units/ml) IV bolus from bag - ADDITIONAL PRN BOLUS - 30 units/kg    heparin 25,000 units in dextrose 5% (100 units/ml) IV bolus from bag - ADDITIONAL PRN BOLUS - 60 units/kg    heparin 25,000 units in dextrose 5% 250 mL (100 units/mL) infusion LOW INTENSITY nomogram - [facility]    hydrALAZINE tablet 25 mg    HYDROcodone-acetaminophen 5-325 mg per tablet  "1 tablet    LORazepam tablet 1 mg    losartan tablet 50 mg    melatonin tablet 6 mg    morphine injection 2 mg    mupirocin 2 % ointment    naloxone 0.4 mg/mL injection 0.02 mg    ondansetron injection 4 mg    promethazine tablet 25 mg    senna-docusate 8.6-50 mg per tablet 1 tablet    sodium chloride 0.9% bolus 250 mL 250 mL    sodium chloride 0.9% bolus 250 mL 250 mL    sodium chloride 0.9% flush 3 mL    triamcinolone acetonide injection 40 mg    vitamin renal formula (B-complex-vitamin c-folic acid) 1 mg per capsule 1 capsule     Objective:     Vital Signs (Most Recent):  Temp: 97.7 °F (36.5 °C) (12/22/22 0719)  Pulse: 83 (12/22/22 0719)  Resp: 17 (12/22/22 0719)  BP: 97/65 (12/22/22 0719)  SpO2: 96 % (12/22/22 0814) Vital Signs (24h Range):  Temp:  [97.7 °F (36.5 °C)-100.7 °F (38.2 °C)] 97.7 °F (36.5 °C)  Pulse:  [75-99] 83  Resp:  [16-20] 17  SpO2:  [90 %-99 %] 96 %  BP: ()/(57-65) 97/65     Weight: 71.5 kg (157 lb 10.1 oz)  Height: 5' 4" (162.6 cm)  Body mass index is 27.06 kg/m².      Intake/Output Summary (Last 24 hours) at 12/22/2022 0908  Last data filed at 12/21/2022 1645  Gross per 24 hour   Intake --   Output 1500 ml   Net -1500 ml       Ortho/SPM Exam  Left hip:  Skin is intact   No edema   Moderate TTP in the groin   No pain with flexion/extension   Pain increases with internal/external rotation  Calf and compartments are soft and compressible   Motor exam normal   Sensation and pulses intact   Cap refill brisk     Right hip:  Skin is intact   No edema   No TTP   No pain with flexion/extension   No pain with internal/external rotation  Calf and compartments are soft and compressible   Motor exam normal   Sensation and pulses intact  Cap refill brisk     GEN: Well developed, well nourished female. AAOX3. No acute distress.   Head: Normocephalic, atraumatic.   Eyes: RIGO  Neck: Trachea is midline, no adenopathy  Resp: Breathing unlabored.  Neuro: Motor function normal, Cranial nerves " intact  Psych: Mood and affect appropriate.      Significant Labs:   Recent Lab Results         12/22/22  0508        Albumin 2.7       Alkaline Phosphatase 99       ALT 16       ANION GAP 15       aPTT 54.7  Comment: aPTT therapeutic range = 39-69 seconds       AST 36       Baso # 0.01        0.01       Basophil % 0.2        0.2       BILIRUBIN TOTAL 1.5  Comment: For infants and newborns, interpretation of results should be based  on gestational age, weight and in agreement with clinical  observations.    Premature Infant recommended reference ranges:  Up to 24 hours.............<8.0 mg/dL  Up to 48 hours............<12.0 mg/dL  3-5 days..................<15.0 mg/dL  6-29 days.................<15.0 mg/dL         BUN 36       Calcium 9.5       Chloride 95       CO2 27       Creatinine 6.5       Differential Method Automated        Automated       eGFR 7       Eos # 0.0        0.0       Eosinophil % 0.2        0.2       Glucose 113       Gran # (ANC) 5.2        5.2       Gran % 77.9        77.9       HEMATOCRIT 36.7        36.7       HEMOGLOBIN 11.8        11.8       Immature Grans (Abs) 0.03  Comment: Mild elevation in immature granulocytes is non specific and   can be seen in a variety of conditions including stress response,   acute inflammation, trauma and pregnancy. Correlation with other   laboratory and clinical findings is essential.          0.03  Comment: Mild elevation in immature granulocytes is non specific and   can be seen in a variety of conditions including stress response,   acute inflammation, trauma and pregnancy. Correlation with other   laboratory and clinical findings is essential.         Immature Granulocytes 0.5        0.5       Lymph # 0.6        0.6       Lymph % 8.9        8.9       MCH 31.0        31.0       MCHC 32.2        32.2       MCV 96        96       Mono # 0.8        0.8       Mono % 12.3        12.3       MPV 11.4        11.4       nRBC 0        0       Platelets 130         130       Potassium 4.5       PROTEIN TOTAL 7.0       RBC 3.81        3.81       RDW 14.4        14.4       Sodium 137       WBC 6.66        6.66             All pertinent labs within the past 24 hours have been reviewed.    Significant Imaging: I have reviewed and interpreted all pertinent imaging results/findings.    Assessment/Plan:     Active Diagnoses:    Diagnosis Date Noted POA    PRINCIPAL PROBLEM:  Left hip pain [M25.552] 12/20/2022 Yes    Pericardial effusion [I31.39] 12/21/2022 Unknown    ESRD (end stage renal disease) on dialysis [N18.6, Z99.2] 12/20/2022 Not Applicable    Primary hypertension [I10] 12/20/2022 Yes    Gastroesophageal reflux disease without esophagitis [K21.9] 12/20/2022 Yes    Tear of left acetabular labrum [S73.192A] 12/20/2022 Yes    Shortness of breath [R06.02] 12/20/2022 Yes    Bone cyst of right femur [M85.651] 12/20/2022 Unknown    Atrial fibrillation [I48.91] 12/19/2022 Yes      Problems Resolved During this Admission:     Assessment:  67-year-old female with a past medical history significant for end-stage renal disease on hemodialysis, hypertension, and GERD who is being treated for left hip pain and right hip cyst seen on CT of her pelvis    Plan:  Patient is scheduled to have MRI of the right hip and injection of the left hip performed today  Recommend continuing therapy and anti-inflammatories for left hip pain   We will continue to follow the results of the MRI and proceed from there    Julien Azul PA-C  Orthopedics  [facility] [facility] Tele[facility] ([facility])

## 2022-12-22 NOTE — PROGRESS NOTES
[facility] Medicine  Progress Note    Patient Name: Meaghan Potter  MRN: [id number]  Patient Class: IP- Inpatient   Admission Date: 12/19/2022  Length of Stay: 1 days  Attending Physician: Afshan Márquez MD  Primary Care Provider: Primary Doctor No        Subjective:     Principal Problem:Left hip pain        HPI:  Meaghan Potter is a 67 y.o. female with a PMH  has a past medical history of ESRD (end stage renal disease) on dialysis, GERD (gastroesophageal reflux disease), and Hypertension. who presented as a transfer from [facility] for higher level cardiology and orthopedic care after patient was found to have sustained a left superior labral tear of her acetabulum noted on MRI in addition to new onset atrial fibrillation with RVR requiring diltiazem drip.  Patient reported being in her usual state of health prior to onset of symptoms and reported acute onset of left hip pain while performing leg exercises in bed earlier today.  Patient reported hearing and feeling a pop in her left hip followed by immediate pain which has remained constant and currently rated 6/10 in severity. Pain was described as throbbing/pulling in nature with no known alleviating factors and aggravating factors including weight-bearing, movement, and palpation to the affected area.  She denied endorsing any numbness, tingling, discoloration, or penetrating trauma, but did express decreased range of motion and muscle strength secondary to exacerbation of pain.  While at outside facility, patient became short of breath while lying flat while obtaining MRI of hip and was found to be in atrial fibrillation with RVR requiring diltiazem drip. She denied endorsing any chest pain, lightheadedness, dizziness, visual disturbances, fever, chills, sweats, nausea, vomiting, abdominal pain, changes in bowel/bladder habits, or onset neurological deficits.  All other review of systems negative except as noted above.  Patient  reports being back at her baseline and continues to complain only of left hip pain.  Orthopedics and cardiology consulted and awaiting further evaluation/recommendations.  Of note, patient missed hemodialysis today secondary to pain and going to the emergency room and usually receives HD via left upper extremity fistula on Monday/Wednesday/Friday.  Follows Dr. Ribera outpatient. Nephrology consulted to continue HD inpatient.     PCP: Primary Doctor No        Overview/Hospital Course:  Pt admitted to Observation for Atrial fibrillation (new onset) with RVR with . Cardiology consulted with Cardizem gtt initiated. NSR on monitor with HR in 80's.  BNP elevated and Troponin trended upward (0.076>0.095) with Heparin infusion initiated. Echo showed with concentric hypertrophy and normal systolic function. Atrial fibrillation observed.Biatrial atrial enlargement. Grade III left ventricular diastolic dysfunction. PA systolic pressure is 60 mmHg. Moderate right ventricular enlargement with normal right ventricular systolic function. Pulmonary hypertension. EF 55%. Moderate to severe tricuspid regurgitation. Mild-to-moderate mitral regurgitation. Severe right atrial enlargement. Large posterior pericardial effusion. No evidence of tamponade. Troponin elevated and flat with no cardiac symptoms reported. Pt also reported L groin pain upon admit. Orthopedic Surgery consulted and pt found to have sustained a left superior labral tear of her acetabulum noted on MRI with further orthopedic work up to be completed outpatient. Hx of ESRD noted with last HD on 12/16/22- Nephrology consulted with HD performed today. AVG to LUE with +bruit/+thrill present. On 12/21/22, MRI of R hip results pending. IR consulted for evaluation of aspiration/injection of left hip. Rate and rhythm controlled on Cardizem gtt. Heparin infusion continued pending joint aspiration.  Pericardial effusion discussed with Nephrology and Cardiology for  "recommendations.   Patient went to have aspiration of left hip however upon transferring from bed to table had an episode described as seizure-like" seems more that it was hypotension related.  Medications adjusted      No new subjective & objective note has been filed under this hospital service since the last note was generated.      Assessment/Plan:      * Left hip pain  Patient presented with acute onset left hip pain x1 day duration in absence of trauma while doing leg exercises in bed and was found to asymmetric large left hip joint effusion, small right hip joint effusion, and evidence of superior labral tear of her left acetabulum noted on MRI.  Patient noted to have 5/5 strength throughout with sensation to light touch grossly intact throughout however, TTP throughout left groin.  Orthopedics consulted and awaiting further evaluation/recommendations.  Plan:  -optimize pain control  -bowel regimen  -bed rest  -nonweightbearing  -PT/OT  -ortho surgery following  -plan as previously discussed   Plan for arthrocentesis with steroid injection in a.m.      Pericardial effusion  -large posterior pericardial effusion   -pt appears asymptomatic   -Nephrology following with HD performed on 12/21/22  -Cardiology following- will follow and evaluate after next HD       Bone cyst of right femur  -Orthopedic Surgery following   -further imaging recommended   -MRI of R hip pending         Shortness of breath  Patient reported acute onset shortness a breath while obtaining MRI and was found to have evidence of severe cardiomegaly with small left pleural effusion but negative for infectious processes noted on CXR. Patient afebrile without leukocytosis. BNP elevated at 4792. Troponin 0.095. Exam positive for bilateral edema but negative for elevated JVD or crackles on lung ausculation. Patient currently saturating % on 2L via NC in no acute distress and without use of accessory muscles noted.  Patient without history " of CHF.  Cardiology consulted.  Echo ordered and pending.  Nephrology consulted to resume HD inpatient.  Will trial dose of Lasix if clinical status worsens.  Plan:  -titrate oxygen therapy as needed  -incentive spirometry  -echo reviewed   -f/u cardiology and nephrology  -monitor Is/Os  -low salt/cardiac/renal diet    -Duonebs prn       Tear of left acetabular labrum  -Orthopedic surgery following  -MRI showed no fracture and asymmetric large left hip joint effusion.  Small right hip joint effusion. Paralabral cyst along the superior border of the acetabulum suspicious for a superior labral tear.   -analgesia as needed   -antiemetics as needed   - the left hip may treat with therapy and anti-inflammatories (per Orthopedic Surgery)   -Aspiration/injection of the left hip by Interventional Radiology may be considered if patient is interested (per Orthopedic Surgery)-outpatient follow up   -IR consulted to aspiration/injection L hip       Gastroesophageal reflux disease without esophagitis  Chronic. Stable. Currently asymptomatic. Home medications include PPI/Antacids as needed.  Plan:  -Continue PPI/Antacids as needed       Primary hypertension  BP currently ranging from 90s systolic.    Plan:  -Optimize pain control   -Continue home medications, titrate as needed   -Monitor BP  -Low salt/renal diet when not NPO  -IV hydralazine prn for SBP>160 or DBP>90   -f/u nephrology for HD inpatient- HD performed on 12/20/22       ESRD (end stage renal disease) on dialysis  Patient currently follows Dr. Ribera from nephrology with last reported dialysis completed last Friday. Patient currently on M/W/F HD via LUE fistula but missed Monday due to not feeling well and being in the ED. Labs consistent with history of ESRD on HD.   Plan:  -continue home medications, titrate as needed  -nephrology consulted -HD performed on 12/20/22 and now back on MWF schedule      Atrial fibrillation  Patient with new onset Paroxysmal (<7 days)  atrial fibrillation with RVR which is uncontrolled currently with Calcium Channel Blocker. Patient is currently in atrial fibrillation with rate in the 105-130s on diltiazem. [id number] Score: 2. HASBLED Score: 3. Anticoagulation indicated. Anticoagulation done with heparin .  Troponin measuring 0.095 with EKG obtained at outside facility revealing SVT/atrial fibrillation with RVR at rate of 176. Troponin likely elevated in setting of ESRD and demand ischemia from arrhythmia as patient denied endorsing chest pain.  Plan:  -telemetry- afib with rates 105-130  -cardizem drip transitioned to oral dosing  -f/u cardiology   -nephrology for HD   -Heparin gtt -  -CHADS-VASc Score- 3         VTE Risk Mitigation (From admission, onward)         Ordered     heparin 25,000 units in dextrose 5% (100 units/ml) IV bolus from bag - ADDITIONAL PRN BOLUS - 60 units/kg  As needed (PRN)        Question:  Heparin Infusion Adjustment (DO NOT MODIFY ANSWER)  Answer:  \\[email or web address]\Images\Pharmacy\HeparinInfusions\heparin LOW INTENSITY nomogram for [facility] [id number]    12/20/22 1315     heparin 25,000 units in dextrose 5% (100 units/ml) IV bolus from bag - ADDITIONAL PRN BOLUS - 30 units/kg  As needed (PRN)        Question:  Heparin Infusion Adjustment (DO NOT MODIFY ANSWER)  Answer:  \\[email or web address]\epic\Images\Pharmacy\HeparinInfusions\heparin LOW INTENSITY nomogram for [facility] [id number].pdf    12/20/22 1315     heparin 25,000 units in dextrose 5% 250 mL (100 units/mL) infusion LOW INTENSITY nomogram - [facility]  Continuous        Question Answer Comment   Heparin Infusion Adjustment (DO NOT MODIFY ANSWER) \\[email or web address]\epic\Images\Pharmacy\HeparinInfusions\heparin LOW INTENSITY nomogram for [facility] [id number].pdf    Begin at (in units/kg/hr) 12        12/20/22 1315     Place sequential compression device  Until discontinued         12/19/22 2119     IP VTE LOW RISK PATIENT  Once         12/19/22 2119                Discharge Planning   ISAAK:      Code  Status: Full Code   Is the patient medically ready for discharge?:     Reason for patient still in hospital (select all that apply): Treatment  Discharge Plan A: Home                  Afshan Jacobs MD  Department of Hospital Medicine   [facility] ([facility])

## 2022-12-22 NOTE — ASSESSMENT & PLAN NOTE
Patient with new onset Paroxysmal (<7 days) atrial fibrillation with RVR which is uncontrolled currently with Calcium Channel Blocker. Patient is currently in atrial fibrillation with rate in the 105-130s on diltiazem. [id number] Score: 2. HASBLED Score: 3. Anticoagulation indicated. Anticoagulation done with heparin .  Troponin measuring 0.095 with EKG obtained at outside facility revealing SVT/atrial fibrillation with RVR at rate of 176. Troponin likely elevated in setting of ESRD and demand ischemia from arrhythmia as patient denied endorsing chest pain.  Plan:  -telemetry- afib with rates 105-130  -cardizem drip transitioned to oral dosing  -f/u cardiology   -nephrology for HD   -Heparin gtt -  -CHADS-VASc Score- 3

## 2022-12-22 NOTE — ASSESSMENT & PLAN NOTE
Patient with new onset Paroxysmal (<7 days) atrial fibrillation with RVR which is uncontrolled currently with Calcium Channel Blocker. Patient is currently in atrial fibrillation with rate in the 105-130s on diltiazem. [id number] Score: 2. HASBLED Score: 3. Anticoagulation indicated. Anticoagulation done with heparin .  Troponin measuring 0.095 with EKG obtained at outside facility revealing SVT/atrial fibrillation with RVR at rate of 176. Troponin likely elevated in setting of ESRD and demand ischemia from arrhythmia as patient denied endorsing chest pain.  Plan:  -telemetry- afib with rates 105-130  -cardizem drip transitioned to oral dosing  -f/u cardiology   -nephrology for HD   -Heparin gtt -  -CHADS-VASc Score- 3

## 2022-12-22 NOTE — ASSESSMENT & PLAN NOTE
EKG reviewed afib  Repeat EKG ordered  Will need AC    12/21/22  -Still in afib but HR controlled  -Cardizem switched to po  -Continue heparin gtt for now, will need OAC upon d/c    12/22/22  Afib fast  Cont diltiazem PO  Added back cardizem gtt  Cont hep gtt for now, OAC upon DC  Follow up in clinic

## 2022-12-22 NOTE — SUBJECTIVE & OBJECTIVE
Review of Systems   Constitutional: Positive for malaise/fatigue.   HENT: Negative.     Eyes: Negative.    Cardiovascular: Negative.    Respiratory: Negative.     Skin: Negative.    Musculoskeletal: Negative.    Gastrointestinal: Negative.    Genitourinary: Negative.    Neurological:  Positive for weakness.   Psychiatric/Behavioral: Negative.     Objective:     Vital Signs (Most Recent):  Temp: 98.6 °F (37 °C) (12/22/22 1200)  Pulse: 105 (12/22/22 1200)  Resp: 17 (12/22/22 1200)  BP: 132/81 (12/22/22 1200)  SpO2: 96 % (12/22/22 1200) Vital Signs (24h Range):  Temp:  [97.7 °F (36.5 °C)-100.7 °F (38.2 °C)] 98.6 °F (37 °C)  Pulse:  [] 105  Resp:  [16-20] 17  SpO2:  [90 %-99 %] 96 %  BP: ()/(57-81) 132/81     Weight: 71.5 kg (157 lb 10.1 oz)  Body mass index is 27.06 kg/m².     SpO2: 96 %         Intake/Output Summary (Last 24 hours) at 12/22/2022 1418  Last data filed at 12/21/2022 1645  Gross per 24 hour   Intake --   Output 1500 ml   Net -1500 ml       Lines/Drains/Airways       Drain  Duration                  Hemodialysis AV Fistula Left upper arm -- days              Peripheral Intravenous Line  Duration                  Peripheral IV - Single Lumen 12/19/22 1155 22 G Posterior;Right Hand 3 days                    Physical Exam  Vitals and nursing note reviewed.   Constitutional:       Appearance: Normal appearance.   HENT:      Head: Normocephalic.   Eyes:      Pupils: Pupils are equal, round, and reactive to light.   Cardiovascular:      Rate and Rhythm: Normal rate. Rhythm irregular.      Heart sounds: Normal heart sounds, S1 normal and S2 normal. No murmur heard.    No S3 or S4 sounds.   Pulmonary:      Effort: Pulmonary effort is normal.      Breath sounds: Normal breath sounds.   Abdominal:      General: Bowel sounds are normal.      Palpations: Abdomen is soft.   Musculoskeletal:         General: Normal range of motion.      Cervical back: Normal range of motion.   Skin:     Capillary  Refill: Capillary refill takes less than 2 seconds.   Neurological:      General: No focal deficit present.      Mental Status: She is alert and oriented to person, place, and time.      Motor: Weakness present.   Psychiatric:         Thought Content: Thought content normal.       Significant Labs: BMP:   Recent Labs   Lab 12/20/22 1658 12/21/22 0618 12/22/22  0508   * 97 113*    137 137   K 4.6 5.3* 4.5   CL 95 94* 95   CO2 24 27 27   BUN 31* 43* 36*   CREATININE 6.3* 7.8* 6.5*   CALCIUM 9.4 8.8 9.5   , CMP   Recent Labs   Lab 12/20/22 1658 12/21/22 0618 12/22/22  0508    137 137   K 4.6 5.3* 4.5   CL 95 94* 95   CO2 24 27 27   * 97 113*   BUN 31* 43* 36*   CREATININE 6.3* 7.8* 6.5*   CALCIUM 9.4 8.8 9.5   PROT  --  6.7 7.0   ALBUMIN  --  3.1* 2.7*   BILITOT  --  1.5* 1.5*   ALKPHOS  --  91 99   AST  --  35 36   ALT  --  16 16   ANIONGAP 18* 16 15   , CBC   Recent Labs   Lab 12/21/22 0618 12/22/22  0508   WBC 6.15  6.15 6.66  6.66   HGB 11.3*  11.3* 11.8*  11.8*   HCT 35.6*  35.6* 36.7*  36.7*   *  100* 130*  130*   , INR No results for input(s): INR, PROTIME in the last 48 hours., Lipid Panel No results for input(s): CHOL, HDL, LDLCALC, TRIG, CHOLHDL in the last 48 hours., Troponin   Recent Labs   Lab 12/20/22 1658   TROPONINI 0.091*   , and All pertinent lab results from the last 24 hours have been reviewed.    Significant Imaging: Echocardiogram: Transthoracic echo (TTE) complete ([facility] Only):   Results for orders placed or performed during the hospital encounter of 12/19/22   Echo   Result Value Ref Range    BSA 1.83 m2    TDI SEPTAL 0.06 m/s    LV LATERAL E/E' RATIO 15.33 m/s    LV SEPTAL E/E' RATIO 15.33 m/s    LA WIDTH 3.80 cm    IVC diameter 2.82 cm    Left Ventricular Outflow Tract Mean Velocity 0.63 cm/s    Left Ventricular Outflow Tract Mean Gradient 2.01 mmHg    TDI LATERAL 0.06 m/s    LVIDd 5.31 3.5 - 6.0 cm    IVS 1.67 (A) 0.6 - 1.1 cm    Posterior Wall  1.49 (A) 0.6 - 1.1 cm    Ao root annulus 3.06 cm    LVIDs 3.78 2.1 - 4.0 cm    FS 29 28 - 44 %    LA volume 89.40 cm3    Sinus 3.26 cm    STJ 3.37 cm    Ascending aorta 3.42 cm    LV mass 381.59 g    LA size 4.15 cm    TAPSE 2.27 cm    Left Ventricle Relative Wall Thickness 0.56 cm    AV mean gradient 9 mmHg    AV valve area 2.09 cm2    AV Velocity Ratio 0.51     AV index (prosthetic) 0.61     MV valve area p 1/2 method 5.12 cm2    E/A ratio 4.38     Mean e' 0.06 m/s    E wave deceleration time 148.05 msec    IVRT 87.54 msec    LVOT diameter 2.09 cm    LVOT area 3.4 cm2    LVOT peak krunal 1.08 m/s    LVOT peak VTI 19.80 cm    Ao peak krunal 2.12 m/s    Ao VTI 32.5 cm    RVOT peak krunal 0.54 m/s    RVOT peak VTI 13.9 cm    Mr max krunal 5.03 m/s    LVOT stroke volume 67.89 cm3    AV peak gradient 18 mmHg    PV mean gradient 0.70 mmHg    E/E' ratio 15.33 m/s    MV Peak E Krunal 0.92 m/s    TR Max Krunal 3.79 m/s    MV stenosis pressure 1/2 time 42.93 ms    MV Peak A Krunal 0.21 m/s    LV Systolic Volume 61.16 mL    LV Systolic Volume Index 34.0 mL/m2    LV Diastolic Volume 136.00 mL    LV Diastolic Volume Index 75.56 mL/m2    LA Volume Index 49.7 mL/m2    LV Mass Index 212 g/m2    RA Major Axis 5.77 cm    Left Atrium Minor Axis 6.48 cm    Left Atrium Major Axis 6.87 cm    Triscuspid Valve Regurgitation Peak Gradient 57 mmHg    RA Width 3.67 cm    Right Atrial Pressure (from IVC) 3 mmHg    TV rest pulmonary artery pressure 60 mmHg    EF 55 %    Narrative    · The left ventricle is normal in size with concentric hypertrophy and   normal systolic function.  · Atrial fibrillation observed.  · Biatrial atrial enlargement.  · Grade III left ventricular diastolic dysfunction.  · The estimated PA systolic pressure is 60 mmHg.  · Moderate right ventricular enlargement with normal right ventricular   systolic function.  · There is pulmonary hypertension.  · Normal central venous pressure (3 mmHg).  · The estimated ejection fraction is 55%.  ·  Moderate to severe tricuspid regurgitation.  · Mild-to-moderate mitral regurgitation.  · Severe right atrial enlargement.  · Large posterior pericardial effusion. No evidence of tamponade.      , EKG: reviewed, and Stress Test: reviewed

## 2022-12-22 NOTE — PROGRESS NOTES
[facility] - Telemetry ([facility])  Nephrology  Progress Note    Patient Name: Meaghan Potter  MRN: [id number]  Admission Date: 12/19/2022  Hospital Length of Stay: 1 days  Attending Provider: Afshan Márquez MD   Primary Care Physician: Primary Doctor No  Principal Problem:Left hip pain  Reason for Consult: Management of ESRD  Primary Nephrologist: Dr. Ribera/[facility]       Subjective:     HPI: 68 yo female with ESRD on MWF last HD last Friday (12/16) admitted as a transfer for Cardiology, Ortho and Nephrology. She is currently seen on dialysis, using a LUE AVF. Dialysis vintage is 13 years. Her only complaint to me is pain in both hips described as 'stiff'   No current SOB, no chest pain but describes poor appetite since in pain.     12/21  - describes left hip pain as shooting pain  - eating lunch, not great appetite    12/22  - bed transfer in Radiology with 'episode' this am   - seen in room s/p Ativan IV X 1  - s/p HD yesterday         Review of patient's allergies indicates:   Allergen Reactions    Amoxicillin Rash     Current Facility-Administered Medications   Medication Frequency    0.9%  NaCl infusion Once    acetaminophen suppository 650 mg Q6H PRN    acetaminophen tablet 650 mg Q8H PRN    albuterol-ipratropium 2.5 mg-0.5 mg/3 mL nebulizer solution 3 mL Q6H PRN    aluminum-magnesium hydroxide-simethicone 200-200-20 mg/5 mL suspension 30 mL QID PRN    brimonidine 0.2% ophthalmic solution 1 drop BID    BUPivacaine (PF) 0.25% (2.5 mg/ml) injection 12.5 mg Once    calcitRIOL capsule 0.5 mcg Every Mon, Wed, Fri    dextrose 10% bolus 125 mL 125 mL PRN    dextrose 10% bolus 250 mL 250 mL PRN    diltiaZEM tablet 30 mg Q6H    [START ON 12/26/2022] ergocalciferol capsule 50,000 Units Q7 Days    glucagon (human recombinant) injection 1 mg PRN    glucose chewable tablet 16 g PRN    glucose chewable tablet 24 g PRN    heparin 25,000 units in dextrose 5% (100 units/ml) IV bolus from bag - ADDITIONAL PRN BOLUS - 30  units/kg PRN    heparin 25,000 units in dextrose 5% (100 units/ml) IV bolus from bag - ADDITIONAL PRN BOLUS - 60 units/kg PRN    heparin 25,000 units in dextrose 5% 250 mL (100 units/mL) infusion LOW INTENSITY nomogram - [facility] Continuous    HYDROcodone-acetaminophen 5-325 mg per tablet 1 tablet Q6H PRN    [START ON 12/23/2022] losartan tablet 25 mg Daily    melatonin tablet 6 mg Nightly PRN    morphine injection 2 mg Q4H PRN    mupirocin 2 % ointment BID    naloxone 0.4 mg/mL injection 0.02 mg PRN    ondansetron injection 4 mg Q8H PRN    promethazine tablet 25 mg Q6H PRN    senna-docusate 8.6-50 mg per tablet 1 tablet BID PRN    sodium chloride 0.9% bolus 250 mL 250 mL PRN    sodium chloride 0.9% bolus 250 mL 250 mL PRN    sodium chloride 0.9% flush 3 mL Q12H PRN    triamcinolone acetonide injection 40 mg Once    vitamin renal formula (B-complex-vitamin c-folic acid) 1 mg per capsule 1 capsule Daily           Review of Systems   Constitutional:  Negative for fever.   Musculoskeletal:  Positive for arthralgias.   Allergic/Immunologic: Positive for immunocompromised state.   Objective:     Vital Signs (Most Recent):  Temp: 98.6 °F (37 °C) (12/22/22 1200)  Pulse: 105 (12/22/22 1200)  Resp: 17 (12/22/22 1200)  BP: 132/81 (12/22/22 1200)  SpO2: 96 % (12/22/22 1200) Vital Signs (24h Range):  Temp:  [97.7 °F (36.5 °C)-100.7 °F (38.2 °C)] 98.6 °F (37 °C)  Pulse:  [] 105  Resp:  [16-20] 17  SpO2:  [90 %-99 %] 96 %  BP: ()/(57-81) 132/81     Weight: 71.5 kg (157 lb 10.1 oz) (12/22/22 0010)  Body mass index is 27.06 kg/m².  Body surface area is 1.8 meters squared.    I/O last 3 completed shifts:  In: 359.8 [P.O.:240; I.V.:119.8]  Out: 1500 [Other:1500]    Physical Exam  Vitals and nursing note reviewed.   Constitutional:       General: She is not in acute distress.     Appearance: She is obese.   Cardiovascular:      Rate and Rhythm: Normal rate.   Pulmonary:      Effort: Pulmonary effort is normal. No respiratory  distress.   Neurological:      Mental Status: She is alert and oriented to person, place, and time.   Psychiatric:         Mood and Affect: Mood normal.       Significant Labs: reviewed        Assessment/Plan:     Active Diagnoses:    Diagnosis Date Noted POA    PRINCIPAL PROBLEM:  Left hip pain [M25.552] 12/20/2022 Yes    Pericardial effusion [I31.39] 12/21/2022 Unknown    ESRD (end stage renal disease) on dialysis [N18.6, Z99.2] 12/20/2022 Not Applicable    Primary hypertension [I10] 12/20/2022 Yes    Gastroesophageal reflux disease without esophagitis [K21.9] 12/20/2022 Yes    Tear of left acetabular labrum [S73.192A] 12/20/2022 Yes    Shortness of breath [R06.02] 12/20/2022 Yes    Bone cyst of right femur [M85.651] 12/20/2022 Unknown    Atrial fibrillation [I48.91] 12/19/2022 Yes      Problems Resolved During this Admission:       ESRD on MWF  - next dialysis tomorrow  - s/p HD Tuesday and Wednesday    Mineral and Bone Disease  - calcitriol 0.5mcg MWF  - continue D2 weekly   - reports no phos binders at home but elevated here, monitor phos level and continue low phos diet    HTN  - adjustments made by HM and Cardiology     Anemia of ESRD  - no ESAs indicated yet   - monitor H&H     Cardiomegaly, quite severe on recent CXR  - pericardial effusion noted on Echo  - etiology of it being uremic is extremely low as she does not miss dialysis and clearance labs from outpatient setting have been above goal  - the pericardial sac is not an area of fluid where dialysis UF can remove     Afib with RVR  - off diltiazem gtts and on po diltiazem IR q6  - per Cardiology    Hip pain  - Ortho notes reviewed    Dr. Glover to assume Nephrology care in am     William Alfonso MD  Nephrology

## 2022-12-22 NOTE — ASSESSMENT & PLAN NOTE
Patient presented with acute onset left hip pain x1 day duration in absence of trauma while doing leg exercises in bed and was found to asymmetric large left hip joint effusion, small right hip joint effusion, and evidence of superior labral tear of her left acetabulum noted on MRI.  Patient noted to have 5/5 strength throughout with sensation to light touch grossly intact throughout however, TTP throughout left groin.  Orthopedics consulted and awaiting further evaluation/recommendations.  Plan:  -optimize pain control  -bowel regimen  -bed rest  -nonweightbearing  -PT/OT  -ortho surgery following  -plan as previously discussed   Plan for arthrocentesis with steroid injection in a.m.

## 2022-12-23 LAB
ALBUMIN SERPL BCP-MCNC: 2.6 G/DL (ref 3.5–5.2)
ANION GAP SERPL CALC-SCNC: 13 MMOL/L (ref 8–16)
APPEARANCE FLD: NORMAL
APTT BLDCRRT: 30.1 SEC (ref 21–32)
APTT BLDCRRT: 50.3 SEC (ref 21–32)
BASOPHILS # BLD AUTO: 0.02 K/UL (ref 0–0.2)
BASOPHILS NFR BLD: 0.3 % (ref 0–1.9)
BODY FLD TYPE: NORMAL
BODY FLD TYPE: NORMAL
BUN SERPL-MCNC: 53 MG/DL (ref 8–23)
CALCIUM SERPL-MCNC: 10 MG/DL (ref 8.7–10.5)
CHLORIDE SERPL-SCNC: 93 MMOL/L (ref 95–110)
CO2 SERPL-SCNC: 28 MMOL/L (ref 23–29)
COLOR FLD: NORMAL
CREAT SERPL-MCNC: 8.3 MG/DL (ref 0.5–1.4)
CRYSTALS FLD MICRO: NEGATIVE
DIFFERENTIAL METHOD: ABNORMAL
EOSINOPHIL # BLD AUTO: 0.1 K/UL (ref 0–0.5)
EOSINOPHIL NFR BLD: 0.7 % (ref 0–8)
ERYTHROCYTE [DISTWIDTH] IN BLOOD BY AUTOMATED COUNT: 14.6 % (ref 11.5–14.5)
EST. GFR  (NO RACE VARIABLE): 5 ML/MIN/1.73 M^2
GLUCOSE SERPL-MCNC: 111 MG/DL (ref 70–110)
GRAM STN SPEC: NORMAL
HBV SURFACE AB SER QL IA: POSITIVE
HBV SURFACE AB SERPL IA-ACNC: 31 MIU/ML
HCT VFR BLD AUTO: 36.4 % (ref 37–48.5)
HGB BLD-MCNC: 11.6 G/DL (ref 12–16)
IMM GRANULOCYTES # BLD AUTO: 0.07 K/UL (ref 0–0.04)
IMM GRANULOCYTES NFR BLD AUTO: 0.9 % (ref 0–0.5)
INR PPP: 1 (ref 0.8–1.2)
LYMPHOCYTES # BLD AUTO: 0.7 K/UL (ref 1–4.8)
LYMPHOCYTES NFR BLD: 9 % (ref 18–48)
MCH RBC QN AUTO: 30.4 PG (ref 27–31)
MCHC RBC AUTO-ENTMCNC: 31.9 G/DL (ref 32–36)
MCV RBC AUTO: 96 FL (ref 82–98)
MONOCYTES # BLD AUTO: 1.2 K/UL (ref 0.3–1)
MONOCYTES NFR BLD: 15 % (ref 4–15)
NEUTROPHILS # BLD AUTO: 5.7 K/UL (ref 1.8–7.7)
NEUTROPHILS NFR BLD: 74.1 % (ref 38–73)
NEUTROPHILS NFR FLD MANUAL: 100 %
NRBC BLD-RTO: 0 /100 WBC
PATH INTERP FLD-IMP: NORMAL
PHOSPHATE SERPL-MCNC: 5.7 MG/DL (ref 2.7–4.5)
PLATELET # BLD AUTO: 142 K/UL (ref 150–450)
PMV BLD AUTO: 11.1 FL (ref 9.2–12.9)
POTASSIUM SERPL-SCNC: 4.8 MMOL/L (ref 3.5–5.1)
PROTHROMBIN TIME: 10.8 SEC (ref 9–12.5)
RBC # BLD AUTO: 3.81 M/UL (ref 4–5.4)
SODIUM SERPL-SCNC: 134 MMOL/L (ref 136–145)
URATE SERPL-MCNC: 4.5 MG/DL (ref 2.4–5.7)
WBC # BLD AUTO: 7.65 K/UL (ref 3.9–12.7)
WBC # FLD: NORMAL /CU MM

## 2022-12-23 PROCEDURE — 99233 PR SUBSEQUENT HOSPITAL CARE,LEVL III: ICD-10-PCS | Mod: ,,, | Performed by: INTERNAL MEDICINE

## 2022-12-23 PROCEDURE — 97161 PT EVAL LOW COMPLEX 20 MIN: CPT

## 2022-12-23 PROCEDURE — 87040 BLOOD CULTURE FOR BACTERIA: CPT | Mod: 59 | Performed by: INTERNAL MEDICINE

## 2022-12-23 PROCEDURE — 80100016 HC MAINTENANCE HEMODIALYSIS

## 2022-12-23 PROCEDURE — 25000003 PHARM REV CODE 250: Performed by: INTERNAL MEDICINE

## 2022-12-23 PROCEDURE — 85025 COMPLETE CBC W/AUTO DIFF WBC: CPT

## 2022-12-23 PROCEDURE — 89060 EXAM SYNOVIAL FLUID CRYSTALS: CPT | Performed by: INTERNAL MEDICINE

## 2022-12-23 PROCEDURE — 99233 SBSQ HOSP IP/OBS HIGH 50: CPT | Mod: ,,, | Performed by: INTERNAL MEDICINE

## 2022-12-23 PROCEDURE — 99233 PR SUBSEQUENT HOSPITAL CARE,LEVL III: ICD-10-PCS | Mod: ,,, | Performed by: STUDENT IN AN ORGANIZED HEALTH CARE EDUCATION/TRAINING PROGRAM

## 2022-12-23 PROCEDURE — 84550 ASSAY OF BLOOD/URIC ACID: CPT | Performed by: INTERNAL MEDICINE

## 2022-12-23 PROCEDURE — 25000003 PHARM REV CODE 250: Performed by: PHYSICIAN ASSISTANT

## 2022-12-23 PROCEDURE — 85610 PROTHROMBIN TIME: CPT | Performed by: INTERNAL MEDICINE

## 2022-12-23 PROCEDURE — 99232 SBSQ HOSP IP/OBS MODERATE 35: CPT | Mod: ,,, | Performed by: PHYSICIAN ASSISTANT

## 2022-12-23 PROCEDURE — 87070 CULTURE OTHR SPECIMN AEROBIC: CPT | Performed by: INTERNAL MEDICINE

## 2022-12-23 PROCEDURE — 25000003 PHARM REV CODE 250: Performed by: STUDENT IN AN ORGANIZED HEALTH CARE EDUCATION/TRAINING PROGRAM

## 2022-12-23 PROCEDURE — 89051 BODY FLUID CELL COUNT: CPT | Performed by: INTERNAL MEDICINE

## 2022-12-23 PROCEDURE — 87205 SMEAR GRAM STAIN: CPT | Performed by: INTERNAL MEDICINE

## 2022-12-23 PROCEDURE — 25000003 PHARM REV CODE 250: Performed by: HOSPITALIST

## 2022-12-23 PROCEDURE — 99232 PR SUBSEQUENT HOSPITAL CARE,LEVL II: ICD-10-PCS | Mod: ,,, | Performed by: PHYSICIAN ASSISTANT

## 2022-12-23 PROCEDURE — 85730 THROMBOPLASTIN TIME PARTIAL: CPT | Mod: 91 | Performed by: INTERNAL MEDICINE

## 2022-12-23 PROCEDURE — 21400001 HC TELEMETRY ROOM

## 2022-12-23 PROCEDURE — 63600175 PHARM REV CODE 636 W HCPCS: Performed by: INTERNAL MEDICINE

## 2022-12-23 PROCEDURE — 87077 CULTURE AEROBIC IDENTIFY: CPT | Performed by: INTERNAL MEDICINE

## 2022-12-23 PROCEDURE — 97110 THERAPEUTIC EXERCISES: CPT

## 2022-12-23 PROCEDURE — 36415 COLL VENOUS BLD VENIPUNCTURE: CPT | Performed by: INTERNAL MEDICINE

## 2022-12-23 PROCEDURE — 99233 SBSQ HOSP IP/OBS HIGH 50: CPT | Mod: ,,, | Performed by: STUDENT IN AN ORGANIZED HEALTH CARE EDUCATION/TRAINING PROGRAM

## 2022-12-23 PROCEDURE — 87015 SPECIMEN INFECT AGNT CONCNTJ: CPT | Performed by: INTERNAL MEDICINE

## 2022-12-23 PROCEDURE — 80069 RENAL FUNCTION PANEL: CPT | Performed by: INTERNAL MEDICINE

## 2022-12-23 PROCEDURE — 87206 SMEAR FLUORESCENT/ACID STAI: CPT | Performed by: INTERNAL MEDICINE

## 2022-12-23 PROCEDURE — 94761 N-INVAS EAR/PLS OXIMETRY MLT: CPT

## 2022-12-23 PROCEDURE — 87186 SC STD MICRODIL/AGAR DIL: CPT | Performed by: INTERNAL MEDICINE

## 2022-12-23 PROCEDURE — 87116 MYCOBACTERIA CULTURE: CPT | Performed by: INTERNAL MEDICINE

## 2022-12-23 RX ORDER — HEPARIN SODIUM 10000 [USP'U]/100ML
17 INJECTION, SOLUTION INTRAVENOUS CONTINUOUS
Status: DISCONTINUED | OUTPATIENT
Start: 2022-12-23 | End: 2022-12-23

## 2022-12-23 RX ORDER — DILTIAZEM HYDROCHLORIDE 60 MG/1
60 TABLET, FILM COATED ORAL EVERY 6 HOURS
Status: DISCONTINUED | OUTPATIENT
Start: 2022-12-23 | End: 2022-12-24

## 2022-12-23 RX ORDER — HEPARIN SODIUM,PORCINE/D5W 25000/250
0-40 INTRAVENOUS SOLUTION INTRAVENOUS CONTINUOUS
Status: DISCONTINUED | OUTPATIENT
Start: 2022-12-23 | End: 2022-12-24

## 2022-12-23 RX ADMIN — LOSARTAN POTASSIUM 25 MG: 25 TABLET, FILM COATED ORAL at 08:12

## 2022-12-23 RX ADMIN — DILTIAZEM HYDROCHLORIDE 60 MG: 60 TABLET, FILM COATED ORAL at 11:12

## 2022-12-23 RX ADMIN — MUPIROCIN: 20 OINTMENT TOPICAL at 08:12

## 2022-12-23 RX ADMIN — DILTIAZEM HYDROCHLORIDE 60 MG: 60 TABLET, FILM COATED ORAL at 01:12

## 2022-12-23 RX ADMIN — HYDROCODONE BITARTRATE AND ACETAMINOPHEN 1 TABLET: 5; 325 TABLET ORAL at 07:12

## 2022-12-23 RX ADMIN — Medication 6 MG: at 08:12

## 2022-12-23 RX ADMIN — HEPARIN SODIUM 12 UNITS/KG/HR: 10000 INJECTION, SOLUTION INTRAVENOUS at 09:12

## 2022-12-23 RX ADMIN — NEPHROCAP 1 CAPSULE: 1 CAP ORAL at 08:12

## 2022-12-23 RX ADMIN — CALCITRIOL CAPSULES 0.25 MCG 0.5 MCG: 0.25 CAPSULE ORAL at 08:12

## 2022-12-23 RX ADMIN — BRIMONIDINE TARTRATE 1 DROP: 2 SOLUTION/ DROPS OPHTHALMIC at 08:12

## 2022-12-23 RX ADMIN — DILTIAZEM HYDROCHLORIDE 30 MG: 30 TABLET, FILM COATED ORAL at 05:12

## 2022-12-23 RX ADMIN — BRIMONIDINE TARTRATE 1 DROP: 2 SOLUTION/ DROPS OPHTHALMIC at 09:12

## 2022-12-23 NOTE — ASSESSMENT & PLAN NOTE
BP currently ranging from 90s systolic.    Plan:  -Optimize pain control   -Continue home medications, titrate as needed   -Monitor BP  -Low salt/renal diet   -IV hydralazine prn for SBP>160 or DBP>90   -f/u nephrology for HD inpatient- HD performed on 12/20/22

## 2022-12-23 NOTE — ASSESSMENT & PLAN NOTE
Patient presented with acute onset left hip pain x1 day duration in absence of trauma while doing leg exercises in bed and was found to asymmetric large left hip joint effusion, small right hip joint effusion, and evidence of superior labral tear of her left acetabulum noted on MRI.  Patient noted to have 5/5 strength throughout with sensation to light touch grossly intact throughout however, TTP throughout left groin.  Orthopedics consulted and awaiting further evaluation/recommendations.  Plan:  -optimize pain control  -bowel regimen  -bed rest  -nonweightbearing  -PT/OT  -ortho surgery following    The patient underwent arthrocentesis by IR today with 4 cc pus obtained.  Sent for cell count which revealed 187,000 white blood cells 100% segs.  Crystal stain and culture pending.  Discussed with the with the patient will go to OR for washout and cultures in a.m..  As patient is not febrile, does not have elevated white count will hold off on antibiotics until further cultures were obtained by ortho in a.m.

## 2022-12-23 NOTE — PT/OT/SLP EVAL
Physical Therapy Evaluation    Patient Name:  Meaghan Potter   MRN:  [id number]    Recommendations:     Discharge Recommendations: home health PT (HOME WITH 24 HOUR S)   Discharge Equipment Recommendations: walker, rolling   Barriers to discharge: None    Assessment:     Meaghan Potter is a 67 y.o. female admitted with a medical diagnosis of Left hip pain.  She presents with the following impairments/functional limitations: weakness, gait instability, decreased lower extremity function, impaired balance, impaired endurance, impaired self care skills, impaired functional mobility, pain, decreased ROM .    Rehab Prognosis: Good; patient would benefit from acute skilled PT services to address these deficits and reach maximum level of function.    Recent Surgery: Procedure(s) (LRB):  INCISION AND DRAINAGE, LOWER EXTREMITY (Left)      Plan:     During this hospitalization, patient to be seen 3 x/week to address the identified rehab impairments via gait training, therapeutic activities, therapeutic exercises and progress toward the following goals:    Plan of Care Expires:  01/06/23    Subjective     Chief Complaint: PAIN L HIP   Patient/Family Comments/goals: INC MOBILITY   Pain/Comfort:  Pain Rating 1: 0/10  Location - Side 1: Left  Location 1: leg  Pain Addressed 1: Cessation of Activity  Pain Rating Post-Intervention 1: 2/10    Patients cultural, spiritual, [employer] conflicts given the current situation:      Living Environment:   PT LIVES AT HOME WITH SON IN A ONE STORY HOME WITH 4 STEPS AND B RAILING   Prior to admission, patients level of function was IND, DRIVES AND IS RETIRED .  Equipment used at home: none.  DME owned (not currently used): none.  Upon discharge, patient will have assistance from FAMILY .    Objective:     Communicated with NURSE WILLIS AND Epic CHART REVIEW  prior to session.  Patient found supine with peripheral IV, telemetry  upon PT entry to room.    General Precautions: Standard,  "fall  Orthopedic Precautions:LLE weight bearing as tolerated   Braces: N/A  Respiratory Status: Room air    Exams:  Cognitive Exam:  Patient is oriented to Person, Place, Time, and Situation  RLE ROM: WNL  RLE Strength: WNL  LLE ROM: WFL  LLE Strength: NA D/T PAIN    Functional Mobility:  Bed Mobility:     Scooting: minimum assistance  Supine to Sit: minimum assistance  Transfers:     Sit to Stand:  minimum assistance with rolling walker  Bed to Chair: minimum assistance with  rolling walker  using  Stand Pivot  Gait: PT GT TRAINED X 22' WITH RW AND STEP TO GT WITH MIN A AND DEC WB ON L LE      AM-PAC 6 CLICK MOBILITY  Total Score:16       Treatment & Education:  PT RETURNED TO  T/F TO CHAIR WITH RW AND MIN A. PT EDUCATED ON AND PT COMPLETED B LE TE X 10 REPS OF AP, TKE, AND MIP. PT LEFT SEATED IN CHAIR WITH ALL NEEDS MET. PT EDUCATED ON RISK FOR FALLS DUE TO GENERALIZED WEAKNESS, EDUCATED ON "CALL DON'T FALL", ENCOURAGED TO CALL FOR ASSISTANCE WITH ALL NEEDS SUCH AS BED<>CHAIR TRANSFERS OR TRIPS TO BATHROOM.      Patient left up in chair with all lines intact and call button in reach.    GOALS:   Multidisciplinary Problems       Physical Therapy Goals          Problem: Physical Therapy    Goal Priority Disciplines Outcome Goal Variances Interventions   Physical Therapy Goal     PT, PT/OT      Description: LT23  1. PT WILL COMPLETE BED MOBILITY WITH S  2. PT WILL T/F TO CHAIR WITH RW AND S  3. PT WILL GT TRAIN X 150' WITH RW AND S                       History:     Past Medical History:   Diagnosis Date    ESRD (end stage renal disease) on dialysis     GERD (gastroesophageal reflux disease)     Hypertension        Past Surgical History:   Procedure Laterality Date     SECTION      PLACEMENT OF DIALYSIS ACCESS         Time Tracking:     PT Received On: 22  PT Start Time: 1045     PT Stop Time: 1110  PT Total Time (min): 25 min     Billable Minutes: Evaluation 15 and Therapeutic Exercise " 10      12/23/2022

## 2022-12-23 NOTE — PROGRESS NOTES
[facility] Tele[facility] ([facility])  Cardiology  Progress Note    Patient Name: Meaghan Potter  MRN: [id number]  Admission Date: 12/19/2022  Hospital Length of Stay: 2 days  Code Status: Full Code   Attending Physician: Afshan Márquez MD   Primary Care Physician: Primary Doctor No  Expected Discharge Date:   Principal Problem:Left hip pain    Subjective:   HPI:  Ms. Potter is a 66y/o female with PMHx ESRD on dialysis, HTN and GERD who presented to [facility] ED transfer from [facility] for cardiac workup following elevated troponin and BNP in the emergency department as well as orthopedic care after patient sustained left superior labral tear of her acetabulum noted on MRI. Cardiology was consulted for new onset afib on diltiazem gtt. Pt seen and examined today resting in bed. Patient reported being in her usual state of health prior to onset of symptoms and reported acute onset of left hip pain while performing leg exercises in bed earlier today. While at outside facility, patient became short of breath while lying flat while obtaining MRI of hip and was found to be in atrial fibrillation with RVR requiring diltiazem drip. She denied endorsing any chest pain, lightheadedness, dizziness, visual disturbances, fever, chills, sweats, nausea, vomiting, abdominal pain, changes in bowel/bladder habits, or onset neurological deficits.  All other review of systems negative except as noted above.  Patient reports being back at her baseline and continues to complain only of left hip pain. Pt missed HD yesterday, planned for today. Labs reviewed K 6.1, Crt 10.6 troponin .076->.095 BNP 4702. Echo pending, repeat EKG ordered    Hospital Course:   12/21/22-Patient seen and examined today, resting in bed. Feels ok. Still complains of hip pain. No SOB/chest pain. Still in afib but HR controlled. Cardizem switched to po. Will re-evaluate pericardial effusion with repeat echo IP vs OP post additional HD.    12/22/22- Patient seen and examined today.  Feels ok, fatigue. Pt went down to have hip drained but had seizure like episode on the table. CT negative. Pt still in afib tachycardic during exam, denies any CP, SOB at this time, repeat Echo tomorrow to assess effusion. Will need eliquis once done with hip procedure, cont hept gtt    12/23/22-Patient seen and examined today, sitting up in bedside chair. Feeling better, s/p hip aspiration, will likely need surgery/washout tmw. Remains in afib but HR controlled. HD scheduled today, will plan on repeat echo after volume status optimized. BC pending.          Review of Systems   Constitutional: Negative.   HENT: Negative.     Eyes: Negative.    Cardiovascular: Negative.    Respiratory: Negative.     Endocrine: Negative.    Hematologic/Lymphatic: Negative.    Musculoskeletal:  Positive for arthritis and joint pain.   Gastrointestinal: Negative.    Genitourinary: Negative.    Neurological: Negative.    Psychiatric/Behavioral: Negative.     Allergic/Immunologic: Negative.    Objective:     Vital Signs (Most Recent):  Temp: 98.1 °F (36.7 °C) (12/23/22 0745)  Pulse: 82 (12/23/22 0745)  Resp: 20 (12/23/22 0745)  BP: 124/75 (12/23/22 0745)  SpO2: 96 % (12/23/22 0745) Vital Signs (24h Range):  Temp:  [97.3 °F (36.3 °C)-98.4 °F (36.9 °C)] 98.1 °F (36.7 °C)  Pulse:  [] 82  Resp:  [16-20] 20  SpO2:  [95 %-98 %] 96 %  BP: (110-133)/(60-77) 124/75     Weight: 71.5 kg (157 lb 10.1 oz)  Body mass index is 27.06 kg/m².     SpO2: 96 %       No intake or output data in the 24 hours ending 12/23/22 1421    Lines/Drains/Airways       Drain  Duration                  Hemodialysis AV Fistula Left upper arm -- days              Peripheral Intravenous Line  Duration                  Peripheral IV - Single Lumen 12/19/22 1155 22 G Posterior;Right Hand 4 days                    Physical Exam  Vitals and nursing note reviewed.   Constitutional:       General: She is not in acute distress.     Appearance: Normal appearance. She is  well-developed. She is not diaphoretic.   HENT:      Head: Normocephalic and atraumatic.   Eyes:      General:         Right eye: No discharge.         Left eye: No discharge.      Pupils: Pupils are equal, round, and reactive to light.   Neck:      Thyroid: No thyromegaly.      Vascular: No JVD.      Trachea: No tracheal deviation.   Cardiovascular:      Rate and Rhythm: Normal rate. Rhythm irregularly irregular.      Heart sounds: Normal heart sounds, S1 normal and S2 normal. No murmur heard.  Pulmonary:      Effort: Pulmonary effort is normal. No respiratory distress.      Breath sounds: Normal breath sounds. No wheezing or rales.   Abdominal:      General: There is no distension.      Palpations: Abdomen is soft.      Tenderness: There is no rebound.   Musculoskeletal:      Cervical back: Neck supple.      Right lower leg: No edema.      Left lower leg: No edema.   Skin:     General: Skin is warm and dry.      Findings: No erythema.   Neurological:      General: No focal deficit present.      Mental Status: She is alert and oriented to person, place, and time.   Psychiatric:         Mood and Affect: Mood normal.         Behavior: Behavior normal.         Thought Content: Thought content normal.       Significant Labs: CMP   Recent Labs   Lab 12/22/22  0508 12/23/22  0511    134*   K 4.5 4.8   CL 95 93*   CO2 27 28   * 111*   BUN 36* 53*   CREATININE 6.5* 8.3*   CALCIUM 9.5 10.0   PROT 7.0  --    ALBUMIN 2.7* 2.6*   BILITOT 1.5*  --    ALKPHOS 99  --    AST 36  --    ALT 16  --    ANIONGAP 15 13   , CBC   Recent Labs   Lab 12/22/22  0508 12/23/22  0511   WBC 6.66  6.66 7.65   HGB 11.8*  11.8* 11.6*   HCT 36.7*  36.7* 36.4*   *  130* 142*   , Troponin No results for input(s): TROPONINI in the last 48 hours., and All pertinent lab results from the last 24 hours have been reviewed.    Significant Imaging: Echocardiogram: Transthoracic echo (TTE) complete (Cupid Only):   Results for orders  placed or performed during the hospital encounter of 12/19/22   Echo   Result Value Ref Range    BSA 1.83 m2    TDI SEPTAL 0.06 m/s    LV LATERAL E/E' RATIO 15.33 m/s    LV SEPTAL E/E' RATIO 15.33 m/s    LA WIDTH 3.80 cm    IVC diameter 2.82 cm    Left Ventricular Outflow Tract Mean Velocity 0.63 cm/s    Left Ventricular Outflow Tract Mean Gradient 2.01 mmHg    TDI LATERAL 0.06 m/s    LVIDd 5.31 3.5 - 6.0 cm    IVS 1.67 (A) 0.6 - 1.1 cm    Posterior Wall 1.49 (A) 0.6 - 1.1 cm    Ao root annulus 3.06 cm    LVIDs 3.78 2.1 - 4.0 cm    FS 29 28 - 44 %    LA volume 89.40 cm3    Sinus 3.26 cm    STJ 3.37 cm    Ascending aorta 3.42 cm    LV mass 381.59 g    LA size 4.15 cm    TAPSE 2.27 cm    Left Ventricle Relative Wall Thickness 0.56 cm    AV mean gradient 9 mmHg    AV valve area 2.09 cm2    AV Velocity Ratio 0.51     AV index (prosthetic) 0.61     MV valve area p 1/2 method 5.12 cm2    E/A ratio 4.38     Mean e' 0.06 m/s    E wave deceleration time 148.05 msec    IVRT 87.54 msec    LVOT diameter 2.09 cm    LVOT area 3.4 cm2    LVOT peak krunal 1.08 m/s    LVOT peak VTI 19.80 cm    Ao peak krunal 2.12 m/s    Ao VTI 32.5 cm    RVOT peak krunal 0.54 m/s    RVOT peak VTI 13.9 cm    Mr max krunal 5.03 m/s    LVOT stroke volume 67.89 cm3    AV peak gradient 18 mmHg    PV mean gradient 0.70 mmHg    E/E' ratio 15.33 m/s    MV Peak E Krunal 0.92 m/s    TR Max Krunal 3.79 m/s    MV stenosis pressure 1/2 time 42.93 ms    MV Peak A Krunal 0.21 m/s    LV Systolic Volume 61.16 mL    LV Systolic Volume Index 34.0 mL/m2    LV Diastolic Volume 136.00 mL    LV Diastolic Volume Index 75.56 mL/m2    LA Volume Index 49.7 mL/m2    LV Mass Index 212 g/m2    RA Major Axis 5.77 cm    Left Atrium Minor Axis 6.48 cm    Left Atrium Major Axis 6.87 cm    Triscuspid Valve Regurgitation Peak Gradient 57 mmHg    RA Width 3.67 cm    Right Atrial Pressure (from IVC) 3 mmHg    TV rest pulmonary artery pressure 60 mmHg    EF 55 %    Narrative    · The left ventricle is normal  in size with concentric hypertrophy and   normal systolic function.  · Atrial fibrillation observed.  · Biatrial atrial enlargement.  · Grade III left ventricular diastolic dysfunction.  · The estimated PA systolic pressure is 60 mmHg.  · Moderate right ventricular enlargement with normal right ventricular   systolic function.  · There is pulmonary hypertension.  · Normal central venous pressure (3 mmHg).  · The estimated ejection fraction is 55%.  · Moderate to severe tricuspid regurgitation.  · Mild-to-moderate mitral regurgitation.  · Severe right atrial enlargement.  · Large posterior pericardial effusion. No evidence of tamponade.      , EKG: Reviewed, and X-Ray: CXR: X-Ray Chest 1 View (CXR): No results found for this visit on 12/19/22. and X-Ray Chest PA and Lateral (CXR): No results found for this visit on 12/19/22.    Assessment and Plan:   Patient who presents with left hip pain/new onset afib/pericardial effusion. Stable CV wise, HR controlled. Plan to repeat echo today after HD. Will likely require surgery/washout of left hip---at least intermediate risk of eb and post OP CV complications, will await echo.    * Left hip pain  Ortho consulted    12/23/22  -s/p hip aspiration today  -Likely will need washout/surgery    Pericardial effusion  -No evidence of tamponade  -Will reassess with echo after additional session of HD    12/23/22  -Repeat echo today post HD    Shortness of breath  Likely secondary to afib and fluid overload  HD today    12/23/22  -Improved    Primary hypertension  Treatment per primary team    ESRD (end stage renal disease) on dialysis  Management as per primary team    Atrial fibrillation  EKG reviewed afib  Repeat EKG ordered  Will need AC    12/21/22  -Still in afib but HR controlled  -Cardizem switched to po  -Continue heparin gtt for now, will need OAC upon d/c    12/22/22  Afib fast  Cont diltiazem PO  Added back cardizem gtt  Cont hep gtt for now, OAC upon DC  Follow up in  clinic    12/23/22  -HR controlled  -Continue diltiazem po  -Resume heparin gtt for AC if ok with primary team, will need Eliquis upon d/c        VTE Risk Mitigation (From admission, onward)         Ordered     Place sequential compression device  Until discontinued         12/19/22 2119     IP VTE LOW RISK PATIENT  Once         12/19/22 2119                Simona Ott PA-C  Cardiology  [facility] - Telemetry ([facility])

## 2022-12-23 NOTE — PROGRESS NOTES
12/23/22 1746   Required for all Hemodialysis Patients   Hepatitis Status other (see comments)   Handoff Report   Received From Dave Tariq RN   Given To Humberto Joyce RN   Treatment Type   Treatment Type Acute   Vital Signs   Temp 97.4 °F (36.3 °C)   Temp src Axillary   Pulse 81   Heart Rate Source Monitor   Resp 20   SpO2 97 %   Pulse Oximetry Type Intermittent   Device (Oxygen Therapy) room air   BP (!) 116/59   MAP (mmHg) 78   BP Location Right arm   BP Method Automatic   Patient Position Lying   Post-Hemodialysis Assessment   Rinseback Volume (mL) 250 mL   Blood Volume Processed (Liters) 53.4 L   Dialyzer Clearance Heavily streaked   Duration of Treatment 180 minutes   Additional Fluid Intake (mL) 0 mL   Total UF (mL) 2503 mL   Net Fluid Removal 2003   Patient Response to Treatment well tolerated   Post-Hemodialysis Comments blood reperfused and pt de accessed according to P&P. no issues with dialysis other than she started clotting during last 15 min of her tx, states she usually gets heparin at her center during dialysis.RTF no shadowing noted.

## 2022-12-23 NOTE — ASSESSMENT & PLAN NOTE
-No evidence of tamponade  -Will reassess with echo after additional session of HD    12/23/22  -Repeat echo today post HD

## 2022-12-23 NOTE — PROGRESS NOTES
12/23/22 1407   Vital Signs   Temp 97.8 °F (36.6 °C)   Temp src Oral   Pulse 66   Heart Rate Source Monitor   Resp 19   SpO2 96 %   Pulse Oximetry Type Intermittent   Device (Oxygen Therapy) room air   /64   MAP (mmHg) 77   BP Location Right arm   BP Method Automatic   Patient Position Lying        Hemodialysis AV Fistula Left upper arm   No placement date or time found.   Present Prior to Hospital Arrival?: Yes  Location: Left upper arm   Needle Size 15ga   Site Assessment Clean;Dry;Intact;No redness;No swelling;No warmth;No drainage   Patency Bruit;Thrill;Present   Status Accessed   Flows Good   Dressing Intervention Integrity maintained   Dressing Status Clean;Dry;Intact   Site Condition No complications   Dressing Gauze   Drainage Description Other (Comment)     3 hour HDTx started.

## 2022-12-23 NOTE — OP NOTE
Pre Op Diagnosis: Left hip pain     Post Op Diagnosis: same     Procedure:  hip aspiration     Procedure performed by: Farzad ANTONY, Lizy AMES     Written Informed Consent Obtained: Yes     Specimen Removed:  yes     Estimated Blood Loss:  minimal     Findings: Local anesthesia and moderate sedation were used.     The patient tolerated the procedure well and there were no complications.      Disposition:  F/U in clinic or with ordering physician    Discharge instructions:  Light activity for 24 hours.  Remove band aid in 24 hours.  No baths (showers are appropriate).      Sterile technique was performed in the anterior left hip, lidocaine was used as a local anesthetic.  Approx 4 ccs of brown cloudy fluid removed and sent to lab.  No injection due to the infected appearing fluid.  Pt tolerated the procedure well without immediate complications.  Please see radiologist report for details. F/u with PCP and/or ordering physician.

## 2022-12-23 NOTE — ASSESSMENT & PLAN NOTE
-Orthopedic surgery following  -MRI showed no fracture and asymmetric large left hip joint effusion.  Small right hip joint effusion. Paralabral cyst along the superior border of the acetabulum suspicious for a superior labral tear.   -analgesia as needed   -antiemetics as needed   - the left hip may treat with therapy and anti-inflammatories (per Orthopedic Surgery)   -Aspiration/injection of the left hip by Interventional Radiology as above

## 2022-12-23 NOTE — INTERVAL H&P NOTE
The patient has been examined and the H&P has been reviewed:    I concur with the findings and no changes have occurred since H&P was written.        Active Hospital Problems    Diagnosis  POA    *Left hip pain [M25.552]  Yes    Pericardial effusion [I31.39]  Yes    ESRD (end stage renal disease) on dialysis [N18.6, Z99.2]  Not Applicable    Primary hypertension [I10]  Yes    Gastroesophageal reflux disease without esophagitis [K21.9]  Yes    Tear of left acetabular labrum [S73.192A]  Yes    Shortness of breath [R06.02]  Yes    Bone cyst of right femur [M85.651]  Unknown    Atrial fibrillation [I48.91]  Yes      Resolved Hospital Problems   No resolved problems to display.

## 2022-12-23 NOTE — NURSING
One set of blood cultures drawn in dialysis as ordered, given to  to be sent to lab for processing.

## 2022-12-23 NOTE — ASSESSMENT & PLAN NOTE
Patient with new onset Paroxysmal (<7 days) atrial fibrillation with RVR which is uncontrolled currently with Calcium Channel Blocker. Patient is currently in atrial fibrillation with rate in the 105-130s on diltiazem. [id number] Score: 2. HASBLED Score: 3. Anticoagulation indicated. Anticoagulation done with heparin .  Troponin measuring 0.095 with EKG obtained at outside facility revealing SVT/atrial fibrillation with RVR at rate of 176. Troponin likely elevated in setting of ESRD and demand ischemia from arrhythmia as patient denied endorsing chest pain.  Plan:  -telemetry- afib with rates 105-130  -cardizem drip transitioned to oral dosing  -f/u cardiology   -nephrology for HD   -Heparin gtt -  -CHADS-VASc Score- 3

## 2022-12-23 NOTE — ASSESSMENT & PLAN NOTE
-large posterior pericardial effusion   -pt appears asymptomatic   -Nephrology following with HD performed on 12/21/22 and a Monday Wednesday Friday schedule  -Cardiology following- will follow and evaluate after next HD

## 2022-12-23 NOTE — PROGRESS NOTES
[facility] - Tele[facility] ([facility])  Nephrology  Progress Note    Patient Name: Meaghan Potter  MRN: [id number]  Admission Date: 12/19/2022  Hospital Length of Stay: 2 days  Attending Provider: Afshan Márquez MD   Primary Care Physician: Primary Doctor No  Principal Problem:Left hip pain  Reason for Consult: Management of ESRD  Primary Nephrologist: Dr. Ribera/[facility]       Subjective:     HPI: 66 yo female with ESRD on MWF last HD last Friday (12/16) admitted as a transfer for Cardiology, Ortho and Nephrology. She is currently seen on dialysis, using a LUE AVF. Dialysis vintage is 13 years. Her only complaint to me is pain in both hips described as 'stiff'   No current SOB, no chest pain but describes poor appetite since in pain.     12/21  - describes left hip pain as shooting pain  - eating lunch, not great appetite    12/22  - bed transfer in Radiology with 'episode' this am   - seen in room s/p Ativan IV X 1  - s/p HD yesterday     *For 12/23/22: First visit with patient; seen and examined; chart reviewed. Denies any discomfort.     Review of patient's allergies indicates:   Allergen Reactions    Amoxicillin Rash     Current Facility-Administered Medications   Medication Frequency    0.9%  NaCl infusion Once    0.9%  NaCl infusion Once    acetaminophen suppository 650 mg Q6H PRN    acetaminophen tablet 650 mg Q8H PRN    albuterol-ipratropium 2.5 mg-0.5 mg/3 mL nebulizer solution 3 mL Q6H PRN    aluminum-magnesium hydroxide-simethicone 200-200-20 mg/5 mL suspension 30 mL QID PRN    brimonidine 0.2% ophthalmic solution 1 drop BID    BUPivacaine (PF) 0.25% (2.5 mg/ml) injection 12.5 mg Once    calcitRIOL capsule 0.5 mcg Every Mon, Wed, Fri    dextrose 10% bolus 125 mL 125 mL PRN    dextrose 10% bolus 250 mL 250 mL PRN    diltiaZEM 125 mg in dextrose 5% 125 mL infusion (non-titrating) Continuous    diltiaZEM tablet 60 mg Q6H    [START ON 12/26/2022] ergocalciferol capsule 50,000 Units Q7 Days    glucagon (human  recombinant) injection 1 mg PRN    glucose chewable tablet 16 g PRN    glucose chewable tablet 24 g PRN    HYDROcodone-acetaminophen 5-325 mg per tablet 1 tablet Q6H PRN    losartan tablet 25 mg Daily    melatonin tablet 6 mg Nightly PRN    morphine injection 2 mg Q4H PRN    mupirocin 2 % ointment BID    naloxone 0.4 mg/mL injection 0.02 mg PRN    ondansetron injection 4 mg Q8H PRN    promethazine tablet 25 mg Q6H PRN    senna-docusate 8.6-50 mg per tablet 1 tablet BID PRN    sodium chloride 0.9% bolus 250 mL 250 mL PRN    sodium chloride 0.9% bolus 250 mL 250 mL PRN    sodium chloride 0.9% bolus 250 mL 250 mL PRN    sodium chloride 0.9% flush 3 mL Q12H PRN    triamcinolone acetonide injection 40 mg Once    vitamin renal formula (B-complex-vitamin c-folic acid) 1 mg per capsule 1 capsule Daily           Review of Systems   Constitutional:  Negative for fever.   Musculoskeletal:  Positive for arthralgias.   Allergic/Immunologic: Positive for immunocompromised state.   Objective:     Vital Signs (Most Recent):  Temp: 98.1 °F (36.7 °C) (12/23/22 0745)  Pulse: 82 (12/23/22 0745)  Resp: 20 (12/23/22 0745)  BP: 124/75 (12/23/22 0745)  SpO2: 96 % (12/23/22 0745) Vital Signs (24h Range):  Temp:  [97.3 °F (36.3 °C)-98.6 °F (37 °C)] 98.1 °F (36.7 °C)  Pulse:  [] 82  Resp:  [16-20] 20  SpO2:  [95 %-98 %] 96 %  BP: (110-133)/(60-81) 124/75     Weight: 71.5 kg (157 lb 10.1 oz) (12/22/22 0010)  Body mass index is 27.06 kg/m².  Body surface area is 1.8 meters squared.    No intake/output data recorded.    Physical Exam  Vitals and nursing note reviewed.   Constitutional:       General: She is not in acute distress.     Appearance: She is obese.   Cardiovascular:      Rate and Rhythm: Normal rate.   Pulmonary:      Effort: Pulmonary effort is normal. No respiratory distress.   Neurological:      Mental Status: She is alert and oriented to person, place, and time.   Psychiatric:         Mood and Affect: Mood normal.        Significant Labs: reviewed        Assessment/Plan:     Active Diagnoses:    Diagnosis Date Noted POA    PRINCIPAL PROBLEM:  Left hip pain [M25.552] 12/20/2022 Yes    Pericardial effusion [I31.39] 12/21/2022 Yes    ESRD (end stage renal disease) on dialysis [N18.6, Z99.2] 12/20/2022 Not Applicable    Primary hypertension [I10] 12/20/2022 Yes    Gastroesophageal reflux disease without esophagitis [K21.9] 12/20/2022 Yes    Tear of left acetabular labrum [S73.192A] 12/20/2022 Yes    Shortness of breath [R06.02] 12/20/2022 Yes    Bone cyst of right femur [M85.651] 12/20/2022 Unknown    Atrial fibrillation [I48.91] 12/19/2022 Yes      Problems Resolved During this Admission:       ESRD on MWF  - next dialysis tomorrow  - s/p HD Tuesday and Wednesday    Mineral and Bone Disease  - calcitriol 0.5mcg MWF  - continue D2 weekly   - reports no phos binders at home but elevated here, monitor phos level and continue low phos diet    HTN  - adjustments made by HM and Cardiology     Anemia of ESRD  - no ESAs indicated yet   - monitor H&H     Cardiomegaly, quite severe on recent CXR  - pericardial effusion noted on Echo  - etiology of it being uremic is extremely low as she does not miss dialysis and clearance labs from outpatient setting have been above goal  - the pericardial sac is not an area of fluid where dialysis UF can remove     Afib with RVR  - off diltiazem gtts and on po diltiazem IR q6  - per Cardiology    Hip pain  - Ortho notes reviewed    Dr. Glover to assume Nephrology care in am     *For 12/23/22: HD today; second shift; likely hip surgery tomorrow; meds and labs reviewed.  Volume status appears acceptable.  Blood cultures requested to be obtained on HD. Will arrange.  Following closely with you.       Moe Glover MD  Nephrology

## 2022-12-23 NOTE — NURSING
Order rec'd for dialysis RN to obtain blood cultures after cannulation of her left AV Fistula per Dr Glover after conversation with hospital medicine.... order placed in [facility] and lab called .   stated they needed confirmation from MD before this could be done. Secure chat sent to primary team.

## 2022-12-23 NOTE — CARE UPDATE
Consult received and reviewed.  Atraumatic onset hip pain in 67 y.o. female with ESRD.  Aspiration highly suspicious for septic joint - 187k cells 100% segs.  Gout cannot be excluded yet (crystals pending).  Patient is HDS and not septic and needs HD today as well as cardiac clearance.  Will await crystal result tonight but plan for I&D in OR tomorrow.    NPO at midnight, POC pending crystal result.  Appreciate excellent care per primary team  Stat MRI right hip to exclude infectious etiology of the cyst seen in the contralateral femoral head.

## 2022-12-23 NOTE — PROGRESS NOTES
[facility] Medicine  Progress Note    Patient Name: Meaghan Potter  MRN: [id number]  Patient Class: IP- Inpatient   Admission Date: 12/19/2022  Length of Stay: 2 days  Attending Physician: Afshan Márquez MD  Primary Care Provider: Primary Doctor No        Subjective:     Principal Problem:Left hip pain        HPI:  Meaghan Potter is a 67 y.o. female with a PMH  has a past medical history of ESRD (end stage renal disease) on dialysis, GERD (gastroesophageal reflux disease), and Hypertension. who presented as a transfer from [facility] for higher level cardiology and orthopedic care after patient was found to have sustained a left superior labral tear of her acetabulum noted on MRI in addition to new onset atrial fibrillation with RVR requiring diltiazem drip.  Patient reported being in her usual state of health prior to onset of symptoms and reported acute onset of left hip pain while performing leg exercises in bed earlier today.  Patient reported hearing and feeling a pop in her left hip followed by immediate pain which has remained constant and currently rated 6/10 in severity. Pain was described as throbbing/pulling in nature with no known alleviating factors and aggravating factors including weight-bearing, movement, and palpation to the affected area.  She denied endorsing any numbness, tingling, discoloration, or penetrating trauma, but did express decreased range of motion and muscle strength secondary to exacerbation of pain.  While at outside facility, patient became short of breath while lying flat while obtaining MRI of hip and was found to be in atrial fibrillation with RVR requiring diltiazem drip. She denied endorsing any chest pain, lightheadedness, dizziness, visual disturbances, fever, chills, sweats, nausea, vomiting, abdominal pain, changes in bowel/bladder habits, or onset neurological deficits.  All other review of systems negative except as noted above.  Patient  reports being back at her baseline and continues to complain only of left hip pain.  Orthopedics and cardiology consulted and awaiting further evaluation/recommendations.  Of note, patient missed hemodialysis today secondary to pain and going to the emergency room and usually receives HD via left upper extremity fistula on Monday/Wednesday/Friday.  Follows Dr. Ribera outpatient. Nephrology consulted to continue HD inpatient.     PCP: Primary Doctor No        Overview/Hospital Course:  Pt admitted to Observation for Atrial fibrillation (new onset) with RVR with . Cardiology consulted with Cardizem gtt initiated. NSR on monitor with HR in 80's.  BNP elevated and Troponin trended upward (0.076>0.095) with Heparin infusion initiated. Echo showed with concentric hypertrophy and normal systolic function. Atrial fibrillation observed.Biatrial atrial enlargement. Grade III left ventricular diastolic dysfunction. PA systolic pressure is 60 mmHg. Moderate right ventricular enlargement with normal right ventricular systolic function. Pulmonary hypertension. EF 55%. Moderate to severe tricuspid regurgitation. Mild-to-moderate mitral regurgitation. Severe right atrial enlargement. Large posterior pericardial effusion. No evidence of tamponade. Troponin elevated and flat with no cardiac symptoms reported. Pt also reported L groin pain upon admit. Orthopedic Surgery consulted and pt found to have sustained a left superior labral tear of her acetabulum noted on MRI with further orthopedic work up to be completed outpatient. Hx of ESRD noted with last HD on 12/16/22- Nephrology consulted with HD performed today. AVG to LUE with +bruit/+thrill present. On 12/21/22, MRI of R hip results pending. IR consulted for evaluation of aspiration/injection of left hip. Rate and rhythm controlled on Cardizem gtt. Heparin infusion continued pending joint aspiration.  Pericardial effusion discussed with Nephrology and Cardiology for  "recommendations.   Patient went to have aspiration of left hip however upon transferring from bed to table had an episode described as seizure-like" seems more that it was hypotension related.  Medications adjusted  She remains on p.o. Cardizem as well as Cardizem drip.  Patient underwent arthrocentesis with 4 cc of pus removed today.  Sent for Gram stain, culture, crystals.  Discussed with Dr. Watson orthopedics and if crystals are negative patient will need washout in a.m..      Interval History:  Patient seen at bedside.  Discussed with Cardiology, Ortho, Nephrology, and Interventional Radiology.  Patient reports feeling well.  Her blood pressure is better today.  No nausea, vomiting, lightheadedness, dizziness.  Continues to experience pain in left hip.    Review of Systems   Constitutional:  Positive for activity change. Negative for fatigue.   Respiratory:  Negative for cough and shortness of breath.    Cardiovascular:  Positive for palpitations (resolved).   Genitourinary:  Negative for urgency.   Musculoskeletal:  Positive for arthralgias (increased pain - left hip) and gait problem. Negative for back pain, joint swelling and myalgias.   Neurological:  Negative for dizziness, seizures, weakness and headaches.   Psychiatric/Behavioral:  Negative for sleep disturbance.    All other systems reviewed and are negative.  Objective:     Vital Signs (Most Recent):  Temp: 97.8 °F (36.6 °C) (12/23/22 1407)  Pulse: 66 (12/23/22 1407)  Resp: 19 (12/23/22 1407)  BP: 112/64 (12/23/22 1407)  SpO2: 96 % (12/23/22 1407) Vital Signs (24h Range):  Temp:  [97.3 °F (36.3 °C)-98.4 °F (36.9 °C)] 97.8 °F (36.6 °C)  Pulse:  [] 66  Resp:  [16-20] 19  SpO2:  [95 %-98 %] 96 %  BP: (107-129)/(59-77) 112/64     Weight: 71.5 kg (157 lb 10.1 oz)  Body mass index is 27.06 kg/m².  No intake or output data in the 24 hours ending 12/23/22 1638   Physical Exam  Vitals reviewed.   Constitutional:       Appearance: She is ill-appearing " (chronic).   HENT:      Head: Normocephalic and atraumatic.      Mouth/Throat:      Mouth: Mucous membranes are moist.      Pharynx: Oropharynx is clear.   Eyes:      Extraocular Movements: Extraocular movements intact.      Conjunctiva/sclera: Conjunctivae normal.   Cardiovascular:      Rate and Rhythm: Tachycardia present. Rhythm irregular.      Pulses: Normal pulses.      Heart sounds: Normal heart sounds.   Pulmonary:      Effort: Pulmonary effort is normal.      Breath sounds: Normal breath sounds.   Abdominal:      General: Bowel sounds are normal.      Palpations: Abdomen is soft.   Musculoskeletal:         General: Normal range of motion.      Cervical back: Normal range of motion and neck supple.   Skin:     General: Skin is warm and dry.   Neurological:      General: No focal deficit present.      Mental Status: She is alert and oriented to person, place, and time. Mental status is at baseline.   Psychiatric:         Mood and Affect: Mood normal.         Behavior: Behavior normal.         Thought Content: Thought content normal.       Significant Labs: All pertinent labs within the past 24 hours have been reviewed.  Blood Culture: No results for input(s): LABBLOO in the last 48 hours.  CBC:   Recent Labs   Lab 12/22/22  0508 12/23/22  0511   WBC 6.66  6.66 7.65   HGB 11.8*  11.8* 11.6*   HCT 36.7*  36.7* 36.4*   *  130* 142*     CMP:   Recent Labs   Lab 12/22/22  0508 12/23/22  0511    134*   K 4.5 4.8   CL 95 93*   CO2 27 28   * 111*   BUN 36* 53*   CREATININE 6.5* 8.3*   CALCIUM 9.5 10.0   PROT 7.0  --    ALBUMIN 2.7* 2.6*   BILITOT 1.5*  --    ALKPHOS 99  --    AST 36  --    ALT 16  --    ANIONGAP 15 13     Arthrocentesis:  4 cc purulent material obtained by Interventional Radiology.  Sent for cell count and diff, Gram stain, culture, crystals, AFB culture.    Significant Imaging: I have reviewed all pertinent imaging results/findings within the past 24  hours.      Assessment/Plan:      * Left hip pain  Patient presented with acute onset left hip pain x1 day duration in absence of trauma while doing leg exercises in bed and was found to asymmetric large left hip joint effusion, small right hip joint effusion, and evidence of superior labral tear of her left acetabulum noted on MRI.  Patient noted to have 5/5 strength throughout with sensation to light touch grossly intact throughout however, TTP throughout left groin.  Orthopedics consulted and awaiting further evaluation/recommendations.  Plan:  -optimize pain control  -bowel regimen  -bed rest  -nonweightbearing  -PT/OT  -ortho surgery following    The patient underwent arthrocentesis by IR today with 4 cc pus obtained.  Sent for cell count which revealed 187,000 white blood cells 100% segs.  Crystal stain and culture pending.  Discussed with the with the patient will go to OR for washout and cultures in a.m..  As patient is not febrile, does not have elevated white count will hold off on antibiotics until further cultures were obtained by ortho in a.m.    Pericardial effusion  -large posterior pericardial effusion   -pt appears asymptomatic   -Nephrology following with HD performed on 12/21/22 and a Monday Wednesday Friday schedule  -Cardiology following- will follow and evaluate after next HD       Bone cyst of right femur  -Orthopedic Surgery following   -further imaging recommended   -MRI of R hip pending         Shortness of breath  Patient reported acute onset shortness a breath while obtaining MRI and was found to have evidence of severe cardiomegaly with small left pleural effusion but negative for infectious processes noted on CXR. Patient afebrile without leukocytosis. BNP elevated at 4792. Troponin 0.095. Exam positive for bilateral edema but negative for elevated JVD or crackles on lung ausculation. Patient currently saturating % on 2L via NC in no acute distress and without use of accessory  muscles noted.  Patient without history of CHF.  Cardiology consulted.  Echo ordered and pending.  Nephrology consulted to resume HD inpatient.  Will trial dose of Lasix if clinical status worsens.  Plan:  -titrate oxygen therapy as needed  -incentive spirometry  -echo reviewed   -f/u cardiology and nephrology  -monitor Is/Os  -low salt/cardiac/renal diet    -Duonebs prn       Tear of left acetabular labrum  -Orthopedic surgery following  -MRI showed no fracture and asymmetric large left hip joint effusion.  Small right hip joint effusion. Paralabral cyst along the superior border of the acetabulum suspicious for a superior labral tear.   -analgesia as needed   -antiemetics as needed   - the left hip may treat with therapy and anti-inflammatories (per Orthopedic Surgery)   -Aspiration/injection of the left hip by Interventional Radiology as above    Gastroesophageal reflux disease without esophagitis  Chronic. Stable. Currently asymptomatic. Home medications include PPI/Antacids as needed.  Plan:  -Continue PPI/Antacids as needed       Primary hypertension  BP currently ranging from 90s systolic.    Plan:  -Optimize pain control   -Continue home medications, titrate as needed   -Monitor BP  -Low salt/renal diet   -IV hydralazine prn for SBP>160 or DBP>90   -f/u nephrology for HD inpatient- HD performed on 12/20/22       ESRD (end stage renal disease) on dialysis  Patient currently follows Dr. Ribera from nephrology with last reported dialysis completed last Friday. Patient currently on M/W/F HD via LUE fistula but missed Monday due to not feeling well and being in the ED. Labs consistent with history of ESRD on HD.   Plan:  -continue home medications, titrate as needed  -nephrology consulted -HD performed on 12/20/22 and now back on MWF schedule      Atrial fibrillation  Patient with new onset Paroxysmal (<7 days) atrial fibrillation with RVR which is uncontrolled currently with Calcium Channel Blocker. Patient is  currently in atrial fibrillation with rate in the 105-130s on diltiazem. [id number] Score: 2. HASBLED Score: 3. Anticoagulation indicated. Anticoagulation done with heparin .  Troponin measuring 0.095 with EKG obtained at outside facility revealing SVT/atrial fibrillation with RVR at rate of 176. Troponin likely elevated in setting of ESRD and demand ischemia from arrhythmia as patient denied endorsing chest pain.  Plan:  -telemetry- afib with rates 105-130  -cardizem drip transitioned to oral dosing  -f/u cardiology   -nephrology for HD   -Heparin gtt -  -CHADS-VASc Score- 3         VTE Risk Mitigation (From admission, onward)         Ordered     Place sequential compression device  Until discontinued         12/19/22 2119     IP VTE LOW RISK PATIENT  Once         12/19/22 2119                Discharge Planning   ISAAK:      Code Status: Full Code   Is the patient medically ready for discharge?:     Reason for patient still in hospital (select all that apply): Patient new problem  Discharge Plan A: Home                  Afshan Jacobs MD  Department of Hospital Medicine   [facility] - Telemetry ([facility])

## 2022-12-23 NOTE — SUBJECTIVE & OBJECTIVE
Interval History:  Patient seen at bedside.  Discussed with Cardiology, Ortho, Nephrology, and Interventional Radiology.  Patient reports feeling well.  Her blood pressure is better today.  No nausea, vomiting, lightheadedness, dizziness.  Continues to experience pain in left hip.    Review of Systems   Constitutional:  Positive for activity change. Negative for fatigue.   Respiratory:  Negative for cough and shortness of breath.    Cardiovascular:  Positive for palpitations (resolved).   Genitourinary:  Negative for urgency.   Musculoskeletal:  Positive for arthralgias (increased pain - left hip) and gait problem. Negative for back pain, joint swelling and myalgias.   Neurological:  Negative for dizziness, seizures, weakness and headaches.   Psychiatric/Behavioral:  Negative for sleep disturbance.    All other systems reviewed and are negative.  Objective:     Vital Signs (Most Recent):  Temp: 97.8 °F (36.6 °C) (12/23/22 1407)  Pulse: 66 (12/23/22 1407)  Resp: 19 (12/23/22 1407)  BP: 112/64 (12/23/22 1407)  SpO2: 96 % (12/23/22 1407) Vital Signs (24h Range):  Temp:  [97.3 °F (36.3 °C)-98.4 °F (36.9 °C)] 97.8 °F (36.6 °C)  Pulse:  [] 66  Resp:  [16-20] 19  SpO2:  [95 %-98 %] 96 %  BP: (107-129)/(59-77) 112/64     Weight: 71.5 kg (157 lb 10.1 oz)  Body mass index is 27.06 kg/m².  No intake or output data in the 24 hours ending 12/23/22 1638   Physical Exam  Vitals reviewed.   Constitutional:       Appearance: She is ill-appearing (chronic).   HENT:      Head: Normocephalic and atraumatic.      Mouth/Throat:      Mouth: Mucous membranes are moist.      Pharynx: Oropharynx is clear.   Eyes:      Extraocular Movements: Extraocular movements intact.      Conjunctiva/sclera: Conjunctivae normal.   Cardiovascular:      Rate and Rhythm: Tachycardia present. Rhythm irregular.      Pulses: Normal pulses.      Heart sounds: Normal heart sounds.   Pulmonary:      Effort: Pulmonary effort is normal.      Breath sounds:  Normal breath sounds.   Abdominal:      General: Bowel sounds are normal.      Palpations: Abdomen is soft.   Musculoskeletal:         General: Normal range of motion.      Cervical back: Normal range of motion and neck supple.   Skin:     General: Skin is warm and dry.   Neurological:      General: No focal deficit present.      Mental Status: She is alert and oriented to person, place, and time. Mental status is at baseline.   Psychiatric:         Mood and Affect: Mood normal.         Behavior: Behavior normal.         Thought Content: Thought content normal.       Significant Labs: All pertinent labs within the past 24 hours have been reviewed.  Blood Culture: No results for input(s): LABBLOO in the last 48 hours.  CBC:   Recent Labs   Lab 12/22/22  0508 12/23/22  0511   WBC 6.66  6.66 7.65   HGB 11.8*  11.8* 11.6*   HCT 36.7*  36.7* 36.4*   *  130* 142*     CMP:   Recent Labs   Lab 12/22/22  0508 12/23/22  0511    134*   K 4.5 4.8   CL 95 93*   CO2 27 28   * 111*   BUN 36* 53*   CREATININE 6.5* 8.3*   CALCIUM 9.5 10.0   PROT 7.0  --    ALBUMIN 2.7* 2.6*   BILITOT 1.5*  --    ALKPHOS 99  --    AST 36  --    ALT 16  --    ANIONGAP 15 13     Arthrocentesis:  4 cc purulent material obtained by Interventional Radiology.  Sent for cell count and diff, Gram stain, culture, crystals, AFB culture.    Significant Imaging: I have reviewed all pertinent imaging results/findings within the past 24 hours.

## 2022-12-23 NOTE — PLAN OF CARE
3 hour I-HDTx in progress as ordered for Dr Glover ; planned UF = 2L as tolerated, will continue to monitor for changes and trends. .

## 2022-12-23 NOTE — ASSESSMENT & PLAN NOTE
EKG reviewed afib  Repeat EKG ordered  Will need AC    12/21/22  -Still in afib but HR controlled  -Cardizem switched to po  -Continue heparin gtt for now, will need OAC upon d/c    12/22/22  Afib fast  Cont diltiazem PO  Added back cardizem gtt  Cont hep gtt for now, OAC upon DC  Follow up in clinic    12/23/22  -HR controlled  -Continue diltiazem po  -Resume heparin gtt for AC if ok with primary team, will need Eliquis upon d/c

## 2022-12-23 NOTE — SUBJECTIVE & OBJECTIVE
Review of Systems   Constitutional: Negative.   HENT: Negative.     Eyes: Negative.    Cardiovascular: Negative.    Respiratory: Negative.     Endocrine: Negative.    Hematologic/Lymphatic: Negative.    Musculoskeletal:  Positive for arthritis and joint pain.   Gastrointestinal: Negative.    Genitourinary: Negative.    Neurological: Negative.    Psychiatric/Behavioral: Negative.     Allergic/Immunologic: Negative.    Objective:     Vital Signs (Most Recent):  Temp: 98.1 °F (36.7 °C) (12/23/22 0745)  Pulse: 82 (12/23/22 0745)  Resp: 20 (12/23/22 0745)  BP: 124/75 (12/23/22 0745)  SpO2: 96 % (12/23/22 0745) Vital Signs (24h Range):  Temp:  [97.3 °F (36.3 °C)-98.4 °F (36.9 °C)] 98.1 °F (36.7 °C)  Pulse:  [] 82  Resp:  [16-20] 20  SpO2:  [95 %-98 %] 96 %  BP: (110-133)/(60-77) 124/75     Weight: 71.5 kg (157 lb 10.1 oz)  Body mass index is 27.06 kg/m².     SpO2: 96 %       No intake or output data in the 24 hours ending 12/23/22 1421    Lines/Drains/Airways       Drain  Duration                  Hemodialysis AV Fistula Left upper arm -- days              Peripheral Intravenous Line  Duration                  Peripheral IV - Single Lumen 12/19/22 1155 22 G Posterior;Right Hand 4 days                    Physical Exam  Vitals and nursing note reviewed.   Constitutional:       General: She is not in acute distress.     Appearance: Normal appearance. She is well-developed. She is not diaphoretic.   HENT:      Head: Normocephalic and atraumatic.   Eyes:      General:         Right eye: No discharge.         Left eye: No discharge.      Pupils: Pupils are equal, round, and reactive to light.   Neck:      Thyroid: No thyromegaly.      Vascular: No JVD.      Trachea: No tracheal deviation.   Cardiovascular:      Rate and Rhythm: Normal rate. Rhythm irregularly irregular.      Heart sounds: Normal heart sounds, S1 normal and S2 normal. No murmur heard.  Pulmonary:      Effort: Pulmonary effort is normal. No respiratory  distress.      Breath sounds: Normal breath sounds. No wheezing or rales.   Abdominal:      General: There is no distension.      Palpations: Abdomen is soft.      Tenderness: There is no rebound.   Musculoskeletal:      Cervical back: Neck supple.      Right lower leg: No edema.      Left lower leg: No edema.   Skin:     General: Skin is warm and dry.      Findings: No erythema.   Neurological:      General: No focal deficit present.      Mental Status: She is alert and oriented to person, place, and time.   Psychiatric:         Mood and Affect: Mood normal.         Behavior: Behavior normal.         Thought Content: Thought content normal.       Significant Labs: CMP   Recent Labs   Lab 12/22/22  0508 12/23/22  0511    134*   K 4.5 4.8   CL 95 93*   CO2 27 28   * 111*   BUN 36* 53*   CREATININE 6.5* 8.3*   CALCIUM 9.5 10.0   PROT 7.0  --    ALBUMIN 2.7* 2.6*   BILITOT 1.5*  --    ALKPHOS 99  --    AST 36  --    ALT 16  --    ANIONGAP 15 13   , CBC   Recent Labs   Lab 12/22/22  0508 12/23/22  0511   WBC 6.66  6.66 7.65   HGB 11.8*  11.8* 11.6*   HCT 36.7*  36.7* 36.4*   *  130* 142*   , Troponin No results for input(s): TROPONINI in the last 48 hours., and All pertinent lab results from the last 24 hours have been reviewed.    Significant Imaging: Echocardiogram: Transthoracic echo (TTE) complete (Cupid Only):   Results for orders placed or performed during the hospital encounter of 12/19/22   Echo   Result Value Ref Range    BSA 1.83 m2    TDI SEPTAL 0.06 m/s    LV LATERAL E/E' RATIO 15.33 m/s    LV SEPTAL E/E' RATIO 15.33 m/s    LA WIDTH 3.80 cm    IVC diameter 2.82 cm    Left Ventricular Outflow Tract Mean Velocity 0.63 cm/s    Left Ventricular Outflow Tract Mean Gradient 2.01 mmHg    TDI LATERAL 0.06 m/s    LVIDd 5.31 3.5 - 6.0 cm    IVS 1.67 (A) 0.6 - 1.1 cm    Posterior Wall 1.49 (A) 0.6 - 1.1 cm    Ao root annulus 3.06 cm    LVIDs 3.78 2.1 - 4.0 cm    FS 29 28 - 44 %    LA volume  89.40 cm3    Sinus 3.26 cm    STJ 3.37 cm    Ascending aorta 3.42 cm    LV mass 381.59 g    LA size 4.15 cm    TAPSE 2.27 cm    Left Ventricle Relative Wall Thickness 0.56 cm    AV mean gradient 9 mmHg    AV valve area 2.09 cm2    AV Velocity Ratio 0.51     AV index (prosthetic) 0.61     MV valve area p 1/2 method 5.12 cm2    E/A ratio 4.38     Mean e' 0.06 m/s    E wave deceleration time 148.05 msec    IVRT 87.54 msec    LVOT diameter 2.09 cm    LVOT area 3.4 cm2    LVOT peak krunal 1.08 m/s    LVOT peak VTI 19.80 cm    Ao peak krunal 2.12 m/s    Ao VTI 32.5 cm    RVOT peak krunal 0.54 m/s    RVOT peak VTI 13.9 cm    Mr max krunal 5.03 m/s    LVOT stroke volume 67.89 cm3    AV peak gradient 18 mmHg    PV mean gradient 0.70 mmHg    E/E' ratio 15.33 m/s    MV Peak E Krunal 0.92 m/s    TR Max Krunal 3.79 m/s    MV stenosis pressure 1/2 time 42.93 ms    MV Peak A Krunal 0.21 m/s    LV Systolic Volume 61.16 mL    LV Systolic Volume Index 34.0 mL/m2    LV Diastolic Volume 136.00 mL    LV Diastolic Volume Index 75.56 mL/m2    LA Volume Index 49.7 mL/m2    LV Mass Index 212 g/m2    RA Major Axis 5.77 cm    Left Atrium Minor Axis 6.48 cm    Left Atrium Major Axis 6.87 cm    Triscuspid Valve Regurgitation Peak Gradient 57 mmHg    RA Width 3.67 cm    Right Atrial Pressure (from IVC) 3 mmHg    TV rest pulmonary artery pressure 60 mmHg    EF 55 %    Narrative    · The left ventricle is normal in size with concentric hypertrophy and   normal systolic function.  · Atrial fibrillation observed.  · Biatrial atrial enlargement.  · Grade III left ventricular diastolic dysfunction.  · The estimated PA systolic pressure is 60 mmHg.  · Moderate right ventricular enlargement with normal right ventricular   systolic function.  · There is pulmonary hypertension.  · Normal central venous pressure (3 mmHg).  · The estimated ejection fraction is 55%.  · Moderate to severe tricuspid regurgitation.  · Mild-to-moderate mitral regurgitation.  · Severe right atrial  enlargement.  · Large posterior pericardial effusion. No evidence of tamponade.      , EKG: Reviewed, and X-Ray: CXR: X-Ray Chest 1 View (CXR): No results found for this visit on 12/19/22. and X-Ray Chest PA and Lateral (CXR): No results found for this visit on 12/19/22.

## 2022-12-23 NOTE — PROGRESS NOTES
[facility])  Orthopedics  Progress Note    Patient Name: Meaghan Potter  MRN: [id number]  Admission Date: 12/19/2022  Hospital Length of Stay: 2 days  Attending Provider: Afshan Márquez MD  Primary Care Provider: Primary Doctor No    Subjective:     Principal Problem:Left hip pain    Principal Orthopedic Problem: Left hip pain, right hip cyst seen on CT    Interval History: Meaghan Potter is a 67-year-old female past medical history significant for end-stage renal disease on hemodialysis, hypertension, and GERD who is being treated by orthopedics for left hip pain.  She reports this pain is about the same.  Of note, the patient has a cyst in the right femoral neck that was seen on CT of her pelvis, but she denies any pain in the right hip.  Patient was taken for aspiration of left hip and MRI right hip yesterday, but she had an episode in Radiology that was described as seizure-like, so these procedures were put on hold    Review of patient's allergies indicates:   Allergen Reactions    Amoxicillin Rash       Current Facility-Administered Medications   Medication    0.9%  NaCl infusion    0.9%  NaCl infusion    acetaminophen suppository 650 mg    acetaminophen tablet 650 mg    albuterol-ipratropium 2.5 mg-0.5 mg/3 mL nebulizer solution 3 mL    aluminum-magnesium hydroxide-simethicone 200-200-20 mg/5 mL suspension 30 mL    brimonidine 0.2% ophthalmic solution 1 drop    BUPivacaine (PF) 0.25% (2.5 mg/ml) injection 12.5 mg    calcitRIOL capsule 0.5 mcg    dextrose 10% bolus 125 mL 125 mL    dextrose 10% bolus 250 mL 250 mL    diltiaZEM 125 mg in dextrose 5% 125 mL infusion (non-titrating)    diltiaZEM tablet 30 mg    [START ON 12/26/2022] ergocalciferol capsule 50,000 Units    glucagon (human recombinant) injection 1 mg    glucose chewable tablet 16 g    glucose chewable tablet 24 g    heparin 25,000 units in dextrose 5% (100 units/ml) IV bolus from bag - ADDITIONAL PRN BOLUS - 30 units/kg    heparin  "25,000 units in dextrose 5% (100 units/ml) IV bolus from bag - ADDITIONAL PRN BOLUS - 60 units/kg    heparin 25,000 units in dextrose 5% 250 mL (100 units/mL) infusion LOW INTENSITY nomogram - [facility]    HYDROcodone-acetaminophen 5-325 mg per tablet 1 tablet    losartan tablet 25 mg    melatonin tablet 6 mg    morphine injection 2 mg    mupirocin 2 % ointment    naloxone 0.4 mg/mL injection 0.02 mg    ondansetron injection 4 mg    promethazine tablet 25 mg    senna-docusate 8.6-50 mg per tablet 1 tablet    sodium chloride 0.9% bolus 250 mL 250 mL    sodium chloride 0.9% bolus 250 mL 250 mL    sodium chloride 0.9% bolus 250 mL 250 mL    sodium chloride 0.9% flush 3 mL    triamcinolone acetonide injection 40 mg    vitamin renal formula (B-complex-vitamin c-folic acid) 1 mg per capsule 1 capsule     Objective:     Vital Signs (Most Recent):  Temp: 98.1 °F (36.7 °C) (12/23/22 0438)  Pulse: 86 (12/23/22 0438)  Resp: 18 (12/23/22 0714)  BP: 129/77 (12/23/22 0438)  SpO2: 95 % (12/23/22 0438) Vital Signs (24h Range):  Temp:  [97.3 °F (36.3 °C)-98.6 °F (37 °C)] 98.1 °F (36.7 °C)  Pulse:  [] 86  Resp:  [16-18] 18  SpO2:  [95 %-98 %] 95 %  BP: (110-133)/(60-81) 129/77     Weight: 71.5 kg (157 lb 10.1 oz)  Height: 5' 4" (162.6 cm)  Body mass index is 27.06 kg/m².    No intake or output data in the 24 hours ending 12/23/22 0728    Ortho/SPM Exam  Left hip:  Skin is intact   No edema   Moderate TTP in the groin   No pain with flexion/extension   Pain increases with internal/external rotation  Calf and compartments are soft and compressible   Motor exam normal   Sensation and pulses intact   Cap refill brisk     Right hip:  Skin is intact   No edema   No TTP   No pain with flexion/extension   No pain with internal/external rotation  Calf and compartments are soft and compressible   Motor exam normal   Sensation and pulses intact  Cap refill brisk     GEN: Well developed, well nourished female. AAOX3. No acute distress.   Head: " Normocephalic, atraumatic.   Eyes: RIGO  Neck: Trachea is midline, no adenopathy  Resp: Breathing unlabored.  Neuro: Motor function normal, Cranial nerves intact  Psych: Mood and affect appropriate.      Significant Labs:   Recent Lab Results         12/23/22  0511        Albumin 2.6       ANION GAP 13       aPTT 50.3  Comment: aPTT therapeutic range = 39-69 seconds       Baso # 0.02       Basophil % 0.3       BUN 53       Calcium 10.0       Chloride 93       CO2 28       Creatinine 8.3       Differential Method Automated       eGFR 5       Eos # 0.1       Eosinophil % 0.7       Glucose 111       Gran # (ANC) 5.7       Gran % 74.1       HEMATOCRIT 36.4       HEMOGLOBIN 11.6       Immature Grans (Abs) 0.07  Comment: Mild elevation in immature granulocytes is non specific and   can be seen in a variety of conditions including stress response,   acute inflammation, trauma and pregnancy. Correlation with other   laboratory and clinical findings is essential.         Immature Granulocytes 0.9       Lymph # 0.7       Lymph % 9.0       MCH 30.4       MCHC 31.9       MCV 96       Mono # 1.2       Mono % 15.0       MPV 11.1       nRBC 0       Phosphorus 5.7       Platelets 142       Potassium 4.8       RBC 3.81       RDW 14.6       Sodium 134       WBC 7.65             All pertinent labs within the past 24 hours have been reviewed.    Significant Imaging: I have reviewed and interpreted all pertinent imaging results/findings.    Assessment/Plan:     Active Diagnoses:    Diagnosis Date Noted POA    PRINCIPAL PROBLEM:  Left hip pain [M25.552] 12/20/2022 Yes    Pericardial effusion [I31.39] 12/21/2022 Yes    ESRD (end stage renal disease) on dialysis [N18.6, Z99.2] 12/20/2022 Not Applicable    Primary hypertension [I10] 12/20/2022 Yes    Gastroesophageal reflux disease without esophagitis [K21.9] 12/20/2022 Yes    Tear of left acetabular labrum [S73.192A] 12/20/2022 Yes    Shortness of breath [R06.02] 12/20/2022 Yes    Bone  cyst of right femur [M85.651] 12/20/2022 Unknown    Atrial fibrillation [I48.91] 12/19/2022 Yes      Problems Resolved During this Admission:     Assessment:  67-year-old female with a past medical history significant for end-stage renal disease on hemodialysis, hypertension, and GERD who is being treated for left hip pain and right hip cyst seen on CT of her pelvis    Plan:  Patient is scheduled to have MRI of the right hip and injection of the left hip performed today  Recommend continuing therapy and anti-inflammatories for left hip pain   We will continue to follow the results of the MRI and proceed from there    Julien Azul PA-C  Orthopedics  [facility] - Telemetry ([facility])   Patient is a 9 year old  boy domiciled with parents and siblings, currently in 4th grade in [employer], patient has no formal pphx, presents to the ED referred by school after patient took a pen and gestured that he was going to stab himself with it. Upon approach patient is sleeping in the ED stretcher and is very difficult to wake up. Patient reports that he is hungry and his mother hands him a cookie which he eats immediately. Patient reports that he was upset that the website he wanted was not working in school. He reports that he got mad and was told to go to the principle's office where he was frustrated by everyone talking to him. He reports that he wanted everyone to leave him alone so he decided to take the pen and hold it against his chest threatening to stab himself so that they would stop. Patient reports that he did not intent to hurt himself. Patient reports that he does not want to die. He denies any SI/HI. Patient also reports that he doesn't like school overall but there are no specific issues at school. Patient denies any bullying. Patient reports that he is happy at home and feels safe. Patient reports that he feels normal overall. Patient denies any AVH.     Collateral, as per mom, patient does have frequent behavioral issues. She reports that this is the first time that he did anything like this but that she often gets phone calls from school. Patient's mom reports that otherwise patient has been himself recently with no changes. He does get easily frustrated and throws tantrums. She reports that although he has been getting counseling at school she has been looking for an outside therapist.

## 2022-12-24 ENCOUNTER — TELEPHONE (OUTPATIENT)
Dept: CARDIOLOGY | Facility: HOSPITAL | Age: 67
End: 2022-12-24
Payer: COMMERCIAL

## 2022-12-24 ENCOUNTER — ANESTHESIA EVENT (OUTPATIENT)
Dept: SURGERY | Facility: HOSPITAL | Age: 67
DRG: 480 | End: 2022-12-24
Payer: COMMERCIAL

## 2022-12-24 ENCOUNTER — ANESTHESIA (OUTPATIENT)
Dept: SURGERY | Facility: HOSPITAL | Age: 67
DRG: 480 | End: 2022-12-24
Payer: COMMERCIAL

## 2022-12-24 PROBLEM — M00.9 PYOGENIC ARTHRITIS OF LEFT HIP: Status: ACTIVE | Noted: 2022-12-20

## 2022-12-24 LAB
ALBUMIN SERPL BCP-MCNC: 2.4 G/DL (ref 3.5–5.2)
ANION GAP SERPL CALC-SCNC: 16 MMOL/L (ref 8–16)
APTT BLDCRRT: 30.8 SEC (ref 21–32)
APTT BLDCRRT: 34.8 SEC (ref 21–32)
BASOPHILS # BLD AUTO: 0.02 K/UL (ref 0–0.2)
BASOPHILS # BLD AUTO: 0.02 K/UL (ref 0–0.2)
BASOPHILS NFR BLD: 0.3 % (ref 0–1.9)
BASOPHILS NFR BLD: 0.3 % (ref 0–1.9)
BSA FOR ECHO PROCEDURE: 1.79 M2
BUN SERPL-MCNC: 38 MG/DL (ref 8–23)
CALCIUM SERPL-MCNC: 9.1 MG/DL (ref 8.7–10.5)
CHLORIDE SERPL-SCNC: 95 MMOL/L (ref 95–110)
CO2 SERPL-SCNC: 24 MMOL/L (ref 23–29)
CREAT SERPL-MCNC: 6.2 MG/DL (ref 0.5–1.4)
DIFFERENTIAL METHOD: ABNORMAL
DIFFERENTIAL METHOD: ABNORMAL
EJECTION FRACTION: 55 %
EOSINOPHIL # BLD AUTO: 0 K/UL (ref 0–0.5)
EOSINOPHIL # BLD AUTO: 0 K/UL (ref 0–0.5)
EOSINOPHIL NFR BLD: 0.6 % (ref 0–8)
EOSINOPHIL NFR BLD: 0.6 % (ref 0–8)
ERYTHROCYTE [DISTWIDTH] IN BLOOD BY AUTOMATED COUNT: 14.4 % (ref 11.5–14.5)
ERYTHROCYTE [DISTWIDTH] IN BLOOD BY AUTOMATED COUNT: 14.4 % (ref 11.5–14.5)
EST. GFR  (NO RACE VARIABLE): 7 ML/MIN/1.73 M^2
GLUCOSE SERPL-MCNC: 164 MG/DL (ref 70–110)
HCT VFR BLD AUTO: 36.6 % (ref 37–48.5)
HCT VFR BLD AUTO: 36.6 % (ref 37–48.5)
HGB BLD-MCNC: 11.6 G/DL (ref 12–16)
HGB BLD-MCNC: 11.6 G/DL (ref 12–16)
IMM GRANULOCYTES # BLD AUTO: 0.04 K/UL (ref 0–0.04)
IMM GRANULOCYTES # BLD AUTO: 0.04 K/UL (ref 0–0.04)
IMM GRANULOCYTES NFR BLD AUTO: 0.6 % (ref 0–0.5)
IMM GRANULOCYTES NFR BLD AUTO: 0.6 % (ref 0–0.5)
LYMPHOCYTES # BLD AUTO: 0.5 K/UL (ref 1–4.8)
LYMPHOCYTES # BLD AUTO: 0.5 K/UL (ref 1–4.8)
LYMPHOCYTES NFR BLD: 8.1 % (ref 18–48)
LYMPHOCYTES NFR BLD: 8.1 % (ref 18–48)
MCH RBC QN AUTO: 30.1 PG (ref 27–31)
MCH RBC QN AUTO: 30.1 PG (ref 27–31)
MCHC RBC AUTO-ENTMCNC: 31.7 G/DL (ref 32–36)
MCHC RBC AUTO-ENTMCNC: 31.7 G/DL (ref 32–36)
MCV RBC AUTO: 95 FL (ref 82–98)
MCV RBC AUTO: 95 FL (ref 82–98)
MONOCYTES # BLD AUTO: 1.1 K/UL (ref 0.3–1)
MONOCYTES # BLD AUTO: 1.1 K/UL (ref 0.3–1)
MONOCYTES NFR BLD: 16.1 % (ref 4–15)
MONOCYTES NFR BLD: 16.1 % (ref 4–15)
NEUTROPHILS # BLD AUTO: 4.8 K/UL (ref 1.8–7.7)
NEUTROPHILS # BLD AUTO: 4.8 K/UL (ref 1.8–7.7)
NEUTROPHILS NFR BLD: 74.3 % (ref 38–73)
NEUTROPHILS NFR BLD: 74.3 % (ref 38–73)
NRBC BLD-RTO: 0 /100 WBC
NRBC BLD-RTO: 0 /100 WBC
PHOSPHATE SERPL-MCNC: 3.6 MG/DL (ref 2.7–4.5)
PISA TR MAX VEL: 4.48 M/S
PLATELET # BLD AUTO: 144 K/UL (ref 150–450)
PLATELET # BLD AUTO: 144 K/UL (ref 150–450)
PMV BLD AUTO: 11.4 FL (ref 9.2–12.9)
PMV BLD AUTO: 11.4 FL (ref 9.2–12.9)
POTASSIUM SERPL-SCNC: 3.9 MMOL/L (ref 3.5–5.1)
RBC # BLD AUTO: 3.86 M/UL (ref 4–5.4)
RBC # BLD AUTO: 3.86 M/UL (ref 4–5.4)
SODIUM SERPL-SCNC: 135 MMOL/L (ref 136–145)
TR MAX PG: 80 MMHG
WBC # BLD AUTO: 6.51 K/UL (ref 3.9–12.7)
WBC # BLD AUTO: 6.51 K/UL (ref 3.9–12.7)

## 2022-12-24 PROCEDURE — 87070 CULTURE OTHR SPECIMN AEROBIC: CPT | Performed by: INTERNAL MEDICINE

## 2022-12-24 PROCEDURE — 63600175 PHARM REV CODE 636 W HCPCS: Performed by: NURSE ANESTHETIST, CERTIFIED REGISTERED

## 2022-12-24 PROCEDURE — 99232 SBSQ HOSP IP/OBS MODERATE 35: CPT | Mod: ,,, | Performed by: INTERNAL MEDICINE

## 2022-12-24 PROCEDURE — 99233 PR SUBSEQUENT HOSPITAL CARE,LEVL III: ICD-10-PCS | Mod: 57,,, | Performed by: ORTHOPAEDIC SURGERY

## 2022-12-24 PROCEDURE — 27030 ARTHROTOMY HIP W/DRAINAGE: CPT | Mod: LT,,, | Performed by: ORTHOPAEDIC SURGERY

## 2022-12-24 PROCEDURE — 63600175 PHARM REV CODE 636 W HCPCS: Performed by: INTERNAL MEDICINE

## 2022-12-24 PROCEDURE — 36000705 HC OR TIME LEV I EA ADD 15 MIN: Performed by: ORTHOPAEDIC SURGERY

## 2022-12-24 PROCEDURE — 80069 RENAL FUNCTION PANEL: CPT | Performed by: INTERNAL MEDICINE

## 2022-12-24 PROCEDURE — 87077 CULTURE AEROBIC IDENTIFY: CPT | Performed by: INTERNAL MEDICINE

## 2022-12-24 PROCEDURE — 37000009 HC ANESTHESIA EA ADD 15 MINS: Performed by: ORTHOPAEDIC SURGERY

## 2022-12-24 PROCEDURE — 21400001 HC TELEMETRY ROOM

## 2022-12-24 PROCEDURE — 25000003 PHARM REV CODE 250: Performed by: INTERNAL MEDICINE

## 2022-12-24 PROCEDURE — 63600175 PHARM REV CODE 636 W HCPCS: Performed by: ORTHOPAEDIC SURGERY

## 2022-12-24 PROCEDURE — 27000221 HC OXYGEN, UP TO 24 HOURS

## 2022-12-24 PROCEDURE — 99232 PR SUBSEQUENT HOSPITAL CARE,LEVL II: ICD-10-PCS | Mod: ,,, | Performed by: INTERNAL MEDICINE

## 2022-12-24 PROCEDURE — 71000039 HC RECOVERY, EACH ADD'L HOUR: Performed by: ORTHOPAEDIC SURGERY

## 2022-12-24 PROCEDURE — 27201423 OPTIME MED/SURG SUP & DEVICES STERILE SUPPLY: Performed by: ORTHOPAEDIC SURGERY

## 2022-12-24 PROCEDURE — 85730 THROMBOPLASTIN TIME PARTIAL: CPT | Performed by: INTERNAL MEDICINE

## 2022-12-24 PROCEDURE — 99900035 HC TECH TIME PER 15 MIN (STAT)

## 2022-12-24 PROCEDURE — 36000704 HC OR TIME LEV I 1ST 15 MIN: Performed by: ORTHOPAEDIC SURGERY

## 2022-12-24 PROCEDURE — 27030 PR DRAINAGE OF HIP JOINT: ICD-10-PCS | Mod: LT,,, | Performed by: ORTHOPAEDIC SURGERY

## 2022-12-24 PROCEDURE — 71000033 HC RECOVERY, INTIAL HOUR: Performed by: ORTHOPAEDIC SURGERY

## 2022-12-24 PROCEDURE — 36415 COLL VENOUS BLD VENIPUNCTURE: CPT | Performed by: INTERNAL MEDICINE

## 2022-12-24 PROCEDURE — 37000008 HC ANESTHESIA 1ST 15 MINUTES: Performed by: ORTHOPAEDIC SURGERY

## 2022-12-24 PROCEDURE — 99233 SBSQ HOSP IP/OBS HIGH 50: CPT | Mod: 57,,, | Performed by: ORTHOPAEDIC SURGERY

## 2022-12-24 PROCEDURE — 25000003 PHARM REV CODE 250: Performed by: NURSE ANESTHETIST, CERTIFIED REGISTERED

## 2022-12-24 PROCEDURE — 25000003 PHARM REV CODE 250: Performed by: STUDENT IN AN ORGANIZED HEALTH CARE EDUCATION/TRAINING PROGRAM

## 2022-12-24 PROCEDURE — 63600175 PHARM REV CODE 636 W HCPCS: Performed by: HOSPITALIST

## 2022-12-24 PROCEDURE — 85025 COMPLETE CBC W/AUTO DIFF WBC: CPT | Performed by: INTERNAL MEDICINE

## 2022-12-24 PROCEDURE — 94760 N-INVAS EAR/PLS OXIMETRY 1: CPT

## 2022-12-24 PROCEDURE — 87205 SMEAR GRAM STAIN: CPT | Performed by: INTERNAL MEDICINE

## 2022-12-24 RX ORDER — LIDOCAINE HYDROCHLORIDE 20 MG/ML
INJECTION INTRAVENOUS
Status: DISCONTINUED | OUTPATIENT
Start: 2022-12-24 | End: 2022-12-24

## 2022-12-24 RX ORDER — TOBRAMYCIN 1.2 G/30ML
1.2 INJECTION, POWDER, LYOPHILIZED, FOR SOLUTION INTRAVENOUS ONCE
Status: DISCONTINUED | OUTPATIENT
Start: 2022-12-24 | End: 2022-12-24

## 2022-12-24 RX ORDER — PROPOFOL 10 MG/ML
VIAL (ML) INTRAVENOUS
Status: DISCONTINUED | OUTPATIENT
Start: 2022-12-24 | End: 2022-12-24

## 2022-12-24 RX ORDER — HYDROMORPHONE HYDROCHLORIDE 2 MG/ML
0.2 INJECTION, SOLUTION INTRAMUSCULAR; INTRAVENOUS; SUBCUTANEOUS EVERY 5 MIN PRN
Status: DISCONTINUED | OUTPATIENT
Start: 2022-12-24 | End: 2022-12-24

## 2022-12-24 RX ORDER — OXYCODONE AND ACETAMINOPHEN 5; 325 MG/1; MG/1
1 TABLET ORAL
Status: DISCONTINUED | OUTPATIENT
Start: 2022-12-24 | End: 2022-12-24

## 2022-12-24 RX ORDER — PHENYLEPHRINE HYDROCHLORIDE 10 MG/ML
INJECTION INTRAVENOUS
Status: DISCONTINUED | OUTPATIENT
Start: 2022-12-24 | End: 2022-12-24

## 2022-12-24 RX ORDER — ONDANSETRON 2 MG/ML
INJECTION INTRAMUSCULAR; INTRAVENOUS
Status: DISCONTINUED | OUTPATIENT
Start: 2022-12-24 | End: 2022-12-24

## 2022-12-24 RX ORDER — VANCOMYCIN HYDROCHLORIDE 1 G/20ML
INJECTION, POWDER, LYOPHILIZED, FOR SOLUTION INTRAVENOUS
Status: DISCONTINUED | OUTPATIENT
Start: 2022-12-24 | End: 2022-12-24 | Stop reason: ALTCHOICE

## 2022-12-24 RX ORDER — SUCCINYLCHOLINE CHLORIDE 20 MG/ML
INJECTION INTRAMUSCULAR; INTRAVENOUS
Status: DISCONTINUED | OUTPATIENT
Start: 2022-12-24 | End: 2022-12-24

## 2022-12-24 RX ORDER — CISATRACURIUM BESYLATE 2 MG/ML
INJECTION, SOLUTION INTRAVENOUS
Status: DISCONTINUED | OUTPATIENT
Start: 2022-12-24 | End: 2022-12-24

## 2022-12-24 RX ORDER — NEOSTIGMINE METHYLSULFATE 1 MG/ML
INJECTION, SOLUTION INTRAVENOUS
Status: DISCONTINUED | OUTPATIENT
Start: 2022-12-24 | End: 2022-12-24

## 2022-12-24 RX ORDER — DILTIAZEM HYDROCHLORIDE 60 MG/1
60 TABLET, FILM COATED ORAL ONCE
Status: COMPLETED | OUTPATIENT
Start: 2022-12-24 | End: 2022-12-24

## 2022-12-24 RX ORDER — DILTIAZEM HYDROCHLORIDE 240 MG/1
240 CAPSULE, COATED, EXTENDED RELEASE ORAL DAILY
Status: DISCONTINUED | OUTPATIENT
Start: 2022-12-25 | End: 2023-01-01

## 2022-12-24 RX ORDER — FENTANYL CITRATE 50 UG/ML
INJECTION, SOLUTION INTRAMUSCULAR; INTRAVENOUS
Status: DISCONTINUED | OUTPATIENT
Start: 2022-12-24 | End: 2022-12-24

## 2022-12-24 RX ORDER — SODIUM CHLORIDE 0.9 % (FLUSH) 0.9 %
10 SYRINGE (ML) INJECTION
Status: DISCONTINUED | OUTPATIENT
Start: 2022-12-24 | End: 2022-12-24

## 2022-12-24 RX ADMIN — BRIMONIDINE TARTRATE 1 DROP: 2 SOLUTION/ DROPS OPHTHALMIC at 09:12

## 2022-12-24 RX ADMIN — CISATRACURIUM BESYLATE 4 MG: 2 INJECTION INTRAVENOUS at 01:12

## 2022-12-24 RX ADMIN — LOSARTAN POTASSIUM 25 MG: 25 TABLET, FILM COATED ORAL at 09:12

## 2022-12-24 RX ADMIN — NEOSTIGMINE METHYLSULFATE 5 MG: 1 INJECTION INTRAVENOUS at 02:12

## 2022-12-24 RX ADMIN — FENTANYL CITRATE 50 MCG: 50 INJECTION, SOLUTION INTRAMUSCULAR; INTRAVENOUS at 02:12

## 2022-12-24 RX ADMIN — MORPHINE SULFATE 2 MG: 2 INJECTION, SOLUTION INTRAMUSCULAR; INTRAVENOUS at 10:12

## 2022-12-24 RX ADMIN — FENTANYL CITRATE 50 MCG: 50 INJECTION, SOLUTION INTRAMUSCULAR; INTRAVENOUS at 12:12

## 2022-12-24 RX ADMIN — CEFTAZIDIME 1 G: 1 INJECTION, POWDER, FOR SOLUTION INTRAMUSCULAR; INTRAVENOUS at 04:12

## 2022-12-24 RX ADMIN — NEPHROCAP 1 CAPSULE: 1 CAP ORAL at 09:12

## 2022-12-24 RX ADMIN — DILTIAZEM HYDROCHLORIDE 60 MG: 60 TABLET, FILM COATED ORAL at 05:12

## 2022-12-24 RX ADMIN — DILTIAZEM HYDROCHLORIDE 60 MG: 60 TABLET, FILM COATED ORAL at 11:12

## 2022-12-24 RX ADMIN — MORPHINE SULFATE 2 MG: 2 INJECTION, SOLUTION INTRAMUSCULAR; INTRAVENOUS at 04:12

## 2022-12-24 RX ADMIN — SODIUM CHLORIDE: 0.9 INJECTION, SOLUTION INTRAVENOUS at 12:12

## 2022-12-24 RX ADMIN — VANCOMYCIN HYDROCHLORIDE 1000 MG: 1 INJECTION, POWDER, LYOPHILIZED, FOR SOLUTION INTRAVENOUS at 01:12

## 2022-12-24 RX ADMIN — PHENYLEPHRINE HYDROCHLORIDE 200 MCG: 10 INJECTION INTRAVENOUS at 01:12

## 2022-12-24 RX ADMIN — SUCCINYLCHOLINE CHLORIDE 140 MG: 20 INJECTION, SOLUTION INTRAMUSCULAR; INTRAVENOUS at 12:12

## 2022-12-24 RX ADMIN — PROPOFOL 150 MG: 10 INJECTION, EMULSION INTRAVENOUS at 12:12

## 2022-12-24 RX ADMIN — MUPIROCIN: 20 OINTMENT TOPICAL at 09:12

## 2022-12-24 RX ADMIN — CISATRACURIUM BESYLATE 1 MG: 2 INJECTION INTRAVENOUS at 12:12

## 2022-12-24 RX ADMIN — DILTIAZEM HYDROCHLORIDE 60 MG: 60 TABLET, FILM COATED ORAL at 04:12

## 2022-12-24 RX ADMIN — ONDANSETRON 4 MG: 2 INJECTION, SOLUTION INTRAMUSCULAR; INTRAVENOUS at 01:12

## 2022-12-24 RX ADMIN — GLYCOPYRROLATE 0.8 MG: 0.2 INJECTION, SOLUTION INTRAMUSCULAR; INTRAVENOUS at 02:12

## 2022-12-24 RX ADMIN — LIDOCAINE HYDROCHLORIDE 100 MG: 20 INJECTION, SOLUTION INTRAVENOUS at 12:12

## 2022-12-24 NOTE — ASSESSMENT & PLAN NOTE
67 y.o. female with incidentally noted left hip cyst.  MRI shows small effusion (nonspecific) and there is no changes within the cyst to suggest infectious and there is no surrounding edema.  She is also asymptomatic from this.  That said it is located in a peritrochanteric region which does increase risk of fracture.  I recommend outpatient orthopedic follow-up and if becomes symptomatic, prophylactic treatment may be discussed.    --activity as tolerated left leg in meantime  --if becomes acutely symptomatic, will plan for aspiration to r/o infection

## 2022-12-24 NOTE — ASSESSMENT & PLAN NOTE
67 y.o. female with ESRD on HD with left hip pain and aspirate diagnostic of septic arthritis.    I had a lengthy discussion today with the patient regarding nonsurgical and surgical treatment options of their left hip infection. I recommend surgery to treat the infection.  In their case, surgery would involve left hip I&D and possible drain placement. I discussed the risks benefits and alternatives to surgery. I discussed the benefits of surgery include decreasing infectious burden, providing antibiotics better chance to treat infection, and hopefully decreasing likelihood of further damage being caused by the infection.  I reviewed the medical and orthopedic risks of surgery. I reviewed the medical risks include but are not limited to the risks associated with anesthesia and any invasive procedure.  The orthopedic risks include but are not limited to the risk of continued or worsening infection and/or wound complications, bleeding, blood clots (and possible pulmonary emboli), surrounding structure injury, the tissues not healing or not healing correctly, postoperative pain and stiffness, nerve sensitivity, and potential need for further surgery.  Patient was given opportunity to ask questions both about the surgery details and the risks.  After this discussion, patient decided to move forward with surgery.    I specifically discussed the patient is at increased risk of complications due to ESRD and requirement of therapeutic anticoagulation (heparin gtt due to new diagnosis a.fib).    --OR today for I&D, further plan of care pending findings and culture data  --hold antibiotics for cultures

## 2022-12-24 NOTE — ANESTHESIA PREPROCEDURE EVALUATION
2022  Meaghan Potter is a 67 y.o., female.      Pre-op Assessment    I have reviewed the Patient Summary Reports.     I have reviewed the Nursing Notes. I have reviewed the NPO Status.      Review of Systems  Anesthesia Hx:  No problems with previous Anesthesia    Social:  Non-Smoker, No Alcohol Use    Hematology/Oncology:  Hematology Normal   Oncology Normal     EENT/Dental:EENT/Dental Normal   Cardiovascular:   Hypertension, well controlled    Pulmonary:  Pulmonary Normal    Renal/:  Renal/ Normal     Hepatic/GI:  Hepatic/GI Normal    Musculoskeletal:  Musculoskeletal Normal    Neurological:  Neurology Normal    Endocrine:  Endocrine Normal    Dermatological:  Skin Normal    Psych:  Psychiatric Normal           Physical Exam  General: Well nourished    Airway:  Mallampati: II   Mouth Opening: Normal  Neck ROM: Normal ROM        Anesthesia Plan  Type of Anesthesia, risks & benefits discussed:    Anesthesia Type: Gen ETT, Gen Supraglottic Airway  Intra-op Monitoring Plan: Standard ASA Monitors  Induction:  IV  Airway Plan: Direct  ASA Score: 3 Emergent    Ready For Surgery From Anesthesia Perspective.     Past Medical History:   Diagnosis Date    ESRD (end stage renal disease) on dialysis     GERD (gastroesophageal reflux disease)     Hypertension        Past Surgical History:   Procedure Laterality Date     SECTION      PLACEMENT OF DIALYSIS ACCESS         Family History   Problem Relation Age of Onset    Diabetes Mother     Hypertension Mother     Diabetes Father     Hypertension Father        Social History     Socioeconomic History    Marital status:    Tobacco Use    Smoking status: Former    Smokeless tobacco: Never   Substance and Sexual Activity    Alcohol use: No     Comment: occassionally    Drug use: No    Sexual activity: Yes       Current  Facility-Administered Medications   Medication Dose Route Frequency Provider Last Rate Last Admin    0.9%  NaCl infusion   Intravenous Once William Alfonso MD        0.9%  NaCl infusion   Intravenous Once William Alfonso MD        acetaminophen suppository 650 mg  650 mg Rectal Q6H PRN Robert Bryan MD        acetaminophen tablet 650 mg  650 mg Oral Q8H PRN Robert Bryan MD   650 mg at 12/21/22 2036    albuterol-ipratropium 2.5 mg-0.5 mg/3 mL nebulizer solution 3 mL  3 mL Nebulization Q6H PRN Robert Bryan MD        aluminum-magnesium hydroxide-simethicone 200-200-20 mg/5 mL suspension 30 mL  30 mL Oral QID PRN Robret Bryan MD        brimonidine 0.2% ophthalmic solution 1 drop  1 drop Both Eyes BID Robert Bryan MD   1 drop at 12/24/22 0908    BUPivacaine (PF) 0.25% (2.5 mg/ml) injection 12.5 mg  5 mL Intra-articular Once Case GIO Medel        calcitRIOL capsule 0.5 mcg  0.5 mcg Oral Every Mon, Wed, Fri William Alfonso MD   0.5 mcg at 12/23/22 0815    dextrose 10% bolus 125 mL 125 mL  12.5 g Intravenous PRN Robert Bryan MD        dextrose 10% bolus 250 mL 250 mL  25 g Intravenous PRN Robert Bryan MD        diltiaZEM tablet 60 mg  60 mg Oral Q6H Kyung Aragon MD   60 mg at 12/24/22 0545    [START ON 12/26/2022] ergocalciferol capsule 50,000 Units  50,000 Units Oral Q7 Days Robert Bryan MD        glucagon (human recombinant) injection 1 mg  1 mg Intramuscular PRN Robert Bryan MD        glucose chewable tablet 16 g  16 g Oral PRN Robert Bryan MD        glucose chewable tablet 24 g  24 g Oral PRN Robert Bryan MD        HYDROcodone-acetaminophen 5-325 mg per tablet 1 tablet  1 tablet Oral Q6H PRN Robert Bryan MD   1 tablet at 12/23/22 1959    losartan tablet 25 mg  25 mg Oral Daily Afshan Márquez MD   25 mg at 12/24/22 0907    melatonin tablet 6 mg  6 mg Oral Nightly PRN Robert Bryan MD    6 mg at 12/23/22 2000    morphine injection 2 mg  2 mg Intravenous Q4H PRN Robert Bryan MD   2 mg at 12/21/22 0853    mupirocin 2 % ointment   Nasal BID William Alfonso MD   Given at 12/24/22 0907    naloxone 0.4 mg/mL injection 0.02 mg  0.02 mg Intravenous PRN Robert Bryan MD        ondansetron injection 4 mg  4 mg Intravenous Q8H PRN Robert Bryan MD        promethazine tablet 25 mg  25 mg Oral Q6H PRN Robert Bryan MD        senna-docusate 8.6-50 mg per tablet 1 tablet  1 tablet Oral BID PRN Robert Bryan MD        sodium chloride 0.9% bolus 250 mL 250 mL  250 mL Intravenous PRN William Alfonso MD        sodium chloride 0.9% bolus 250 mL 250 mL  250 mL Intravenous PRN William Alfonso MD        sodium chloride 0.9% bolus 250 mL 250 mL  250 mL Intravenous PRN William Alfonso MD        sodium chloride 0.9% flush 3 mL  3 mL Intravenous Q12H PRN Robert Bryan MD        vitamin renal formula (B-complex-vitamin c-folic acid) 1 mg per capsule 1 capsule  1 capsule Oral Daily William Alfonso MD   1 capsule at 12/24/22 0907       Review of patient's allergies indicates:   Allergen Reactions    Amoxicillin Rash     .  Lab Results   Component Value Date    WBC 6.51 12/24/2022    WBC 6.51 12/24/2022    HGB 11.6 (L) 12/24/2022    HGB 11.6 (L) 12/24/2022    HCT 36.6 (L) 12/24/2022    HCT 36.6 (L) 12/24/2022    MCV 95 12/24/2022    MCV 95 12/24/2022     (L) 12/24/2022     (L) 12/24/2022

## 2022-12-24 NOTE — ASSESSMENT & PLAN NOTE
Patient presented with acute onset left hip pain x1 day duration in absence of trauma while doing leg exercises in bed and was found to asymmetric large left hip joint effusion, small right hip joint effusion, and evidence of superior labral tear of her left acetabulum noted on MRI.  Patient noted to have 5/5 strength throughout with sensation to light touch grossly intact throughout however, TTP throughout left groin.  Orthopedics consulted and awaiting further evaluation/recommendations.  Plan:  -optimize pain control  -bowel regimen  -bed rest  -nonweightbearing  -PT/OT  -ortho surgery following    The patient underwent arthrocentesis by IR 12/23 with 4 cc pus obtained.  Sent for cell count which revealed 187,000 white blood cells 100% segs.  Crystals negative  and culture pending.  Discussed with the with the patient will go to OR for washout and cultures..  As patient is not febrile, does not have elevated white count will hold off on antibiotics until further cultures were obtained by ortho in a.m.

## 2022-12-24 NOTE — SUBJECTIVE & OBJECTIVE
Principal Problem:Left hip pain    Principal Orthopedic Problem: left septic hip arthritis    Interval History: Tolerated HD well, no issues.  Left hip pain remains stable and prohibits ability to get OOB or ambulate.  F/up cardiac echo this AM showed stable pericardial effusion - etiology unclear.  Medicine and Cardiology affirm patient is optimized for surgery, heparin gtt off since 5am.    Review of patient's allergies indicates:   Allergen Reactions    Amoxicillin Rash       Current Facility-Administered Medications   Medication    0.9%  NaCl infusion    0.9%  NaCl infusion    acetaminophen suppository 650 mg    acetaminophen tablet 650 mg    albuterol-ipratropium 2.5 mg-0.5 mg/3 mL nebulizer solution 3 mL    aluminum-magnesium hydroxide-simethicone 200-200-20 mg/5 mL suspension 30 mL    brimonidine 0.2% ophthalmic solution 1 drop    BUPivacaine (PF) 0.25% (2.5 mg/ml) injection 12.5 mg    calcitRIOL capsule 0.5 mcg    dextrose 10% bolus 125 mL 125 mL    dextrose 10% bolus 250 mL 250 mL    diltiaZEM tablet 60 mg    [START ON 12/26/2022] ergocalciferol capsule 50,000 Units    glucagon (human recombinant) injection 1 mg    glucose chewable tablet 16 g    glucose chewable tablet 24 g    HYDROcodone-acetaminophen 5-325 mg per tablet 1 tablet    losartan tablet 25 mg    melatonin tablet 6 mg    morphine injection 2 mg    mupirocin 2 % ointment    naloxone 0.4 mg/mL injection 0.02 mg    ondansetron injection 4 mg    promethazine tablet 25 mg    senna-docusate 8.6-50 mg per tablet 1 tablet    sodium chloride 0.9% bolus 250 mL 250 mL    sodium chloride 0.9% bolus 250 mL 250 mL    sodium chloride 0.9% bolus 250 mL 250 mL    sodium chloride 0.9% flush 3 mL    vitamin renal formula (B-complex-vitamin c-folic acid) 1 mg per capsule 1 capsule     Objective:     Vital Signs (Most Recent):  Temp: 98.5 °F (36.9 °C) (12/24/22 0749)  Pulse: 86 (12/24/22 0820)  Resp: 18 (12/24/22 0820)  BP: 111/66 (12/24/22 0749)  SpO2: 95 %  "(12/24/22 0820) Vital Signs (24h Range):  Temp:  [97.4 °F (36.3 °C)-100.5 °F (38.1 °C)] 98.5 °F (36.9 °C)  Pulse:  [61-98] 86  Resp:  [16-20] 18  SpO2:  [94 %-97 %] 95 %  BP: (103-116)/(56-66) 111/66     Weight: 71.2 kg (157 lb)  Height: 5' 4" (162.6 cm)  Body mass index is 26.95 kg/m².      Intake/Output Summary (Last 24 hours) at 12/24/2022 0952  Last data filed at 12/23/2022 1746  Gross per 24 hour   Intake 300 ml   Output 2503 ml   Net -2203 ml       General    Constitutional: No distress.             Right Hip Exam     Comments:  Right hip ROM non-irritable, NVI distally with baseline paresthesias/neuropathy.  Left Hip Exam     Comments:  Left hip ROM minimally irritable, NVI distally with intact DP/PT and baseline paresthesias.  Intact DF/PF.          Significant Labs: CBC:   Recent Labs   Lab 12/23/22  0511 12/24/22  0542   WBC 7.65 6.51  6.51   HGB 11.6* 11.6*  11.6*   HCT 36.4* 36.6*  36.6*   * 144*  144*     All pertinent labs within the past 24 hours have been reviewed.  Body Fluid Source, Crystal Exam  Joint Fluid, Left Hip    Body Fluid Crystal Negative Negative    Comment: No urate or pyrophosphate crystals identified     Body Fluid Type  Joint Fluid, Left Hip    Fluid Appearance  Bloody    Fluid Color  Red    WBC, Body Fluid /cu mm [id number]    Comment: Reference ranges for body fluids not established.   Correlate clinically.    Segs, Fluid % 100      Gram Stain Result Many WBC's    Gram Stain Result Few Gram positive cocci    Gram Stain Result Rare Gram negative rods      Significant Imaging: CT: I have reviewed all pertinent results/findings and my personal findings are:  Demonstrates mild-to-moderate bilateral hip osteoarthritis, there is incidentally noted cystic lesion in the right femoral neck at the basicervical junction  X-Ray: I have reviewed all pertinent results/findings and my personal findings are:  Pelvis and hip radiographs reviewed and again demonstrate incidentally known cyst " in the right femoral neck basicervical junction.  There are no erosive changes in the left hip.  MRI: I have reviewed all pertinent results/findings and my personal findings are:  Bilateral hip MRIs are significantly limited by motion artifact.  There are small effusions noted bilaterally.  There is no edema around the incidentally noted right hip cyst.

## 2022-12-24 NOTE — SUBJECTIVE & OBJECTIVE
Interval History:  Patient seen and examined at bedside.  Patient has no complaints except continued pain in her left hip.  Results of Gram stain revealed Gram-negative rods culture pending.  Crystal exam was negative.  Repeat echo this a.m. reveals no change in the size of her posterior pericardial effusion.  On discussion with [facility] and nurses there to not remember need home patient has had systemic infection or tunneled dialysis catheter.    Review of Systems   Constitutional:  Positive for activity change. Negative for fatigue.   Respiratory:  Negative for cough and shortness of breath.    Cardiovascular:  Negative for palpitations (resolved).   Genitourinary:  Negative for urgency.   Musculoskeletal:  Positive for arthralgias (increased pain - left hip) and gait problem. Negative for back pain, joint swelling and myalgias.   Neurological:  Negative for dizziness, seizures, weakness and headaches.   Psychiatric/Behavioral:  Negative for sleep disturbance.    All other systems reviewed and are negative.  Objective:     Vital Signs (Most Recent):  Temp: 98.5 °F (36.9 °C) (12/24/22 0749)  Pulse: 86 (12/24/22 0820)  Resp: 18 (12/24/22 0820)  BP: 111/66 (12/24/22 0749)  SpO2: 95 % (12/24/22 0820) Vital Signs (24h Range):  Temp:  [97.4 °F (36.3 °C)-100.5 °F (38.1 °C)] 98.5 °F (36.9 °C)  Pulse:  [61-98] 86  Resp:  [16-20] 18  SpO2:  [94 %-97 %] 95 %  BP: (103-116)/(56-66) 111/66     Weight: 71.2 kg (157 lb)  Body mass index is 26.95 kg/m².    Intake/Output Summary (Last 24 hours) at 12/24/2022 0851  Last data filed at 12/23/2022 1746  Gross per 24 hour   Intake 420 ml   Output 2503 ml   Net -2083 ml      Physical Exam  Vitals reviewed.   Constitutional:       Appearance: Normal appearance. Ill appearance: chronic.   HENT:      Head: Normocephalic and atraumatic.      Mouth/Throat:      Mouth: Mucous membranes are moist.      Pharynx: Oropharynx is clear.   Eyes:      Extraocular Movements: Extraocular  movements intact.      Conjunctiva/sclera: Conjunctivae normal.   Cardiovascular:      Rate and Rhythm: Normal rate. Rhythm irregular.      Pulses: Normal pulses.      Heart sounds: Normal heart sounds.      Comments: Left upper arm AV fistula with thrill and bruit no evidence of infection    Pulmonary:      Effort: Pulmonary effort is normal.      Breath sounds: Normal breath sounds.   Abdominal:      General: Bowel sounds are normal.      Palpations: Abdomen is soft.   Musculoskeletal:         General: Tenderness (Left hip to range-of-motion) present. Normal range of motion.      Cervical back: Normal range of motion and neck supple.   Skin:     General: Skin is warm and dry.   Neurological:      General: No focal deficit present.      Mental Status: She is alert and oriented to person, place, and time. Mental status is at baseline.   Psychiatric:         Mood and Affect: Mood normal.         Behavior: Behavior normal.         Thought Content: Thought content normal.       Significant Labs: All pertinent labs within the past 24 hours have been reviewed.  Blood Culture:   Recent Labs   Lab 12/23/22  1131 12/23/22  1504   LABBLOO No Growth to date No Growth to date     CBC:   Recent Labs   Lab 12/23/22  0511 12/24/22  0542   WBC 7.65 6.51  6.51   HGB 11.6* 11.6*  11.6*   HCT 36.4* 36.6*  36.6*   * 144*  144*     CMP:   Recent Labs   Lab 12/23/22  0511 12/24/22  0542   * 135*   K 4.8 3.9   CL 93* 95   CO2 28 24   * 164*   BUN 53* 38*   CREATININE 8.3* 6.2*   CALCIUM 10.0 9.1   ALBUMIN 2.6* 2.4*   ANIONGAP 13 16       Significant Imaging:

## 2022-12-24 NOTE — PROGRESS NOTES
[facility] - Telemetry ([facility])  Nephrology  Progress Note    Patient Name: Meaghan Potter  MRN: [id number]  Admission Date: 12/19/2022  Hospital Length of Stay: 3 days  Attending Provider: Asfhan Márquez MD   Primary Care Physician: Primary Doctor No  Principal Problem:Pyogenic arthritis of left hip  Reason for Consult: Management of ESRD  Primary Nephrologist: Dr. Ribera/[facility]       Subjective:     HPI: 68 yo female with ESRD on MWF last HD last Friday (12/16) admitted as a transfer for Cardiology, Ortho and Nephrology. She is currently seen on dialysis, using a LUE AVF. Dialysis vintage is 13 years. Her only complaint to me is pain in both hips described as 'stiff'   No current SOB, no chest pain but describes poor appetite since in pain.     12/21  - describes left hip pain as shooting pain  - eating lunch, not great appetite    12/22  - bed transfer in Radiology with 'episode' this am   - seen in room s/p Ativan IV X 1  - s/p HD yesterday     *For 12/23/22: First visit with patient; seen and examined; chart reviewed. Denies any discomfort.     *For 12/24/22: Off floor in surgery; chart reviewed.      Review of patient's allergies indicates:   Allergen Reactions    Amoxicillin Rash     Current Facility-Administered Medications   Medication Frequency    0.9%  NaCl infusion Once    0.9%  NaCl infusion Once    acetaminophen suppository 650 mg Q6H PRN    acetaminophen tablet 650 mg Q8H PRN    albuterol-ipratropium 2.5 mg-0.5 mg/3 mL nebulizer solution 3 mL Q6H PRN    aluminum-magnesium hydroxide-simethicone 200-200-20 mg/5 mL suspension 30 mL QID PRN    brimonidine 0.2% ophthalmic solution 1 drop BID    BUPivacaine (PF) 0.25% (2.5 mg/ml) injection 12.5 mg Once    calcitRIOL capsule 0.5 mcg Every Mon, Wed, Fri    dextrose 10% bolus 125 mL 125 mL PRN    dextrose 10% bolus 250 mL 250 mL PRN    diltiaZEM tablet 60 mg Q6H    [START ON 12/26/2022] ergocalciferol capsule 50,000 Units Q7 Days    glucagon (human  recombinant) injection 1 mg PRN    glucose chewable tablet 16 g PRN    glucose chewable tablet 24 g PRN    HYDROcodone-acetaminophen 5-325 mg per tablet 1 tablet Q6H PRN    losartan tablet 25 mg Daily    melatonin tablet 6 mg Nightly PRN    morphine injection 2 mg Q4H PRN    mupirocin 2 % ointment BID    naloxone 0.4 mg/mL injection 0.02 mg PRN    ondansetron injection 4 mg Q8H PRN    promethazine tablet 25 mg Q6H PRN    senna-docusate 8.6-50 mg per tablet 1 tablet BID PRN    sodium chloride 0.9% bolus 250 mL 250 mL PRN    sodium chloride 0.9% bolus 250 mL 250 mL PRN    sodium chloride 0.9% bolus 250 mL 250 mL PRN    sodium chloride 0.9% flush 3 mL Q12H PRN    vitamin renal formula (B-complex-vitamin c-folic acid) 1 mg per capsule 1 capsule Daily           Review of Systems   Constitutional:  Negative for fever.   Musculoskeletal:  Positive for arthralgias.   Allergic/Immunologic: Positive for immunocompromised state.   Objective:     Vital Signs (Most Recent):  Temp: 98.5 °F (36.9 °C) (12/24/22 0749)  Pulse: 86 (12/24/22 0820)  Resp: 18 (12/24/22 0820)  BP: 111/66 (12/24/22 0749)  SpO2: 95 % (12/24/22 0820) Vital Signs (24h Range):  Temp:  [97.4 °F (36.3 °C)-100.5 °F (38.1 °C)] 98.5 °F (36.9 °C)  Pulse:  [61-98] 86  Resp:  [16-20] 18  SpO2:  [94 %-97 %] 95 %  BP: (103-116)/(56-66) 111/66     Weight: 71.2 kg (157 lb) (12/24/22 0453)  Body mass index is 26.95 kg/m².  Body surface area is 1.79 meters squared.    I/O last 3 completed shifts:  In: 600 [P.O.:600]  Out: 2503 [Other:2503]    Physical Exam  Vitals and nursing note reviewed.   Constitutional:       General: She is not in acute distress.     Appearance: She is obese.   Cardiovascular:      Rate and Rhythm: Normal rate.   Pulmonary:      Effort: Pulmonary effort is normal. No respiratory distress.   Neurological:      Mental Status: She is alert and oriented to person, place, and time.   Psychiatric:         Mood and Affect: Mood normal.       Significant  Labs: reviewed        Assessment/Plan:     Active Diagnoses:    Diagnosis Date Noted POA    PRINCIPAL PROBLEM:  Pyogenic arthritis of left hip [M00.9] 12/20/2022 Yes    Pericardial effusion [I31.39] 12/21/2022 Yes    ESRD (end stage renal disease) on dialysis [N18.6, Z99.2] 12/20/2022 Not Applicable    Primary hypertension [I10] 12/20/2022 Yes    Gastroesophageal reflux disease without esophagitis [K21.9] 12/20/2022 Yes    Tear of left acetabular labrum [S73.192A] 12/20/2022 Yes    Shortness of breath [R06.02] 12/20/2022 Yes    Bone cyst of right femur [M85.651] 12/20/2022 Unknown    Atrial fibrillation [I48.91] 12/19/2022 Yes      Problems Resolved During this Admission:       ESRD on MWF  - next dialysis tomorrow  - s/p HD Tuesday and Wednesday    Mineral and Bone Disease  - calcitriol 0.5mcg MWF  - continue D2 weekly   - reports no phos binders at home but elevated here, monitor phos level and continue low phos diet    HTN  - adjustments made by HM and Cardiology     Anemia of ESRD  - no ESAs indicated yet   - monitor H&H     Cardiomegaly, quite severe on recent CXR  - pericardial effusion noted on Echo  - etiology of it being uremic is extremely low as she does not miss dialysis and clearance labs from outpatient setting have been above goal  - the pericardial sac is not an area of fluid where dialysis UF can remove     Afib with RVR  - off diltiazem gtts and on po diltiazem IR q6  - per Cardiology    Hip pain  - Ortho notes reviewed    Dr. Glover to assume Nephrology care in am     *For 12/23/22: HD today; second shift; likely hip surgery tomorrow; meds and labs reviewed.  Volume status appears acceptable.  Blood cultures requested to be obtained on HD. Will arrange.  Following closely with you.     *For 12/24/22: Will attempt to revisit; labs and meds reviewed; next HD session Monday. Following.       Moe Glover MD  Nephrology

## 2022-12-24 NOTE — ASSESSMENT & PLAN NOTE
Patient currently follows Dr. Ribera from nephrology with last reported dialysis completed last Friday. Patient currently on M/W/F HD at [facility] via LUE fistula but missed Monday due to not feeling well and being in the ED. Labs consistent with history of ESRD on HD.   Plan:  -continue home medications, titrate as needed  -nephrology consulted -HD performed on 12/20/22 and now back on MWF schedule

## 2022-12-24 NOTE — OP NOTE
[facility]  Orthopedic Surgery Department  Operative Note    SUMMARY     Date of Procedure: 12/24/2022     Surgeon: Kim Johnson MD    Assistant(s): None    Procedure:   Left hip arthrotomy and washout    Pre-Operative Diagnosis: Pyogenic arthritis of left hip, due to unspecified organism [M00.9]    Post-Operative Diagnosis: Same    Tourniquet: None    Findings: Shahid purulence in the left hip; capsule intact and no extension outside joint.  No softening of bone.    Indications: See clinical or hospital notes for consent and indications including discussion of risks, benefits, and alternatives.    Description of procedure:     Patient was seen identified in the preoperative holding area.  Consent was reviewed and surgical site was marked.  The patient also met our anesthesia colleagues who have their own discussion about risks benefits and alternatives to anesthesia options.      Patient was then brought to the operating room.  Anesthesia: General.  Positioning:  Lateral decubitus with a beanbag.  Axillary roll was placed.  Downside fibular head was padded..  We ensured that all bony prominences were well padded.    The left lower extremity was then prepped and draped routine sterile fashion.  A time-out was then performed confirming patient identification, procedure to be performed, laterality and site, proper equipment, and antibiotic infusion was held.    Posterolateral exposure marked centered over the greater trochanter.  Skin incised.  Electrocautery used down to fascia.  Fascia incised sharply.  Gluteus fascia divided with electrocautery.  Charnley retractor was placed.  Bursa excised.  Hip was then internally rotated and the capsule was elevated off the posterior neck.  Upon entering the hip joint, bloody purulence material was encountered.  Fluid was sent for culture.  Vancomycin then administered.  Capsulotomy then extended.    6 L of sterile normal saline was then irrigated through the  hip joint.  Gloves changed and clean drape then applied.  An additional 3 L of normal saline was then irrigated through the hip joint.  Vancomycin powder and tobramycin powder were applied to the joint.  Capsule repaired with 0 PDS.  Fascia repaired with interrupted 0 PDS and oversewn with 0 Stratafix.  Remaining vancomycin and tobramycin powder then applied.  Wound then closed in a layered fashion with 2 0 PDS and 2 O nylon.  An Adaptic gauze dressing then applied and secured with Medipore tape.    SCD applied to the operative extremity.    At the conclusion of procedure, all needle and sponge counts were correct.  The case postoperative de-briefing was held confirming the procedure performed, any relevant specimens and/or postop concerns, and patient disposition.      Complications: No    Estimated Blood Loss (EBL): 150cc           Specimens: Left hip fluid - aerobic, anaerobic, fungal, AFB and gram stain.             Condition: Good    Disposition: PACU - hemodynamically stable.    Planned staged operation?: No    Postop plan:   1. Weight bearing status: WBAT BLE  2. Immobilization: None  3. Wound: Change dressing POD#2  4. ID: gram negatives and gram positives in gram stain; antibiotics per primary but will need coverage for both  5. DVT prophylaxis: per primary  6. Follow-up: 2 weeks in ortho trauma clinic; X-Rays: none

## 2022-12-24 NOTE — ANESTHESIA PROCEDURE NOTES
Intubation    Date/Time: 12/24/2022 12:35 PM  Performed by: Stephan Alonso CRNA  Authorized by: Pradeep Evangelista MD     Intubation:     Induction:  Intravenous    Intubated:  Postinduction    Mask Ventilation:  Easy with oral airway    Attempts:  2    Attempted By:  CRNA    Method of Intubation:  Direct    Blade:  Wu 2    Laryngeal View Grade: Grade IIb - only the arytenoids and epiglottis seen      Attempted By (2nd Attempt):  CRNA    Method of Intubation (2nd Attempt):  Video laryngoscopy    Blade (2nd Attempt):  Jacobs 3    Laryngeal View Grade (2nd Attempt): Grade I - full view of cords      Difficult Airway Encountered?: No      Complications:  Dental trauma    Airway Device:  Oral endotracheal tube    Airway Device Size:  7.5    Style/Cuff Inflation:  Cuffed (inflated to minimal occlusive pressure)    Tube secured:  21    Secured at:  The lips    Placement Verified By:  Capnometry    Complicating Factors:  Poor neck/head extension    Findings Post-Intubation:  BS equal bilateral and atraumatic/condition of teeth unchanged  Notes:      While performing direct laryngoscopy, the blade slipped off the epiglottis and hit the pt's upper tooth. The tooth was knocked out. The tooth was recovered. Minimal bleeding. Dr evangelista notified

## 2022-12-24 NOTE — PROGRESS NOTES
[facility] - Telemetry ([facility])  Orthopedics  Progress Note    Patient Name: Meaghan Potter  MRN: [id number]  Admission Date: 12/19/2022  Hospital Length of Stay: 3 days  Attending Provider: Afshan Márquez MD  Primary Care Provider: Primary Doctor No  Follow-up For: Procedure(s) (LRB):  INCISION AND DRAINAGE, LOWER EXTREMITY (Left)    Post-Operative Day: Day of Surgery  Subjective:     Principal Problem:Left hip pain    Principal Orthopedic Problem: left septic hip arthritis    Interval History: Tolerated HD well, no issues.  Left hip pain remains stable and prohibits ability to get OOB or ambulate.  F/up cardiac echo this AM showed stable pericardial effusion - etiology unclear.  Medicine and Cardiology affirm patient is optimized for surgery, heparin gtt off since 5am.    Review of patient's allergies indicates:   Allergen Reactions    Amoxicillin Rash       Current Facility-Administered Medications   Medication    0.9%  NaCl infusion    0.9%  NaCl infusion    acetaminophen suppository 650 mg    acetaminophen tablet 650 mg    albuterol-ipratropium 2.5 mg-0.5 mg/3 mL nebulizer solution 3 mL    aluminum-magnesium hydroxide-simethicone 200-200-20 mg/5 mL suspension 30 mL    brimonidine 0.2% ophthalmic solution 1 drop    BUPivacaine (PF) 0.25% (2.5 mg/ml) injection 12.5 mg    calcitRIOL capsule 0.5 mcg    dextrose 10% bolus 125 mL 125 mL    dextrose 10% bolus 250 mL 250 mL    diltiaZEM tablet 60 mg    [START ON 12/26/2022] ergocalciferol capsule 50,000 Units    glucagon (human recombinant) injection 1 mg    glucose chewable tablet 16 g    glucose chewable tablet 24 g    HYDROcodone-acetaminophen 5-325 mg per tablet 1 tablet    losartan tablet 25 mg    melatonin tablet 6 mg    morphine injection 2 mg    mupirocin 2 % ointment    naloxone 0.4 mg/mL injection 0.02 mg    ondansetron injection 4 mg    promethazine tablet 25 mg    senna-docusate 8.6-50 mg per tablet 1 tablet    sodium chloride 0.9% bolus 250 mL 250 mL     "sodium chloride 0.9% bolus 250 mL 250 mL    sodium chloride 0.9% bolus 250 mL 250 mL    sodium chloride 0.9% flush 3 mL    vitamin renal formula (B-complex-vitamin c-folic acid) 1 mg per capsule 1 capsule     Objective:     Vital Signs (Most Recent):  Temp: 98.5 °F (36.9 °C) (12/24/22 0749)  Pulse: 86 (12/24/22 0820)  Resp: 18 (12/24/22 0820)  BP: 111/66 (12/24/22 0749)  SpO2: 95 % (12/24/22 0820) Vital Signs (24h Range):  Temp:  [97.4 °F (36.3 °C)-100.5 °F (38.1 °C)] 98.5 °F (36.9 °C)  Pulse:  [61-98] 86  Resp:  [16-20] 18  SpO2:  [94 %-97 %] 95 %  BP: (103-116)/(56-66) 111/66     Weight: 71.2 kg (157 lb)  Height: 5' 4" (162.6 cm)  Body mass index is 26.95 kg/m².      Intake/Output Summary (Last 24 hours) at 12/24/2022 0952  Last data filed at 12/23/2022 1746  Gross per 24 hour   Intake 300 ml   Output 2503 ml   Net -2203 ml       General    Constitutional: No distress.             Right Hip Exam     Comments:  Right hip ROM non-irritable, NVI distally with baseline paresthesias/neuropathy.  Left Hip Exam     Comments:  Left hip ROM minimally irritable, NVI distally with intact DP/PT and baseline paresthesias.  Intact DF/PF.          Significant Labs: CBC:   Recent Labs   Lab 12/23/22  0511 12/24/22  0542   WBC 7.65 6.51  6.51   HGB 11.6* 11.6*  11.6*   HCT 36.4* 36.6*  36.6*   * 144*  144*     All pertinent labs within the past 24 hours have been reviewed.  Body Fluid Source, Crystal Exam  Joint Fluid, Left Hip    Body Fluid Crystal Negative Negative    Comment: No urate or pyrophosphate crystals identified     Body Fluid Type  Joint Fluid, Left Hip    Fluid Appearance  Bloody    Fluid Color  Red    WBC, Body Fluid /cu mm [id number]    Comment: Reference ranges for body fluids not established.   Correlate clinically.    Segs, Fluid % 100      Gram Stain Result Many WBC's    Gram Stain Result Few Gram positive cocci    Gram Stain Result Rare Gram negative rods      Significant Imaging: CT: I have reviewed " all pertinent results/findings and my personal findings are:  Demonstrates mild-to-moderate bilateral hip osteoarthritis, there is incidentally noted cystic lesion in the right femoral neck at the basicervical junction  X-Ray: I have reviewed all pertinent results/findings and my personal findings are:  Pelvis and hip radiographs reviewed and again demonstrate incidentally known cyst in the right femoral neck basicervical junction.  There are no erosive changes in the left hip.  MRI: I have reviewed all pertinent results/findings and my personal findings are:  Bilateral hip MRIs are significantly limited by motion artifact.  There are small effusions noted bilaterally.  There is no edema around the incidentally noted right hip cyst.  Assessment/Plan:     * Pyogenic arthritis of left hip  67 y.o. female with ESRD on HD with left hip pain and aspirate diagnostic of septic arthritis.    I had a lengthy discussion today with the patient regarding nonsurgical and surgical treatment options of their left hip infection. I recommend surgery to treat the infection.  In their case, surgery would involve left hip I&D and possible drain placement. I discussed the risks benefits and alternatives to surgery. I discussed the benefits of surgery include decreasing infectious burden, providing antibiotics better chance to treat infection, and hopefully decreasing likelihood of further damage being caused by the infection.  I reviewed the medical and orthopedic risks of surgery. I reviewed the medical risks include but are not limited to the risks associated with anesthesia and any invasive procedure.  The orthopedic risks include but are not limited to the risk of continued or worsening infection and/or wound complications, bleeding, blood clots (and possible pulmonary emboli), surrounding structure injury, the tissues not healing or not healing correctly, postoperative pain and stiffness, nerve sensitivity, and potential need for  further surgery.  Patient was given opportunity to ask questions both about the surgery details and the risks.  After this discussion, patient decided to move forward with surgery.    I specifically discussed the patient is at increased risk of complications due to ESRD and requirement of therapeutic anticoagulation (heparin gtt due to new diagnosis a.fib).    --OR today for I&D, further plan of care pending findings and culture data  --hold antibiotics for cultures      Bone cyst of right femur  67 y.o. female with incidentally noted left hip cyst.  MRI shows small effusion (nonspecific) and there is no changes within the cyst to suggest infectious and there is no surrounding edema.  She is also asymptomatic from this.  That said it is located in a peritrochanteric region which does increase risk of fracture.  I recommend outpatient orthopedic follow-up and if becomes symptomatic, prophylactic treatment may be discussed.    --activity as tolerated left leg in meantime  --if becomes acutely symptomatic, will plan for aspiration to r/o infection          Kim Johnson MD  Orthopedics  [facility] - Telemetry ([facility])

## 2022-12-24 NOTE — TRANSFER OF CARE
"Anesthesia Transfer of Care Note    Patient: Meaghan Potter    Procedure(s) Performed: Procedure(s) (LRB):  INCISION AND DRAINAGE, LOWER EXTREMITY (Left)    Patient location: PACU    Anesthesia Type: general    Transport from OR: Transported from OR on room air with adequate spontaneous ventilation    Post pain: adequate analgesia    Post assessment: no apparent anesthetic complications and tolerated procedure well    Post vital signs: stable    Level of consciousness: awake    Nausea/Vomiting: no nausea/vomiting    Complications: none    Transfer of care protocol was followed      Last vitals:   Visit Vitals  /66   Pulse 86   Temp 36.9 °C (98.5 °F) (Oral)   Resp 18   Ht 5' 4" (1.626 m)   Wt 71.2 kg (157 lb)   SpO2 95%   BMI 26.95 kg/m²     "

## 2022-12-24 NOTE — HOSPITAL COURSE
Meaghan Potter is a 67-year-old female past medical history significant for end-stage renal disease on hemodialysis, hypertension, and GERD who is being treated by orthopedics for left hip pain.    12/23- left hip aspiration revealed chyna purulent fluid, 187k cells with 100% segs and negative crystals and decision made to recommend I&D but await HD and f/up cardiac echo and recs

## 2022-12-24 NOTE — CARE UPDATE
Patient elevated CV risk for orthopedic procedure given comorbidities   However patient asymptomatic:  Denies shortness of breath, chest pain, syncope  Still has large pericardial effusion most likely due to significant renal dysfunction on dialysis, no evidence of tamponade    Dr. Aragon  Cardiology

## 2022-12-24 NOTE — PROGRESS NOTES
[facility]  Progress Note    Patient Name: Meaghan Potter  MRN: [id number]  Patient Class: IP- Inpatient   Admission Date: 12/19/2022  Length of Stay: 3 days  Attending Physician: Afshan Márquez MD  Primary Care Provider: Primary Doctor No        Subjective:     Principal Problem:Left hip pain        HPI:  Meaghan Potter is a 67 y.o. female with a PMH  has a past medical history of ESRD (end stage renal disease) on dialysis, GERD (gastroesophageal reflux disease), and Hypertension. who presented as a transfer from [facility] for higher level cardiology and orthopedic care after patient was found to have sustained a left superior labral tear of her acetabulum noted on MRI in addition to new onset atrial fibrillation with RVR requiring diltiazem drip.  Patient reported being in her usual state of health prior to onset of symptoms and reported acute onset of left hip pain while performing leg exercises in bed earlier today.  Patient reported hearing and feeling a pop in her left hip followed by immediate pain which has remained constant and currently rated 6/10 in severity. Pain was described as throbbing/pulling in nature with no known alleviating factors and aggravating factors including weight-bearing, movement, and palpation to the affected area.  She denied endorsing any numbness, tingling, discoloration, or penetrating trauma, but did express decreased range of motion and muscle strength secondary to exacerbation of pain.  While at outside facility, patient became short of breath while lying flat while obtaining MRI of hip and was found to be in atrial fibrillation with RVR requiring diltiazem drip. She denied endorsing any chest pain, lightheadedness, dizziness, visual disturbances, fever, chills, sweats, nausea, vomiting, abdominal pain, changes in bowel/bladder habits, or onset neurological deficits.  All other review of systems negative except as noted above.  Patient  reports being back at her baseline and continues to complain only of left hip pain.  Orthopedics and cardiology consulted and awaiting further evaluation/recommendations.  Of note, patient missed hemodialysis today secondary to pain and going to the emergency room and usually receives HD via left upper extremity fistula on Monday/Wednesday/Friday.  Follows Dr. Ribera outpatient. Nephrology consulted to continue HD inpatient.     PCP: Primary Doctor No        Overview/Hospital Course:  Pt admitted to Observation for Atrial fibrillation (new onset) with RVR with . Cardiology consulted with Cardizem gtt initiated. NSR on monitor with HR in 80's.  BNP elevated and Troponin trended upward (0.076>0.095) with Heparin infusion initiated. Echo showed with concentric hypertrophy and normal systolic function. Atrial fibrillation observed.Biatrial atrial enlargement. Grade III left ventricular diastolic dysfunction. PA systolic pressure is 60 mmHg. Moderate right ventricular enlargement with normal right ventricular systolic function. Pulmonary hypertension. EF 55%. Moderate to severe tricuspid regurgitation. Mild-to-moderate mitral regurgitation. Severe right atrial enlargement. Large posterior pericardial effusion. No evidence of tamponade. Troponin elevated and flat with no cardiac symptoms reported.       Hx of ESRD noted with last HD on 12/16/22- Nephrology consulted with HD performed today. AVG to LUE with +bruit/+thrill present. On 12/21/22, MRI of R hip results pending. IR consulted for evaluation of aspiration/injection of left hip. Rate and rhythm controlled on Cardizem gtt. Heparin infusion continued pending joint aspiration.  Pericardial effusion discussed with Nephrology and Cardiology for recommendations.     Pt also reported L groin pain upon admit. Orthopedic Surgery consulted and pt found to have sustained a left superior labral tear of her acetabulum noted on MRI. Patient went to have aspiration of left  "hip however upon transferring from bed to table had an episode described as seizure-like" seems more that it was hypotension related.  Medications adjusted  She remains on p.o. Cardizem as well as Cardizem drip.  Patient underwent arthrocentesis with 4 cc of pus removed 12/23.  Sent for Gram stain, culture, crystals.  Discussed with Dr. Johnson , orthopedics,crystals are negative patient will need washout .          Interval History:  Patient seen and examined at bedside.  Patient has no complaints except continued pain in her left hip.  Results of Gram stain revealed Gram-negative rods culture pending.  Crystal exam was negative.  Repeat echo this a.m. reveals no change in the size of her posterior pericardial effusion.  On discussion with [facility] and nurses there to not remember need home patient has had systemic infection or tunneled dialysis catheter.    Review of Systems   Constitutional:  Positive for activity change. Negative for fatigue.   Respiratory:  Negative for cough and shortness of breath.    Cardiovascular:  Negative for palpitations (resolved).   Genitourinary:  Negative for urgency.   Musculoskeletal:  Positive for arthralgias (increased pain - left hip) and gait problem. Negative for back pain, joint swelling and myalgias.   Neurological:  Negative for dizziness, seizures, weakness and headaches.   Psychiatric/Behavioral:  Negative for sleep disturbance.    All other systems reviewed and are negative.  Objective:     Vital Signs (Most Recent):  Temp: 98.5 °F (36.9 °C) (12/24/22 0749)  Pulse: 86 (12/24/22 0820)  Resp: 18 (12/24/22 0820)  BP: 111/66 (12/24/22 0749)  SpO2: 95 % (12/24/22 0820) Vital Signs (24h Range):  Temp:  [97.4 °F (36.3 °C)-100.5 °F (38.1 °C)] 98.5 °F (36.9 °C)  Pulse:  [61-98] 86  Resp:  [16-20] 18  SpO2:  [94 %-97 %] 95 %  BP: (103-116)/(56-66) 111/66     Weight: 71.2 kg (157 lb)  Body mass index is 26.95 kg/m².    Intake/Output Summary (Last 24 hours) at 12/24/2022 " 0851  Last data filed at 12/23/2022 1746  Gross per 24 hour   Intake 420 ml   Output 2503 ml   Net -2083 ml      Physical Exam  Vitals reviewed.   Constitutional:       Appearance: Normal appearance. Ill appearance: chronic.   HENT:      Head: Normocephalic and atraumatic.      Mouth/Throat:      Mouth: Mucous membranes are moist.      Pharynx: Oropharynx is clear.   Eyes:      Extraocular Movements: Extraocular movements intact.      Conjunctiva/sclera: Conjunctivae normal.   Cardiovascular:      Rate and Rhythm: Normal rate. Rhythm irregular.      Pulses: Normal pulses.      Heart sounds: Normal heart sounds.      Comments: Left upper arm AV fistula with thrill and bruit no evidence of infection    Pulmonary:      Effort: Pulmonary effort is normal.      Breath sounds: Normal breath sounds.   Abdominal:      General: Bowel sounds are normal.      Palpations: Abdomen is soft.   Musculoskeletal:         General: Tenderness (Left hip to range-of-motion) present. Normal range of motion.      Cervical back: Normal range of motion and neck supple.   Skin:     General: Skin is warm and dry.   Neurological:      General: No focal deficit present.      Mental Status: She is alert and oriented to person, place, and time. Mental status is at baseline.   Psychiatric:         Mood and Affect: Mood normal.         Behavior: Behavior normal.         Thought Content: Thought content normal.       Significant Labs: All pertinent labs within the past 24 hours have been reviewed.  Blood Culture:   Recent Labs   Lab 12/23/22  1131 12/23/22  1504   LABBLOO No Growth to date No Growth to date     CBC:   Recent Labs   Lab 12/23/22  0511 12/24/22  0542   WBC 7.65 6.51  6.51   HGB 11.6* 11.6*  11.6*   HCT 36.4* 36.6*  36.6*   * 144*  144*     CMP:   Recent Labs   Lab 12/23/22  0511 12/24/22  0542   * 135*   K 4.8 3.9   CL 93* 95   CO2 28 24   * 164*   BUN 53* 38*   CREATININE 8.3* 6.2*   CALCIUM 10.0 9.1   ALBUMIN  2.6* 2.4*   ANIONGAP 13 16       Significant Imaging:       Assessment/Plan:      * Left hip pain  Patient presented with acute onset left hip pain x1 day duration in absence of trauma while doing leg exercises in bed and was found to asymmetric large left hip joint effusion, small right hip joint effusion, and evidence of superior labral tear of her left acetabulum noted on MRI.  Patient noted to have 5/5 strength throughout with sensation to light touch grossly intact throughout however, TTP throughout left groin.  Orthopedics consulted and awaiting further evaluation/recommendations.  Plan:  -optimize pain control  -bowel regimen  -bed rest  -nonweightbearing  -PT/OT  -ortho surgery following    The patient underwent arthrocentesis by IR 12/23 with 4 cc pus obtained.  Sent for cell count which revealed 187,000 white blood cells 100% segs.  Crystals negative  and culture pending.  Discussed with the with the patient will go to OR for washout and cultures..  As patient is not febrile, does not have elevated white count will hold off on antibiotics until further cultures were obtained by ortho in OR    Pericardial effusion  -large posterior pericardial effusion   -pt appears asymptomatic   -Nephrology following with HD performed on 12/21/22 and a Monday Wednesday Friday schedule  -Cardiology following- will follow and evaluate after next HD       Bone cyst of right femur  -Orthopedic Surgery following   -follow up outpatient recommended      Shortness of breath  Patient reported acute onset shortness a breath while obtaining MRI and was found to have evidence of severe cardiomegaly with small left pleural effusion but negative for infectious processes noted on CXR. Patient afebrile without leukocytosis. BNP elevated at 4792. Troponin 0.095. Exam positive for bilateral edema but negative for elevated JVD or crackles on lung ausculation. Patient currently saturating % on 2L via NC in no acute distress and  without use of accessory muscles noted.  Patient without history of CHF.  Cardiology consulted. .  Nephrology consulted to resume HD inpatient.  Will trial dose of Lasix if clinical status worsens.  Plan:  -titrate oxygen therapy as needed  -incentive spirometry  -echo reviewed   -f/u cardiology and nephrology  -monitor Is/Os  -low salt/cardiac/renal diet    -Duonebs prn       Tear of left acetabular labrum  -Orthopedic surgery following  -MRI showed no fracture and asymmetric large left hip joint effusion.  Small right hip joint effusion. Paralabral cyst along the superior border of the acetabulum suspicious for a superior labral tear.   -analgesia as needed   -antiemetics as needed   - the left hip may treat with therapy and anti-inflammatories (per Orthopedic Surgery)   -Aspiration of the left hip by Interventional Radiology as above    Gastroesophageal reflux disease without esophagitis  Chronic. Stable. Currently asymptomatic. Home medications include PPI/Antacids as needed.  Plan:  -Continue PPI/Antacids as needed       Primary hypertension  BP currently ranging from 90s systolic.    Plan:  -Optimize pain control   -Continue home medications, titrate as needed   -Monitor BP  -Low salt/renal diet   -IV hydralazine prn for SBP>160 or DBP>90   -f/u nephrology for HD inpatient- HD performed on 12/20/22       ESRD (end stage renal disease) on dialysis  Patient currently follows Dr. Ribera from nephrology with last reported dialysis completed last Friday. Patient currently on M/W/F HD at [facility] via LUE fistula but missed Monday due to not feeling well and being in the ED. Labs consistent with history of ESRD on HD.   Plan:  -continue home medications, titrate as needed  -nephrology consulted -HD performed on 12/20/22 and now back on MWF schedule      Atrial fibrillation  Patient with new onset Paroxysmal (<7 days) atrial fibrillation with RVR which is uncontrolled currently with Calcium Channel Blocker.  Patient is currently in atrial fibrillation with rate in the 105-130s on diltiazem. [id number] Score: 2. HASBLED Score: 3. Anticoagulation indicated. Anticoagulation done with heparin .  Troponin measuring 0.095 with EKG obtained at outside facility revealing SVT/atrial fibrillation with RVR at rate of 176. Troponin likely elevated in setting of ESRD and demand ischemia from arrhythmia as patient denied endorsing chest pain.  Plan:  -telemetry- afib with rates   -cardizem drip transitioned to oral dosing  -f/u cardiology   -nephrology for HD   -Heparin gtt -held for planned surgery  -CHADS-VASc Score- 3         VTE Risk Mitigation (From admission, onward)         Ordered     Place sequential compression device  Until discontinued         12/19/22 2119     IP VTE LOW RISK PATIENT  Once         12/19/22 2119                Discharge Planning   ISAAK:      Code Status: Full Code   Is the patient medically ready for discharge?:     Reason for patient still in hospital (select all that apply): Patient trending condition  Discharge Plan A: Home                  Afshan Jacobs MD  Department of Hospital Medicine   [facility] - Telemetry ([facility])

## 2022-12-24 NOTE — ASSESSMENT & PLAN NOTE
Patient with new onset Paroxysmal (<7 days) atrial fibrillation with RVR which is uncontrolled currently with Calcium Channel Blocker. Patient is currently in atrial fibrillation with rate in the 105-130s on diltiazem. [id number] Score: 2. HASBLED Score: 3. Anticoagulation indicated. Anticoagulation done with heparin .  Troponin measuring 0.095 with EKG obtained at outside facility revealing SVT/atrial fibrillation with RVR at rate of 176. Troponin likely elevated in setting of ESRD and demand ischemia from arrhythmia as patient denied endorsing chest pain.  Plan:  -telemetry- afib with rates   -cardizem drip transitioned to oral dosing  -f/u cardiology   -nephrology for HD   -Heparin gtt -held for planned surgery  -CHADS-VASc Score- 3

## 2022-12-24 NOTE — ANESTHESIA POSTPROCEDURE EVALUATION
Anesthesia Post Evaluation    Patient: Meaghan Potter    Procedure(s) Performed: Procedure(s) (LRB):  INCISION AND DRAINAGE, LOWER EXTREMITY (Left)    Final Anesthesia Type: general      Patient location during evaluation: PACU  Patient participation: Yes- Able to Participate  Level of consciousness: awake and alert  Post-procedure vital signs: reviewed and stable  Pain management: adequate  Airway patency: patent    PONV status at discharge: No PONV  Anesthetic complications: no      Cardiovascular status: blood pressure returned to baseline  Respiratory status: unassisted  Hydration status: euvolemic  Follow-up not needed.          Vitals Value Taken Time   /73 12/24/22 1520   Temp 37.9 °C (100.2 °F) 12/24/22 1435   Pulse 108 12/24/22 1530   Resp 42 12/24/22 1530   SpO2 100 % 12/24/22 1519   Vitals shown include unvalidated device data.      No case tracking events are documented in the log.      Pain/Jessica Score: Pain Rating Prior to Med Admin: 8 (12/23/2022  7:59 PM)  Jessica Score: 8 (12/24/2022  3:00 PM)

## 2022-12-24 NOTE — ASSESSMENT & PLAN NOTE
-Orthopedic surgery following  -MRI showed no fracture and asymmetric large left hip joint effusion.  Small right hip joint effusion. Paralabral cyst along the superior border of the acetabulum suspicious for a superior labral tear.   -analgesia as needed   -antiemetics as needed   - the left hip may treat with therapy and anti-inflammatories (per Orthopedic Surgery)   -Aspiration of the left hip by Interventional Radiology as above

## 2022-12-25 PROBLEM — E87.5 HYPERKALEMIA, DIMINISHED RENAL EXCRETION: Status: ACTIVE | Noted: 2022-12-25

## 2022-12-25 LAB
ALBUMIN SERPL BCP-MCNC: 2.3 G/DL (ref 3.5–5.2)
ANION GAP SERPL CALC-SCNC: 16 MMOL/L (ref 8–16)
BASOPHILS # BLD AUTO: 0.02 K/UL (ref 0–0.2)
BASOPHILS NFR BLD: 0.2 % (ref 0–1.9)
BUN SERPL-MCNC: 54 MG/DL (ref 8–23)
CALCIUM SERPL-MCNC: 8.8 MG/DL (ref 8.7–10.5)
CHLORIDE SERPL-SCNC: 99 MMOL/L (ref 95–110)
CO2 SERPL-SCNC: 18 MMOL/L (ref 23–29)
CREAT SERPL-MCNC: 7.7 MG/DL (ref 0.5–1.4)
DIFFERENTIAL METHOD: ABNORMAL
EOSINOPHIL # BLD AUTO: 0 K/UL (ref 0–0.5)
EOSINOPHIL NFR BLD: 0.2 % (ref 0–8)
ERYTHROCYTE [DISTWIDTH] IN BLOOD BY AUTOMATED COUNT: 14.2 % (ref 11.5–14.5)
EST. GFR  (NO RACE VARIABLE): 5 ML/MIN/1.73 M^2
GLUCOSE SERPL-MCNC: 96 MG/DL (ref 70–110)
HCT VFR BLD AUTO: 33.8 % (ref 37–48.5)
HGB BLD-MCNC: 10.9 G/DL (ref 12–16)
IMM GRANULOCYTES # BLD AUTO: 0.03 K/UL (ref 0–0.04)
IMM GRANULOCYTES NFR BLD AUTO: 0.4 % (ref 0–0.5)
LYMPHOCYTES # BLD AUTO: 0.5 K/UL (ref 1–4.8)
LYMPHOCYTES NFR BLD: 5.7 % (ref 18–48)
MCH RBC QN AUTO: 30.7 PG (ref 27–31)
MCHC RBC AUTO-ENTMCNC: 32.2 G/DL (ref 32–36)
MCV RBC AUTO: 95 FL (ref 82–98)
MONOCYTES # BLD AUTO: 1.2 K/UL (ref 0.3–1)
MONOCYTES NFR BLD: 15.2 % (ref 4–15)
NEUTROPHILS # BLD AUTO: 6.3 K/UL (ref 1.8–7.7)
NEUTROPHILS NFR BLD: 78.3 % (ref 38–73)
NRBC BLD-RTO: 0 /100 WBC
PHOSPHATE SERPL-MCNC: 5.3 MG/DL (ref 2.7–4.5)
PLATELET # BLD AUTO: 154 K/UL (ref 150–450)
PMV BLD AUTO: 11 FL (ref 9.2–12.9)
POTASSIUM SERPL-SCNC: 5.7 MMOL/L (ref 3.5–5.1)
RBC # BLD AUTO: 3.55 M/UL (ref 4–5.4)
SODIUM SERPL-SCNC: 133 MMOL/L (ref 136–145)
VANCOMYCIN SERPL-MCNC: 11 UG/ML
WBC # BLD AUTO: 8.08 K/UL (ref 3.9–12.7)

## 2022-12-25 PROCEDURE — 87040 BLOOD CULTURE FOR BACTERIA: CPT | Performed by: INTERNAL MEDICINE

## 2022-12-25 PROCEDURE — 25000003 PHARM REV CODE 250: Performed by: INTERNAL MEDICINE

## 2022-12-25 PROCEDURE — 63600175 PHARM REV CODE 636 W HCPCS: Performed by: INTERNAL MEDICINE

## 2022-12-25 PROCEDURE — 99232 SBSQ HOSP IP/OBS MODERATE 35: CPT | Mod: ,,, | Performed by: INTERNAL MEDICINE

## 2022-12-25 PROCEDURE — 27000221 HC OXYGEN, UP TO 24 HOURS

## 2022-12-25 PROCEDURE — 63600175 PHARM REV CODE 636 W HCPCS: Performed by: HOSPITALIST

## 2022-12-25 PROCEDURE — 99232 PR SUBSEQUENT HOSPITAL CARE,LEVL II: ICD-10-PCS | Mod: ,,, | Performed by: INTERNAL MEDICINE

## 2022-12-25 PROCEDURE — 85025 COMPLETE CBC W/AUTO DIFF WBC: CPT | Performed by: INTERNAL MEDICINE

## 2022-12-25 PROCEDURE — 36415 COLL VENOUS BLD VENIPUNCTURE: CPT | Performed by: INTERNAL MEDICINE

## 2022-12-25 PROCEDURE — 99900035 HC TECH TIME PER 15 MIN (STAT)

## 2022-12-25 PROCEDURE — 80069 RENAL FUNCTION PANEL: CPT | Performed by: INTERNAL MEDICINE

## 2022-12-25 PROCEDURE — 25000003 PHARM REV CODE 250: Performed by: HOSPITALIST

## 2022-12-25 PROCEDURE — 25000003 PHARM REV CODE 250: Performed by: STUDENT IN AN ORGANIZED HEALTH CARE EDUCATION/TRAINING PROGRAM

## 2022-12-25 PROCEDURE — 21400001 HC TELEMETRY ROOM

## 2022-12-25 PROCEDURE — 80202 ASSAY OF VANCOMYCIN: CPT | Performed by: INTERNAL MEDICINE

## 2022-12-25 RX ADMIN — NEPHROCAP 1 CAPSULE: 1 CAP ORAL at 09:12

## 2022-12-25 RX ADMIN — CEFTAZIDIME 1 G: 1 INJECTION, POWDER, FOR SOLUTION INTRAMUSCULAR; INTRAVENOUS at 05:12

## 2022-12-25 RX ADMIN — DILTIAZEM HYDROCHLORIDE 240 MG: 240 CAPSULE, COATED, EXTENDED RELEASE ORAL at 09:12

## 2022-12-25 RX ADMIN — SODIUM ZIRCONIUM CYCLOSILICATE 10 G: 5 POWDER, FOR SUSPENSION ORAL at 08:12

## 2022-12-25 RX ADMIN — MORPHINE SULFATE 2 MG: 2 INJECTION, SOLUTION INTRAMUSCULAR; INTRAVENOUS at 02:12

## 2022-12-25 RX ADMIN — BRIMONIDINE TARTRATE 1 DROP: 2 SOLUTION/ DROPS OPHTHALMIC at 08:12

## 2022-12-25 RX ADMIN — VANCOMYCIN HYDROCHLORIDE 500 MG: 500 INJECTION, POWDER, LYOPHILIZED, FOR SOLUTION INTRAVENOUS at 02:12

## 2022-12-25 RX ADMIN — SODIUM ZIRCONIUM CYCLOSILICATE 10 G: 5 POWDER, FOR SUSPENSION ORAL at 02:12

## 2022-12-25 RX ADMIN — APIXABAN 5 MG: 2.5 TABLET, FILM COATED ORAL at 08:12

## 2022-12-25 RX ADMIN — HYDROCODONE BITARTRATE AND ACETAMINOPHEN 1 TABLET: 5; 325 TABLET ORAL at 07:12

## 2022-12-25 RX ADMIN — HYDROCODONE BITARTRATE AND ACETAMINOPHEN 1 TABLET: 5; 325 TABLET ORAL at 12:12

## 2022-12-25 RX ADMIN — LOSARTAN POTASSIUM 25 MG: 25 TABLET, FILM COATED ORAL at 09:12

## 2022-12-25 RX ADMIN — SODIUM ZIRCONIUM CYCLOSILICATE 10 G: 5 POWDER, FOR SUSPENSION ORAL at 09:12

## 2022-12-25 RX ADMIN — BRIMONIDINE TARTRATE 1 DROP: 2 SOLUTION/ DROPS OPHTHALMIC at 09:12

## 2022-12-25 NOTE — PROGRESS NOTES
'[facility])  Orthopedics  Progress Note    Patient Name: Meaghan Potter  MRN: [id number]  Admission Date: 12/19/2022  Hospital Length of Stay: 4 days  Attending Provider: Afshan Márquez MD  Primary Care Provider: Primary Doctor No  Follow-up For: Procedure(s) (LRB):  INCISION AND DRAINAGE, LOWER EXTREMITY (Left)    Post-Operative Day: 1 Day Post-Op  Subjective:     Principal Problem:Pyogenic arthritis of left hip    Principal Orthopedic Problem: left septic hip arthritis    Interval History: POD#1 left hip arthrotomy I&D and notes interval improvement in her pain.  Blood cultures positive.  Hip cultures still pending.    Review of patient's allergies indicates:   Allergen Reactions    Amoxicillin Rash       Current Facility-Administered Medications   Medication    0.9%  NaCl infusion    0.9%  NaCl infusion    acetaminophen suppository 650 mg    acetaminophen tablet 650 mg    albuterol-ipratropium 2.5 mg-0.5 mg/3 mL nebulizer solution 3 mL    aluminum-magnesium hydroxide-simethicone 200-200-20 mg/5 mL suspension 30 mL    apixaban tablet 5 mg    brimonidine 0.2% ophthalmic solution 1 drop    BUPivacaine (PF) 0.25% (2.5 mg/ml) injection 12.5 mg    calcitRIOL capsule 0.5 mcg    ceftAZIDime (FORTAZ) 1 g in dextrose 5 % in water (D5W) 5 % 50 mL IVPB (MB+)    dextrose 10% bolus 125 mL 125 mL    dextrose 10% bolus 250 mL 250 mL    diltiaZEM 24 hr capsule 240 mg    [START ON 12/26/2022] ergocalciferol capsule 50,000 Units    glucagon (human recombinant) injection 1 mg    glucose chewable tablet 16 g    glucose chewable tablet 24 g    HYDROcodone-acetaminophen 5-325 mg per tablet 1 tablet    melatonin tablet 6 mg    morphine injection 2 mg    naloxone 0.4 mg/mL injection 0.02 mg    ondansetron injection 4 mg    promethazine tablet 25 mg    senna-docusate 8.6-50 mg per tablet 1 tablet    sodium chloride 0.9% bolus 250 mL 250 mL    sodium chloride 0.9% bolus 250 mL 250 mL     "sodium chloride 0.9% bolus 250 mL 250 mL    sodium chloride 0.9% flush 3 mL    sodium zirconium cyclosilicate packet 10 g    vancomycin - pharmacy to dose    vitamin renal formula (B-complex-vitamin c-folic acid) 1 mg per capsule 1 capsule     Objective:     Vital Signs (Most Recent):  Temp: 98 °F (36.7 °C) (12/25/22 0731)  Pulse: 79 (12/25/22 0917)  Resp: 16 (12/25/22 1445)  BP: (!) 112/59 (12/25/22 0917)  SpO2: 97 % (12/25/22 0917)   Vital Signs (24h Range):  Temp:  [98 °F (36.7 °C)-99.8 °F (37.7 °C)] 98 °F (36.7 °C)  Pulse:  [79-98] 79  Resp:  [16-20] 16  SpO2:  [95 %-97 %] 97 %  BP: ()/(52-90) 112/59     Weight: 71.2 kg (157 lb)  Height: 5' 4" (162.6 cm)  Body mass index is 26.95 kg/m².    No intake or output data in the 24 hours ending 12/25/22 1524            Left Hip Exam     Comments:  Left hip dressing CDI.  Thigh soft.  Fires TA/GSC.  Baseline neuropathy present.          Significant Labs: CBC:   Recent Labs   Lab 12/24/22  0542 12/25/22  0738   WBC 6.51  6.51 8.08   HGB 11.6*  11.6* 10.9*   HCT 36.6*  36.6* 33.8*   *  144* 154     Blood cultures -   Blood Culture, Routine Gram stain jad bottle: Gram positive cocci in chains resembling Strep P    Blood Culture, Routine Results called to and read back by: Coco Baer LPN 12/24/2022  19:35 P    Blood Culture, Routine Gram stain aer bottle: Gram positive cocci in chains resembling Strep P    Blood Culture, Routine Positive results previously called 12/25/2022  00:37 P     Tissue cultures - pending  Left hip aspiration - cxs pending  Component 2 d ago    Gram Stain Result Many WBC's    Gram Stain Result Few Gram positive cocci    Gram Stain Result Rare Gram negative rods      Significant Imaging: None    Assessment/Plan:     * Pyogenic arthritis of left hip  67 y.o. female with ESRD on HD POD#1 left hip arthrotomy I&D for septic arthritis.  Cultures remain pending but blood cultures positive suggestive that bacteremia is " source.    --activity as tolerated, encourage OOB with PT  --antibiotics per primary, possible ID consult could be considered given her PCN allergy  --change dressing POD#2  --follow-up in ortho trauma clinic 2 weeks postop      Bone cyst of right femur  67 y.o. female with incidentally noted right hip cyst.  MRI shows small effusion (nonspecific) and there is no changes within the cyst to suggest infectious and there is no surrounding edema.  She is also asymptomatic from this.  That said it is located in a peritrochanteric region which does increase risk of fracture.  I recommend outpatient orthopedic follow-up and if becomes symptomatic, prophylactic treatment may be discussed.    --activity as tolerated right leg in meantime  --if becomes acutely symptomatic, will plan for aspiration to r/o infection          Kim Johnson MD  Orthopedics  [facility] - Telemetry ([facility])

## 2022-12-25 NOTE — ASSESSMENT & PLAN NOTE
67 y.o. female with ESRD on HD POD#1 left hip arthrotomy I&D for septic arthritis.  Cultures remain pending but blood cultures positive suggestive that bacteremia is source.    --activity as tolerated, encourage OOB with PT  --antibiotics per primary, possible ID consult could be considered given her PCN allergy  --change dressing POD#2  --follow-up in ortho trauma clinic 2 weeks postop

## 2022-12-25 NOTE — ASSESSMENT & PLAN NOTE
67 y.o. female with incidentally noted right hip cyst.  MRI shows small effusion (nonspecific) and there is no changes within the cyst to suggest infectious and there is no surrounding edema.  She is also asymptomatic from this.  That said it is located in a peritrochanteric region which does increase risk of fracture.  I recommend outpatient orthopedic follow-up and if becomes symptomatic, prophylactic treatment may be discussed.    --activity as tolerated right leg in meantime  --if becomes acutely symptomatic, will plan for aspiration to r/o infection

## 2022-12-25 NOTE — CARE UPDATE
Rate controlled afib. Continue PO diltiazem.  Transition to PO eliquis 2.5 mg bid when cleared from surgery standpoint.  Follow up in cardiology clinic.     Dr. Aragon

## 2022-12-25 NOTE — ASSESSMENT & PLAN NOTE
Patient currently follows Dr. Ribera from nephrology with last reported dialysis completed last Friday. Patient currently on M/W/F HD at [facility] via LUE fistula but missed Monday due to not feeling well and being in the ED. Labs consistent with history of ESRD on HD.   Plan:  -continue home medications, titrate as needed  -nephrology consulted -HD performed on 12/20/22 and now back on MWF schedule

## 2022-12-25 NOTE — SUBJECTIVE & OBJECTIVE
Principal Problem:Pyogenic arthritis of left hip    Principal Orthopedic Problem: left septic hip arthritis    Interval History: POD#1 left hip arthrotomy I&D and notes interval improvement in her pain.  Blood cultures positive.  Hip cultures still pending.    Review of patient's allergies indicates:   Allergen Reactions    Amoxicillin Rash       Current Facility-Administered Medications   Medication    0.9%  NaCl infusion    0.9%  NaCl infusion    acetaminophen suppository 650 mg    acetaminophen tablet 650 mg    albuterol-ipratropium 2.5 mg-0.5 mg/3 mL nebulizer solution 3 mL    aluminum-magnesium hydroxide-simethicone 200-200-20 mg/5 mL suspension 30 mL    apixaban tablet 5 mg    brimonidine 0.2% ophthalmic solution 1 drop    BUPivacaine (PF) 0.25% (2.5 mg/ml) injection 12.5 mg    calcitRIOL capsule 0.5 mcg    ceftAZIDime (FORTAZ) 1 g in dextrose 5 % in water (D5W) 5 % 50 mL IVPB (MB+)    dextrose 10% bolus 125 mL 125 mL    dextrose 10% bolus 250 mL 250 mL    diltiaZEM 24 hr capsule 240 mg    [START ON 12/26/2022] ergocalciferol capsule 50,000 Units    glucagon (human recombinant) injection 1 mg    glucose chewable tablet 16 g    glucose chewable tablet 24 g    HYDROcodone-acetaminophen 5-325 mg per tablet 1 tablet    melatonin tablet 6 mg    morphine injection 2 mg    naloxone 0.4 mg/mL injection 0.02 mg    ondansetron injection 4 mg    promethazine tablet 25 mg    senna-docusate 8.6-50 mg per tablet 1 tablet    sodium chloride 0.9% bolus 250 mL 250 mL    sodium chloride 0.9% bolus 250 mL 250 mL    sodium chloride 0.9% bolus 250 mL 250 mL    sodium chloride 0.9% flush 3 mL    sodium zirconium cyclosilicate packet 10 g    vancomycin - pharmacy to dose    vitamin renal formula (B-complex-vitamin c-folic acid) 1 mg per capsule 1 capsule     Objective:     Vital Signs (Most Recent):  Temp: 98 °F (36.7 °C) (12/25/22 0731)  Pulse: 79 (12/25/22 0917)  Resp: 16 (12/25/22 1445)  BP: (!) 112/59 (12/25/22 0917)  SpO2: 97 %  "(12/25/22 0917)   Vital Signs (24h Range):  Temp:  [98 °F (36.7 °C)-99.8 °F (37.7 °C)] 98 °F (36.7 °C)  Pulse:  [79-98] 79  Resp:  [16-20] 16  SpO2:  [95 %-97 %] 97 %  BP: ()/(52-90) 112/59     Weight: 71.2 kg (157 lb)  Height: 5' 4" (162.6 cm)  Body mass index is 26.95 kg/m².    No intake or output data in the 24 hours ending 12/25/22 1524            Left Hip Exam     Comments:  Left hip dressing CDI.  Thigh soft.  Fires TA/GSC.  Baseline neuropathy present.          Significant Labs: CBC:   Recent Labs   Lab 12/24/22  0542 12/25/22  0738   WBC 6.51  6.51 8.08   HGB 11.6*  11.6* 10.9*   HCT 36.6*  36.6* 33.8*   *  144* 154     Blood cultures -   Blood Culture, Routine Gram stain jad bottle: Gram positive cocci in chains resembling Strep P    Blood Culture, Routine Results called to and read back by: Coco Baer LPN 12/24/2022  19:35 P    Blood Culture, Routine Gram stain aer bottle: Gram positive cocci in chains resembling Strep P    Blood Culture, Routine Positive results previously called 12/25/2022  00:37 P     Tissue cultures - pending  Left hip aspiration - cxs pending  Component 2 d ago    Gram Stain Result Many WBC's    Gram Stain Result Few Gram positive cocci    Gram Stain Result Rare Gram negative rods      Significant Imaging: None  "

## 2022-12-25 NOTE — ASSESSMENT & PLAN NOTE
Patient presented with acute onset left hip pain x1 day duration in absence of trauma while doing leg exercises in bed and was found to asymmetric large left hip joint effusion, small right hip joint effusion, and evidence of superior labral tear of her left acetabulum noted on MRI.  Patient noted to have 5/5 strength throughout with sensation to light touch grossly intact throughout however, TTP throughout left groin.  Orthopedics consulted and awaiting further evaluation/recommendations.  Plan:  -optimize pain control  -bowel regimen  -PT/OT  -ortho surgery following    The patient underwent arthrocentesis by IR 12/23 with 4 cc pus obtained.  Sent for cell count which revealed 187,000 white blood cells 100% segs.  Crystals negative  and culture Gram-negative and Gram-positive.  Patient went for washout 12/24.  Placed on empiric vanco and ceftazidime awaiting sensitivities    Blood cultures positive we will repeat and await ID and sensitivity

## 2022-12-25 NOTE — ASSESSMENT & PLAN NOTE
Patient with new onset Paroxysmal (<7 days) atrial fibrillation with RVR which is uncontrolled currently with Calcium Channel Blocker. Patient is currently in atrial fibrillation with rate in the 105-130s on diltiazem. [id number] Score: 2. HASBLED Score: 3. Anticoagulation indicated. Anticoagulation done with heparin .  Troponin measuring 0.095 with EKG obtained at outside facility revealing SVT/atrial fibrillation with RVR at rate of 176. Troponin likely elevated in setting of ESRD and demand ischemia from arrhythmia as patient denied endorsing chest pain.  Plan:  -telemetry- afib with rates   -cardizem drip transitioned to oral dosing  -f/u cardiology   -nephrology for HD   -Heparin gtt -transitioned to eliquis  -CHADS-VASc Score- 3

## 2022-12-25 NOTE — PROGRESS NOTES
[facility] - Telemetry ([facility])  Nephrology  Progress Note    Patient Name: Meaghan Potter  MRN: [id number]  Admission Date: 12/19/2022  Hospital Length of Stay: 4 days  Attending Provider: Afshan Márquez MD   Primary Care Physician: Primary Doctor No  Principal Problem:Pyogenic arthritis of left hip  Reason for Consult: Management of ESRD  Primary Nephrologist: Dr. Ribera/[facility]       Subjective:     HPI: 68 yo female with ESRD on MWF last HD last Friday (12/16) admitted as a transfer for Cardiology, Ortho and Nephrology. She is currently seen on dialysis, using a LUE AVF. Dialysis vintage is 13 years. Her only complaint to me is pain in both hips described as 'stiff'   No current SOB, no chest pain but describes poor appetite since in pain.     12/21  - describes left hip pain as shooting pain  - eating lunch, not great appetite    12/22  - bed transfer in Radiology with 'episode' this am   - seen in room s/p Ativan IV X 1  - s/p HD yesterday     *For 12/23/22: First visit with patient; seen and examined; chart reviewed. Denies any discomfort.     *For 12/24/22: Off floor in surgery; chart reviewed.      *For 12/25/22: Tolerated hip washout yesterday.    Review of patient's allergies indicates:   Allergen Reactions    Amoxicillin Rash     Current Facility-Administered Medications   Medication Frequency    0.9%  NaCl infusion Once    0.9%  NaCl infusion Once    acetaminophen suppository 650 mg Q6H PRN    acetaminophen tablet 650 mg Q8H PRN    albuterol-ipratropium 2.5 mg-0.5 mg/3 mL nebulizer solution 3 mL Q6H PRN    aluminum-magnesium hydroxide-simethicone 200-200-20 mg/5 mL suspension 30 mL QID PRN    brimonidine 0.2% ophthalmic solution 1 drop BID    BUPivacaine (PF) 0.25% (2.5 mg/ml) injection 12.5 mg Once    calcitRIOL capsule 0.5 mcg Every Mon, Wed, Fri    ceftAZIDime (FORTAZ) 1 g in dextrose 5 % in water (D5W) 5 % 50 mL IVPB (MB+) Q24H    dextrose 10% bolus 125 mL 125 mL PRN    dextrose 10% bolus  250 mL 250 mL PRN    diltiaZEM 24 hr capsule 240 mg Daily    [START ON 12/26/2022] ergocalciferol capsule 50,000 Units Q7 Days    glucagon (human recombinant) injection 1 mg PRN    glucose chewable tablet 16 g PRN    glucose chewable tablet 24 g PRN    HYDROcodone-acetaminophen 5-325 mg per tablet 1 tablet Q6H PRN    losartan tablet 25 mg Daily    melatonin tablet 6 mg Nightly PRN    morphine injection 2 mg Q4H PRN    naloxone 0.4 mg/mL injection 0.02 mg PRN    ondansetron injection 4 mg Q8H PRN    promethazine tablet 25 mg Q6H PRN    senna-docusate 8.6-50 mg per tablet 1 tablet BID PRN    sodium chloride 0.9% bolus 250 mL 250 mL PRN    sodium chloride 0.9% bolus 250 mL 250 mL PRN    sodium chloride 0.9% bolus 250 mL 250 mL PRN    sodium chloride 0.9% flush 3 mL Q12H PRN    sodium zirconium cyclosilicate packet 10 g TID    vancomycin - pharmacy to dose pharmacy to manage frequency    vitamin renal formula (B-complex-vitamin c-folic acid) 1 mg per capsule 1 capsule Daily           Review of Systems   Constitutional:  Negative for fever.   Musculoskeletal:  Positive for arthralgias.   Allergic/Immunologic: Positive for immunocompromised state.   Objective:     Vital Signs (Most Recent):  Temp: 98 °F (36.7 °C) (12/25/22 0731)  Pulse: 79 (12/25/22 0917)  Resp: 16 (12/25/22 0731)  BP: (!) 112/59 (12/25/22 0917)  SpO2: 97 % (12/25/22 0917) Vital Signs (24h Range):  Temp:  [98 °F (36.7 °C)-100.2 °F (37.9 °C)] 98 °F (36.7 °C)  Pulse:  [] 79  Resp:  [15-24] 16  SpO2:  [94 %-99 %] 97 %  BP: ()/() 112/59     Weight: 71.2 kg (157 lb) (12/24/22 0453)  Body mass index is 26.95 kg/m².  Body surface area is 1.79 meters squared.    I/O last 3 completed shifts:  In: 850 [IV Piggyback:850]  Out: 150 [Blood:150]    Physical Exam  Vitals and nursing note reviewed.   Constitutional:       General: She is not in acute distress.     Appearance: She is obese.   Cardiovascular:      Rate and Rhythm: Normal rate.    Pulmonary:      Effort: Pulmonary effort is normal. No respiratory distress.   Neurological:      Mental Status: She is alert and oriented to person, place, and time.   Psychiatric:         Mood and Affect: Mood normal.       Significant Labs: reviewed        Assessment/Plan:     Active Diagnoses:    Diagnosis Date Noted POA    PRINCIPAL PROBLEM:  Pyogenic arthritis of left hip [M00.9] 12/20/2022 Yes    Pericardial effusion [I31.39] 12/21/2022 Yes    ESRD (end stage renal disease) on dialysis [N18.6, Z99.2] 12/20/2022 Not Applicable    Primary hypertension [I10] 12/20/2022 Yes    Gastroesophageal reflux disease without esophagitis [K21.9] 12/20/2022 Yes    Tear of left acetabular labrum [S73.192A] 12/20/2022 Yes    Shortness of breath [R06.02] 12/20/2022 Yes    Bone cyst of right femur [M85.651] 12/20/2022 Yes    Atrial fibrillation [I48.91] 12/19/2022 Yes      Problems Resolved During this Admission:       ESRD on MWF  - next dialysis tomorrow  - s/p HD Tuesday and Wednesday    Mineral and Bone Disease  - calcitriol 0.5mcg MWF  - continue D2 weekly   - reports no phos binders at home but elevated here, monitor phos level and continue low phos diet    HTN  - adjustments made by HM and Cardiology     Anemia of ESRD  - no ESAs indicated yet   - monitor H&H     Cardiomegaly, quite severe on recent CXR  - pericardial effusion noted on Echo  - etiology of it being uremic is extremely low as she does not miss dialysis and clearance labs from outpatient setting have been above goal  - the pericardial sac is not an area of fluid where dialysis UF can remove     Afib with RVR  - off diltiazem gtts and on po diltiazem IR q6  - per Cardiology    Hip pain  - Ortho notes reviewed    Dr. Glover to assume Nephrology care in am     *For 12/23/22: HD today; second shift; likely hip surgery tomorrow; meds and labs reviewed.  Volume status appears acceptable.  Blood cultures requested to be obtained on HD. Will arrange.  Following  closely with you.     *For 12/24/22: Will attempt to revisit; labs and meds reviewed; next HD session Monday. Following.     *For 12/25/22: Next HD session Monday; meds and labs reviewed; following closely with you.  NB K 5.7; on Lokelma, will dc ARB; low K diet needed.        Moe Glover MD  Nephrology

## 2022-12-25 NOTE — PROGRESS NOTES
Pharmacokinetic Assessment Follow Up: IV Vancomycin    Vancomycin serum concentration assessment(s):    The random level was drawn correctly and can be used to guide therapy at this time. The measurement is below the desired definitive target range of 15 to 20 mcg/mL.    Vancomycin Regimen Plan:    Patient received 1000mg x1 in OR yesterday. Since level is <15 will give a supplemental 500 mg x1 since loading dose should have been 1500mg.   Re-dose when the random level is less than 25 mcg/mL, next level to be drawn at 0600 on 12/26    Drug levels (last 3 results):  Recent Labs   Lab Result Units 12/25/22  1037   Vancomycin, Random ug/mL 11.0       Pharmacy will continue to follow and monitor vancomycin.    Please contact pharmacy at extension [phone] for questions regarding this assessment.    Thank you for the consult,   Jade Simon       Patient brief summary:  Meaghan Potter is a 67 y.o. female initiated on antimicrobial therapy with IV Vancomycin for treatment of bone/joint infection    The patient's current regimen is HD dosing    Drug Allergies:   Review of patient's allergies indicates:   Allergen Reactions    Amoxicillin Rash       Actual Body Weight:   71.2kg    Renal Function:   Estimated Creatinine Clearance: 6.9 mL/min (A) (based on SCr of 7.7 mg/dL (H)).,     Dialysis Method (if applicable):  intermittent HD MWF    CBC (last 72 hours):  Recent Labs   Lab Result Units 12/23/22  0511 12/24/22  0542 12/25/22  0738   WBC K/uL 7.65 6.51  6.51 8.08   Hemoglobin g/dL 11.6* 11.6*  11.6* 10.9*   Hematocrit % 36.4* 36.6*  36.6* 33.8*   Platelets K/uL 142* 144*  144* 154   Gran % % 74.1* 74.3*  74.3* 78.3*   Lymph % % 9.0* 8.1*  8.1* 5.7*   Mono % % 15.0 16.1*  16.1* 15.2*   Eosinophil % % 0.7 0.6  0.6 0.2   Basophil % % 0.3 0.3  0.3 0.2   Differential Method  Automated Automated  Automated Automated       Metabolic Panel (last 72 hours):  Recent Labs   Lab Result Units 12/23/22  0511  12/24/22  0542 12/25/22  0534   Sodium mmol/L 134* 135* 133*   Potassium mmol/L 4.8 3.9 5.7*   Chloride mmol/L 93* 95 99   CO2 mmol/L 28 24 18*   Glucose mg/dL 111* 164* 96   BUN mg/dL 53* 38* 54*   Creatinine mg/dL 8.3* 6.2* 7.7*   Albumin g/dL 2.6* 2.4* 2.3*   Phosphorus mg/dL 5.7* 3.6 5.3*       Vancomycin Administrations:  vancomycin given in the last 96 hours                     vancomycin (VANCOCIN) 1,000 mg in sodium chloride 0.9% 250 mL IVPB (mg) 1,000 mg New Bag 12/24/22 1340    vancomycin injection (g) 1 g Given 12/24/22 1324                    Microbiologic Results:  Microbiology Results (last 7 days)       Procedure Component Value Units Date/Time    Culture, Anaerobic [[id number]]     Order Status: Completed Specimen: Joint Fluid from Hip, Left     Blood culture [[id number]] Collected: 12/23/22 1131    Order Status: Completed Specimen: Blood from Antecubital, Right Arm Updated: 12/25/22 0948     Blood Culture, Routine Gram stain jad bottle: Gram positive cocci in chains resembling Strep      Gram stain aer bottle: Gram positive cocci in chains resembling Strep      Results called to and read back by: Coco Baer LPN 12/24/2022  19:35    AFB culture [[id number]] Collected: 12/23/22 1007    Order Status: Completed Specimen: Joint Fluid from Hip, Left Updated: 12/25/22 0927     AFB Culture & Smear Culture in progress    Narrative:      Joint Fluid    Blood culture [[id number]] Collected: 12/25/22 0912    Order Status: Sent Specimen: Blood Updated: 12/25/22 0912    Blood culture [[id number]] Collected: 12/23/22 1504    Order Status: Completed Specimen: Blood from Antecubital, Right Arm Updated: 12/25/22 0037     Blood Culture, Routine Gram stain jad bottle: Gram positive cocci in chains resembling Strep      Results called to and read back by: Coco Baer LPN 12/24/2022  19:35      Gram stain aer bottle: Gram positive cocci in chains resembling Strep      Positive results previously called 12/25/2022  00:37     Narrative:      Collection has been rescheduled by KAVIN at 12/23/2022 11:33 Reason:   Nurse Draw  Collection has been rescheduled by KAVIN at 12/23/2022 11:33 Reason:   Nurse Draw    Tissue culture [[id number]] Collected: 12/24/22 1354    Order Status: Completed Specimen: Tissue from Hip, Left Updated: 12/24/22 2233     Gram Stain Result Many WBC's      Rare Gram positive cocci      Rare Gram negative rods    Gram stain [[id number]] Collected: 12/23/22 1007    Order Status: Completed Specimen: Joint Fluid from Hip, Left Updated: 12/23/22 2335     Gram Stain Result Many WBC's      Few Gram positive cocci      Rare Gram negative rods    Narrative:      Joint Fluid    Aerobic culture [[id number]] Collected: 12/23/22 1007    Order Status: No result Specimen: Joint Fluid from Hip, Left Updated: 12/23/22 2300    Culture, body fluid - Bactec [[id number]] Collected: 12/23/22 1007    Order Status: Canceled Specimen: Joint Fluid from Hip, Left

## 2022-12-25 NOTE — SUBJECTIVE & OBJECTIVE
Interval History:  Patient reports feeling better today.  No shortness of breath, no chest pain, hip pain improved.    Review of Systems   Constitutional:  Positive for activity change. Negative for fatigue.   Respiratory:  Negative for cough and shortness of breath.    Cardiovascular:  Negative for palpitations (resolved).   Genitourinary:  Negative for urgency.   Musculoskeletal:  Positive for arthralgias (left hip Improved) and gait problem. Negative for back pain, joint swelling and myalgias.   Neurological:  Negative for dizziness, seizures, weakness and headaches.   Psychiatric/Behavioral:  Negative for sleep disturbance.    All other systems reviewed and are negative.  Objective:     Vital Signs (Most Recent):  Temp: 98 °F (36.7 °C) (12/25/22 0731)  Pulse: 79 (12/25/22 0917)  Resp: 16 (12/25/22 1238)  BP: (!) 112/59 (12/25/22 0917)  SpO2: 97 % (12/25/22 0917)   Vital Signs (24h Range):  Temp:  [98 °F (36.7 °C)-100.2 °F (37.9 °C)] 98 °F (36.7 °C)  Pulse:  [] 79  Resp:  [15-24] 16  SpO2:  [94 %-99 %] 97 %  BP: ()/() 112/59     Weight: 71.2 kg (157 lb)  Body mass index is 26.95 kg/m².    Intake/Output Summary (Last 24 hours) at 12/25/2022 1356  Last data filed at 12/24/2022 1429  Gross per 24 hour   Intake 850 ml   Output 150 ml   Net 700 ml      Physical Exam  Vitals reviewed.   Constitutional:       Appearance: Normal appearance.   HENT:      Head: Normocephalic and atraumatic.      Mouth/Throat:      Mouth: Mucous membranes are moist.      Pharynx: Oropharynx is clear.   Eyes:      Extraocular Movements: Extraocular movements intact.      Conjunctiva/sclera: Conjunctivae normal.   Cardiovascular:      Rate and Rhythm: Normal rate. Rhythm irregular.      Pulses: Normal pulses.      Heart sounds: Normal heart sounds.   Pulmonary:      Effort: Pulmonary effort is normal.      Breath sounds: Normal breath sounds.   Abdominal:      General: Bowel sounds are normal.      Palpations: Abdomen is  soft.   Musculoskeletal:      Cervical back: Normal range of motion and neck supple.      Comments: Pain in the left hip, incision clean dry intact   Skin:     General: Skin is warm and dry.   Neurological:      General: No focal deficit present.      Mental Status: She is alert and oriented to person, place, and time. Mental status is at baseline.   Psychiatric:         Mood and Affect: Mood normal.         Behavior: Behavior normal.         Thought Content: Thought content normal.       Significant Labs: All pertinent labs within the past 24 hours have been reviewed.  CBC:   Recent Labs   Lab 12/24/22  0542 12/25/22  0738   WBC 6.51  6.51 8.08   HGB 11.6*  11.6* 10.9*   HCT 36.6*  36.6* 33.8*   *  144* 154     CMP:   Recent Labs   Lab 12/24/22  0542 12/25/22  0534   * 133*   K 3.9 5.7*   CL 95 99   CO2 24 18*   * 96   BUN 38* 54*   CREATININE 6.2* 7.7*   CALCIUM 9.1 8.8   ALBUMIN 2.4* 2.3*   ANIONGAP 16 16       Significant Imaging: I have reviewed all pertinent imaging results/findings within the past 24 hours.

## 2022-12-25 NOTE — PROGRESS NOTES
[facility] Medicine  Progress Note    Patient Name: Meaghan Potter  MRN: [id number]  Patient Class: IP- Inpatient   Admission Date: 12/19/2022  Length of Stay: 4 days  Attending Physician: Afshan Márquez MD  Primary Care Provider: Primary Doctor No        Subjective:     Principal Problem:Pyogenic arthritis of left hip        HPI:  Meaghan Potter is a 67 y.o. female with a PMH  has a past medical history of ESRD (end stage renal disease) on dialysis, GERD (gastroesophageal reflux disease), and Hypertension. who presented as a transfer from [facility] for higher level cardiology and orthopedic care after patient was found to have sustained a left superior labral tear of her acetabulum noted on MRI in addition to new onset atrial fibrillation with RVR requiring diltiazem drip.  Patient reported being in her usual state of health prior to onset of symptoms and reported acute onset of left hip pain while performing leg exercises in bed earlier today.  Patient reported hearing and feeling a pop in her left hip followed by immediate pain which has remained constant and currently rated 6/10 in severity. Pain was described as throbbing/pulling in nature with no known alleviating factors and aggravating factors including weight-bearing, movement, and palpation to the affected area.  She denied endorsing any numbness, tingling, discoloration, or penetrating trauma, but did express decreased range of motion and muscle strength secondary to exacerbation of pain.  While at outside facility, patient became short of breath while lying flat while obtaining MRI of hip and was found to be in atrial fibrillation with RVR requiring diltiazem drip. She denied endorsing any chest pain, lightheadedness, dizziness, visual disturbances, fever, chills, sweats, nausea, vomiting, abdominal pain, changes in bowel/bladder habits, or onset neurological deficits.  All other review of systems negative except as noted  above.  Patient reports being back at her baseline and continues to complain only of left hip pain.  Orthopedics and cardiology consulted and awaiting further evaluation/recommendations.  Of note, patient missed hemodialysis today secondary to pain and going to the emergency room and usually receives HD via left upper extremity fistula on Monday/Wednesday/Friday.  Follows Dr. Ribera outpatient. Nephrology consulted to continue HD inpatient.     PCP: Primary Doctor No        Overview/Hospital Course:  Pt admitted to Observation for Atrial fibrillation (new onset) with RVR with . Cardiology consulted with Cardizem gtt initiated. NSR on monitor with HR in 80's.  BNP elevated and Troponin trended upward (0.076>0.095) with Heparin infusion initiated. Echo showed with concentric hypertrophy and normal systolic function. Atrial fibrillation observed.Biatrial atrial enlargement. Grade III left ventricular diastolic dysfunction. PA systolic pressure is 60 mmHg. Moderate right ventricular enlargement with normal right ventricular systolic function. Pulmonary hypertension. EF 55%. Moderate to severe tricuspid regurgitation. Mild-to-moderate mitral regurgitation. Severe right atrial enlargement. Large posterior pericardial effusion. No evidence of tamponade. Troponin elevated and flat with no cardiac symptoms reported. Pt also reported L groin pain upon admit. Orthopedic Surgery consulted and pt found to have sustained a left superior labral tear of her acetabulum noted on MRI with further orthopedic work up to be completed outpatient. Hx of ESRD noted with last HD on 12/16/22- Nephrology consulted with HD performed today. AVG to LUE with +bruit/+thrill present. On 12/21/22, MRI of R hip results pending. IR consulted for evaluation of aspiration/injection of left hip. Rate and rhythm controlled on Cardizem gtt. Heparin infusion continued pending joint aspiration.  Pericardial effusion discussed with Nephrology and  "Cardiology for recommendations.   Patient went to have aspiration of left hip however upon transferring from bed to table had an episode described as seizure-like" seems more that it was hypotension related.  Medications adjusted  She remains on p.o. Cardizem as well as Cardizem drip.  Patient underwent arthrocentesis with 4 cc of pus removed 12/23.  Sent for Gram stain, culture, crystals.  Discussed with Dr. Johnson , orthopedics,crystals are negative patient underwent washout with  Cultures 12/24.  Blood cultures and culture from left hip reveal Gram-positive and Gram-negative.  Patient paced on ceftazidime and vancomycin empirically until cultures return.      Interval History:  Patient reports feeling better today.  No shortness of breath, no chest pain, hip pain improved.    Review of Systems   Constitutional:  Positive for activity change. Negative for fatigue.   Respiratory:  Negative for cough and shortness of breath.    Cardiovascular:  Negative for palpitations (resolved).   Genitourinary:  Negative for urgency.   Musculoskeletal:  Positive for arthralgias (left hip Improved) and gait problem. Negative for back pain, joint swelling and myalgias.   Neurological:  Negative for dizziness, seizures, weakness and headaches.   Psychiatric/Behavioral:  Negative for sleep disturbance.    All other systems reviewed and are negative.  Objective:     Vital Signs (Most Recent):  Temp: 98 °F (36.7 °C) (12/25/22 0731)  Pulse: 79 (12/25/22 0917)  Resp: 16 (12/25/22 1238)  BP: (!) 112/59 (12/25/22 0917)  SpO2: 97 % (12/25/22 0917)   Vital Signs (24h Range):  Temp:  [98 °F (36.7 °C)-100.2 °F (37.9 °C)] 98 °F (36.7 °C)  Pulse:  [] 79  Resp:  [15-24] 16  SpO2:  [94 %-99 %] 97 %  BP: ()/() 112/59     Weight: 71.2 kg (157 lb)  Body mass index is 26.95 kg/m².    Intake/Output Summary (Last 24 hours) at 12/25/2022 1356  Last data filed at 12/24/2022 1429  Gross per 24 hour   Intake 850 ml   Output 150 ml   Net " 700 ml      Physical Exam  Vitals reviewed.   Constitutional:       Appearance: Normal appearance.   HENT:      Head: Normocephalic and atraumatic.      Mouth/Throat:      Mouth: Mucous membranes are moist.      Pharynx: Oropharynx is clear.   Eyes:      Extraocular Movements: Extraocular movements intact.      Conjunctiva/sclera: Conjunctivae normal.   Cardiovascular:      Rate and Rhythm: Normal rate. Rhythm irregular.      Pulses: Normal pulses.      Heart sounds: Normal heart sounds.   Pulmonary:      Effort: Pulmonary effort is normal.      Breath sounds: Normal breath sounds.   Abdominal:      General: Bowel sounds are normal.      Palpations: Abdomen is soft.   Musculoskeletal:      Cervical back: Normal range of motion and neck supple.      Comments: Pain in the left hip, incision clean dry intact   Skin:     General: Skin is warm and dry.   Neurological:      General: No focal deficit present.      Mental Status: She is alert and oriented to person, place, and time. Mental status is at baseline.   Psychiatric:         Mood and Affect: Mood normal.         Behavior: Behavior normal.         Thought Content: Thought content normal.       Significant Labs: All pertinent labs within the past 24 hours have been reviewed.  CBC:   Recent Labs   Lab 12/24/22  0542 12/25/22  0738   WBC 6.51  6.51 8.08   HGB 11.6*  11.6* 10.9*   HCT 36.6*  36.6* 33.8*   *  144* 154     CMP:   Recent Labs   Lab 12/24/22  0542 12/25/22  0534   * 133*   K 3.9 5.7*   CL 95 99   CO2 24 18*   * 96   BUN 38* 54*   CREATININE 6.2* 7.7*   CALCIUM 9.1 8.8   ALBUMIN 2.4* 2.3*   ANIONGAP 16 16       Significant Imaging: I have reviewed all pertinent imaging results/findings within the past 24 hours.      Assessment/Plan:      * Pyogenic arthritis of left hip  Patient presented with acute onset left hip pain x1 day duration in absence of trauma while doing leg exercises in bed and was found to asymmetric large left hip  joint effusion, small right hip joint effusion, and evidence of superior labral tear of her left acetabulum noted on MRI.  Patient noted to have 5/5 strength throughout with sensation to light touch grossly intact throughout however, TTP throughout left groin.  Orthopedics consulted and awaiting further evaluation/recommendations.  Plan:  -optimize pain control  -bowel regimen  -PT/OT  -ortho surgery following    The patient underwent arthrocentesis by IR 12/23 with 4 cc pus obtained.  Sent for cell count which revealed 187,000 white blood cells 100% segs.  Crystals negative  and culture Gram-negative and Gram-positive.  Patient went for washout 12/24.  Placed on empiric vanco and ceftazidime awaiting sensitivities    Blood cultures positive we will repeat and await ID and sensitivity    Pericardial effusion  -large posterior pericardial effusion   -pt appears asymptomatic   -Nephrology following with HD performed on 12/21/22 and a Monday Wednesday Friday schedule  -Cardiology following- will follow and evaluate after next HD       Bone cyst of right femur  -Orthopedic Surgery following   -follow up outpatient recommended          Shortness of breath  Patient reported acute onset shortness a breath while obtaining MRI and was found to have evidence of severe cardiomegaly with small left pleural effusion but negative for infectious processes noted on CXR. Patient afebrile without leukocytosis. BNP elevated at 4792. Troponin 0.095. Exam positive for bilateral edema but negative for elevated JVD or crackles on lung ausculation. Patient currently saturating % on 2L via NC in no acute distress and without use of accessory muscles noted.  Patient without history of CHF.  Cardiology consulted.  Echo ordered and pending.  Nephrology consulted to resume HD inpatient.  Will trial dose of Lasix if clinical status worsens.  Plan:  -titrate oxygen therapy as needed  -incentive spirometry  -echo reviewed   -f/u cardiology  and nephrology  -monitor Is/Os  -low salt/cardiac/renal diet    -Duonebs prn       Tear of left acetabular labrum  -Orthopedic surgery following  -MRI showed no fracture and asymmetric large left hip joint effusion.  Small right hip joint effusion. Paralabral cyst along the superior border of the acetabulum suspicious for a superior labral tear.   -analgesia as needed   -antiemetics as needed   - the left hip may treat with therapy and anti-inflammatories (per Orthopedic Surgery)   -Aspiration of the left hip by Interventional Radiology as above    Gastroesophageal reflux disease without esophagitis  Chronic. Stable. Currently asymptomatic. Home medications include PPI/Antacids as needed.  Plan:  -Continue PPI/Antacids as needed       Primary hypertension  BP currently ranging from 90s systolic.    Plan:  -Optimize pain control   -Continue home medications, titrate as needed   -Monitor BP  -Low salt/renal diet   -IV hydralazine prn for SBP>160 or DBP>90   -f/u nephrology for HD inpatient- HD performed on 12/20/22       ESRD (end stage renal disease) on dialysis  Patient currently follows Dr. Ribera from nephrology with last reported dialysis completed last Friday. Patient currently on M/W/F HD at [facility] via LUE fistula but missed Monday due to not feeling well and being in the ED. Labs consistent with history of ESRD on HD.   Plan:  -continue home medications, titrate as needed  -nephrology consulted -HD performed on 12/20/22 and now back on MWF schedule      Atrial fibrillation  Patient with new onset Paroxysmal (<7 days) atrial fibrillation with RVR which is uncontrolled currently with Calcium Channel Blocker. Patient is currently in atrial fibrillation with rate in the 105-130s on diltiazem. [id number] Score: 2. HASBLED Score: 3. Anticoagulation indicated. Anticoagulation done with heparin .  Troponin measuring 0.095 with EKG obtained at outside facility revealing SVT/atrial fibrillation with RVR at rate  of 176. Troponin likely elevated in setting of ESRD and demand ischemia from arrhythmia as patient denied endorsing chest pain.  Plan:  -telemetry- afib with rates   -cardizem drip transitioned to oral dosing  -f/u cardiology   -nephrology for HD   -Heparin gtt -transitioned to eliquis  -CHADS-VASc Score- 3         VTE Risk Mitigation (From admission, onward)         Ordered     Place sequential compression device  Until discontinued         12/19/22 2119     IP VTE LOW RISK PATIENT  Once         12/19/22 2119                Discharge Planning   ISAAK:      Code Status: Full Code   Is the patient medically ready for discharge?:     Reason for patient still in hospital (select all that apply): Treatment  Discharge Plan A: Home                  Afshan Jacobs MD  Department of Hospital Medicine   [facility] - Telemetry ([facility])

## 2022-12-26 LAB
ALBUMIN SERPL BCP-MCNC: 2.5 G/DL (ref 3.5–5.2)
ANION GAP SERPL CALC-SCNC: 17 MMOL/L (ref 8–16)
BACTERIA BLD CULT: ABNORMAL
BUN SERPL-MCNC: 69 MG/DL (ref 8–23)
CALCIUM SERPL-MCNC: 10.4 MG/DL (ref 8.7–10.5)
CHLORIDE SERPL-SCNC: 93 MMOL/L (ref 95–110)
CO2 SERPL-SCNC: 24 MMOL/L (ref 23–29)
CREAT SERPL-MCNC: 9.1 MG/DL (ref 0.5–1.4)
EST. GFR  (NO RACE VARIABLE): 4 ML/MIN/1.73 M^2
GLUCOSE SERPL-MCNC: 115 MG/DL (ref 70–110)
PHOSPHATE SERPL-MCNC: 5.7 MG/DL (ref 2.7–4.5)
POTASSIUM SERPL-SCNC: 4.8 MMOL/L (ref 3.5–5.1)
SODIUM SERPL-SCNC: 134 MMOL/L (ref 136–145)
VANCOMYCIN SERPL-MCNC: 15.3 UG/ML

## 2022-12-26 PROCEDURE — 99223 1ST HOSP IP/OBS HIGH 75: CPT | Mod: NSCH,,, | Performed by: INTERNAL MEDICINE

## 2022-12-26 PROCEDURE — 63600175 PHARM REV CODE 636 W HCPCS: Performed by: INTERNAL MEDICINE

## 2022-12-26 PROCEDURE — 94761 N-INVAS EAR/PLS OXIMETRY MLT: CPT

## 2022-12-26 PROCEDURE — 80100016 HC MAINTENANCE HEMODIALYSIS

## 2022-12-26 PROCEDURE — 80202 ASSAY OF VANCOMYCIN: CPT | Performed by: INTERNAL MEDICINE

## 2022-12-26 PROCEDURE — 90935 HEMODIALYSIS ONE EVALUATION: CPT | Mod: ,,, | Performed by: INTERNAL MEDICINE

## 2022-12-26 PROCEDURE — 25000003 PHARM REV CODE 250: Performed by: HOSPITALIST

## 2022-12-26 PROCEDURE — 25000003 PHARM REV CODE 250: Performed by: STUDENT IN AN ORGANIZED HEALTH CARE EDUCATION/TRAINING PROGRAM

## 2022-12-26 PROCEDURE — 21400001 HC TELEMETRY ROOM

## 2022-12-26 PROCEDURE — 80069 RENAL FUNCTION PANEL: CPT | Performed by: INTERNAL MEDICINE

## 2022-12-26 PROCEDURE — 25000003 PHARM REV CODE 250: Performed by: INTERNAL MEDICINE

## 2022-12-26 PROCEDURE — 36415 COLL VENOUS BLD VENIPUNCTURE: CPT | Performed by: INTERNAL MEDICINE

## 2022-12-26 PROCEDURE — 90935 PR HEMODIALYSIS, ONE EVALUATION: ICD-10-PCS | Mod: ,,, | Performed by: INTERNAL MEDICINE

## 2022-12-26 PROCEDURE — 99223 PR INITIAL HOSPITAL CARE,LEVL III: ICD-10-PCS | Mod: NSCH,,, | Performed by: INTERNAL MEDICINE

## 2022-12-26 RX ORDER — SODIUM CHLORIDE 9 MG/ML
INJECTION, SOLUTION INTRAVENOUS
Status: DISCONTINUED | OUTPATIENT
Start: 2022-12-26 | End: 2023-01-07 | Stop reason: HOSPADM

## 2022-12-26 RX ADMIN — BRIMONIDINE TARTRATE 1 DROP: 2 SOLUTION/ DROPS OPHTHALMIC at 09:12

## 2022-12-26 RX ADMIN — APIXABAN 5 MG: 2.5 TABLET, FILM COATED ORAL at 08:12

## 2022-12-26 RX ADMIN — CALCITRIOL CAPSULES 0.25 MCG 0.5 MCG: 0.25 CAPSULE ORAL at 08:12

## 2022-12-26 RX ADMIN — BRIMONIDINE TARTRATE 1 DROP: 2 SOLUTION/ DROPS OPHTHALMIC at 10:12

## 2022-12-26 RX ADMIN — DILTIAZEM HYDROCHLORIDE 240 MG: 240 CAPSULE, COATED, EXTENDED RELEASE ORAL at 08:12

## 2022-12-26 RX ADMIN — NEPHROCAP 1 CAPSULE: 1 CAP ORAL at 08:12

## 2022-12-26 RX ADMIN — APIXABAN 5 MG: 2.5 TABLET, FILM COATED ORAL at 10:12

## 2022-12-26 RX ADMIN — ERGOCALCIFEROL 50000 UNITS: 1.25 CAPSULE ORAL at 08:12

## 2022-12-26 RX ADMIN — HYDROCODONE BITARTRATE AND ACETAMINOPHEN 1 TABLET: 5; 325 TABLET ORAL at 06:12

## 2022-12-26 RX ADMIN — SODIUM CHLORIDE 30 ML/HR: 9 INJECTION, SOLUTION INTRAVENOUS at 05:12

## 2022-12-26 RX ADMIN — HYDROCODONE BITARTRATE AND ACETAMINOPHEN 1 TABLET: 5; 325 TABLET ORAL at 05:12

## 2022-12-26 RX ADMIN — CEFTRIAXONE 2 G: 2 INJECTION, POWDER, FOR SOLUTION INTRAMUSCULAR; INTRAVENOUS at 06:12

## 2022-12-26 NOTE — ASSESSMENT & PLAN NOTE
Patient currently follows Dr. Ribera from nephrology with last reported dialysis completed last Friday. Patient currently on M/W/F HD at [facility] via LUE fistula but missed Monday due to not feeling well and being in the ED. Labs consistent with history of ESRD on HD.   Plan:  -continue home medications, titrate as needed  -nephrology consulted -HD performed on 12/20/22 and now back on MWF schedule     Home

## 2022-12-26 NOTE — PT/OT/SLP PROGRESS
Physical Therapy      Patient Name:  Meaghan Potter   MRN:  [id number]    ATTEMPTED P.T. TX THIS AM, PT AWAY AT HEMODIALYSIS, WILL ASSESS PT NEXT VISIT, CONT PER POC    Noemi Bean, PT  12/26/2022  1100

## 2022-12-26 NOTE — ASSESSMENT & PLAN NOTE
Patient presented with acute onset left hip pain x1 day duration in absence of trauma while doing leg exercises in bed and was found to asymmetric large left hip joint effusion, small right hip joint effusion, and evidence of superior labral tear of her left acetabulum noted on MRI.  Patient noted to have 5/5 strength throughout with sensation to light touch grossly intact throughout however, TTP throughout left groin.  Orthopedics consulted and awaiting further evaluation/recommendations.  Plan:  -optimize pain control  -bowel regimen  -PT/OT  -ortho surgery following    The patient underwent arthrocentesis by IR 12/23 with 4 cc pus obtained.  Sent for cell count which revealed 187,000 white blood cells 100% segs.  Crystals negative  and culture Gram-negative and Gram-positive.  Patient went for washout 12/24.  Placed on empiric vanco and ceftazidime awaiting sensitivities    Blood cultures positive STREPTOCOCCUS GORDONII  ID consulted

## 2022-12-26 NOTE — PROGRESS NOTES
[facility] - Telemetry ([facility])  Nephrology  Progress Note    Patient Name: Meaghan Potter  MRN: [id number]  Admission Date: 12/19/2022  Hospital Length of Stay: 5 days  Attending Provider: Jonathan Smith, *   Primary Care Physician: Primary Doctor No  Principal Problem:Pyogenic arthritis of left hip  Reason for Consult: Management of ESRD  Primary Nephrologist: Dr. Ribera/[facility]       Subjective:     HPI: 68 yo female with ESRD on MWF last HD last Friday (12/16) admitted as a transfer for Cardiology, Ortho and Nephrology. She is currently seen on dialysis, using a LUE AVF. Dialysis vintage is 13 years. Her only complaint to me is pain in both hips described as 'stiff'   No current SOB, no chest pain but describes poor appetite since in pain.     12/21  - describes left hip pain as shooting pain  - eating lunch, not great appetite    12/22  - bed transfer in Radiology with 'episode' this am   - seen in room s/p Ativan IV X 1  - s/p HD yesterday     *For 12/23/22: First visit with patient; seen and examined; chart reviewed. Denies any discomfort.     *For 12/24/22: Off floor in surgery; chart reviewed.      *For 12/25/22: Tolerated hip washout yesterday.    *For 12/26/22: Seen and examined on HD; none voiced at HD initiation.     Review of patient's allergies indicates:   Allergen Reactions    Amoxicillin Rash     Current Facility-Administered Medications   Medication Frequency    0.9%  NaCl infusion Once    0.9%  NaCl infusion Once    acetaminophen suppository 650 mg Q6H PRN    acetaminophen tablet 650 mg Q8H PRN    albuterol-ipratropium 2.5 mg-0.5 mg/3 mL nebulizer solution 3 mL Q6H PRN    aluminum-magnesium hydroxide-simethicone 200-200-20 mg/5 mL suspension 30 mL QID PRN    apixaban tablet 5 mg BID    brimonidine 0.2% ophthalmic solution 1 drop BID    BUPivacaine (PF) 0.25% (2.5 mg/ml) injection 12.5 mg Once    calcitRIOL capsule 0.5 mcg Every Mon, Wed, Fri    cefTRIAXone (ROCEPHIN) 2 g in dextrose 5 %  in water (D5W) 5 % 50 mL IVPB (MB+) Q24H    dextrose 10% bolus 125 mL 125 mL PRN    dextrose 10% bolus 250 mL 250 mL PRN    diltiaZEM 24 hr capsule 240 mg Daily    ergocalciferol capsule 50,000 Units Q7 Days    glucagon (human recombinant) injection 1 mg PRN    glucose chewable tablet 16 g PRN    glucose chewable tablet 24 g PRN    HYDROcodone-acetaminophen 5-325 mg per tablet 1 tablet Q6H PRN    melatonin tablet 6 mg Nightly PRN    morphine injection 2 mg Q4H PRN    naloxone 0.4 mg/mL injection 0.02 mg PRN    ondansetron injection 4 mg Q8H PRN    promethazine tablet 25 mg Q6H PRN    senna-docusate 8.6-50 mg per tablet 1 tablet BID PRN    sodium chloride 0.9% bolus 250 mL 250 mL PRN    sodium chloride 0.9% bolus 250 mL 250 mL PRN    sodium chloride 0.9% bolus 250 mL 250 mL PRN    sodium chloride 0.9% bolus 250 mL 250 mL PRN    sodium chloride 0.9% flush 3 mL Q12H PRN    vitamin renal formula (B-complex-vitamin c-folic acid) 1 mg per capsule 1 capsule Daily           Review of Systems   Constitutional:  Negative for fever.   Musculoskeletal:  Positive for arthralgias.   Allergic/Immunologic: Positive for immunocompromised state.   Objective:     Vital Signs (Most Recent):  Temp: 97.9 °F (36.6 °C) (12/26/22 0741)  Pulse: 84 (12/26/22 0837)  Resp: 19 (12/26/22 0837)  BP: (!) 100/57 (12/26/22 0741)  SpO2: 95 % (12/26/22 0837) Vital Signs (24h Range):  Temp:  [97.6 °F (36.4 °C)-98.8 °F (37.1 °C)] 97.9 °F (36.6 °C)  Pulse:  [64-92] 84  Resp:  [16-20] 19  SpO2:  [86 %-96 %] 95 %  BP: ()/(51-67) 100/57     Weight: 71.2 kg (157 lb) (12/24/22 0453)  Body mass index is 26.95 kg/m².  Body surface area is 1.79 meters squared.    No intake/output data recorded.    Physical Exam  Vitals and nursing note reviewed.   Constitutional:       General: She is not in acute distress.     Appearance: She is obese.   Cardiovascular:      Rate and Rhythm: Normal rate.   Pulmonary:      Effort: Pulmonary effort is normal. No respiratory  distress.   Neurological:      Mental Status: She is alert and oriented to person, place, and time.   Psychiatric:         Mood and Affect: Mood normal.       Significant Labs: reviewed        Assessment/Plan:     Active Diagnoses:    Diagnosis Date Noted POA    PRINCIPAL PROBLEM:  Pyogenic arthritis of left hip [M00.9] 12/20/2022 Yes    Hyperkalemia, diminished renal excretion [E87.5] 12/25/2022 Yes    Pericardial effusion [I31.39] 12/21/2022 Yes    ESRD (end stage renal disease) on dialysis [N18.6, Z99.2] 12/20/2022 Not Applicable    Primary hypertension [I10] 12/20/2022 Yes    Gastroesophageal reflux disease without esophagitis [K21.9] 12/20/2022 Yes    Tear of left acetabular labrum [S73.192A] 12/20/2022 Yes    Shortness of breath [R06.02] 12/20/2022 Yes    Bone cyst of right femur [M85.651] 12/20/2022 Yes    Atrial fibrillation [I48.91] 12/19/2022 Yes      Problems Resolved During this Admission:       ESRD on MWF  - next dialysis tomorrow  - s/p HD Tuesday and Wednesday    Mineral and Bone Disease  - calcitriol 0.5mcg MWF  - continue D2 weekly   - reports no phos binders at home but elevated here, monitor phos level and continue low phos diet    HTN  - adjustments made by HM and Cardiology     Anemia of ESRD  - no ESAs indicated yet   - monitor H&H     Cardiomegaly, quite severe on recent CXR  - pericardial effusion noted on Echo  - etiology of it being uremic is extremely low as she does not miss dialysis and clearance labs from outpatient setting have been above goal  - the pericardial sac is not an area of fluid where dialysis UF can remove     Afib with RVR  - off diltiazem gtts and on po diltiazem IR q6  - per Cardiology    Hip pain  - Ortho notes reviewed    Dr. Glover to assume Nephrology care in am     *For 12/23/22: HD today; second shift; likely hip surgery tomorrow; meds and labs reviewed.  Volume status appears acceptable.  Blood cultures requested to be obtained on HD. Will arrange.  Following  closely with you.     *For 12/24/22: Will attempt to revisit; labs and meds reviewed; next HD session Monday. Following.     *For 12/25/22: Next HD session Monday; meds and labs reviewed; following closely with you.  NB K 5.7; on kelma, will dc ARB; low K diet needed.      *For 12/26/22: HD in session; aiming 1-2 liters UF; access stable; meds and labs reviewed; K and acid base status acceptable.  Following.     NB Called by HD RN: BP low in 90's apparently was given cardizem this am; will be unable to perform any UF accordingly.       Moe Glover MD  Nephrology

## 2022-12-26 NOTE — SUBJECTIVE & OBJECTIVE
Interval History:     Review of Systems   Constitutional:  Positive for activity change. Negative for fatigue.   HENT: Negative.     Eyes: Negative.    Respiratory:  Negative for cough and shortness of breath.    Cardiovascular:  Negative for palpitations (resolved).   Endocrine: Negative.    Genitourinary:  Negative for urgency.   Musculoskeletal:  Positive for arthralgias (left hip Improved). Negative for back pain, gait problem, joint swelling and myalgias.   Allergic/Immunologic: Negative.    Neurological:  Negative for dizziness, seizures, weakness and headaches.   Psychiatric/Behavioral:  Negative for sleep disturbance.    All other systems reviewed and are negative.  Objective:     Vital Signs (Most Recent):  Temp: 98.1 °F (36.7 °C) (12/26/22 1457)  Pulse: 79 (12/26/22 1457)  Resp: 18 (12/26/22 1457)  BP: 112/60 (12/26/22 1457)  SpO2: (!) 94 % (12/26/22 1457)   Vital Signs (24h Range):  Temp:  [97.1 °F (36.2 °C)-98.8 °F (37.1 °C)] 98.1 °F (36.7 °C)  Pulse:  [64-92] 79  Resp:  [16-20] 18  SpO2:  [86 %-96 %] 94 %  BP: ()/(48-67) 112/60     Weight: 71.2 kg (157 lb)  Body mass index is 26.95 kg/m².    Intake/Output Summary (Last 24 hours) at 12/26/2022 1509  Last data filed at 12/26/2022 1435  Gross per 24 hour   Intake 0 ml   Output 501 ml   Net -501 ml      Physical Exam  Vitals reviewed.   Constitutional:       Appearance: Normal appearance.   HENT:      Head: Normocephalic and atraumatic.      Mouth/Throat:      Mouth: Mucous membranes are moist.      Pharynx: Oropharynx is clear.   Eyes:      Extraocular Movements: Extraocular movements intact.      Conjunctiva/sclera: Conjunctivae normal.   Cardiovascular:      Rate and Rhythm: Normal rate. Rhythm irregular.      Pulses: Normal pulses.      Heart sounds: Normal heart sounds.   Pulmonary:      Effort: Pulmonary effort is normal.      Breath sounds: Normal breath sounds.   Abdominal:      General: Bowel sounds are normal.      Palpations: Abdomen is  soft.   Musculoskeletal:      Cervical back: Normal range of motion and neck supple.      Comments: Pain in the left hip, incision clean dry intact   Skin:     General: Skin is warm and dry.   Neurological:      General: No focal deficit present.      Mental Status: She is alert and oriented to person, place, and time. Mental status is at baseline.   Psychiatric:         Mood and Affect: Mood normal.         Behavior: Behavior normal.         Thought Content: Thought content normal.       Significant Labs: All pertinent labs within the past 24 hours have been reviewed.      Significant Imaging:   MRI Hip Without Contrast Right   Final Result      Limited evaluation secondary to motion.      The right femoral head neck junction cyst represents a benign synovial herniation pit.         Electronically signed by: Rikki Ballard MD   Date:    12/24/2022   Time:    07:41      US Guided Needle Placement   Final Result      Percutaneous ultrasound-guided aspiration of the left hip joint, yielding 4 mL of purulent fluid. No drainage catheter was left in place.      Plan:      Follow-up fluid studies.      ______________________________________________________________________         Electronically signed by: Juan M Panda   Date:    12/23/2022   Time:    10:50      CT Head Without Contrast   Final Result      No CT evidence of acute intracranial abnormality.      All CT scans at this facility are performed  using dose modulation techniques as appropriate to performed exam including the following:  automated exposure control; adjustment of mA and/or kV according to the patients size (this includes techniques or standardized protocols for targeted exams where dose is matched to indication/reason for exam: i.e. extremities or head);  iterative reconstruction technique.         Electronically signed by: Rikki Ballard MD   Date:    12/22/2022   Time:    10:46      X-Ray Chest AP Portable   Final Result      Unchanged cardiomegaly with  unchanged appearance of pericardial effusion compared to 12/19/2022. This is enlarged compared to 02/20/2018.         Electronically signed by: Juan M Panda   Date:    12/22/2022   Time:    10:31      X-Ray Chest AP Portable   Final Result      Severe cardiomegaly. Left lung base obscured by the heart. There may be a small left pleural effusion. Consider further evaluation with PA and lateral chest radiographs. No pneumothorax.         Electronically signed by: Juan M Panda   Date:    12/19/2022   Time:    14:24      MRI Hip Without Contrast Left   Final Result      1. No left hip fracture.   2. Asymmetric large left hip joint effusion.  Small right hip joint effusion.   3. Paralabral cyst along the superior border of the acetabulum suspicious for a superior labral tear.         Electronically signed by: Bob Chester MD   Date:    12/19/2022   Time:    14:32      CT Pelvis Without Contrast   Final Result      No femur fracture. Small left hip joint effusion. Focal calcification lateral to the left acetabulum raising question of avulsion of the rectus femoris ligament or iliofemoral ligament. Consider further evaluation with MRI left hip without IV contrast on a nonemergent basis, or as clinically warranted.         Electronically signed by: Juan M Panda   Date:    12/19/2022   Time:    11:29      X-Ray Pelvis Routine AP   Final Result      No acute abnormality.         Electronically signed by: Juan M Panda   Date:    12/19/2022   Time:    10:17

## 2022-12-26 NOTE — PLAN OF CARE
3 hour I-HDTx in progress as ordered for Dr Glover; due to b/p issues , no UF today. Will plan to keep Net UF @ 0. Will continue to monitor for changes and trends.

## 2022-12-26 NOTE — PROGRESS NOTES
[facility] Tele[facility] ([facility])  Orthopedics  Progress Note    Patient Name: Meaghan Potter  MRN: [id number]  Admission Date: 12/19/2022  Hospital Length of Stay: 5 days  Attending Provider: Jonathan Smith, *  Primary Care Provider: Primary Doctor No  Follow-up For: Procedure(s) (LRB):  INCISION AND DRAINAGE, LOWER EXTREMITY (Left)    Post-Operative Day: 2 Days Post-Op  Subjective:     Principal Problem:Pyogenic arthritis of left hip    Principal Orthopedic Problem: Same    Interval History: POD#2 - off the floor at  currently; blood cxs growing strep species    Review of patient's allergies indicates:   Allergen Reactions    Amoxicillin Rash       Current Facility-Administered Medications   Medication    0.9%  NaCl infusion    0.9%  NaCl infusion    acetaminophen suppository 650 mg    acetaminophen tablet 650 mg    albuterol-ipratropium 2.5 mg-0.5 mg/3 mL nebulizer solution 3 mL    aluminum-magnesium hydroxide-simethicone 200-200-20 mg/5 mL suspension 30 mL    apixaban tablet 5 mg    brimonidine 0.2% ophthalmic solution 1 drop    BUPivacaine (PF) 0.25% (2.5 mg/ml) injection 12.5 mg    calcitRIOL capsule 0.5 mcg    cefTRIAXone (ROCEPHIN) 2 g in dextrose 5 % in water (D5W) 5 % 50 mL IVPB (MB+)    dextrose 10% bolus 125 mL 125 mL    dextrose 10% bolus 250 mL 250 mL    diltiaZEM 24 hr capsule 240 mg    ergocalciferol capsule 50,000 Units    glucagon (human recombinant) injection 1 mg    glucose chewable tablet 16 g    glucose chewable tablet 24 g    HYDROcodone-acetaminophen 5-325 mg per tablet 1 tablet    melatonin tablet 6 mg    morphine injection 2 mg    naloxone 0.4 mg/mL injection 0.02 mg    ondansetron injection 4 mg    promethazine tablet 25 mg    senna-docusate 8.6-50 mg per tablet 1 tablet    sodium chloride 0.9% bolus 250 mL 250 mL    sodium chloride 0.9% bolus 250 mL 250 mL    sodium chloride 0.9% bolus 250 mL 250 mL    sodium chloride 0.9% bolus 250 mL 250 mL    sodium  "chloride 0.9% flush 3 mL    vitamin renal formula (B-complex-vitamin c-folic acid) 1 mg per capsule 1 capsule     Objective:     Vital Signs (Most Recent):  Temp: 97.5 °F (36.4 °C) (12/26/22 1104)  Pulse: 78 (12/26/22 1200)  Resp: 19 (12/26/22 1200)  BP: (!) 85/56 (12/26/22 1200)  SpO2: 96 % (12/26/22 1200)   Vital Signs (24h Range):  Temp:  [97.5 °F (36.4 °C)-98.8 °F (37.1 °C)] 97.5 °F (36.4 °C)  Pulse:  [64-92] 78  Resp:  [16-20] 19  SpO2:  [86 %-96 %] 96 %  BP: ()/(48-67) 85/56     Weight: 71.2 kg (157 lb)  Height: 5' 4" (162.6 cm)  Body mass index is 26.95 kg/m².    No intake or output data in the 24 hours ending 12/26/22 1326    Ortho/SPM Exam    Significant Labs: CBC:   Recent Labs   Lab 12/25/22  0738   WBC 8.08   HGB 10.9*   HCT 33.8*        Wound Culture:   Intraop and aspirate cultures:   Aerobic Culture - Tissue  Abnormal   STREPTOCOCCUS GORDONII   Moderate   For susceptibility see order # [id number]   P      Gram Stain Result Many WBC's    Gram Stain Result Rare Gram positive cocci    Gram Stain Result Rare Gram negative rods      Blood cultures:   Blood Culture, Routine STREPTOCOCCUS GORDONII Abnormal  VC    Resulting Agency [facility]        Susceptibility     Streptococcus gordonii     CULTURE, BLOOD     Cefepime 0.5 mcg/mL Sensitive     Ceftriaxone <=0.25 mcg/mL Sensitive     Penicillin 0.12 mcg/mL Sensitive     Vancomycin 0.5 mcg/mL Sensitive               All pertinent labs within the past 24 hours have been reviewed.    Significant Imaging: None    Assessment/Plan:     * Pyogenic arthritis of left hip  67 y.o. female with ESRD on HD s/p left hip arthrotomy I&D for septic arthritis.      --activity as tolerated, encourage OOB with PT  --antibiotics per primary, possible ID consult could be considered given her PCN allergy  --change dressing today  --follow-up in ortho trauma clinic 2 weeks postop      Bone cyst of right femur  67 y.o. female with incidentally noted right hip cyst.  MRI " shows small effusion (nonspecific) and there is no changes within the cyst to suggest infectious and there is no surrounding edema.  She is also asymptomatic from this.  That said it is located in a peritrochanteric region which does increase risk of fracture.  I recommend outpatient orthopedic follow-up and if becomes symptomatic, prophylactic treatment may be discussed.    --activity as tolerated right leg in meantime  --if becomes acutely symptomatic, will plan for aspiration to r/o infection          Kim Johnson MD  Orthopedics  [facility] Tele[facility] ([facility])

## 2022-12-26 NOTE — PROGRESS NOTES
Pharmacokinetic Assessment Follow Up: IV Vancomycin    Vancomycin serum concentration assessment(s):    The random level was drawn correctly and can be used to guide therapy at this time. The measurement is within the desired definitive target range of 15 to 25 mcg/mL.        Vancomycin Regimen Plan:    Re-dose when the random level is less than 26 mcg/mL, next level to be drawn at 0600 on 12/28    Drug levels (last 3 results):  Recent Labs   Lab Result Units 12/25/22  1037 12/26/22  0617   Vancomycin, Random ug/mL 11.0 15.3       Pharmacy will continue to follow and monitor vancomycin.    Please contact pharmacy at extension [phone] for questions regarding this assessment.    Thank you for the consult,   Camille Maldonado       Patient brief summary:  Meaghan Potter is a 67 y.o. female initiated on antimicrobial therapy with IV Vancomycin for treatment of Pyogenic arthritis of left hip    Drug Allergies:   Review of patient's allergies indicates:   Allergen Reactions    Amoxicillin Rash       Actual Body Weight:   71.2 kg    Renal Function:   Estimated Creatinine Clearance: 5.8 mL/min (A) (based on SCr of 9.1 mg/dL (H)).,     Dialysis Method (if applicable):  intermittent HD on [facility]wf    CBC (last 72 hours):  Recent Labs   Lab Result Units 12/24/22  0542 12/25/22  0738   WBC K/uL 6.51  6.51 8.08   Hemoglobin g/dL 11.6*  11.6* 10.9*   Hematocrit % 36.6*  36.6* 33.8*   Platelets K/uL 144*  144* 154   Gran % % 74.3*  74.3* 78.3*   Lymph % % 8.1*  8.1* 5.7*   Mono % % 16.1*  16.1* 15.2*   Eosinophil % % 0.6  0.6 0.2   Basophil % % 0.3  0.3 0.2   Differential Method  Automated  Automated Automated       Metabolic Panel (last 72 hours):  Recent Labs   Lab Result Units 12/24/22  0542 12/25/22  0534 12/26/22  0617   Sodium mmol/L 135* 133* 134*   Potassium mmol/L 3.9 5.7* 4.8   Chloride mmol/L 95 99 93*   CO2 mmol/L 24 18* 24   Glucose mg/dL 164* 96 115*   BUN mg/dL 38* 54* 69*   Creatinine mg/dL 6.2* 7.7* 9.1*   Albumin  g/dL 2.4* 2.3* 2.5*   Phosphorus mg/dL 3.6 5.3* 5.7*       Vancomycin Administrations:  vancomycin given in the last 96 hours                     vancomycin (VANCOCIN) 500 mg in dextrose 5 % in water (D5W) 5 % 100 mL IVPB (MB+) (mg) 500 mg New Bag 12/25/22 1412    vancomycin (VANCOCIN) 1,000 mg in sodium chloride 0.9% 250 mL IVPB (mg) 1,000 mg New Bag 12/24/22 1340    vancomycin injection (g) 1 g Given 12/24/22 1324                    Microbiologic Results:  Microbiology Results (last 7 days)       Procedure Component Value Units Date/Time    Blood culture [[id number]] Collected: 12/25/22 0912    Order Status: Completed Specimen: Blood Updated: 12/26/22 0115     Blood Culture, Routine No Growth to date    Culture, Anaerobic [[id number]]     Order Status: Completed Specimen: Joint Fluid from Hip, Left     Blood culture [[id number]] Collected: 12/23/22 1131    Order Status: Completed Specimen: Blood from Antecubital, Right Arm Updated: 12/25/22 0948     Blood Culture, Routine Gram stain jad bottle: Gram positive cocci in chains resembling Strep      Gram stain aer bottle: Gram positive cocci in chains resembling Strep      Results called to and read back by: Coco Baer LPN 12/24/2022  19:35    AFB culture [[id number]] Collected: 12/23/22 1007    Order Status: Completed Specimen: Joint Fluid from Hip, Left Updated: 12/25/22 0927     AFB Culture & Smear Culture in progress    Narrative:      Joint Fluid    Blood culture [[id number]] Collected: 12/23/22 1504    Order Status: Completed Specimen: Blood from Antecubital, Right Arm Updated: 12/25/22 0037     Blood Culture, Routine Gram stain jad bottle: Gram positive cocci in chains resembling Strep      Results called to and read back by: Coco Baer LPN 12/24/2022  19:35      Gram stain aer bottle: Gram positive cocci in chains resembling Strep      Positive results previously called 12/25/2022  00:37    Narrative:      Collection has been rescheduled by KAVIN at 12/23/2022  11:33 Reason:   Nurse Draw  Collection has been rescheduled by KAVIN at 12/23/2022 11:33 Reason:   Nurse Draw    Tissue culture [[id number]] Collected: 12/24/22 1354    Order Status: Completed Specimen: Tissue from Hip, Left Updated: 12/24/22 2233     Gram Stain Result Many WBC's      Rare Gram positive cocci      Rare Gram negative rods    Gram stain [[id number]] Collected: 12/23/22 1007    Order Status: Completed Specimen: Joint Fluid from Hip, Left Updated: 12/23/22 2335     Gram Stain Result Many WBC's      Few Gram positive cocci      Rare Gram negative rods    Narrative:      Joint Fluid    Aerobic culture [[id number]] Collected: 12/23/22 1007    Order Status: No result Specimen: Joint Fluid from Hip, Left Updated: 12/23/22 2300    Culture, body fluid - Bactec [[id number]] Collected: 12/23/22 1007    Order Status: Canceled Specimen: Joint Fluid from Hip, Left

## 2022-12-26 NOTE — PROGRESS NOTES
[facility] Emergency Room Medicine  Progress Note    Patient Name: Meaghan Potter  MRN: [id number]  Patient Class: IP- Inpatient   Admission Date: 12/19/2022  Length of Stay: 5 days  Attending Physician: Jonathan Smith, *  Primary Care Provider: Primary Doctor No        Subjective:     Principal Problem:Pyogenic arthritis of left hip        HPI:  Meaghan Potter is a 67 y.o. female with a PMH  has a past medical history of ESRD (end stage renal disease) on dialysis, GERD (gastroesophageal reflux disease), and Hypertension. who presented as a transfer from [facility] for higher level cardiology and orthopedic care after patient was found to have sustained a left superior labral tear of her acetabulum noted on MRI in addition to new onset atrial fibrillation with RVR requiring diltiazem drip.  Patient reported being in her usual state of health prior to onset of symptoms and reported acute onset of left hip pain while performing leg exercises in bed earlier today.  Patient reported hearing and feeling a pop in her left hip followed by immediate pain which has remained constant and currently rated 6/10 in severity. Pain was described as throbbing/pulling in nature with no known alleviating factors and aggravating factors including weight-bearing, movement, and palpation to the affected area.  She denied endorsing any numbness, tingling, discoloration, or penetrating trauma, but did express decreased range of motion and muscle strength secondary to exacerbation of pain.  While at outside facility, patient became short of breath while lying flat while obtaining MRI of hip and was found to be in atrial fibrillation with RVR requiring diltiazem drip. She denied endorsing any chest pain, lightheadedness, dizziness, visual disturbances, fever, chills, sweats, nausea, vomiting, abdominal pain, changes in bowel/bladder habits, or onset neurological deficits.  All other review of systems negative except as noted  above.  Patient reports being back at her baseline and continues to complain only of left hip pain.  Orthopedics and cardiology consulted and awaiting further evaluation/recommendations.  Of note, patient missed hemodialysis today secondary to pain and going to the emergency room and usually receives HD via left upper extremity fistula on Monday/Wednesday/Friday.  Follows Dr. Ribera outpatient. Nephrology consulted to continue HD inpatient.     PCP: Primary Doctor No        Overview/Hospital Course:  Pt admitted to Observation for Atrial fibrillation (new onset) with RVR with . Cardiology consulted with Cardizem gtt initiated. NSR on monitor with HR in 80's.  BNP elevated and Troponin trended upward (0.076>0.095) with Heparin infusion initiated. Echo showed with concentric hypertrophy and normal systolic function. Atrial fibrillation observed.Biatrial atrial enlargement. Grade III left ventricular diastolic dysfunction. PA systolic pressure is 60 mmHg. Moderate right ventricular enlargement with normal right ventricular systolic function. Pulmonary hypertension. EF 55%. Moderate to severe tricuspid regurgitation. Mild-to-moderate mitral regurgitation. Severe right atrial enlargement. Large posterior pericardial effusion. No evidence of tamponade. Troponin elevated and flat with no cardiac symptoms reported. Pt also reported L groin pain upon admit. Orthopedic Surgery consulted and pt found to have sustained a left superior labral tear of her acetabulum noted on MRI with further orthopedic work up to be completed outpatient. Hx of ESRD noted with last HD on 12/16/22- Nephrology consulted with HD performed today. AVG to LUE with +bruit/+thrill present. On 12/21/22, MRI of R hip results pending. IR consulted for evaluation of aspiration/injection of left hip. Rate and rhythm controlled on Cardizem gtt. Heparin infusion continued pending joint aspiration.  Pericardial effusion discussed with Nephrology and  "Cardiology for recommendations.   Patient went to have aspiration of left hip however upon transferring from bed to table had an episode described as seizure-like" seems more that it was hypotension related.  Medications adjusted  She remains on p.o. Cardizem as well as Cardizem drip.  Patient underwent arthrocentesis with 4 cc of pus removed 12/23.  Sent for Gram stain, culture, crystals.  Discussed with Dr. Johnsno , orthopedics,crystals are negative patient underwent washout with  Cultures 12/24.  Blood cultures and culture from left hip reveal Gram-positive and Gram-negative.  Patient paced on ceftazidime and vancomycin empirically until cultures return.  12/26 She is complaining of weakness . The blood and drainage cx are (+) for STREPTOCOCCUS GORDONII  sensitive  to rocephin .  ID consulted .       Interval History:     Review of Systems   Constitutional:  Positive for activity change. Negative for fatigue.   HENT: Negative.     Eyes: Negative.    Respiratory:  Negative for cough and shortness of breath.    Cardiovascular:  Negative for palpitations (resolved).   Endocrine: Negative.    Genitourinary:  Negative for urgency.   Musculoskeletal:  Positive for arthralgias (left hip Improved). Negative for back pain, gait problem, joint swelling and myalgias.   Allergic/Immunologic: Negative.    Neurological:  Negative for dizziness, seizures, weakness and headaches.   Psychiatric/Behavioral:  Negative for sleep disturbance.    All other systems reviewed and are negative.  Objective:     Vital Signs (Most Recent):  Temp: 98.1 °F (36.7 °C) (12/26/22 1457)  Pulse: 79 (12/26/22 1457)  Resp: 18 (12/26/22 1457)  BP: 112/60 (12/26/22 1457)  SpO2: (!) 94 % (12/26/22 1457)   Vital Signs (24h Range):  Temp:  [97.1 °F (36.2 °C)-98.8 °F (37.1 °C)] 98.1 °F (36.7 °C)  Pulse:  [64-92] 79  Resp:  [16-20] 18  SpO2:  [86 %-96 %] 94 %  BP: ()/(48-67) 112/60     Weight: 71.2 kg (157 lb)  Body mass index is 26.95 " kg/m².    Intake/Output Summary (Last 24 hours) at 12/26/2022 1509  Last data filed at 12/26/2022 1435  Gross per 24 hour   Intake 0 ml   Output 501 ml   Net -501 ml      Physical Exam  Vitals reviewed.   Constitutional:       Appearance: Normal appearance.   HENT:      Head: Normocephalic and atraumatic.      Mouth/Throat:      Mouth: Mucous membranes are moist.      Pharynx: Oropharynx is clear.   Eyes:      Extraocular Movements: Extraocular movements intact.      Conjunctiva/sclera: Conjunctivae normal.   Cardiovascular:      Rate and Rhythm: Normal rate. Rhythm irregular.      Pulses: Normal pulses.      Heart sounds: Normal heart sounds.   Pulmonary:      Effort: Pulmonary effort is normal.      Breath sounds: Normal breath sounds.   Abdominal:      General: Bowel sounds are normal.      Palpations: Abdomen is soft.   Musculoskeletal:      Cervical back: Normal range of motion and neck supple.      Comments: Pain in the left hip, incision clean dry intact   Skin:     General: Skin is warm and dry.   Neurological:      General: No focal deficit present.      Mental Status: She is alert and oriented to person, place, and time. Mental status is at baseline.   Psychiatric:         Mood and Affect: Mood normal.         Behavior: Behavior normal.         Thought Content: Thought content normal.       Significant Labs: All pertinent labs within the past 24 hours have been reviewed.      Significant Imaging:   MRI Hip Without Contrast Right   Final Result      Limited evaluation secondary to motion.      The right femoral head neck junction cyst represents a benign synovial herniation pit.         Electronically signed by: Rikki Ballard MD   Date:    12/24/2022   Time:    07:41      US Guided Needle Placement   Final Result      Percutaneous ultrasound-guided aspiration of the left hip joint, yielding 4 mL of purulent fluid. No drainage catheter was left in place.      Plan:      Follow-up fluid studies.       ______________________________________________________________________         Electronically signed by: Juan M Panda   Date:    12/23/2022   Time:    10:50      CT Head Without Contrast   Final Result      No CT evidence of acute intracranial abnormality.      All CT scans at this facility are performed  using dose modulation techniques as appropriate to performed exam including the following:  automated exposure control; adjustment of mA and/or kV according to the patients size (this includes techniques or standardized protocols for targeted exams where dose is matched to indication/reason for exam: i.e. extremities or head);  iterative reconstruction technique.         Electronically signed by: Rikki Ballard MD   Date:    12/22/2022   Time:    10:46      X-Ray Chest AP Portable   Final Result      Unchanged cardiomegaly with unchanged appearance of pericardial effusion compared to 12/19/2022. This is enlarged compared to 02/20/2018.         Electronically signed by: Juan M Panda   Date:    12/22/2022   Time:    10:31      X-Ray Chest AP Portable   Final Result      Severe cardiomegaly. Left lung base obscured by the heart. There may be a small left pleural effusion. Consider further evaluation with PA and lateral chest radiographs. No pneumothorax.         Electronically signed by: Juan M Panda   Date:    12/19/2022   Time:    14:24      MRI Hip Without Contrast Left   Final Result      1. No left hip fracture.   2. Asymmetric large left hip joint effusion.  Small right hip joint effusion.   3. Paralabral cyst along the superior border of the acetabulum suspicious for a superior labral tear.         Electronically signed by: Bob Chester MD   Date:    12/19/2022   Time:    14:32      CT Pelvis Without Contrast   Final Result      No femur fracture. Small left hip joint effusion. Focal calcification lateral to the left acetabulum raising question of avulsion of the rectus femoris ligament or iliofemoral ligament.  Consider further evaluation with MRI left hip without IV contrast on a nonemergent basis, or as clinically warranted.         Electronically signed by: Juan M Panda   Date:    12/19/2022   Time:    11:29      X-Ray Pelvis Routine AP   Final Result      No acute abnormality.         Electronically signed by: Juan M Panda   Date:    12/19/2022   Time:    10:17              Assessment/Plan:      * Pyogenic arthritis of left hip  Patient presented with acute onset left hip pain x1 day duration in absence of trauma while doing leg exercises in bed and was found to asymmetric large left hip joint effusion, small right hip joint effusion, and evidence of superior labral tear of her left acetabulum noted on MRI.  Patient noted to have 5/5 strength throughout with sensation to light touch grossly intact throughout however, TTP throughout left groin.  Orthopedics consulted and awaiting further evaluation/recommendations.  Plan:  -optimize pain control  -bowel regimen  -PT/OT  -ortho surgery following    The patient underwent arthrocentesis by IR 12/23 with 4 cc pus obtained.  Sent for cell count which revealed 187,000 white blood cells 100% segs.  Crystals negative  and culture Gram-negative and Gram-positive.  Patient went for washout 12/24.  Placed on empiric vanco and ceftazidime awaiting sensitivities    Blood cultures positive STREPTOCOCCUS GORDONII  ID consulted     Pericardial effusion  -large posterior pericardial effusion   -pt appears asymptomatic   -Nephrology following with HD performed on 12/21/22 and a Monday Wednesday Friday schedule  -Cardiology following- will follow and evaluate after next HD       Bone cyst of right femur  -Orthopedic Surgery following   -follow up outpatient recommended          Shortness of breath  Patient reported acute onset shortness a breath while obtaining MRI and was found to have evidence of severe cardiomegaly with small left pleural effusion but negative for infectious processes  noted on CXR. Patient afebrile without leukocytosis. BNP elevated at 4792. Troponin 0.095. Exam positive for bilateral edema but negative for elevated JVD or crackles on lung ausculation. Patient currently saturating % on 2L via NC in no acute distress and without use of accessory muscles noted.  Patient without history of CHF.  Cardiology consulted.  Echo ordered and pending.  Nephrology consulted to resume HD inpatient.  Will trial dose of Lasix if clinical status worsens.  Plan:  -titrate oxygen therapy as needed  -incentive spirometry  -echo reviewed   -f/u cardiology and nephrology  -monitor Is/Os  -low salt/cardiac/renal diet    -Duonebs prn       Tear of left acetabular labrum  -Orthopedic surgery following  -MRI showed no fracture and asymmetric large left hip joint effusion.  Small right hip joint effusion. Paralabral cyst along the superior border of the acetabulum suspicious for a superior labral tear.   -analgesia as needed   -antiemetics as needed   - the left hip may treat with therapy and anti-inflammatories (per Orthopedic Surgery)   -Aspiration of the left hip by Interventional Radiology as above    Gastroesophageal reflux disease without esophagitis  Chronic. Stable. Currently asymptomatic. Home medications include PPI/Antacids as needed.  Plan:  -Continue PPI/Antacids as needed       Primary hypertension  BP currently ranging from 90s systolic.    Plan:  -Optimize pain control   -Continue home medications, titrate as needed   -Monitor BP  -Low salt/renal diet   -IV hydralazine prn for SBP>160 or DBP>90   -f/u nephrology for HD inpatient- HD performed on 12/20/22       ESRD (end stage renal disease) on dialysis  Patient currently follows Dr. Ribera from nephrology with last reported dialysis completed last Friday. Patient currently on M/W/F HD at [facility] via LUE fistula but missed Monday due to not feeling well and being in the ED. Labs consistent with history of ESRD on HD.    Plan:  -continue home medications, titrate as needed  -nephrology consulted -HD performed on 12/20/22 and now back on MWF schedule      Atrial fibrillation  Patient with new onset Paroxysmal (<7 days) atrial fibrillation with RVR which is uncontrolled currently with Calcium Channel Blocker. Patient is currently in atrial fibrillation with rate in the 105-130s on diltiazem. [id number] Score: 2. HASBLED Score: 3. Anticoagulation indicated. Anticoagulation done with heparin .  Troponin measuring 0.095 with EKG obtained at outside facility revealing SVT/atrial fibrillation with RVR at rate of 176. Troponin likely elevated in setting of ESRD and demand ischemia from arrhythmia as patient denied endorsing chest pain.  Plan:  -telemetry- afib with rates   -cardizem drip transitioned to oral dosing  -f/u cardiology   -nephrology for HD   -Heparin gtt -transitioned to eliquis  -CHADS-VASc Score- 3         VTE Risk Mitigation (From admission, onward)         Ordered     apixaban tablet 5 mg  2 times daily         12/25/22 1402     Place sequential compression device  Until discontinued         12/19/22 2119     IP VTE LOW RISK PATIENT  Once         12/19/22 2119                Discharge Planning   ISAAK:      Code Status: Full Code   Is the patient medically ready for discharge?:     Reason for patient still in hospital (select all that apply): Treatment  Discharge Plan A: Home                  Jonathan Smith MD  Department of Hospital Medicine   [facility] - Telemetry ([facility])

## 2022-12-26 NOTE — SUBJECTIVE & OBJECTIVE
Principal Problem:Pyogenic arthritis of left hip    Principal Orthopedic Problem: Same    Interval History: POD#2 - off the floor at HD currently; blood cxs growing strep species    Review of patient's allergies indicates:   Allergen Reactions    Amoxicillin Rash       Current Facility-Administered Medications   Medication    0.9%  NaCl infusion    0.9%  NaCl infusion    acetaminophen suppository 650 mg    acetaminophen tablet 650 mg    albuterol-ipratropium 2.5 mg-0.5 mg/3 mL nebulizer solution 3 mL    aluminum-magnesium hydroxide-simethicone 200-200-20 mg/5 mL suspension 30 mL    apixaban tablet 5 mg    brimonidine 0.2% ophthalmic solution 1 drop    BUPivacaine (PF) 0.25% (2.5 mg/ml) injection 12.5 mg    calcitRIOL capsule 0.5 mcg    cefTRIAXone (ROCEPHIN) 2 g in dextrose 5 % in water (D5W) 5 % 50 mL IVPB (MB+)    dextrose 10% bolus 125 mL 125 mL    dextrose 10% bolus 250 mL 250 mL    diltiaZEM 24 hr capsule 240 mg    ergocalciferol capsule 50,000 Units    glucagon (human recombinant) injection 1 mg    glucose chewable tablet 16 g    glucose chewable tablet 24 g    HYDROcodone-acetaminophen 5-325 mg per tablet 1 tablet    melatonin tablet 6 mg    morphine injection 2 mg    naloxone 0.4 mg/mL injection 0.02 mg    ondansetron injection 4 mg    promethazine tablet 25 mg    senna-docusate 8.6-50 mg per tablet 1 tablet    sodium chloride 0.9% bolus 250 mL 250 mL    sodium chloride 0.9% bolus 250 mL 250 mL    sodium chloride 0.9% bolus 250 mL 250 mL    sodium chloride 0.9% bolus 250 mL 250 mL    sodium chloride 0.9% flush 3 mL    vitamin renal formula (B-complex-vitamin c-folic acid) 1 mg per capsule 1 capsule     Objective:     Vital Signs (Most Recent):  Temp: 97.5 °F (36.4 °C) (12/26/22 1104)  Pulse: 78 (12/26/22 1200)  Resp: 19 (12/26/22 1200)  BP: (!) 85/56 (12/26/22 1200)  SpO2: 96 % (12/26/22 1200)   Vital Signs (24h Range):  Temp:  [97.5 °F (36.4 °C)-98.8 °F (37.1 °C)] 97.5 °F (36.4 °C)  Pulse:  [64-92]  "78  Resp:  [16-20] 19  SpO2:  [86 %-96 %] 96 %  BP: ()/(48-67) 85/56     Weight: 71.2 kg (157 lb)  Height: 5' 4" (162.6 cm)  Body mass index is 26.95 kg/m².    No intake or output data in the 24 hours ending 12/26/22 1326    Ortho/SPM Exam    Significant Labs: CBC:   Recent Labs   Lab 12/25/22  0738   WBC 8.08   HGB 10.9*   HCT 33.8*        Wound Culture:   Intraop and aspirate cultures:   Aerobic Culture - Tissue  Abnormal   STREPTOCOCCUS GORDONII   Moderate   For susceptibility see order # [id number]   P      Gram Stain Result Many WBC's    Gram Stain Result Rare Gram positive cocci    Gram Stain Result Rare Gram negative rods      Blood cultures:   Blood Culture, Routine STREPTOCOCCUS GORDONII Abnormal  VC    Resulting Agency [facility]        Susceptibility     Streptococcus gordonii     CULTURE, BLOOD     Cefepime 0.5 mcg/mL Sensitive     Ceftriaxone <=0.25 mcg/mL Sensitive     Penicillin 0.12 mcg/mL Sensitive     Vancomycin 0.5 mcg/mL Sensitive               All pertinent labs within the past 24 hours have been reviewed.    Significant Imaging: None  "

## 2022-12-26 NOTE — ASSESSMENT & PLAN NOTE
67 y.o. female with ESRD on HD s/p left hip arthrotomy I&D for septic arthritis.      --activity as tolerated, encourage OOB with PT  --antibiotics per primary, possible ID consult could be considered given her PCN allergy  --change dressing today  --follow-up in ortho trauma clinic 2 weeks postop

## 2022-12-26 NOTE — PROGRESS NOTES
12/26/22 1104   Vital Signs   Temp 97.5 °F (36.4 °C)   Temp src Axillary   Pulse 90   Heart Rate Source Monitor   Resp 19   SpO2 96 %   Pulse Oximetry Type Intermittent   Device (Oxygen Therapy) room air   BP (!) 91/48   MAP (mmHg) 61   BP Location Right arm   BP Method Automatic   Patient Position Lying        Hemodialysis AV Fistula Left upper arm   No placement date or time found.   Present Prior to Hospital Arrival?: Yes  Location: Left upper arm   Needle Size 15ga   Site Assessment Clean;Dry;Intact;No redness;No swelling;No warmth;No drainage   Patency Bruit;Thrill;Present   Status Accessed   Flows Good   Dressing Intervention Integrity maintained   Dressing Status Clean;Dry;Intact   Site Condition No complications   Dressing Gauze   Drainage Description Other (Comment)  (no drainage noted)     3 hour I-HDTx started.

## 2022-12-26 NOTE — ASSESSMENT & PLAN NOTE
Patient with new onset Paroxysmal (<7 days) atrial fibrillation with RVR which is uncontrolled currently with Calcium Channel Blocker. Patient is currently in atrial fibrillation with rate in the 105-130s on diltiazem. [id number] Score: 2. HASBLED Score: 3. Anticoagulation indicated. Anticoagulation done with heparin .  Troponin measuring 0.095 with EKG obtained at outside facility revealing SVT/atrial fibrillation with RVR at rate of 176. Troponin likely elevated in setting of ESRD and demand ischemia from arrhythmia as patient denied endorsing chest pain.  Plan:  -telemetry- afib with rates   -cardizem drip transitioned to oral dosing  -f/u cardiology   -nephrology for HD   -Heparin gtt -transitioned to eliquis  -CHADS-VASc Score- 3

## 2022-12-26 NOTE — PLAN OF CARE
Ongoing (interventions implemented as appropriate)  Pt is alert and oriented.    VSS  Pt able to make needs known.  Pt remained afebrile throughout this shift.   Pt remained free of falls this shift.   Pt denies pain this shift.  Plan of care reviewed. Patient verbalizes understanding.   Pt moving/turing independent. Frequent weight shifting encouraged.  Patient afib on monitor.   Bed low, side rails up x 2, wheels locked, call light in reach.   Hourly rounding completed.   Will continue to observe.

## 2022-12-26 NOTE — PROGRESS NOTES
12/26/22 1435   Required for all Hemodialysis Patients   Hepatitis Status other (see comments)   Handoff Report   Received From Dave Tariq RN   Given To Tabitha Whipple RN   Treatment Type   Treatment Type Acute   Vital Signs   Temp 97.1 °F (36.2 °C)   Temp src Axillary   Pulse 81   Heart Rate Source Monitor   Resp 19   SpO2 96 %   Pulse Oximetry Type Intermittent   Device (Oxygen Therapy) room air   BP (!) 106/59   MAP (mmHg) 71   BP Location Right arm   BP Method Automatic   Patient Position Lying   Post-Hemodialysis Assessment   Rinseback Volume (mL) 250 mL   Blood Volume Processed (Liters) 72.6 L   Dialyzer Clearance Moderately streaked   Duration of Treatment 180 minutes   Additional Fluid Intake (mL) 0 mL   Total UF (mL) 501 mL   Net Fluid Removal 0   Patient Response to Treatment tolerated tx but had b/p issues and unable to remove any fluid. MD aware. RTF with primary RN as same.

## 2022-12-26 NOTE — PROGRESS NOTES
Therapy with vancomycin complete and/or consult discontinued by provider.    Pharmacy will sign off, please re-consult as needed.  /Camille Maldonado [facility] 12/26/2022 10:44 AM

## 2022-12-26 NOTE — PLAN OF CARE
Patient remained free from falls throughout shift, call bell within reach. POD 2 from her washout to L hip. [place] given once for hip pain. Turn q2h. HD today. Vitals stable, will continue to monitor.

## 2022-12-27 ENCOUNTER — TELEPHONE (OUTPATIENT)
Dept: ORTHOPEDICS | Facility: CLINIC | Age: 67
End: 2022-12-27
Payer: COMMERCIAL

## 2022-12-27 ENCOUNTER — ANESTHESIA EVENT (OUTPATIENT)
Dept: CARDIOLOGY | Facility: HOSPITAL | Age: 67
DRG: 480 | End: 2022-12-27
Payer: COMMERCIAL

## 2022-12-27 PROBLEM — B95.5 STREPTOCOCCAL BACTEREMIA: Status: ACTIVE | Noted: 2022-12-27

## 2022-12-27 PROBLEM — R78.81 STREPTOCOCCAL BACTEREMIA: Status: ACTIVE | Noted: 2022-12-27

## 2022-12-27 LAB
BACTERIA SPEC AEROBE CULT: ABNORMAL
BACTERIA TISS AEROBE CULT: ABNORMAL
GRAM STN SPEC: ABNORMAL
POCT GLUCOSE: 137 MG/DL (ref 70–110)
POCT GLUCOSE: 138 MG/DL (ref 70–110)

## 2022-12-27 PROCEDURE — 25000003 PHARM REV CODE 250: Performed by: HOSPITALIST

## 2022-12-27 PROCEDURE — 21400001 HC TELEMETRY ROOM

## 2022-12-27 PROCEDURE — 97116 GAIT TRAINING THERAPY: CPT | Mod: CQ

## 2022-12-27 PROCEDURE — 99223 1ST HOSP IP/OBS HIGH 75: CPT | Mod: NSCH,,, | Performed by: INTERNAL MEDICINE

## 2022-12-27 PROCEDURE — 25000003 PHARM REV CODE 250: Performed by: STUDENT IN AN ORGANIZED HEALTH CARE EDUCATION/TRAINING PROGRAM

## 2022-12-27 PROCEDURE — 97530 THERAPEUTIC ACTIVITIES: CPT | Mod: CQ

## 2022-12-27 PROCEDURE — 63600175 PHARM REV CODE 636 W HCPCS: Performed by: INTERNAL MEDICINE

## 2022-12-27 PROCEDURE — 25000003 PHARM REV CODE 250: Performed by: INTERNAL MEDICINE

## 2022-12-27 PROCEDURE — 99233 SBSQ HOSP IP/OBS HIGH 50: CPT | Mod: ,,, | Performed by: INTERNAL MEDICINE

## 2022-12-27 PROCEDURE — 99223 PR INITIAL HOSPITAL CARE,LEVL III: ICD-10-PCS | Mod: NSCH,,, | Performed by: INTERNAL MEDICINE

## 2022-12-27 PROCEDURE — 99233 PR SUBSEQUENT HOSPITAL CARE,LEVL III: ICD-10-PCS | Mod: ,,, | Performed by: INTERNAL MEDICINE

## 2022-12-27 PROCEDURE — 99232 SBSQ HOSP IP/OBS MODERATE 35: CPT | Mod: ,,, | Performed by: INTERNAL MEDICINE

## 2022-12-27 PROCEDURE — 99232 PR SUBSEQUENT HOSPITAL CARE,LEVL II: ICD-10-PCS | Mod: ,,, | Performed by: INTERNAL MEDICINE

## 2022-12-27 PROCEDURE — 94761 N-INVAS EAR/PLS OXIMETRY MLT: CPT

## 2022-12-27 RX ADMIN — HYDROCODONE BITARTRATE AND ACETAMINOPHEN 1 TABLET: 5; 325 TABLET ORAL at 08:12

## 2022-12-27 RX ADMIN — NEPHROCAP 1 CAPSULE: 1 CAP ORAL at 09:12

## 2022-12-27 RX ADMIN — CEFTRIAXONE 2 G: 2 INJECTION, POWDER, FOR SOLUTION INTRAMUSCULAR; INTRAVENOUS at 04:12

## 2022-12-27 RX ADMIN — APIXABAN 5 MG: 2.5 TABLET, FILM COATED ORAL at 09:12

## 2022-12-27 RX ADMIN — BRIMONIDINE TARTRATE 1 DROP: 2 SOLUTION/ DROPS OPHTHALMIC at 09:12

## 2022-12-27 RX ADMIN — DILTIAZEM HYDROCHLORIDE 240 MG: 240 CAPSULE, COATED, EXTENDED RELEASE ORAL at 09:12

## 2022-12-27 RX ADMIN — HYDROCODONE BITARTRATE AND ACETAMINOPHEN 1 TABLET: 5; 325 TABLET ORAL at 09:12

## 2022-12-27 RX ADMIN — SODIUM CHLORIDE 30 ML/HR: 9 INJECTION, SOLUTION INTRAVENOUS at 04:12

## 2022-12-27 NOTE — PROGRESS NOTES
[facility] - Telemetry ([facility])  Cardiology  Progress Note    Patient Name: Meaghan Potter  MRN: [id number]  Admission Date: 12/19/2022  Hospital Length of Stay: 6 days  Code Status: Full Code   Attending Physician: Jonathan Smith, *   Primary Care Physician: Primary Doctor No  Expected Discharge Date:   Principal Problem:Pyogenic arthritis of left hip    Subjective:   HPI:  Ms. Potter is a 68y/o female with PMHx ESRD on dialysis, HTN and GERD who presented to [facility] ED transfer from [facility] for cardiac workup following elevated troponin and BNP in the emergency department as well as orthopedic care after patient sustained left superior labral tear of her acetabulum noted on MRI. Cardiology was consulted for new onset afib on diltiazem gtt. Pt seen and examined today resting in bed. Patient reported being in her usual state of health prior to onset of symptoms and reported acute onset of left hip pain while performing leg exercises in bed earlier today. While at outside facility, patient became short of breath while lying flat while obtaining MRI of hip and was found to be in atrial fibrillation with RVR requiring diltiazem drip. She denied endorsing any chest pain, lightheadedness, dizziness, visual disturbances, fever, chills, sweats, nausea, vomiting, abdominal pain, changes in bowel/bladder habits, or onset neurological deficits.  All other review of systems negative except as noted above.  Patient reports being back at her baseline and continues to complain only of left hip pain. Pt missed HD yesterday, planned for today. Labs reviewed K 6.1, Crt 10.6 troponin .076->.095 BNP 4702. Echo pending, repeat EKG ordered  Hospital Course:   12/21/22-Patient seen and examined today, resting in bed. Feels ok. Still complains of hip pain. No SOB/chest pain. Still in afib but HR controlled. Cardizem switched to po. Will re-evaluate pericardial effusion with repeat echo IP vs OP post additional HD.    12/22/22- Patient seen  and examined today. Feels ok, fatigue. Pt went down to have hip drained but had seizure like episode on the table. CT negative. Pt still in afib tachycardic during exam, denies any CP, SOB at this time, repeat Echo tomorrow to assess effusion. Will need eliquis once done with hip procedure, cont hept gtt    12/23/22-Patient seen and examined today, sitting up in bedside chair. Feeling better, s/p hip aspiration, will likely need surgery/washout tmw. Remains in afib but HR controlled. HD scheduled today, will plan on repeat echo after volume status optimized. BC pending.    12/27/22- Patient seen and examined today, reconsulted for KATE. Left hip fluid drained, Blood cultures and culture from left hip reveal Gram-positive and Gram-negative. Labs reviewed Crt 9.1 BUN 69. Will plan for KATE tomorrow morning 9am           Review of Systems   Constitutional: Positive for malaise/fatigue.   HENT: Negative.     Eyes: Negative.    Cardiovascular: Negative.    Respiratory: Negative.     Skin: Negative.    Musculoskeletal: Negative.    Gastrointestinal: Negative.    Genitourinary: Negative.    Neurological:  Positive for weakness.   Psychiatric/Behavioral: Negative.     Objective:     Vital Signs (Most Recent):  Temp: 98.1 °F (36.7 °C) (12/27/22 0741)  Pulse: 83 (12/27/22 0900)  Resp: 18 (12/27/22 0900)  BP: 112/62 (12/27/22 0741)  SpO2: 95 % (12/27/22 0900) Vital Signs (24h Range):  Temp:  [97.1 °F (36.2 °C)-98.2 °F (36.8 °C)] 98.1 °F (36.7 °C)  Pulse:  [72-94] 83  Resp:  [18-19] 18  SpO2:  [93 %-98 %] 95 %  BP: ()/(53-62) 112/62     Weight: 79.1 kg (174 lb 6.1 oz)  Body mass index is 29.93 kg/m².     SpO2: 95 %         Intake/Output Summary (Last 24 hours) at 12/27/2022 1156  Last data filed at 12/27/2022 0800  Gross per 24 hour   Intake 180 ml   Output 501 ml   Net -321 ml       Lines/Drains/Airways       Drain  Duration                  Hemodialysis AV Fistula Left upper arm -- days              Peripheral Intravenous  Line  Duration                  Peripheral IV - Single Lumen 12/24/22 1530 20 G Right Antecubital 2 days                    Physical Exam  Vitals and nursing note reviewed.   Constitutional:       Appearance: Normal appearance.   HENT:      Head: Normocephalic.   Eyes:      Pupils: Pupils are equal, round, and reactive to light.   Cardiovascular:      Rate and Rhythm: Normal rate. Rhythm irregular.      Heart sounds: Normal heart sounds, S1 normal and S2 normal. No murmur heard.    No S3 or S4 sounds.   Pulmonary:      Effort: Pulmonary effort is normal.      Breath sounds: Normal breath sounds.   Abdominal:      General: Bowel sounds are normal.      Palpations: Abdomen is soft.   Musculoskeletal:         General: Normal range of motion.      Cervical back: Normal range of motion.   Skin:     Capillary Refill: Capillary refill takes less than 2 seconds.   Neurological:      General: No focal deficit present.      Mental Status: She is alert and oriented to person, place, and time.   Psychiatric:         Mood and Affect: Mood normal.         Behavior: Behavior normal.         Thought Content: Thought content normal.       Significant Labs: BMP:   Recent Labs   Lab 12/26/22  0617   *   *   K 4.8   CL 93*   CO2 24   BUN 69*   CREATININE 9.1*   CALCIUM 10.4   , CMP   Recent Labs   Lab 12/26/22  0617   *   K 4.8   CL 93*   CO2 24   *   BUN 69*   CREATININE 9.1*   CALCIUM 10.4   ALBUMIN 2.5*   ANIONGAP 17*   , CBC No results for input(s): WBC, HGB, HCT, PLT in the last 48 hours., INR No results for input(s): INR, PROTIME in the last 48 hours., Lipid Panel No results for input(s): CHOL, HDL, LDLCALC, TRIG, CHOLHDL in the last 48 hours., Troponin No results for input(s): TROPONINI in the last 48 hours., and All pertinent lab results from the last 24 hours have been reviewed.    Significant Imaging: Cardiac Cath: reviewed, Echocardiogram: Transthoracic echo (TTE) complete (Cupid Only):   Results  for orders placed or performed during the hospital encounter of 12/19/22   Echo   Result Value Ref Range    BSA 1.79 m2    TR Max Krunal 4.48 m/s    Triscuspid Valve Regurgitation Peak Gradient 80 mmHg    EF 55 %    Narrative    · Normal systolic function.  · The estimated ejection fraction is 55%.  · Normal left ventricular diastolic function.  · Moderate right ventricular enlargement with mildly reduced right   ventricular systolic function.  · Biatrial enlargement.  · Large circumferential pericardial effusion.     Limited echo to evaluate pericardial effusion. Stable from prior.     , EKG: reviewed, Stress Test: reviewed, and X-Ray: CXR: X-Ray Chest 1 View (CXR): No results found for this visit on 12/19/22. and X-Ray Chest PA and Lateral (CXR): No results found for this visit on 12/19/22.    Assessment and Plan:         * Pyogenic arthritis of left hip  Ortho consulted    12/23/22  -s/p hip aspiration today  -Likely will need washout/surgery    12/27/22  Blood cultures and culture from left hip reveal Gram-positive and Gram-negative  Will plan for KATE tomorrow morning 9am, all risks and benefits explained to pt, all questions answered. Pt agreeable to proceed with procedure tomorrow morning.    Pericardial effusion  -No evidence of tamponade  -Will reassess with echo after additional session of HD    12/23/22  -Repeat echo today post HD    12/27/22  Repeat echo still showing large pericardial effusion, no evidence of tamponade per Dr. Aragon's note    Shortness of breath  Likely secondary to afib and fluid overload  HD today    12/23/22  -Improved    Primary hypertension  Treatment per primary team    ESRD (end stage renal disease) on dialysis  Management as per primary team    Atrial fibrillation  EKG reviewed afib  Repeat EKG ordered  Will need AC    12/21/22  -Still in afib but HR controlled  -Cardizem switched to po  -Continue heparin gtt for now, will need OAC upon d/c    12/22/22  Afib fast  Cont diltiazem  PO  Added back cardizem gtt  Cont hep gtt for now, OAC upon DC  Follow up in clinic    12/23/22  -HR controlled  -Continue diltiazem po  -Resume heparin gtt for AC if ok with primary team, will need Eliquis upon d/c    12/27/22  HR controlled  Cont Eliquis, diltiazem        VTE Risk Mitigation (From admission, onward)         Ordered     apixaban tablet 5 mg  2 times daily         12/25/22 1402     Place sequential compression device  Until discontinued         12/19/22 2119     IP VTE LOW RISK PATIENT  Once         12/19/22 2119                Rissa Yang, NP  Cardiology  [facility] - Telemetry ([facility])

## 2022-12-27 NOTE — ASSESSMENT & PLAN NOTE
Echo showed large pericardial effusion       Will plan to do KATE if feasible     The practical choice that can be given daily will be to use IV vancomycin as she is on HD .  IV Rocephin once daily is the preferred regime .  Final duration will depend on KATE

## 2022-12-27 NOTE — PROGRESS NOTES
[facility] Medicine  Progress Note    Patient Name: Meaghan Potter  MRN: [id number]  Patient Class: IP- Inpatient   Admission Date: 12/19/2022  Length of Stay: 6 days  Attending Physician: Jonathan Smith, *  Primary Care Provider: Primary Doctor No        Subjective:     Principal Problem:Pyogenic arthritis of left hip        HPI:  Meaghan Potter is a 67 y.o. female with a PMH  has a past medical history of ESRD (end stage renal disease) on dialysis, GERD (gastroesophageal reflux disease), and Hypertension. who presented as a transfer from [facility] for higher level cardiology and orthopedic care after patient was found to have sustained a left superior labral tear of her acetabulum noted on MRI in addition to new onset atrial fibrillation with RVR requiring diltiazem drip.  Patient reported being in her usual state of health prior to onset of symptoms and reported acute onset of left hip pain while performing leg exercises in bed earlier today.  Patient reported hearing and feeling a pop in her left hip followed by immediate pain which has remained constant and currently rated 6/10 in severity. Pain was described as throbbing/pulling in nature with no known alleviating factors and aggravating factors including weight-bearing, movement, and palpation to the affected area.  She denied endorsing any numbness, tingling, discoloration, or penetrating trauma, but did express decreased range of motion and muscle strength secondary to exacerbation of pain.  While at outside facility, patient became short of breath while lying flat while obtaining MRI of hip and was found to be in atrial fibrillation with RVR requiring diltiazem drip. She denied endorsing any chest pain, lightheadedness, dizziness, visual disturbances, fever, chills, sweats, nausea, vomiting, abdominal pain, changes in bowel/bladder habits, or onset neurological deficits.  All other review of systems negative except as noted  above.  Patient reports being back at her baseline and continues to complain only of left hip pain.  Orthopedics and cardiology consulted and awaiting further evaluation/recommendations.  Of note, patient missed hemodialysis today secondary to pain and going to the emergency room and usually receives HD via left upper extremity fistula on Monday/Wednesday/Friday.  Follows Dr. Ribera outpatient. Nephrology consulted to continue HD inpatient.     PCP: Primary Doctor No        Overview/Hospital Course:  Pt admitted to Observation for Atrial fibrillation (new onset) with RVR with . Cardiology consulted with Cardizem gtt initiated. NSR on monitor with HR in 80's.  BNP elevated and Troponin trended upward (0.076>0.095) with Heparin infusion initiated. Echo showed with concentric hypertrophy and normal systolic function. Atrial fibrillation observed.Biatrial atrial enlargement. Grade III left ventricular diastolic dysfunction. PA systolic pressure is 60 mmHg. Moderate right ventricular enlargement with normal right ventricular systolic function. Pulmonary hypertension. EF 55%. Moderate to severe tricuspid regurgitation. Mild-to-moderate mitral regurgitation. Severe right atrial enlargement. Large posterior pericardial effusion. No evidence of tamponade. Troponin elevated and flat with no cardiac symptoms reported. Pt also reported L groin pain upon admit. Orthopedic Surgery consulted and pt found to have sustained a left superior labral tear of her acetabulum noted on MRI with further orthopedic work up to be completed outpatient. Hx of ESRD noted with last HD on 12/16/22- Nephrology consulted with HD performed today. AVG to LUE with +bruit/+thrill present. On 12/21/22, MRI of R hip results pending. IR consulted for evaluation of aspiration/injection of left hip. Rate and rhythm controlled on Cardizem gtt. Heparin infusion continued pending joint aspiration.  Pericardial effusion discussed with Nephrology and  "Cardiology for recommendations.   Patient went to have aspiration of left hip however upon transferring from bed to table had an episode described as seizure-like" seems more that it was hypotension related.  Medications adjusted  She remains on p.o. Cardizem as well as Cardizem drip.  Patient underwent arthrocentesis with 4 cc of pus removed 12/23.  Sent for Gram stain, culture, crystals.  Discussed with Dr. Johnson , orthopedics,crystals are negative patient underwent washout with  Cultures 12/24.  Blood cultures and culture from left hip reveal Gram-positive and Gram-negative.  Patient paced on ceftazidime and vancomycin empirically until cultures return.  12/26 She is complaining of weakness . The blood and drainage cx are (+) for STREPTOCOCCUS GORDONII  sensitive  to rocephin .  ID consulted .  12/27 NAEON . ID consulted and rec IV rocephin qd x 4 week from the last negative blood cx . Cardiology consulted for KATE .       Interval History:     Review of Systems   Constitutional:  Negative for chills and fatigue.   HENT:  Negative for congestion, ear pain, facial swelling, sinus pressure and sore throat.    Eyes:  Negative for pain.   Respiratory:  Negative for apnea, chest tightness, shortness of breath and stridor.    Cardiovascular:  Negative for chest pain, palpitations and leg swelling.   Gastrointestinal:  Negative for abdominal distention, abdominal pain, diarrhea and nausea.   Endocrine: Negative for polydipsia and polyphagia.   Genitourinary:  Negative for decreased urine volume, difficulty urinating, frequency and genital sores.   Musculoskeletal:  Positive for arthralgias, gait problem and myalgias.   Neurological:  Negative for light-headedness and headaches.   Hematological:  Negative for adenopathy.   Psychiatric/Behavioral:  Negative for agitation, confusion and decreased concentration.    Objective:     Vital Signs (Most Recent):  Temp: 98.1 °F (36.7 °C) (12/27/22 0741)  Pulse: 83 (12/27/22 " 0900)  Resp: 18 (12/27/22 0900)  BP: 112/62 (12/27/22 0741)  SpO2: 95 % (12/27/22 0900) Vital Signs (24h Range):  Temp:  [97.1 °F (36.2 °C)-98.2 °F (36.8 °C)] 98.1 °F (36.7 °C)  Pulse:  [72-94] 83  Resp:  [18-19] 18  SpO2:  [93 %-98 %] 95 %  BP: ()/(53-62) 112/62     Weight: 79.1 kg (174 lb 6.1 oz)  Body mass index is 29.93 kg/m².    Intake/Output Summary (Last 24 hours) at 12/27/2022 1229  Last data filed at 12/27/2022 0800  Gross per 24 hour   Intake 180 ml   Output 501 ml   Net -321 ml      Physical Exam  Vitals and nursing note reviewed.   Constitutional:       Appearance: She is well-developed.   HENT:      Head: Normocephalic and atraumatic.   Eyes:      Conjunctiva/sclera: Conjunctivae normal.      Pupils: Pupils are equal, round, and reactive to light.   Neck:      Thyroid: No thyroid mass or thyromegaly.   Cardiovascular:      Rate and Rhythm: Normal rate.      Heart sounds: Normal heart sounds.   Pulmonary:      Effort: Pulmonary effort is normal. No accessory muscle usage or respiratory distress.      Breath sounds: Normal breath sounds.   Abdominal:      General: Bowel sounds are normal.      Palpations: Abdomen is soft. There is no mass.      Tenderness: There is no abdominal tenderness.   Musculoskeletal:      Cervical back: Normal range of motion and neck supple.      Comments: Dressing over left hip   Skin:     Findings: No rash.   Neurological:      Mental Status: She is alert and oriented to person, place, and time.       Significant Labs: All pertinent labs within the past 24 hours have been reviewed.      Significant Imaging: I have reviewed all pertinent imaging results/findings within the past 24 hours.        Assessment/Plan:      * Pyogenic arthritis of left hip  Patient presented with acute onset left hip pain x1 day duration in absence of trauma while doing leg exercises in bed and was found to asymmetric large left hip joint effusion, small right hip joint effusion, and evidence of  superior labral tear of her left acetabulum noted on MRI.  Patient noted to have 5/5 strength throughout with sensation to light touch grossly intact throughout however, TTP throughout left groin.  Orthopedics consulted and awaiting further evaluation/recommendations.  Plan:  -optimize pain control  -bowel regimen  -PT/OT  -ortho surgery following    The patient underwent arthrocentesis by IR 12/23 with 4 cc pus obtained.  Sent for cell count which revealed 187,000 white blood cells 100% segs.  Crystals negative  and culture Gram-negative and Gram-positive.  Patient went for washout 12/24.  Placed on empiric vanco and ceftazidime awaiting sensitivities    Blood cultures positive STREPTOCOCCUS GORDONII  ID consulted     Streptococcal bacteremia  Cont IV rocephin       Pericardial effusion  -large posterior pericardial effusion   -pt appears asymptomatic   -Nephrology following with HD performed on 12/21/22 and a Monday Wednesday Friday schedule  -Cardiology following- will follow and evaluate after next HD       Bone cyst of right femur  -Orthopedic Surgery following   -follow up outpatient recommended          Shortness of breath  Patient reported acute onset shortness a breath while obtaining MRI and was found to have evidence of severe cardiomegaly with small left pleural effusion but negative for infectious processes noted on CXR. Patient afebrile without leukocytosis. BNP elevated at 4792. Troponin 0.095. Exam positive for bilateral edema but negative for elevated JVD or crackles on lung ausculation. Patient currently saturating % on 2L via NC in no acute distress and without use of accessory muscles noted.  Patient without history of CHF.  Cardiology consulted.  Echo ordered and pending.  Nephrology consulted to resume HD inpatient.  Will trial dose of Lasix if clinical status worsens.  Plan:  -titrate oxygen therapy as needed  -incentive spirometry  -echo reviewed   -f/u cardiology and  nephrology  -monitor Is/Os  -low salt/cardiac/renal diet    -Duonebs prn       Tear of left acetabular labrum  -Orthopedic surgery following  -MRI showed no fracture and asymmetric large left hip joint effusion.  Small right hip joint effusion. Paralabral cyst along the superior border of the acetabulum suspicious for a superior labral tear.   -analgesia as needed   -antiemetics as needed   - the left hip may treat with therapy and anti-inflammatories (per Orthopedic Surgery)   -Aspiration of the left hip by Interventional Radiology as above    Gastroesophageal reflux disease without esophagitis  Chronic. Stable. Currently asymptomatic. Home medications include PPI/Antacids as needed.  Plan:  -Continue PPI/Antacids as needed       Primary hypertension  BP currently ranging from 90s systolic.    Plan:  -Optimize pain control   -Continue home medications, titrate as needed   -Monitor BP  -Low salt/renal diet   -IV hydralazine prn for SBP>160 or DBP>90   -f/u nephrology for HD inpatient- HD performed on 12/20/22       ESRD (end stage renal disease) on dialysis  Patient currently follows Dr. Ribera from nephrology with last reported dialysis completed last Friday. Patient currently on M/W/F HD at [facility] via LUE fistula but missed Monday due to not feeling well and being in the ED. Labs consistent with history of ESRD on HD.   Plan:  -continue home medications, titrate as needed  -nephrology consulted -HD performed on 12/20/22 and now back on MWF schedule      Atrial fibrillation  Patient with new onset Paroxysmal (<7 days) atrial fibrillation with RVR which is uncontrolled currently with Calcium Channel Blocker. Patient is currently in atrial fibrillation with rate in the 105-130s on diltiazem. [id number] Score: 2. HASBLED Score: 3. Anticoagulation indicated. Anticoagulation done with heparin .  Troponin measuring 0.095 with EKG obtained at outside facility revealing SVT/atrial fibrillation with RVR at rate of  176. Troponin likely elevated in setting of ESRD and demand ischemia from arrhythmia as patient denied endorsing chest pain.  Plan:  -telemetry- afib with rates   -cardizem drip transitioned to oral dosing  -f/u cardiology   -nephrology for HD   -Heparin gtt -transitioned to eliquis  -CHADS-VASc Score- 3         VTE Risk Mitigation (From admission, onward)         Ordered     apixaban tablet 5 mg  2 times daily         12/25/22 1402     Place sequential compression device  Until discontinued         12/19/22 2119     IP VTE LOW RISK PATIENT  Once         12/19/22 2119                Discharge Planning   ISAAK:      Code Status: Full Code   Is the patient medically ready for discharge?:     Reason for patient still in hospital (select all that apply): Treatment  Discharge Plan A: Home                  Jonathan Smith MD  Department of Hospital Medicine   [facility] - Telemetry ([facility])

## 2022-12-27 NOTE — ASSESSMENT & PLAN NOTE
Patient currently follows Dr. Ribera from nephrology with last reported dialysis completed last Friday. Patient currently on M/W/F HD at [facility] via LUE fistula but missed Monday due to not feeling well and being in the ED. Labs consistent with history of ESRD on HD.   Plan:  -continue home medications, titrate as needed  -nephrology consulted -HD performed on 12/20/22 and now back on MWF schedule

## 2022-12-27 NOTE — ASSESSMENT & PLAN NOTE
-No evidence of tamponade  -Will reassess with echo after additional session of HD    12/23/22  -Repeat echo today post HD    12/27/22  Repeat echo still showing large pericardial effusion, no evidence of tamponade per Dr. Aragon's note

## 2022-12-27 NOTE — ANESTHESIA PREPROCEDURE EVALUATION
2022  Meaghan Potter is a 67 y.o., female.      Pre-op Assessment    I have reviewed the Patient Summary Reports.     I have reviewed the Nursing Notes. I have reviewed the NPO Status.      Review of Systems  Anesthesia Hx:  No problems with previous Anesthesia    Social:  Non-Smoker, No Alcohol Use    Hematology/Oncology:  Hematology Normal   Oncology Normal     EENT/Dental:EENT/Dental Normal   Cardiovascular:   Hypertension, well controlled    Pulmonary:  Pulmonary Normal    Renal/:  Renal/ Normal     Hepatic/GI:  Hepatic/GI Normal    Musculoskeletal:  Musculoskeletal Normal    Neurological:  Neurology Normal    Endocrine:  Endocrine Normal    Dermatological:  Skin Normal    Psych:  Psychiatric Normal           Physical Exam  General: Well nourished    Airway:  Mallampati: II   Mouth Opening: Normal  Neck ROM: Normal ROM        Anesthesia Plan  Type of Anesthesia, risks & benefits discussed:    Anesthesia Type: Gen ETT, Gen Supraglottic Airway  Intra-op Monitoring Plan: Standard ASA Monitors  Induction:  IV  Airway Plan: Direct  ASA Score: 3 Emergent    Ready For Surgery From Anesthesia Perspective.     Past Medical History:   Diagnosis Date    ESRD (end stage renal disease) on dialysis     GERD (gastroesophageal reflux disease)     Hypertension        Past Surgical History:   Procedure Laterality Date     SECTION      INCISION AND DRAINAGE, LOWER EXTREMITY Left 2022    Procedure: INCISION AND DRAINAGE, LOWER EXTREMITY;  Surgeon: Kim Johnson MD;  Location: [facility];  Service: Orthopedics;  Laterality: Left;    PLACEMENT OF DIALYSIS ACCESS         Family History   Problem Relation Age of Onset    Diabetes Mother     Hypertension Mother     Diabetes Father     Hypertension Father        Social History     Socioeconomic History    Marital status:    Tobacco Use     Smoking status: Former    Smokeless tobacco: Never   Substance and Sexual Activity    Alcohol use: No     Comment: occassionally    Drug use: No    Sexual activity: Yes       No current facility-administered medications for this visit.     No current outpatient medications on file.     Facility-Administered Medications Ordered in Other Visits   Medication Dose Route Frequency Provider Last Rate Last Admin    0.9%  NaCl infusion   Intravenous Once William Alfonso MD        0.9%  NaCl infusion   Intravenous Once William Alfonso MD        0.9%  NaCl infusion   Intravenous PRN Jonathan Smith MD 5 mL/hr at 12/28/22 1612 New Bag at 12/28/22 1612    acetaminophen suppository 650 mg  650 mg Rectal Q6H PRN Robert Bryan MD        acetaminophen tablet 650 mg  650 mg Oral Q8H PRN Robert Bryan MD   650 mg at 12/21/22 2036    albuterol-ipratropium 2.5 mg-0.5 mg/3 mL nebulizer solution 3 mL  3 mL Nebulization Q6H PRN Robert Bryan MD        aluminum-magnesium hydroxide-simethicone 200-200-20 mg/5 mL suspension 30 mL  30 mL Oral QID PRN Robert Bryan MD        apixaban tablet 5 mg  5 mg Oral BID Afshan Márquez MD   5 mg at 12/28/22 2117    brimonidine 0.2% ophthalmic solution 1 drop  1 drop Both Eyes BID Robert Bryan MD   1 drop at 12/28/22 2117    BUPivacaine (PF) 0.25% (2.5 mg/ml) injection 12.5 mg  5 mL Intra-articular Once Case GIO Medel        calcitRIOL capsule 0.5 mcg  0.5 mcg Oral Every Mon, Wed, Fri William Alfonso MD   0.5 mcg at 12/26/22 0852    cefTRIAXone (ROCEPHIN) 2 g in dextrose 5 % in water (D5W) 5 % 50 mL IVPB (MB+)  2 g Intravenous Q24H Jonathan Smith MD   Stopped at 12/28/22 1644    dextrose 10% bolus 125 mL 125 mL  12.5 g Intravenous PRN Robert Bryan MD        dextrose 10% bolus 250 mL 250 mL  25 g Intravenous PRN Robert Bryan MD        diltiaZEM 24 hr capsule 240 mg  240 mg Oral Daily Kyung Aragon,  MD   240 mg at 12/27/22 0902    ergocalciferol capsule 50,000 Units  50,000 Units Oral Q7 Days Robert Bryan MD   50,000 Units at 12/26/22 0852    glucagon (human recombinant) injection 1 mg  1 mg Intramuscular PRN Robert Bryan MD        glucose chewable tablet 16 g  16 g Oral PRN Robert Bryan MD        glucose chewable tablet 24 g  24 g Oral PRN Robert Bryan MD        HYDROcodone-acetaminophen 5-325 mg per tablet 1 tablet  1 tablet Oral Q6H PRN Robert Bryan MD   1 tablet at 12/28/22 2123    melatonin tablet 6 mg  6 mg Oral Nightly PRN Robert Bryan MD   6 mg at 12/23/22 2000    morphine injection 2 mg  2 mg Intravenous Q4H PRN Robert Bryan MD   2 mg at 12/25/22 1445    naloxone 0.4 mg/mL injection 0.02 mg  0.02 mg Intravenous PRN Robert Bryan MD        ondansetron injection 4 mg  4 mg Intravenous Q8H PRN Robert Bryan MD        promethazine tablet 25 mg  25 mg Oral Q6H PRN Robert Bryan MD        senna-docusate 8.6-50 mg per tablet 1 tablet  1 tablet Oral BID PRN Robert Bryan MD        sodium chloride 0.9% bolus 250 mL 250 mL  250 mL Intravenous PRN William Alfonso MD        sodium chloride 0.9% bolus 250 mL 250 mL  250 mL Intravenous PRN William Alfonso MD        sodium chloride 0.9% bolus 250 mL 250 mL  250 mL Intravenous PRN William Alfonso MD        sodium chloride 0.9% bolus 250 mL 250 mL  250 mL Intravenous PRN Moe Glover MD        sodium chloride 0.9% flush 3 mL  3 mL Intravenous Q12H PRN Robert Bryan MD        vitamin renal formula (B-complex-vitamin c-folic acid) 1 mg per capsule 1 capsule  1 capsule Oral Daily William Alfonso MD   1 capsule at 12/27/22 0901       Review of patient's allergies indicates:   Allergen Reactions    Amoxicillin Rash     .  Lab Results   Component Value Date    WBC 7.14 12/28/2022    HGB 10.0 (L) 12/28/2022    HCT 30.1 (L) 12/28/2022    MCV 98 12/28/2022      12/28/2022 12/27/2022  Meaghan Potter is a 67 y.o., female.      Pre-op Assessment    I have reviewed the Patient Summary Reports.     I have reviewed the Nursing Notes. I have reviewed the NPO Status.   I have reviewed the Medications.     Review of Systems  Anesthesia Hx:  No problems with previous Anesthesia  Denies Family Hx of Anesthesia complications.   Denies Personal Hx of Anesthesia complications.   Social:  Non-Smoker    Hematology/Oncology:  Hematology Normal   Oncology Normal     EENT/Dental:EENT/Dental Normal   Cardiovascular:   Exercise tolerance: good Hypertension, well controlled Dysrhythmias atrial fibrillation ECG has been reviewed. Echo 12/20/22    Normal systolic function.  The estimated ejection fraction is 55%.  Normal left ventricular diastolic function.  Moderate right ventricular enlargement with mildly reduced right ventricular systolic function.  Biatrial enlargement.  Large circumferential pericardial effusion.   Pulmonary:   Shortness of breath    Renal/:   Chronic Renal Disease, ESRD, Dialysis    Hepatic/GI:   GERD, well controlled    Musculoskeletal:  Musculoskeletal Normal    Neurological:  Neurology Normal    Endocrine:  Obesity / BMI > 30  Dermatological:  Skin Normal    Psych:  Psychiatric Normal           Physical Exam  General: Well nourished, Cooperative, Alert and Oriented    Airway:  Mallampati: II   Mouth Opening: Normal  TM Distance: Normal  Tongue: Normal  Neck ROM: Normal ROM    Dental:  Intact  Pt denies loose or removable dentition  Heart:  Rate: Normal  Rhythm: Regular Rhythm        Anesthesia Plan  Type of Anesthesia, risks & benefits discussed:    Anesthesia Type: MAC  Intra-op Monitoring Plan: Standard ASA Monitors  Post Op Pain Control Plan: multimodal analgesia  Induction:  IV  Informed Consent: Informed consent signed with the Patient and  all parties understand the risks and agree with anesthesia plan.  All questions answered.   ASA Score: 4  Day of Surgery Review of History & Physical: H&P Update referred to the surgeon/provider.I have interviewed and examined the patient. I have reviewed the patient's H&P dated: There are no significant changes.     Ready For Surgery From Anesthesia Perspective.     .

## 2022-12-27 NOTE — TELEPHONE ENCOUNTER
----- Message from Kim Johnson MD sent at 12/24/2022  2:48 PM CST -----  Regarding: finger osteo  Not sure if [facility] is here this week or not.  Patient with chronic thumb paronychia, drained in ED but x-ray looks like might have osteo.  Discharged from ED on antibiotics.  Needs f/up this week (40 minutes and lightest day please) for check and possible nail removal in clinic please.

## 2022-12-27 NOTE — ASSESSMENT & PLAN NOTE
Patient with new onset Paroxysmal (<7 days) atrial fibrillation with RVR which is uncontrolled currently with Calcium Channel Blocker. Patient is currently in atrial fibrillation with rate in the 105-130s on diltiazem. [id number] Score: 2. HASBLED Score: 3. Anticoagulation indicated. Anticoagulation done with heparin .  Troponin measuring 0.095 with EKG obtained at outside facility revealing SVT/atrial fibrillation with RVR at rate of 176. Troponin likely elevated in setting of ESRD and demand ischemia from arrhythmia as patient denied endorsing chest pain.  Plan:  -telemetry- afib with rates   -cardizem drip transitioned to oral dosing  -f/u cardiology   -nephrology for HD   -Heparin gtt -transitioned to eliquis  -CHADS-VASc Score- 3

## 2022-12-27 NOTE — SUBJECTIVE & OBJECTIVE
Past Medical History:   Diagnosis Date    ESRD (end stage renal disease) on dialysis     GERD (gastroesophageal reflux disease)     Hypertension        Past Surgical History:   Procedure Laterality Date     SECTION      PLACEMENT OF DIALYSIS ACCESS         Review of patient's allergies indicates:   Allergen Reactions    Amoxicillin Rash       Medications:  Medications Prior to Admission   Medication Sig    brimonidine 0.2% (ALPHAGAN) 0.2 % Drop Place 1 drop into both eyes 2 (two) times daily.    cyclobenzaprine (FLEXERIL) 10 MG tablet Take 10 mg by mouth nightly as needed.    ergocalciferol (ERGOCALCIFEROL) 50,000 unit Cap Take 50,000 Units by mouth every 7 days.    hydrALAZINE (APRESOLINE) 25 MG tablet Take 25 mg by mouth 3 (three) times daily.    irbesartan (AVAPRO) 150 MG tablet Take 150 mg by mouth every evening.    amLODIPine (NORVASC) 5 MG tablet Take 2.5 mg by mouth once daily.     escitalopram oxalate (LEXAPRO) 5 MG Tab Take 5 mg by mouth once daily.    famotidine (PEPCID) 10 MG tablet Take 10 mg by mouth 2 (two) times daily.    HYDROcodone-acetaminophen (NORCO) 5-325 mg per tablet Take 1 tablet by mouth every 4 (four) hours as needed for Pain.    VENTOLIN HFA 90 mcg/actuation inhaler 2 puffs 3 (three) times daily as needed.     Antibiotics (From admission, onward)      Start     Stop Route Frequency Ordered    22 1700  cefTRIAXone (ROCEPHIN) 2 g in dextrose 5 % in water (D5W) 5 % 50 mL IVPB (MB+)         -- IV Every 24 hours (non-standard times) 22 1055    22 1030  mupirocin 2 % ointment          0859 Nasl 2 times daily 22 0935          Antifungals (From admission, onward)      None          Antivirals (From admission, onward)      None               There is no immunization history on file for this patient.    Family History       Problem Relation (Age of Onset)    Diabetes Mother, Father    Hypertension Mother, Father          Social History     Socioeconomic History     Marital status:    Tobacco Use    Smoking status: Former    Smokeless tobacco: Never   Substance and Sexual Activity    Alcohol use: No     Comment: occassionally    Drug use: No    Sexual activity: Yes     Review of Systems   Constitutional:  Negative for chills and fatigue.   HENT:  Negative for congestion, ear pain, facial swelling, sinus pressure and sore throat.    Eyes:  Negative for pain.   Respiratory:  Negative for apnea, chest tightness, shortness of breath and stridor.    Cardiovascular:  Negative for chest pain, palpitations and leg swelling.   Gastrointestinal:  Negative for abdominal distention, abdominal pain, diarrhea and nausea.   Endocrine: Negative for polydipsia and polyphagia.   Genitourinary:  Negative for decreased urine volume, difficulty urinating, frequency and genital sores.   Musculoskeletal:  Positive for arthralgias, gait problem and myalgias.   Neurological:  Negative for light-headedness and headaches.   Hematological:  Negative for adenopathy.   Psychiatric/Behavioral:  Negative for agitation, confusion and decreased concentration.    Objective:     Vital Signs (Most Recent):  Temp: 97.7 °F (36.5 °C) (12/27/22 0456)  Pulse: 75 (12/27/22 0456)  Resp: 18 (12/27/22 0456)  BP: (!) 109/58 (12/27/22 0456)  SpO2: (!) 93 % (12/27/22 0456)   Vital Signs (24h Range):  Temp:  [97.1 °F (36.2 °C)-98.2 °F (36.8 °C)] 97.7 °F (36.5 °C)  Pulse:  [72-94] 75  Resp:  [18-20] 18  SpO2:  [93 %-98 %] 93 %  BP: ()/(48-60) 109/58     Weight: 79.1 kg (174 lb 6.1 oz)  Body mass index is 29.93 kg/m².    Estimated Creatinine Clearance: 6.1 mL/min (A) (based on SCr of 9.1 mg/dL (H)).    Physical Exam  Vitals and nursing note reviewed.   Constitutional:       Appearance: She is well-developed.   HENT:      Head: Normocephalic and atraumatic.   Eyes:      Conjunctiva/sclera: Conjunctivae normal.      Pupils: Pupils are equal, round, and reactive to light.   Neck:      Thyroid: No thyroid mass or  thyromegaly.   Cardiovascular:      Rate and Rhythm: Normal rate.      Heart sounds: Normal heart sounds.   Pulmonary:      Effort: Pulmonary effort is normal. No accessory muscle usage or respiratory distress.      Breath sounds: Normal breath sounds.   Abdominal:      General: Bowel sounds are normal.      Palpations: Abdomen is soft. There is no mass.      Tenderness: There is no abdominal tenderness.   Musculoskeletal:      Cervical back: Normal range of motion and neck supple.      Comments: Dressing over left hip   Skin:     Findings: No rash.   Neurological:      Mental Status: She is alert and oriented to person, place, and time.       Significant Labs: Blood Culture:   Recent Labs   Lab 12/23/22  1131 12/23/22  1504 12/25/22  0912   LABBLOO Gram stain jad bottle: Gram positive cocci in chains resembling Strep  Gram stain aer bottle: Gram positive cocci in chains resembling Strep  Results called to and read back by: Coco Baer LPN 12/24/2022  19:35  STREPTOCOCCUS GORDONII* Gram stain jad bottle: Gram positive cocci in chains resembling Strep  Results called to and read back by: Coco Baer LPN 12/24/2022  19:35  Gram stain aer bottle: Gram positive cocci in chains resembling Strep  Positive results previously called 12/25/2022  00:37  STREPTOCOCCUS GORDONII  For susceptibility see order #[id number]  * No Growth to date  No Growth to date     BMP:   Recent Labs   Lab 12/26/22  0617   *   *   K 4.8   CL 93*   CO2 24   BUN 69*   CREATININE 9.1*   CALCIUM 10.4     CBC:   Recent Labs   Lab 12/25/22  0738   WBC 8.08   HGB 10.9*   HCT 33.8*        CMP:   Recent Labs   Lab 12/26/22  0617   *   K 4.8   CL 93*   CO2 24   *   BUN 69*   CREATININE 9.1*   CALCIUM 10.4   ALBUMIN 2.5*   ANIONGAP 17*     Wound Culture:   Recent Labs   Lab 12/23/22  1007   LABAERO STREPTOCOCCUS GORDONII  Moderate  Susceptibility pending  *     All pertinent labs within the past 24 hours have been  reviewed.    Significant Imaging: I have reviewed all pertinent imaging results/findings within the past 24 hours.

## 2022-12-27 NOTE — SUBJECTIVE & OBJECTIVE
Interval History:     Review of Systems   Constitutional:  Negative for chills and fatigue.   HENT:  Negative for congestion, ear pain, facial swelling, sinus pressure and sore throat.    Eyes:  Negative for pain.   Respiratory:  Negative for apnea, chest tightness, shortness of breath and stridor.    Cardiovascular:  Negative for chest pain, palpitations and leg swelling.   Gastrointestinal:  Negative for abdominal distention, abdominal pain, diarrhea and nausea.   Endocrine: Negative for polydipsia and polyphagia.   Genitourinary:  Negative for decreased urine volume, difficulty urinating, frequency and genital sores.   Musculoskeletal:  Positive for arthralgias, gait problem and myalgias.   Neurological:  Negative for light-headedness and headaches.   Hematological:  Negative for adenopathy.   Psychiatric/Behavioral:  Negative for agitation, confusion and decreased concentration.    Objective:     Vital Signs (Most Recent):  Temp: 98.1 °F (36.7 °C) (12/27/22 0741)  Pulse: 83 (12/27/22 0900)  Resp: 18 (12/27/22 0900)  BP: 112/62 (12/27/22 0741)  SpO2: 95 % (12/27/22 0900) Vital Signs (24h Range):  Temp:  [97.1 °F (36.2 °C)-98.2 °F (36.8 °C)] 98.1 °F (36.7 °C)  Pulse:  [72-94] 83  Resp:  [18-19] 18  SpO2:  [93 %-98 %] 95 %  BP: ()/(53-62) 112/62     Weight: 79.1 kg (174 lb 6.1 oz)  Body mass index is 29.93 kg/m².    Intake/Output Summary (Last 24 hours) at 12/27/2022 1229  Last data filed at 12/27/2022 0800  Gross per 24 hour   Intake 180 ml   Output 501 ml   Net -321 ml      Physical Exam  Vitals and nursing note reviewed.   Constitutional:       Appearance: She is well-developed.   HENT:      Head: Normocephalic and atraumatic.   Eyes:      Conjunctiva/sclera: Conjunctivae normal.      Pupils: Pupils are equal, round, and reactive to light.   Neck:      Thyroid: No thyroid mass or thyromegaly.   Cardiovascular:      Rate and Rhythm: Normal rate.      Heart sounds: Normal heart sounds.   Pulmonary:       Effort: Pulmonary effort is normal. No accessory muscle usage or respiratory distress.      Breath sounds: Normal breath sounds.   Abdominal:      General: Bowel sounds are normal.      Palpations: Abdomen is soft. There is no mass.      Tenderness: There is no abdominal tenderness.   Musculoskeletal:      Cervical back: Normal range of motion and neck supple.      Comments: Dressing over left hip   Skin:     Findings: No rash.   Neurological:      Mental Status: She is alert and oriented to person, place, and time.       Significant Labs: All pertinent labs within the past 24 hours have been reviewed.      Significant Imaging: I have reviewed all pertinent imaging results/findings within the past 24 hours.

## 2022-12-27 NOTE — PT/OT/SLP PROGRESS
Physical Therapy  Treatment    Meaghan Potter   MRN: [id number]   Admitting Diagnosis: Pyogenic arthritis of left hip    PT Received On: 12/27/22  PT Start Time: 0825     PT Stop Time: 0855    PT Total Time (min): 30 min       Billable Minutes:  Gait Training 10 and Therapeutic Activity 20    Treatment Type: Treatment  PT/PTA: PTA     PTA Visit Number: 1       General Precautions: Standard, fall  Orthopedic Precautions: LLE weight bearing as tolerated  Braces: N/A  Respiratory Status: Nasal cannula, flow 4 L/min         Subjective:  Communicated with patient's nurse, Tabitha EDWARDS, and completed Epic chart review prior to session.  Patient agreed to PT session with encouragement.     Pain/Comfort  Pain Rating 1: 8/10  Location - Side 1: Left  Location 1: hip  Pain Addressed 1: Other (see comments), Nurse notified (ACTIVITY PACING)  Pain Rating Post-Intervention 1: 8/10    Objective:   Patient found with: bed alarm, oxygen, telemetry, peripheral IV    Supine > sit EOB: Min-Mod A     Forward scoot towards EOB: SBA    STS from EOB > RW x2 trials: Min-Mod A     25ft w/ RW Min A (intermittent VC for safety)     Stand pivot T/F to chair w/ RW: Min A     Completed x10 reps AROM TE to BLE: LAQ, Hip Flex, AP   Intermittent cues given as needed to maintain correct form during repetitions    Educated patient on importance of increased tolerance to upright position and direct impact on CV endurance and strength. Patient encouraged to sit up in chair for a minimum of 2 consecutive hours per day. Encouraged patient to perform AROM TE to BLE throughout the day within all available planes of motion. Re enforced importance of utilizing call light to meet needs in room and not attempt to get up without staff assistance. Patient verbalized understanding and agreed to comply.       AM-PAC 6 CLICK MOBILITY  How much help from another person does this patient currently need?   1 = Unable, Total/Dependent Assistance  2 = A lot, Maximum/Moderate  Assistance  3 = A little, Minimum/Contact Guard/Supervision  4 = None, Modified [place]/Independent    Turning over in bed (including adjusting bedclothes, sheets and blankets)?: 3  Sitting down on and standing up from a chair with arms (e.g., wheelchair, bedside commode, etc.): 2  Moving from lying on back to sitting on the side of the bed?: 3  Moving to and from a bed to a chair (including a wheelchair)?: 3  Need to walk in hospital room?: 3  Climbing 3-5 steps with a railing?: 1  Basic Mobility Total Score: 15    AM-PAC Raw Score CMS G-Code Modifier Level of Impairment Assistance   6 % Total / Unable   7 - 9 CM 80 - 100% Maximal Assist   10 - 14 CL 60 - 80% Moderate Assist   15 - 19 CK 40 - 60% Moderate Assist   20 - 22 CJ 20 - 40% Minimal Assist   23 CI 1-20% SBA / CGA   24 CH 0% Independent/ Mod I     Patient left up in chair with all lines intact, call button in reach, and nurse notified.    Assessment:  Meaghan Potter is a 67 y.o. female with a medical diagnosis of Pyogenic arthritis of left hip and presents with overall decline in functional mobility. Patient would continue to benefit from skilled PT to address functional limitations listed below in order to return to PLOF/decrease caregiver burden.      Rehab identified problem list/impairments: weakness, impaired endurance, impaired self care skills, impaired functional mobility, gait instability, impaired balance, decreased coordination, decreased lower extremity function, decreased safety awareness, pain, decreased ROM, impaired cardiopulmonary response to activity    Rehab potential is fair.    Activity tolerance: Fair    Discharge recommendations: home health PT (WITH 24HR SPV)      Barriers to discharge:      Equipment recommendations: walker, rolling     GOALS:   Multidisciplinary Problems       Physical Therapy Goals          Problem: Physical Therapy    Goal Priority Disciplines Outcome Goal Variances Interventions   Physical Therapy  Goal     PT, PT/OT      Description: LT23  1. PT WILL COMPLETE BED MOBILITY WITH S  2. PT WILL T/F TO CHAIR WITH RW AND S  3. PT WILL GT TRAIN X 150' WITH RW AND S                       PLAN:    Patient to be seen 3 x/week to address the above listed problems via gait training, therapeutic activities, therapeutic exercises  Plan of Care expires: 23  Plan of Care reviewed with: patient         2022

## 2022-12-27 NOTE — ASSESSMENT & PLAN NOTE
S/p left hip arthrotomy- will follow Ortho- will plan to treat for 4 weeks from date of negative blood cultures with IV rocephin 2 gram daily, follow repeat blood culture

## 2022-12-27 NOTE — PROGRESS NOTES
[facility] - Telemetry ([facility])  Nephrology  Progress Note    Patient Name: Meaghan Potter  MRN: [id number]  Admission Date: 12/19/2022  Hospital Length of Stay: 6 days  Attending Provider: Jonathan Smith, *   Primary Care Physician: Primary Doctor No  Principal Problem:Pyogenic arthritis of left hip  Reason for Consult: Management of ESRD  Primary Nephrologist: Dr. Ribera/[facility]       Subjective:     HPI: 68 yo female with ESRD on MWF last HD last Friday (12/16) admitted as a transfer for Cardiology, Ortho and Nephrology. She is currently seen on dialysis, using a LUE AVF. Dialysis vintage is 13 years. Her only complaint to me is pain in both hips described as 'stiff'   No current SOB, no chest pain but describes poor appetite since in pain.     12/21  - describes left hip pain as shooting pain  - eating lunch, not great appetite    12/22  - bed transfer in Radiology with 'episode' this am   - seen in room s/p Ativan IV X 1  - s/p HD yesterday     *For 12/23/22: First visit with patient; seen and examined; chart reviewed. Denies any discomfort.     *For 12/24/22: Off floor in surgery; chart reviewed.      *For 12/25/22: Tolerated hip washout yesterday.    *For 12/26/22: Seen and examined on HD; none voiced at HD initiation.     *For 12/27/22: Seen and examined; chart reviewed; voices no acute issues overnight.     Review of patient's allergies indicates:   Allergen Reactions    Amoxicillin Rash     Current Facility-Administered Medications   Medication Frequency    0.9%  NaCl infusion Once    0.9%  NaCl infusion Once    0.9%  NaCl infusion PRN    acetaminophen suppository 650 mg Q6H PRN    acetaminophen tablet 650 mg Q8H PRN    albuterol-ipratropium 2.5 mg-0.5 mg/3 mL nebulizer solution 3 mL Q6H PRN    aluminum-magnesium hydroxide-simethicone 200-200-20 mg/5 mL suspension 30 mL QID PRN    apixaban tablet 5 mg BID    brimonidine 0.2% ophthalmic solution 1 drop BID    BUPivacaine (PF) 0.25% (2.5 mg/ml)  injection 12.5 mg Once    calcitRIOL capsule 0.5 mcg Every Mon, Wed, Fri    cefTRIAXone (ROCEPHIN) 2 g in dextrose 5 % in water (D5W) 5 % 50 mL IVPB (MB+) Q24H    dextrose 10% bolus 125 mL 125 mL PRN    dextrose 10% bolus 250 mL 250 mL PRN    diltiaZEM 24 hr capsule 240 mg Daily    ergocalciferol capsule 50,000 Units Q7 Days    glucagon (human recombinant) injection 1 mg PRN    glucose chewable tablet 16 g PRN    glucose chewable tablet 24 g PRN    HYDROcodone-acetaminophen 5-325 mg per tablet 1 tablet Q6H PRN    melatonin tablet 6 mg Nightly PRN    morphine injection 2 mg Q4H PRN    naloxone 0.4 mg/mL injection 0.02 mg PRN    ondansetron injection 4 mg Q8H PRN    promethazine tablet 25 mg Q6H PRN    senna-docusate 8.6-50 mg per tablet 1 tablet BID PRN    sodium chloride 0.9% bolus 250 mL 250 mL PRN    sodium chloride 0.9% bolus 250 mL 250 mL PRN    sodium chloride 0.9% bolus 250 mL 250 mL PRN    sodium chloride 0.9% bolus 250 mL 250 mL PRN    sodium chloride 0.9% flush 3 mL Q12H PRN    vitamin renal formula (B-complex-vitamin c-folic acid) 1 mg per capsule 1 capsule Daily           Review of Systems   Constitutional:  Negative for fever.   Musculoskeletal:  Positive for arthralgias.   Allergic/Immunologic: Positive for immunocompromised state.   Objective:     Vital Signs (Most Recent):  Temp: 98.1 °F (36.7 °C) (12/27/22 0741)  Pulse: 83 (12/27/22 0900)  Resp: 18 (12/27/22 0900)  BP: 112/62 (12/27/22 0741)  SpO2: 95 % (12/27/22 0900) Vital Signs (24h Range):  Temp:  [97.1 °F (36.2 °C)-98.2 °F (36.8 °C)] 98.1 °F (36.7 °C)  Pulse:  [72-94] 83  Resp:  [18-20] 18  SpO2:  [93 %-98 %] 95 %  BP: ()/(48-62) 112/62     Weight: 79.1 kg (174 lb 6.1 oz) (12/27/22 0012)  Body mass index is 29.93 kg/m².  Body surface area is 1.89 meters squared.    I/O last 3 completed shifts:  In: 0   Out: 501 [Other:501]    Physical Exam  Vitals and nursing note reviewed.   Constitutional:       General: She is not in acute distress.      Appearance: She is obese.   Cardiovascular:      Rate and Rhythm: Normal rate.   Pulmonary:      Effort: Pulmonary effort is normal. No respiratory distress.   Neurological:      Mental Status: She is alert and oriented to person, place, and time.   Psychiatric:         Mood and Affect: Mood normal.       Significant Labs: reviewed        Assessment/Plan:     Active Diagnoses:    Diagnosis Date Noted POA    PRINCIPAL PROBLEM:  Pyogenic arthritis of left hip [M00.9] 12/20/2022 Yes    Streptococcal bacteremia [R78.81, B95.5] 12/27/2022 Unknown    Hyperkalemia, diminished renal excretion [E87.5] 12/25/2022 Yes    Pericardial effusion [I31.39] 12/21/2022 Yes    ESRD (end stage renal disease) on dialysis [N18.6, Z99.2] 12/20/2022 Not Applicable    Primary hypertension [I10] 12/20/2022 Yes    Gastroesophageal reflux disease without esophagitis [K21.9] 12/20/2022 Yes    Tear of left acetabular labrum [S73.192A] 12/20/2022 Yes    Shortness of breath [R06.02] 12/20/2022 Yes    Bone cyst of right femur [M85.651] 12/20/2022 Yes    Atrial fibrillation [I48.91] 12/19/2022 Yes      Problems Resolved During this Admission:       ESRD on MWF  - next dialysis tomorrow  - s/p HD Tuesday and Wednesday    Mineral and Bone Disease  - calcitriol 0.5mcg MWF  - continue D2 weekly   - reports no phos binders at home but elevated here, monitor phos level and continue low phos diet    HTN  - adjustments made by HM and Cardiology     Anemia of ESRD  - no ESAs indicated yet   - monitor H&H     Cardiomegaly, quite severe on recent CXR  - pericardial effusion noted on Echo  - etiology of it being uremic is extremely low as she does not miss dialysis and clearance labs from outpatient setting have been above goal  - the pericardial sac is not an area of fluid where dialysis UF can remove     Afib with RVR  - off diltiazem gtts and on po diltiazem IR q6  - per Cardiology    Hip pain  - Ortho notes reviewed    Dr. Glover to assume Nephrology care  in am     *For 12/23/22: HD today; second shift; likely hip surgery tomorrow; meds and labs reviewed.  Volume status appears acceptable.  Blood cultures requested to be obtained on HD. Will arrange.  Following closely with you.     *For 12/24/22: Will attempt to revisit; labs and meds reviewed; next HD session Monday. Following.     *For 12/25/22: Next HD session Monday; meds and labs reviewed; following closely with you.  NB K 5.7; on [place], [facility] dc ARB; low K diet needed.      *For 12/26/22: HD in session; aiming 1-2 liters UF; access stable; meds and labs reviewed; K and acid base status acceptable.  Following.     NB Called by HD RN: BP low in 90's apparently was given cardizem this am; will be unable to perform any UF accordingly.     *For 12/27/22: Next HD session in am; meds and labs reviewed; had 500 cc UF yesterday with HD; following closely with you.  Access stable.       Moe Glover MD  Nephrology

## 2022-12-27 NOTE — HPI
"67 y.o. woman with  past medical history of ESRD on dialysis, GERD and Hypertension transferred to the hospital with history of atrial fib and noted to have left superior labral tear of the acetabulum  /small right hip joint effusion and a large left hip joint effusion on MRI.  She was seen by Ortho and has left hip arthrotomy with op findings -  "Shahid purulence in the left hip; capsule intact and no extension outside joint.  No softening of bone."  Cultures- 12/23- blood culture- strep gordoni   Hip culture - strep gordoni  She also had US guided aspiration of the hip.     Component      Latest Ref Rng & Units 12/25/2022 12/24/2022 12/24/2022            5:42 AM  5:42 AM   WBC      3.90 - 12.70 K/uL 8.08 6.51 6.51     "

## 2022-12-27 NOTE — CONSULTS
"[facility])  Infectious Disease  Consult Note    Patient Name: Meaghan Potter  MRN: [id number]  Admission Date: 12/19/2022  Hospital Length of Stay: 6 days  Attending Physician: Jonathan Smith, *  Primary Care Provider: Primary Doctor No     Isolation Status: No active isolations    Patient information was obtained from patient, past medical records and ER records.      Consults  Assessment/Plan:     * Pyogenic arthritis of left hip  S/p left hip arthrotomy- will follow Ortho- will plan to treat for 4 weeks from date of negative blood cultures with IV rocephin 2 gram daily, follow repeat blood culture      Streptococcal bacteremia  Echo showed large pericardial effusion       Will plan to do KATE if feasible     The practical choice that can be given daily will be to use IV vancomycin as she is on HD .  IV Rocephin once daily is the preferred regime .  Final duration will depend on KATE    Tear of left acetabular labrum  Follow ortho    Primary hypertension  BP control as per primary team    ESRD (end stage renal disease) on dialysis  HD as tolerated     Atrial fibrillation  Rate control as per primary team       Pericardial effusion - will follow cardiology   Thank you for your consult. I will follow-up with patient. Please contact us if you have any additional questions.    Gerardo Schwartz MD, [facility]  Infectious Disease  [facility])    Subjective:     Principal Problem: Pyogenic arthritis of left hip    HPI:   67 y.o. woman with  past medical history of ESRD on dialysis, GERD and Hypertension transferred to the hospital with history of atrial fib and noted to have left superior labral tear of the acetabulum  /small right hip joint effusion and a large left hip joint effusion on MRI.  She was seen by Ortho and has left hip arthrotomy with op findings -  "Shahid purulence in the left hip; capsule intact and no extension outside joint.  No softening of bone."  Cultures- 12/23- blood " culture- strep gordoni   Hip culture - strep gordoni  She also had US guided aspiration of the hip.     Component      Latest Ref Rng & Units 2022            5:42 AM  5:42 AM   WBC      3.90 - 12.70 K/uL 8.08 6.51 6.51       Past Medical History:   Diagnosis Date    ESRD (end stage renal disease) on dialysis     GERD (gastroesophageal reflux disease)     Hypertension        Past Surgical History:   Procedure Laterality Date     SECTION      PLACEMENT OF DIALYSIS ACCESS         Review of patient's allergies indicates:   Allergen Reactions    Amoxicillin Rash       Medications:  Medications Prior to Admission   Medication Sig    brimonidine 0.2% (ALPHAGAN) 0.2 % Drop Place 1 drop into both eyes 2 (two) times daily.    cyclobenzaprine (FLEXERIL) 10 MG tablet Take 10 mg by mouth nightly as needed.    ergocalciferol (ERGOCALCIFEROL) 50,000 unit Cap Take 50,000 Units by mouth every 7 days.    hydrALAZINE (APRESOLINE) 25 MG tablet Take 25 mg by mouth 3 (three) times daily.    irbesartan (AVAPRO) 150 MG tablet Take 150 mg by mouth every evening.    amLODIPine (NORVASC) 5 MG tablet Take 2.5 mg by mouth once daily.     escitalopram oxalate (LEXAPRO) 5 MG Tab Take 5 mg by mouth once daily.    famotidine (PEPCID) 10 MG tablet Take 10 mg by mouth 2 (two) times daily.    HYDROcodone-acetaminophen (NORCO) 5-325 mg per tablet Take 1 tablet by mouth every 4 (four) hours as needed for Pain.    VENTOLIN HFA 90 mcg/actuation inhaler 2 puffs 3 (three) times daily as needed.     Antibiotics (From admission, onward)      Start     Stop Route Frequency Ordered    22 1700  cefTRIAXone (ROCEPHIN) 2 g in dextrose 5 % in water (D5W) 5 % 50 mL IVPB (MB+)         -- IV Every 24 hours (non-standard times) 22 1055    22 1030  mupirocin 2 % ointment          0859 Nasl 2 times daily 22 0935          Antifungals (From admission, onward)      None          Antivirals (From admission,  onward)      None               There is no immunization history on file for this patient.    Family History       Problem Relation (Age of Onset)    Diabetes Mother, Father    Hypertension Mother, Father          Social History     Socioeconomic History    Marital status:    Tobacco Use    Smoking status: Former    Smokeless tobacco: Never   Substance and Sexual Activity    Alcohol use: No     Comment: occassionally    Drug use: No    Sexual activity: Yes     Review of Systems   Constitutional:  Negative for chills and fatigue.   HENT:  Negative for congestion, ear pain, facial swelling, sinus pressure and sore throat.    Eyes:  Negative for pain.   Respiratory:  Negative for apnea, chest tightness, shortness of breath and stridor.    Cardiovascular:  Negative for chest pain, palpitations and leg swelling.   Gastrointestinal:  Negative for abdominal distention, abdominal pain, diarrhea and nausea.   Endocrine: Negative for polydipsia and polyphagia.   Genitourinary:  Negative for decreased urine volume, difficulty urinating, frequency and genital sores.   Musculoskeletal:  Positive for arthralgias, gait problem and myalgias.   Neurological:  Negative for light-headedness and headaches.   Hematological:  Negative for adenopathy.   Psychiatric/Behavioral:  Negative for agitation, confusion and decreased concentration.    Objective:     Vital Signs (Most Recent):  Temp: 97.7 °F (36.5 °C) (12/27/22 0456)  Pulse: 75 (12/27/22 0456)  Resp: 18 (12/27/22 0456)  BP: (!) 109/58 (12/27/22 0456)  SpO2: (!) 93 % (12/27/22 0456)   Vital Signs (24h Range):  Temp:  [97.1 °F (36.2 °C)-98.2 °F (36.8 °C)] 97.7 °F (36.5 °C)  Pulse:  [72-94] 75  Resp:  [18-20] 18  SpO2:  [93 %-98 %] 93 %  BP: ()/(48-60) 109/58     Weight: 79.1 kg (174 lb 6.1 oz)  Body mass index is 29.93 kg/m².    Estimated Creatinine Clearance: 6.1 mL/min (A) (based on SCr of 9.1 mg/dL (H)).    Physical Exam  Vitals and nursing note reviewed.    Constitutional:       Appearance: She is well-developed.   HENT:      Head: Normocephalic and atraumatic.   Eyes:      Conjunctiva/sclera: Conjunctivae normal.      Pupils: Pupils are equal, round, and reactive to light.   Neck:      Thyroid: No thyroid mass or thyromegaly.   Cardiovascular:      Rate and Rhythm: Normal rate.      Heart sounds: Normal heart sounds.   Pulmonary:      Effort: Pulmonary effort is normal. No accessory muscle usage or respiratory distress.      Breath sounds: Normal breath sounds.   Abdominal:      General: Bowel sounds are normal.      Palpations: Abdomen is soft. There is no mass.      Tenderness: There is no abdominal tenderness.   Musculoskeletal:      Cervical back: Normal range of motion and neck supple.      Comments: Dressing over left hip   Skin:     Findings: No rash.   Neurological:      Mental Status: She is alert and oriented to person, place, and time.       Significant Labs: Blood Culture:   Recent Labs   Lab 12/23/22  1131 12/23/22  1504 12/25/22  0912   LABBLOO Gram stain jad bottle: Gram positive cocci in chains resembling Strep  Gram stain aer bottle: Gram positive cocci in chains resembling Strep  Results called to and read back by: Coco Baer LPN 12/24/2022  19:35  STREPTOCOCCUS GORDONII* Gram stain jad bottle: Gram positive cocci in chains resembling Strep  Results called to and read back by: Coco Baer LPN 12/24/2022  19:35  Gram stain aer bottle: Gram positive cocci in chains resembling Strep  Positive results previously called 12/25/2022  00:37  STREPTOCOCCUS GORDONII  For susceptibility see order #[id number]  * No Growth to date  No Growth to date     BMP:   Recent Labs   Lab 12/26/22  0617   *   *   K 4.8   CL 93*   CO2 24   BUN 69*   CREATININE 9.1*   CALCIUM 10.4     CBC:   Recent Labs   Lab 12/25/22  0738   WBC 8.08   HGB 10.9*   HCT 33.8*        CMP:   Recent Labs   Lab 12/26/22  0617   *   K 4.8   CL 93*   CO2 24   GLU  115*   BUN 69*   CREATININE 9.1*   CALCIUM 10.4   ALBUMIN 2.5*   ANIONGAP 17*     Wound Culture:   Recent Labs   Lab 12/23/22  1007   LABAERO STREPTOCOCCUS GORDONII  Moderate  Susceptibility pending  *     All pertinent labs within the past 24 hours have been reviewed.    Significant Imaging: I have reviewed all pertinent imaging results/findings within the past 24 hours.

## 2022-12-27 NOTE — ASSESSMENT & PLAN NOTE
Ortho consulted    12/23/22  -s/p hip aspiration today  -Likely will need washout/surgery    12/27/22  Blood cultures and culture from left hip reveal Gram-positive and Gram-negative  Will plan for KATE tomorrow morning 9am, all risks and benefits explained to pt, all questions answered. Pt agreeable to proceed with procedure tomorrow morning.

## 2022-12-27 NOTE — SUBJECTIVE & OBJECTIVE
Review of Systems   Constitutional: Positive for malaise/fatigue.   HENT: Negative.     Eyes: Negative.    Cardiovascular: Negative.    Respiratory: Negative.     Skin: Negative.    Musculoskeletal: Negative.    Gastrointestinal: Negative.    Genitourinary: Negative.    Neurological:  Positive for weakness.   Psychiatric/Behavioral: Negative.     Objective:     Vital Signs (Most Recent):  Temp: 98.1 °F (36.7 °C) (12/27/22 0741)  Pulse: 83 (12/27/22 0900)  Resp: 18 (12/27/22 0900)  BP: 112/62 (12/27/22 0741)  SpO2: 95 % (12/27/22 0900) Vital Signs (24h Range):  Temp:  [97.1 °F (36.2 °C)-98.2 °F (36.8 °C)] 98.1 °F (36.7 °C)  Pulse:  [72-94] 83  Resp:  [18-19] 18  SpO2:  [93 %-98 %] 95 %  BP: ()/(53-62) 112/62     Weight: 79.1 kg (174 lb 6.1 oz)  Body mass index is 29.93 kg/m².     SpO2: 95 %         Intake/Output Summary (Last 24 hours) at 12/27/2022 1156  Last data filed at 12/27/2022 0800  Gross per 24 hour   Intake 180 ml   Output 501 ml   Net -321 ml       Lines/Drains/Airways       Drain  Duration                  Hemodialysis AV Fistula Left upper arm -- days              Peripheral Intravenous Line  Duration                  Peripheral IV - Single Lumen 12/24/22 1530 20 G Right Antecubital 2 days                    Physical Exam  Vitals and nursing note reviewed.   Constitutional:       Appearance: Normal appearance.   HENT:      Head: Normocephalic.   Eyes:      Pupils: Pupils are equal, round, and reactive to light.   Cardiovascular:      Rate and Rhythm: Normal rate. Rhythm irregular.      Heart sounds: Normal heart sounds, S1 normal and S2 normal. No murmur heard.    No S3 or S4 sounds.   Pulmonary:      Effort: Pulmonary effort is normal.      Breath sounds: Normal breath sounds.   Abdominal:      General: Bowel sounds are normal.      Palpations: Abdomen is soft.   Musculoskeletal:         General: Normal range of motion.      Cervical back: Normal range of motion.   Skin:     Capillary Refill:  Capillary refill takes less than 2 seconds.   Neurological:      General: No focal deficit present.      Mental Status: She is alert and oriented to person, place, and time.   Psychiatric:         Mood and Affect: Mood normal.         Behavior: Behavior normal.         Thought Content: Thought content normal.       Significant Labs: BMP:   Recent Labs   Lab 12/26/22  0617   *   *   K 4.8   CL 93*   CO2 24   BUN 69*   CREATININE 9.1*   CALCIUM 10.4   , CMP   Recent Labs   Lab 12/26/22  0617   *   K 4.8   CL 93*   CO2 24   *   BUN 69*   CREATININE 9.1*   CALCIUM 10.4   ALBUMIN 2.5*   ANIONGAP 17*   , CBC No results for input(s): WBC, HGB, HCT, PLT in the last 48 hours., INR No results for input(s): INR, PROTIME in the last 48 hours., Lipid Panel No results for input(s): CHOL, HDL, LDLCALC, TRIG, CHOLHDL in the last 48 hours., Troponin No results for input(s): TROPONINI in the last 48 hours., and All pertinent lab results from the last 24 hours have been reviewed.    Significant Imaging: Cardiac Cath: reviewed, Echocardiogram: Transthoracic echo (TTE) complete (Cupid Only):   Results for orders placed or performed during the hospital encounter of 12/19/22   Echo   Result Value Ref Range    BSA 1.79 m2    TR Max Krunal 4.48 m/s    Triscuspid Valve Regurgitation Peak Gradient 80 mmHg    EF 55 %    Narrative    · Normal systolic function.  · The estimated ejection fraction is 55%.  · Normal left ventricular diastolic function.  · Moderate right ventricular enlargement with mildly reduced right   ventricular systolic function.  · Biatrial enlargement.  · Large circumferential pericardial effusion.     Limited echo to evaluate pericardial effusion. Stable from prior.     , EKG: reviewed, Stress Test: reviewed, and X-Ray: CXR: X-Ray Chest 1 View (CXR): No results found for this visit on 12/19/22. and X-Ray Chest PA and Lateral (CXR): No results found for this visit on 12/19/22.

## 2022-12-27 NOTE — ASSESSMENT & PLAN NOTE
EKG reviewed afib  Repeat EKG ordered  Will need AC    12/21/22  -Still in afib but HR controlled  -Cardizem switched to po  -Continue heparin gtt for now, will need OAC upon d/c    12/22/22  Afib fast  Cont diltiazem PO  Added back cardizem gtt  Cont hep gtt for now, OAC upon DC  Follow up in clinic    12/23/22  -HR controlled  -Continue diltiazem po  -Resume heparin gtt for AC if ok with primary team, will need Eliquis upon d/c    12/27/22  HR controlled  Cont Eliquis, diltiazem

## 2022-12-28 ENCOUNTER — TELEPHONE (OUTPATIENT)
Dept: ORTHOPEDICS | Facility: CLINIC | Age: 67
End: 2022-12-28
Payer: COMMERCIAL

## 2022-12-28 ENCOUNTER — ANESTHESIA (OUTPATIENT)
Dept: CARDIOLOGY | Facility: HOSPITAL | Age: 67
DRG: 480 | End: 2022-12-28
Payer: COMMERCIAL

## 2022-12-28 LAB
ANION GAP SERPL CALC-SCNC: 14 MMOL/L (ref 8–16)
BASOPHILS # BLD AUTO: 0.03 K/UL (ref 0–0.2)
BASOPHILS NFR BLD: 0.4 % (ref 0–1.9)
BUN SERPL-MCNC: 47 MG/DL (ref 8–23)
CALCIUM SERPL-MCNC: 10.3 MG/DL (ref 8.7–10.5)
CHLORIDE SERPL-SCNC: 99 MMOL/L (ref 95–110)
CO2 SERPL-SCNC: 21 MMOL/L (ref 23–29)
CREAT SERPL-MCNC: 7.4 MG/DL (ref 0.5–1.4)
DIFFERENTIAL METHOD: ABNORMAL
EOSINOPHIL # BLD AUTO: 0.1 K/UL (ref 0–0.5)
EOSINOPHIL NFR BLD: 1.4 % (ref 0–8)
ERYTHROCYTE [DISTWIDTH] IN BLOOD BY AUTOMATED COUNT: 14.5 % (ref 11.5–14.5)
EST. GFR  (NO RACE VARIABLE): 6 ML/MIN/1.73 M^2
GLUCOSE SERPL-MCNC: 93 MG/DL (ref 70–110)
HCT VFR BLD AUTO: 30.1 % (ref 37–48.5)
HGB BLD-MCNC: 10 G/DL (ref 12–16)
IMM GRANULOCYTES # BLD AUTO: 0.07 K/UL (ref 0–0.04)
IMM GRANULOCYTES NFR BLD AUTO: 1 % (ref 0–0.5)
LYMPHOCYTES # BLD AUTO: 0.9 K/UL (ref 1–4.8)
LYMPHOCYTES NFR BLD: 12.2 % (ref 18–48)
MCH RBC QN AUTO: 32.6 PG (ref 27–31)
MCHC RBC AUTO-ENTMCNC: 33.2 G/DL (ref 32–36)
MCV RBC AUTO: 98 FL (ref 82–98)
MONOCYTES # BLD AUTO: 0.8 K/UL (ref 0.3–1)
MONOCYTES NFR BLD: 11.5 % (ref 4–15)
NEUTROPHILS # BLD AUTO: 5.3 K/UL (ref 1.8–7.7)
NEUTROPHILS NFR BLD: 73.5 % (ref 38–73)
NRBC BLD-RTO: 0 /100 WBC
PLATELET # BLD AUTO: 264 K/UL (ref 150–450)
PLATELET BLD QL SMEAR: ABNORMAL
PMV BLD AUTO: 10.1 FL (ref 9.2–12.9)
POTASSIUM SERPL-SCNC: 4.5 MMOL/L (ref 3.5–5.1)
RBC # BLD AUTO: 3.07 M/UL (ref 4–5.4)
SODIUM SERPL-SCNC: 134 MMOL/L (ref 136–145)
VANCOMYCIN SERPL-MCNC: 10.3 UG/ML
WBC # BLD AUTO: 7.14 K/UL (ref 3.9–12.7)

## 2022-12-28 PROCEDURE — 21400001 HC TELEMETRY ROOM

## 2022-12-28 PROCEDURE — 99233 PR SUBSEQUENT HOSPITAL CARE,LEVL III: ICD-10-PCS | Mod: GT,NSCH,, | Performed by: INTERNAL MEDICINE

## 2022-12-28 PROCEDURE — 25000003 PHARM REV CODE 250: Performed by: INTERNAL MEDICINE

## 2022-12-28 PROCEDURE — 93010 EKG 12-LEAD: ICD-10-PCS | Mod: ,,, | Performed by: INTERNAL MEDICINE

## 2022-12-28 PROCEDURE — 85025 COMPLETE CBC W/AUTO DIFF WBC: CPT | Performed by: INTERNAL MEDICINE

## 2022-12-28 PROCEDURE — 94761 N-INVAS EAR/PLS OXIMETRY MLT: CPT

## 2022-12-28 PROCEDURE — 93005 ELECTROCARDIOGRAM TRACING: CPT

## 2022-12-28 PROCEDURE — 90935 HEMODIALYSIS ONE EVALUATION: CPT | Mod: ,,, | Performed by: INTERNAL MEDICINE

## 2022-12-28 PROCEDURE — 63600175 PHARM REV CODE 636 W HCPCS: Performed by: INTERNAL MEDICINE

## 2022-12-28 PROCEDURE — 99233 SBSQ HOSP IP/OBS HIGH 50: CPT | Mod: GT,NSCH,, | Performed by: INTERNAL MEDICINE

## 2022-12-28 PROCEDURE — 90935 PR HEMODIALYSIS, ONE EVALUATION: ICD-10-PCS | Mod: ,,, | Performed by: INTERNAL MEDICINE

## 2022-12-28 PROCEDURE — 93010 ELECTROCARDIOGRAM REPORT: CPT | Mod: ,,, | Performed by: INTERNAL MEDICINE

## 2022-12-28 PROCEDURE — 80202 ASSAY OF VANCOMYCIN: CPT | Performed by: INTERNAL MEDICINE

## 2022-12-28 PROCEDURE — 25000003 PHARM REV CODE 250: Performed by: HOSPITALIST

## 2022-12-28 PROCEDURE — 80100016 HC MAINTENANCE HEMODIALYSIS

## 2022-12-28 PROCEDURE — 80048 BASIC METABOLIC PNL TOTAL CA: CPT | Performed by: INTERNAL MEDICINE

## 2022-12-28 RX ADMIN — SODIUM CHLORIDE: 9 INJECTION, SOLUTION INTRAVENOUS at 04:12

## 2022-12-28 RX ADMIN — HYDROCODONE BITARTRATE AND ACETAMINOPHEN 1 TABLET: 5; 325 TABLET ORAL at 04:12

## 2022-12-28 RX ADMIN — APIXABAN 5 MG: 2.5 TABLET, FILM COATED ORAL at 09:12

## 2022-12-28 RX ADMIN — APIXABAN 5 MG: 2.5 TABLET, FILM COATED ORAL at 04:12

## 2022-12-28 RX ADMIN — CEFTRIAXONE 2 G: 2 INJECTION, POWDER, FOR SOLUTION INTRAMUSCULAR; INTRAVENOUS at 04:12

## 2022-12-28 RX ADMIN — BRIMONIDINE TARTRATE 1 DROP: 2 SOLUTION/ DROPS OPHTHALMIC at 04:12

## 2022-12-28 RX ADMIN — HYDROCODONE BITARTRATE AND ACETAMINOPHEN 1 TABLET: 5; 325 TABLET ORAL at 09:12

## 2022-12-28 RX ADMIN — BRIMONIDINE TARTRATE 1 DROP: 2 SOLUTION/ DROPS OPHTHALMIC at 09:12

## 2022-12-28 NOTE — PROGRESS NOTES
[facility] - Telemetry ([facility])  Nephrology  Progress Note    Patient Name: Meaghan Potter  MRN: [id number]  Admission Date: 12/19/2022  Hospital Length of Stay: 7 days  Attending Provider: Jonathan Smith, *   Primary Care Physician: Primary Doctor No  Principal Problem:Pyogenic arthritis of left hip  Reason for Consult: Management of ESRD  Primary Nephrologist: Dr. Ribera/[facility]       Subjective:     HPI: 66 yo female with ESRD on MWF last HD last Friday (12/16) admitted as a transfer for Cardiology, Ortho and Nephrology. She is currently seen on dialysis, using a LUE AVF. Dialysis vintage is 13 years. Her only complaint to me is pain in both hips described as 'stiff'   No current SOB, no chest pain but describes poor appetite since in pain.     12/21  - describes left hip pain as shooting pain  - eating lunch, not great appetite    12/22  - bed transfer in Radiology with 'episode' this am   - seen in room s/p Ativan IV X 1  - s/p HD yesterday     *For 12/23/22: First visit with patient; seen and examined; chart reviewed. Denies any discomfort.     *For 12/24/22: Off floor in surgery; chart reviewed.      *For 12/25/22: Tolerated hip washout yesterday.    *For 12/26/22: Seen and examined on HD; none voiced at HD initiation.     *For 12/27/22: Seen and examined; chart reviewed; voices no acute issues overnight.     *For 12/28/22: Seen and examined; none voiced; seen on HD.    Review of patient's allergies indicates:   Allergen Reactions    Amoxicillin Rash     Current Facility-Administered Medications   Medication Frequency    0.9%  NaCl infusion Once    0.9%  NaCl infusion Once    0.9%  NaCl infusion PRN    acetaminophen suppository 650 mg Q6H PRN    acetaminophen tablet 650 mg Q8H PRN    albuterol-ipratropium 2.5 mg-0.5 mg/3 mL nebulizer solution 3 mL Q6H PRN    aluminum-magnesium hydroxide-simethicone 200-200-20 mg/5 mL suspension 30 mL QID PRN    apixaban tablet 5 mg BID    brimonidine 0.2% ophthalmic  solution 1 drop BID    BUPivacaine (PF) 0.25% (2.5 mg/ml) injection 12.5 mg Once    calcitRIOL capsule 0.5 mcg Every Mon, Wed, Fri    cefTRIAXone (ROCEPHIN) 2 g in dextrose 5 % in water (D5W) 5 % 50 mL IVPB (MB+) Q24H    dextrose 10% bolus 125 mL 125 mL PRN    dextrose 10% bolus 250 mL 250 mL PRN    diltiaZEM 24 hr capsule 240 mg Daily    ergocalciferol capsule 50,000 Units Q7 Days    glucagon (human recombinant) injection 1 mg PRN    glucose chewable tablet 16 g PRN    glucose chewable tablet 24 g PRN    HYDROcodone-acetaminophen 5-325 mg per tablet 1 tablet Q6H PRN    melatonin tablet 6 mg Nightly PRN    morphine injection 2 mg Q4H PRN    naloxone 0.4 mg/mL injection 0.02 mg PRN    ondansetron injection 4 mg Q8H PRN    promethazine tablet 25 mg Q6H PRN    senna-docusate 8.6-50 mg per tablet 1 tablet BID PRN    sodium chloride 0.9% bolus 250 mL 250 mL PRN    sodium chloride 0.9% bolus 250 mL 250 mL PRN    sodium chloride 0.9% bolus 250 mL 250 mL PRN    sodium chloride 0.9% bolus 250 mL 250 mL PRN    sodium chloride 0.9% flush 3 mL Q12H PRN    vitamin renal formula (B-complex-vitamin c-folic acid) 1 mg per capsule 1 capsule Daily           Review of Systems   Constitutional:  Negative for fever.   Musculoskeletal:  Positive for arthralgias.   Allergic/Immunologic: Positive for immunocompromised state.   Objective:     Vital Signs (Most Recent):  Temp: 98.5 °F (36.9 °C) (12/28/22 0751)  Pulse: 81 (12/28/22 0751)  Resp: 14 (12/28/22 0751)  BP: (!) 121/57 (12/28/22 0751)  SpO2: 97 % (12/28/22 0751) Vital Signs (24h Range):  Temp:  [97.8 °F (36.6 °C)-98.6 °F (37 °C)] 98.5 °F (36.9 °C)  Pulse:  [70-90] 81  Resp:  [14-19] 14  SpO2:  [94 %-99 %] 97 %  BP: ()/(52-65) 121/57     Weight: 78.8 kg (173 lb 11.6 oz) (12/28/22 0118)  Body mass index is 29.82 kg/m².  Body surface area is 1.89 meters squared.    I/O last 3 completed shifts:  In: 480 [P.O.:480]  Out: -     Physical Exam  Vitals and nursing note reviewed.    Constitutional:       General: She is not in acute distress.     Appearance: She is obese.   Cardiovascular:      Rate and Rhythm: Normal rate.   Pulmonary:      Effort: Pulmonary effort is normal. No respiratory distress.   Neurological:      Mental Status: She is alert and oriented to person, place, and time.   Psychiatric:         Mood and Affect: Mood normal.       Significant Labs: reviewed        Assessment/Plan:     Active Diagnoses:    Diagnosis Date Noted POA    PRINCIPAL PROBLEM:  Pyogenic arthritis of left hip [M00.9] 12/20/2022 Yes    Streptococcal bacteremia [R78.81, B95.5] 12/27/2022 Yes    Hyperkalemia, diminished renal excretion [E87.5] 12/25/2022 Yes    Pericardial effusion [I31.39] 12/21/2022 Yes    ESRD (end stage renal disease) on dialysis [N18.6, Z99.2] 12/20/2022 Not Applicable    Primary hypertension [I10] 12/20/2022 Yes    Gastroesophageal reflux disease without esophagitis [K21.9] 12/20/2022 Yes    Tear of left acetabular labrum [S73.192A] 12/20/2022 Yes    Shortness of breath [R06.02] 12/20/2022 Yes    Bone cyst of right femur [M85.651] 12/20/2022 Yes    Atrial fibrillation [I48.91] 12/19/2022 Yes      Problems Resolved During this Admission:       ESRD on MWF  - next dialysis tomorrow  - s/p HD Tuesday and Wednesday    Mineral and Bone Disease  - calcitriol 0.5mcg MWF  - continue D2 weekly   - reports no phos binders at home but elevated here, monitor phos level and continue low phos diet    HTN  - adjustments made by HM and Cardiology     Anemia of ESRD  - no ESAs indicated yet   - monitor H&H     Cardiomegaly, quite severe on recent CXR  - pericardial effusion noted on Echo  - etiology of it being uremic is extremely low as she does not miss dialysis and clearance labs from outpatient setting have been above goal  - the pericardial sac is not an area of fluid where dialysis UF can remove     Afib with RVR  - off diltiazem gtts and on po diltiazem IR q6  - per Cardiology    Hip pain  -  Ortho notes reviewed    Dr. Glover to assume Nephrology care in am     *For 12/23/22: HD today; second shift; likely hip surgery tomorrow; meds and labs reviewed.  Volume status appears acceptable.  Blood cultures requested to be obtained on HD. Will arrange.  Following closely with you.     *For 12/24/22: Will attempt to revisit; labs and meds reviewed; next HD session Monday. Following.     *For 12/25/22: Next HD session Monday; meds and labs reviewed; following closely with you.  NB K 5.7; on Lokelma, will dc ARB; low K diet needed.      *For 12/26/22: HD in session; aiming 1-2 liters UF; access stable; meds and labs reviewed; K and acid base status acceptable.  Following.     NB Called by HD RN: BP low in 90's apparently was given cardizem this am; will be unable to perform any UF accordingly.     *For 12/27/22: Next HD session in am; meds and labs reviewed; had 500 cc UF yesterday with HD; following closely with you.  Access stable.     *For 12/28/22: Aiming 1.5 liters due to marginal BP: access stable; meds and labs reviewed; following closely with you.        Moe Glover MD  Nephrology

## 2022-12-28 NOTE — ASSESSMENT & PLAN NOTE
Patient with new onset Paroxysmal (<7 days) atrial fibrillation with RVR which is uncontrolled currently with Calcium Channel Blocker. Patient is currently in atrial fibrillation with rate in the 105-130s on diltiazem. [id number] Score: 2. HASBLED Score: 3. Anticoagulation indicated. Anticoagulation done with heparin .  Troponin measuring 0.095 with EKG obtained at outside facility revealing SVT/atrial fibrillation with RVR at rate of 176. Troponin likely elevated in setting of ESRD and demand ischemia from arrhythmia as patient denied endorsing chest pain.  Plan:  -telemetry- afib with rates   -cardizem drip transitioned to oral dosing  -f/u cardiology   -nephrology for HD   -Heparin gtt -transitioned to eliquis  -CHADS-VASc Score- 3

## 2022-12-28 NOTE — NURSING
"Pt returned to room from HD via bed.    1.5L removed, tolerated well.    AAOx4  NAD  VSS    BP (!) 121/57 (BP Location: Right arm, Patient Position: Sitting)   Pulse 81   Temp 98.5 °F (36.9 °C) (Oral)   Resp 14   Ht 5' 4" (1.626 m)   Wt 78.8 kg (173 lb 11.6 oz)   SpO2 97%   BMI 29.82 kg/m²     Pt sitting up in bed. Bed in low & locked position with call light in reach. Will continue to monitor.    "

## 2022-12-28 NOTE — ASSESSMENT & PLAN NOTE
S/p left hip arthrotomy- will follow Ortho- will plan to treat for 4 weeks from date of negative blood cultures with IV rocephin 2 gram daily, follow repeat blood culture          12/28- will follow repeat blood culture, follow Ortho, continue Rocephin

## 2022-12-28 NOTE — PLAN OF CARE
Duration: 3 hours    Stable/unstable: stable    Ultrafiltration volume: 1.5 liters net    Notes: Tolerated, No access issues, Dr. Glover visited during hd.

## 2022-12-28 NOTE — PLAN OF CARE
Pt oriented x4 VSS  Plan of care reviewed. pt verbalizes understanding.  Patient moving/ turning with assistance.  Patient afib on monitor  Bed low, side rails up x2, wheels locked, call light in reach.  Pt instructed to call for assistance  Hourly rounding completed.

## 2022-12-28 NOTE — SUBJECTIVE & OBJECTIVE
Interval History:     Review of Systems   Constitutional:  Negative for chills and fatigue.   HENT:  Negative for congestion, ear pain, facial swelling, sinus pressure and sore throat.    Eyes:  Negative for pain.   Respiratory:  Negative for apnea, chest tightness, shortness of breath and stridor.    Cardiovascular:  Negative for chest pain, palpitations and leg swelling.   Gastrointestinal:  Negative for abdominal distention, abdominal pain, diarrhea and nausea.   Endocrine: Negative for polydipsia and polyphagia.   Genitourinary:  Negative for decreased urine volume, difficulty urinating, frequency and genital sores.   Musculoskeletal:  Positive for arthralgias, gait problem and myalgias.   Neurological:  Negative for light-headedness and headaches.   Hematological:  Negative for adenopathy.   Psychiatric/Behavioral:  Negative for agitation, confusion and decreased concentration.    Objective:     Vital Signs (Most Recent):  Temp: 98.5 °F (36.9 °C) (12/28/22 1605)  Pulse: 99 (12/28/22 1605)  Resp: 18 (12/28/22 1616)  BP: 112/67 (12/28/22 1605)  SpO2: 95 % (12/28/22 1605) Vital Signs (24h Range):  Temp:  [97.8 °F (36.6 °C)-98.6 °F (37 °C)] 98.5 °F (36.9 °C)  Pulse:  [77-99] 99  Resp:  [14-20] 18  SpO2:  [95 %-99 %] 95 %  BP: (106-121)/(56-67) 112/67     Weight: 78.8 kg (173 lb 11.6 oz)  Body mass index is 29.82 kg/m².    Intake/Output Summary (Last 24 hours) at 12/28/2022 1746  Last data filed at 12/28/2022 1218  Gross per 24 hour   Intake 780 ml   Output 2000 ml   Net -1220 ml      Physical Exam  Vitals and nursing note reviewed.   Constitutional:       Appearance: She is well-developed.   HENT:      Head: Normocephalic and atraumatic.   Eyes:      Conjunctiva/sclera: Conjunctivae normal.      Pupils: Pupils are equal, round, and reactive to light.   Neck:      Thyroid: No thyroid mass or thyromegaly.   Cardiovascular:      Rate and Rhythm: Normal rate.      Heart sounds: Normal heart sounds.   Pulmonary:       Effort: Pulmonary effort is normal. No accessory muscle usage or respiratory distress.      Breath sounds: Normal breath sounds.   Abdominal:      General: Bowel sounds are normal.      Palpations: Abdomen is soft. There is no mass.      Tenderness: There is no abdominal tenderness.   Musculoskeletal:      Cervical back: Normal range of motion and neck supple.      Comments: Dressing over left hip   Skin:     Findings: No rash.   Neurological:      Mental Status: She is alert and oriented to person, place, and time.       Significant Labs: All pertinent labs within the past 24 hours have been reviewed.    Results for orders placed or performed during the hospital encounter of 12/19/22   Gram stain    Specimen: Hip, Left; Joint Fluid   Result Value Ref Range    Gram Stain Result Many WBC's     Gram Stain Result Few Gram positive cocci     Gram Stain Result Rare Gram negative rods    AFB culture    Specimen: Hip, Left; Joint Fluid   Result Value Ref Range    AFB Culture & Smear Culture in progress     AFB CULTURE STAIN No acid fast bacilli seen.    Blood culture    Specimen: Antecubital, Right Arm; Blood   Result Value Ref Range    Blood Culture, Routine       Gram stain jad bottle: Gram positive cocci in chains resembling Strep    Blood Culture, Routine       Gram stain aer bottle: Gram positive cocci in chains resembling Strep    Blood Culture, Routine       Results called to and read back by: Coco Baer LPN 12/24/2022  19:35    Blood Culture, Routine STREPTOCOCCUS GORDONII (A)        Susceptibility    Streptococcus gordonii - CULTURE, BLOOD     Ceftriaxone <=0.25 Sensitive mcg/mL     Cefepime 0.5 Sensitive mcg/mL     Penicillin 0.12 Sensitive mcg/mL     Vancomycin 0.5 Sensitive mcg/mL   Blood culture    Specimen: Antecubital, Right Arm; Blood   Result Value Ref Range    Blood Culture, Routine       Gram stain jad bottle: Gram positive cocci in chains resembling Strep    Blood Culture, Routine       Results called  to and read back by: Coco Baer LPN 12/24/2022  19:35    Blood Culture, Routine       Gram stain aer bottle: Gram positive cocci in chains resembling Strep    Blood Culture, Routine       Positive results previously called 12/25/2022  00:37    Blood Culture, Routine (A)      STREPTOCOCCUS GORDONII  For susceptibility see order #[id number]     Aerobic culture    Specimen: Hip, Left; Joint Fluid   Result Value Ref Range    Aerobic Bacterial Culture STREPTOCOCCUS GORDONII  Moderate   (A)        Susceptibility    Streptococcus gordonii - CULTURE, AEROBIC  (SPECIFY SOURCE)     Ceftriaxone <=0.25 Sensitive mcg/mL     Cefepime <=0.25 Sensitive mcg/mL     Penicillin 0.12 Sensitive mcg/mL     Vancomycin 0.5 Sensitive mcg/mL   Tissue culture    Specimen: Hip, Left; Tissue   Result Value Ref Range    Aerobic Culture - Tissue (A)      STREPTOCOCCUS GORDONII  Moderate  For susceptibility see order # [id number]      Gram Stain Result Many WBC's     Gram Stain Result Rare Gram positive cocci     Gram Stain Result Rare Gram negative rods    Blood culture    Specimen: Blood   Result Value Ref Range    Blood Culture, Routine No Growth to date     Blood Culture, Routine No Growth to date     Blood Culture, Routine No Growth to date    CBC Auto Differential   Result Value Ref Range    WBC 6.44 3.90 - 12.70 K/uL    RBC 4.05 4.00 - 5.40 M/uL    Hemoglobin 12.7 12.0 - 16.0 g/dL    Hematocrit 38.8 37.0 - 48.5 %    MCV 96 82 - 98 fL    MCH 31.4 (H) 27.0 - 31.0 pg    MCHC 32.7 32.0 - 36.0 g/dL    RDW 14.9 (H) 11.5 - 14.5 %    Platelets 103 (L) 150 - 450 K/uL    MPV 12.6 9.2 - 12.9 fL    Immature Granulocytes 0.3 0.0 - 0.5 %    Gran # (ANC) 5.1 1.8 - 7.7 K/uL    Immature Grans (Abs) 0.02 0.00 - 0.04 K/uL    Lymph # 0.6 (L) 1.0 - 4.8 K/uL    Mono # 0.7 0.3 - 1.0 K/uL    Eos # 0.0 0.0 - 0.5 K/uL    Baso # 0.02 0.00 - 0.20 K/uL    nRBC 0 0 /100 WBC    Gran % 78.6 (H) 38.0 - 73.0 %    Lymph % 9.6 (L) 18.0 - 48.0 %    Mono % 11.2 4.0 - 15.0 %     Eosinophil % 0.0 0.0 - 8.0 %    Basophil % 0.3 0.0 - 1.9 %    Differential Method Automated    Comprehensive Metabolic Panel   Result Value Ref Range    Sodium 139 136 - 145 mmol/L    Potassium 5.6 (H) 3.5 - 5.1 mmol/L    Chloride 102 95 - 110 mmol/L    CO2 21 (L) 23 - 29 mmol/L    Glucose 106 70 - 110 mg/dL    BUN 54 (H) 8 - 23 mg/dL    Creatinine 9.6 (H) 0.5 - 1.4 mg/dL    Calcium 9.6 8.7 - 10.5 mg/dL    Total Protein 7.9 6.0 - 8.4 g/dL    Albumin 3.9 3.5 - 5.2 g/dL    Total Bilirubin 1.3 (H) 0.1 - 1.0 mg/dL    Alkaline Phosphatase 112 55 - 135 U/L    AST 32 10 - 40 U/L    ALT 22 10 - 44 U/L    Anion Gap 16 8 - 16 mmol/L    eGFR 4.1 (A) >60 mL/min/1.73 m^2   BNP   Result Value Ref Range    BNP 4,792 (H) 0 - 99 pg/mL   CK   Result Value Ref Range     20 - 180 U/L   Troponin I   Result Value Ref Range    Troponin I 0.076 (H) 0.000 - 0.026 ng/mL   Troponin I   Result Value Ref Range    Troponin I 0.095 (H) 0.000 - 0.026 ng/mL   Comprehensive Metabolic Panel (CMP)   Result Value Ref Range    Sodium 137 136 - 145 mmol/L    Potassium 6.1 (H) 3.5 - 5.1 mmol/L    Chloride 102 95 - 110 mmol/L    CO2 20 (L) 23 - 29 mmol/L    Glucose 87 70 - 110 mg/dL    BUN 66 (H) 8 - 23 mg/dL    Creatinine 10.6 (H) 0.5 - 1.4 mg/dL    Calcium 9.8 8.7 - 10.5 mg/dL    Total Protein 7.4 6.0 - 8.4 g/dL    Albumin 3.4 (L) 3.5 - 5.2 g/dL    Total Bilirubin 1.5 (H) 0.1 - 1.0 mg/dL    Alkaline Phosphatase 102 55 - 135 U/L    AST 23 10 - 40 U/L    ALT 16 10 - 44 U/L    Anion Gap 15 8 - 16 mmol/L    eGFR 4 (A) >60 mL/min/1.73 m^2   CBC with Automated Differential   Result Value Ref Range    WBC 6.35 3.90 - 12.70 K/uL    RBC 3.75 (L) 4.00 - 5.40 M/uL    Hemoglobin 11.6 (L) 12.0 - 16.0 g/dL    Hematocrit 35.7 (L) 37.0 - 48.5 %    MCV 95 82 - 98 fL    MCH 30.9 27.0 - 31.0 pg    MCHC 32.5 32.0 - 36.0 g/dL    RDW 14.7 (H) 11.5 - 14.5 %    Platelets 95 (L) 150 - 450 K/uL    MPV 12.7 9.2 - 12.9 fL    Immature Granulocytes 0.3 0.0 - 0.5 %    Gran #  (ANC) 4.4 1.8 - 7.7 K/uL    Immature Grans (Abs) 0.02 0.00 - 0.04 K/uL    Lymph # 1.0 1.0 - 4.8 K/uL    Mono # 0.9 0.3 - 1.0 K/uL    Eos # 0.0 0.0 - 0.5 K/uL    Baso # 0.02 0.00 - 0.20 K/uL    nRBC 0 0 /100 WBC    Gran % 69.7 38.0 - 73.0 %    Lymph % 15.1 (L) 18.0 - 48.0 %    Mono % 14.6 4.0 - 15.0 %    Eosinophil % 0.0 0.0 - 8.0 %    Basophil % 0.3 0.0 - 1.9 %    Differential Method Automated    APTT   Result Value Ref Range    aPTT 35.4 (H) 21.0 - 32.0 sec   Protime-INR   Result Value Ref Range    Prothrombin Time 11.7 9.0 - 12.5 sec    INR 1.1 0.8 - 1.2   Troponin I   Result Value Ref Range    Troponin I 0.091 (H) 0.000 - 0.026 ng/mL   Basic Metabolic Panel   Result Value Ref Range    Sodium 137 136 - 145 mmol/L    Potassium 4.6 3.5 - 5.1 mmol/L    Chloride 95 95 - 110 mmol/L    CO2 24 23 - 29 mmol/L    Glucose 119 (H) 70 - 110 mg/dL    BUN 31 (H) 8 - 23 mg/dL    Creatinine 6.3 (H) 0.5 - 1.4 mg/dL    Calcium 9.4 8.7 - 10.5 mg/dL    Anion Gap 18 (H) 8 - 16 mmol/L    eGFR 7 (A) >60 mL/min/1.73 m^2   Hepatitis panel, acute   Result Value Ref Range    Hepatitis B Surface Ag Non-reactive Non-reactive    Hep B C IgM Non-reactive Non-reactive    Hep A IgM Non-reactive Non-reactive    Hepatitis C Ab Non-reactive Non-reactive   Hepatitis B Surface Antibody, Qual/Quant   Result Value Ref Range    Hep. B Surf Ab, Qual POSITIVE     Hep. B Surf Ab, Quant. 31 mIU/mL   Comprehensive Metabolic Panel (CMP)   Result Value Ref Range    Sodium 137 136 - 145 mmol/L    Potassium 5.3 (H) 3.5 - 5.1 mmol/L    Chloride 94 (L) 95 - 110 mmol/L    CO2 27 23 - 29 mmol/L    Glucose 97 70 - 110 mg/dL    BUN 43 (H) 8 - 23 mg/dL    Creatinine 7.8 (H) 0.5 - 1.4 mg/dL    Calcium 8.8 8.7 - 10.5 mg/dL    Total Protein 6.7 6.0 - 8.4 g/dL    Albumin 3.1 (L) 3.5 - 5.2 g/dL    Total Bilirubin 1.5 (H) 0.1 - 1.0 mg/dL    Alkaline Phosphatase 91 55 - 135 U/L    AST 35 10 - 40 U/L    ALT 16 10 - 44 U/L    Anion Gap 16 8 - 16 mmol/L    eGFR 5 (A) >60  mL/min/1.73 m^2   CBC with Automated Differential   Result Value Ref Range    WBC 6.15 3.90 - 12.70 K/uL    RBC 3.70 (L) 4.00 - 5.40 M/uL    Hemoglobin 11.3 (L) 12.0 - 16.0 g/dL    Hematocrit 35.6 (L) 37.0 - 48.5 %    MCV 96 82 - 98 fL    MCH 30.5 27.0 - 31.0 pg    MCHC 31.7 (L) 32.0 - 36.0 g/dL    RDW 14.5 11.5 - 14.5 %    Platelets 100 (L) 150 - 450 K/uL    MPV 12.5 9.2 - 12.9 fL    Immature Granulocytes 0.3 0.0 - 0.5 %    Gran # (ANC) 5.0 1.8 - 7.7 K/uL    Immature Grans (Abs) 0.02 0.00 - 0.04 K/uL    Lymph # 0.5 (L) 1.0 - 4.8 K/uL    Mono # 0.7 0.3 - 1.0 K/uL    Eos # 0.0 0.0 - 0.5 K/uL    Baso # 0.01 0.00 - 0.20 K/uL    nRBC 0 0 /100 WBC    Gran % 80.6 (H) 38.0 - 73.0 %    Lymph % 7.3 (L) 18.0 - 48.0 %    Mono % 11.4 4.0 - 15.0 %    Eosinophil % 0.2 0.0 - 8.0 %    Basophil % 0.2 0.0 - 1.9 %    Differential Method Automated    CBC auto differential   Result Value Ref Range    WBC 6.15 3.90 - 12.70 K/uL    RBC 3.70 (L) 4.00 - 5.40 M/uL    Hemoglobin 11.3 (L) 12.0 - 16.0 g/dL    Hematocrit 35.6 (L) 37.0 - 48.5 %    MCV 96 82 - 98 fL    MCH 30.5 27.0 - 31.0 pg    MCHC 31.7 (L) 32.0 - 36.0 g/dL    RDW 14.5 11.5 - 14.5 %    Platelets 100 (L) 150 - 450 K/uL    MPV 12.5 9.2 - 12.9 fL    Immature Granulocytes 0.3 0.0 - 0.5 %    Gran # (ANC) 5.0 1.8 - 7.7 K/uL    Immature Grans (Abs) 0.02 0.00 - 0.04 K/uL    Lymph # 0.5 (L) 1.0 - 4.8 K/uL    Mono # 0.7 0.3 - 1.0 K/uL    Eos # 0.0 0.0 - 0.5 K/uL    Baso # 0.01 0.00 - 0.20 K/uL    nRBC 0 0 /100 WBC    Gran % 80.6 (H) 38.0 - 73.0 %    Lymph % 7.3 (L) 18.0 - 48.0 %    Mono % 11.4 4.0 - 15.0 %    Eosinophil % 0.2 0.0 - 8.0 %    Basophil % 0.2 0.0 - 1.9 %    Differential Method Automated    Phosphorus   Result Value Ref Range    Phosphorus 6.0 (H) 2.7 - 4.5 mg/dL   APTT   Result Value Ref Range    aPTT 45.3 (H) 21.0 - 32.0 sec   APTT   Result Value Ref Range    aPTT 59.0 (H) 21.0 - 32.0 sec   WBC & Diff,Body Fluid Joint Fluid, Left Hip   Result Value Ref Range    Body Fluid  Type Joint Fluid, Left Hip     Fluid Appearance Bloody     Fluid Color Red     WBC, Body Fluid 914866 /cu mm    Segs, Fluid 100 %   Body fluid crystal Joint Fluid, Left Hip   Result Value Ref Range    Body Fluid Source, Crystal Exam Joint Fluid, Left Hip     Body Fluid Crystal Negative Negative   Comprehensive Metabolic Panel (CMP)   Result Value Ref Range    Sodium 137 136 - 145 mmol/L    Potassium 4.5 3.5 - 5.1 mmol/L    Chloride 95 95 - 110 mmol/L    CO2 27 23 - 29 mmol/L    Glucose 113 (H) 70 - 110 mg/dL    BUN 36 (H) 8 - 23 mg/dL    Creatinine 6.5 (H) 0.5 - 1.4 mg/dL    Calcium 9.5 8.7 - 10.5 mg/dL    Total Protein 7.0 6.0 - 8.4 g/dL    Albumin 2.7 (L) 3.5 - 5.2 g/dL    Total Bilirubin 1.5 (H) 0.1 - 1.0 mg/dL    Alkaline Phosphatase 99 55 - 135 U/L    AST 36 10 - 40 U/L    ALT 16 10 - 44 U/L    Anion Gap 15 8 - 16 mmol/L    eGFR 7 (A) >60 mL/min/1.73 m^2   CBC with Automated Differential   Result Value Ref Range    WBC 6.66 3.90 - 12.70 K/uL    RBC 3.81 (L) 4.00 - 5.40 M/uL    Hemoglobin 11.8 (L) 12.0 - 16.0 g/dL    Hematocrit 36.7 (L) 37.0 - 48.5 %    MCV 96 82 - 98 fL    MCH 31.0 27.0 - 31.0 pg    MCHC 32.2 32.0 - 36.0 g/dL    RDW 14.4 11.5 - 14.5 %    Platelets 130 (L) 150 - 450 K/uL    MPV 11.4 9.2 - 12.9 fL    Immature Granulocytes 0.5 0.0 - 0.5 %    Gran # (ANC) 5.2 1.8 - 7.7 K/uL    Immature Grans (Abs) 0.03 0.00 - 0.04 K/uL    Lymph # 0.6 (L) 1.0 - 4.8 K/uL    Mono # 0.8 0.3 - 1.0 K/uL    Eos # 0.0 0.0 - 0.5 K/uL    Baso # 0.01 0.00 - 0.20 K/uL    nRBC 0 0 /100 WBC    Gran % 77.9 (H) 38.0 - 73.0 %    Lymph % 8.9 (L) 18.0 - 48.0 %    Mono % 12.3 4.0 - 15.0 %    Eosinophil % 0.2 0.0 - 8.0 %    Basophil % 0.2 0.0 - 1.9 %    Differential Method Automated    CBC auto differential   Result Value Ref Range    WBC 6.66 3.90 - 12.70 K/uL    RBC 3.81 (L) 4.00 - 5.40 M/uL    Hemoglobin 11.8 (L) 12.0 - 16.0 g/dL    Hematocrit 36.7 (L) 37.0 - 48.5 %    MCV 96 82 - 98 fL    MCH 31.0 27.0 - 31.0 pg    MCHC 32.2 32.0 -  36.0 g/dL    RDW 14.4 11.5 - 14.5 %    Platelets 130 (L) 150 - 450 K/uL    MPV 11.4 9.2 - 12.9 fL    Immature Granulocytes 0.5 0.0 - 0.5 %    Gran # (ANC) 5.2 1.8 - 7.7 K/uL    Immature Grans (Abs) 0.03 0.00 - 0.04 K/uL    Lymph # 0.6 (L) 1.0 - 4.8 K/uL    Mono # 0.8 0.3 - 1.0 K/uL    Eos # 0.0 0.0 - 0.5 K/uL    Baso # 0.01 0.00 - 0.20 K/uL    nRBC 0 0 /100 WBC    Gran % 77.9 (H) 38.0 - 73.0 %    Lymph % 8.9 (L) 18.0 - 48.0 %    Mono % 12.3 4.0 - 15.0 %    Eosinophil % 0.2 0.0 - 8.0 %    Basophil % 0.2 0.0 - 1.9 %    Differential Method Automated    APTT   Result Value Ref Range    aPTT 54.7 (H) 21.0 - 32.0 sec   CBC auto differential   Result Value Ref Range    WBC 7.65 3.90 - 12.70 K/uL    RBC 3.81 (L) 4.00 - 5.40 M/uL    Hemoglobin 11.6 (L) 12.0 - 16.0 g/dL    Hematocrit 36.4 (L) 37.0 - 48.5 %    MCV 96 82 - 98 fL    MCH 30.4 27.0 - 31.0 pg    MCHC 31.9 (L) 32.0 - 36.0 g/dL    RDW 14.6 (H) 11.5 - 14.5 %    Platelets 142 (L) 150 - 450 K/uL    MPV 11.1 9.2 - 12.9 fL    Immature Granulocytes 0.9 (H) 0.0 - 0.5 %    Gran # (ANC) 5.7 1.8 - 7.7 K/uL    Immature Grans (Abs) 0.07 (H) 0.00 - 0.04 K/uL    Lymph # 0.7 (L) 1.0 - 4.8 K/uL    Mono # 1.2 (H) 0.3 - 1.0 K/uL    Eos # 0.1 0.0 - 0.5 K/uL    Baso # 0.02 0.00 - 0.20 K/uL    nRBC 0 0 /100 WBC    Gran % 74.1 (H) 38.0 - 73.0 %    Lymph % 9.0 (L) 18.0 - 48.0 %    Mono % 15.0 4.0 - 15.0 %    Eosinophil % 0.7 0.0 - 8.0 %    Basophil % 0.3 0.0 - 1.9 %    Differential Method Automated    APTT   Result Value Ref Range    aPTT 50.3 (H) 21.0 - 32.0 sec   Renal Function Panel   Result Value Ref Range    Glucose 111 (H) 70 - 110 mg/dL    Sodium 134 (L) 136 - 145 mmol/L    Potassium 4.8 3.5 - 5.1 mmol/L    Chloride 93 (L) 95 - 110 mmol/L    CO2 28 23 - 29 mmol/L    BUN 53 (H) 8 - 23 mg/dL    Calcium 10.0 8.7 - 10.5 mg/dL    Creatinine 8.3 (H) 0.5 - 1.4 mg/dL    Albumin 2.6 (L) 3.5 - 5.2 g/dL    Phosphorus 5.7 (H) 2.7 - 4.5 mg/dL    eGFR 5 (A) >60 mL/min/1.73 m^2    Anion Gap 13 8 -  16 mmol/L   Uric acid   Result Value Ref Range    Uric Acid 4.5 2.4 - 5.7 mg/dL   Pathologist Review of Laboratory Test   Result Value Ref Range    Pathologist Review, Body Fluid REVIEWED    Protime-INR   Result Value Ref Range    Prothrombin Time 10.8 9.0 - 12.5 sec    INR 1.0 0.8 - 1.2   APTT   Result Value Ref Range    aPTT 30.1 21.0 - 32.0 sec   CBC Auto Differential   Result Value Ref Range    WBC 6.51 3.90 - 12.70 K/uL    RBC 3.86 (L) 4.00 - 5.40 M/uL    Hemoglobin 11.6 (L) 12.0 - 16.0 g/dL    Hematocrit 36.6 (L) 37.0 - 48.5 %    MCV 95 82 - 98 fL    MCH 30.1 27.0 - 31.0 pg    MCHC 31.7 (L) 32.0 - 36.0 g/dL    RDW 14.4 11.5 - 14.5 %    Platelets 144 (L) 150 - 450 K/uL    MPV 11.4 9.2 - 12.9 fL    Immature Granulocytes 0.6 (H) 0.0 - 0.5 %    Gran # (ANC) 4.8 1.8 - 7.7 K/uL    Immature Grans (Abs) 0.04 0.00 - 0.04 K/uL    Lymph # 0.5 (L) 1.0 - 4.8 K/uL    Mono # 1.1 (H) 0.3 - 1.0 K/uL    Eos # 0.0 0.0 - 0.5 K/uL    Baso # 0.02 0.00 - 0.20 K/uL    nRBC 0 0 /100 WBC    Gran % 74.3 (H) 38.0 - 73.0 %    Lymph % 8.1 (L) 18.0 - 48.0 %    Mono % 16.1 (H) 4.0 - 15.0 %    Eosinophil % 0.6 0.0 - 8.0 %    Basophil % 0.3 0.0 - 1.9 %    Differential Method Automated    Renal Function Panel   Result Value Ref Range    Glucose 164 (H) 70 - 110 mg/dL    Sodium 135 (L) 136 - 145 mmol/L    Potassium 3.9 3.5 - 5.1 mmol/L    Chloride 95 95 - 110 mmol/L    CO2 24 23 - 29 mmol/L    BUN 38 (H) 8 - 23 mg/dL    Calcium 9.1 8.7 - 10.5 mg/dL    Creatinine 6.2 (H) 0.5 - 1.4 mg/dL    Albumin 2.4 (L) 3.5 - 5.2 g/dL    Phosphorus 3.6 2.7 - 4.5 mg/dL    eGFR 7 (A) >60 mL/min/1.73 m^2    Anion Gap 16 8 - 16 mmol/L   CBC auto differential   Result Value Ref Range    WBC 6.51 3.90 - 12.70 K/uL    RBC 3.86 (L) 4.00 - 5.40 M/uL    Hemoglobin 11.6 (L) 12.0 - 16.0 g/dL    Hematocrit 36.6 (L) 37.0 - 48.5 %    MCV 95 82 - 98 fL    MCH 30.1 27.0 - 31.0 pg    MCHC 31.7 (L) 32.0 - 36.0 g/dL    RDW 14.4 11.5 - 14.5 %    Platelets 144 (L) 150 - 450 K/uL     MPV 11.4 9.2 - 12.9 fL    Immature Granulocytes 0.6 (H) 0.0 - 0.5 %    Gran # (ANC) 4.8 1.8 - 7.7 K/uL    Immature Grans (Abs) 0.04 0.00 - 0.04 K/uL    Lymph # 0.5 (L) 1.0 - 4.8 K/uL    Mono # 1.1 (H) 0.3 - 1.0 K/uL    Eos # 0.0 0.0 - 0.5 K/uL    Baso # 0.02 0.00 - 0.20 K/uL    nRBC 0 0 /100 WBC    Gran % 74.3 (H) 38.0 - 73.0 %    Lymph % 8.1 (L) 18.0 - 48.0 %    Mono % 16.1 (H) 4.0 - 15.0 %    Eosinophil % 0.6 0.0 - 8.0 %    Basophil % 0.3 0.0 - 1.9 %    Differential Method Automated    APTT   Result Value Ref Range    aPTT 34.8 (H) 21.0 - 32.0 sec   APTT   Result Value Ref Range    aPTT 30.8 21.0 - 32.0 sec   Renal Function Panel   Result Value Ref Range    Glucose 96 70 - 110 mg/dL    Sodium 133 (L) 136 - 145 mmol/L    Potassium 5.7 (H) 3.5 - 5.1 mmol/L    Chloride 99 95 - 110 mmol/L    CO2 18 (L) 23 - 29 mmol/L    BUN 54 (H) 8 - 23 mg/dL    Calcium 8.8 8.7 - 10.5 mg/dL    Creatinine 7.7 (H) 0.5 - 1.4 mg/dL    Albumin 2.3 (L) 3.5 - 5.2 g/dL    Phosphorus 5.3 (H) 2.7 - 4.5 mg/dL    eGFR 5 (A) >60 mL/min/1.73 m^2    Anion Gap 16 8 - 16 mmol/L   CBC auto differential   Result Value Ref Range    WBC 8.08 3.90 - 12.70 K/uL    RBC 3.55 (L) 4.00 - 5.40 M/uL    Hemoglobin 10.9 (L) 12.0 - 16.0 g/dL    Hematocrit 33.8 (L) 37.0 - 48.5 %    MCV 95 82 - 98 fL    MCH 30.7 27.0 - 31.0 pg    MCHC 32.2 32.0 - 36.0 g/dL    RDW 14.2 11.5 - 14.5 %    Platelets 154 150 - 450 K/uL    MPV 11.0 9.2 - 12.9 fL    Immature Granulocytes 0.4 0.0 - 0.5 %    Gran # (ANC) 6.3 1.8 - 7.7 K/uL    Immature Grans (Abs) 0.03 0.00 - 0.04 K/uL    Lymph # 0.5 (L) 1.0 - 4.8 K/uL    Mono # 1.2 (H) 0.3 - 1.0 K/uL    Eos # 0.0 0.0 - 0.5 K/uL    Baso # 0.02 0.00 - 0.20 K/uL    nRBC 0 0 /100 WBC    Gran % 78.3 (H) 38.0 - 73.0 %    Lymph % 5.7 (L) 18.0 - 48.0 %    Mono % 15.2 (H) 4.0 - 15.0 %    Eosinophil % 0.2 0.0 - 8.0 %    Basophil % 0.2 0.0 - 1.9 %    Differential Method Automated    Vancomycin, Random   Result Value Ref Range    Vancomycin, Random 11.0  Not established ug/mL   Renal Function Panel   Result Value Ref Range    Glucose 115 (H) 70 - 110 mg/dL    Sodium 134 (L) 136 - 145 mmol/L    Potassium 4.8 3.5 - 5.1 mmol/L    Chloride 93 (L) 95 - 110 mmol/L    CO2 24 23 - 29 mmol/L    BUN 69 (H) 8 - 23 mg/dL    Calcium 10.4 8.7 - 10.5 mg/dL    Creatinine 9.1 (H) 0.5 - 1.4 mg/dL    Albumin 2.5 (L) 3.5 - 5.2 g/dL    Phosphorus 5.7 (H) 2.7 - 4.5 mg/dL    eGFR 4 (A) >60 mL/min/1.73 m^2    Anion Gap 17 (H) 8 - 16 mmol/L   Vancomycin, random   Result Value Ref Range    Vancomycin, Random 15.3 Not established ug/mL   Vancomycin, random   Result Value Ref Range    Vancomycin, Random 10.3 Not established ug/mL   CBC auto differential   Result Value Ref Range    WBC 7.14 3.90 - 12.70 K/uL    RBC 3.07 (L) 4.00 - 5.40 M/uL    Hemoglobin 10.0 (L) 12.0 - 16.0 g/dL    Hematocrit 30.1 (L) 37.0 - 48.5 %    MCV 98 82 - 98 fL    MCH 32.6 (H) 27.0 - 31.0 pg    MCHC 33.2 32.0 - 36.0 g/dL    RDW 14.5 11.5 - 14.5 %    Platelets 264 150 - 450 K/uL    MPV 10.1 9.2 - 12.9 fL    Immature Granulocytes 1.0 (H) 0.0 - 0.5 %    Gran # (ANC) 5.3 1.8 - 7.7 K/uL    Immature Grans (Abs) 0.07 (H) 0.00 - 0.04 K/uL    Lymph # 0.9 (L) 1.0 - 4.8 K/uL    Mono # 0.8 0.3 - 1.0 K/uL    Eos # 0.1 0.0 - 0.5 K/uL    Baso # 0.03 0.00 - 0.20 K/uL    nRBC 0 0 /100 WBC    Gran % 73.5 (H) 38.0 - 73.0 %    Lymph % 12.2 (L) 18.0 - 48.0 %    Mono % 11.5 4.0 - 15.0 %    Eosinophil % 1.4 0.0 - 8.0 %    Basophil % 0.4 0.0 - 1.9 %    Platelet Estimate Appears normal     Differential Method Automated    Basic metabolic panel   Result Value Ref Range    Sodium 134 (L) 136 - 145 mmol/L    Potassium 4.5 3.5 - 5.1 mmol/L    Chloride 99 95 - 110 mmol/L    CO2 21 (L) 23 - 29 mmol/L    Glucose 93 70 - 110 mg/dL    BUN 47 (H) 8 - 23 mg/dL    Creatinine 7.4 (H) 0.5 - 1.4 mg/dL    Calcium 10.3 8.7 - 10.5 mg/dL    Anion Gap 14 8 - 16 mmol/L    eGFR 6 (A) >60 mL/min/1.73 m^2   Echo   Result Value Ref Range    BSA 1.83 m2    TDI SEPTAL  0.06 m/s    LV LATERAL E/E' RATIO 15.33 m/s    LV SEPTAL E/E' RATIO 15.33 m/s    LA WIDTH 3.80 cm    IVC diameter 2.82 cm    Left Ventricular Outflow Tract Mean Velocity 0.63 cm/s    Left Ventricular Outflow Tract Mean Gradient 2.01 mmHg    TDI LATERAL 0.06 m/s    LVIDd 5.31 3.5 - 6.0 cm    IVS 1.67 (A) 0.6 - 1.1 cm    Posterior Wall 1.49 (A) 0.6 - 1.1 cm    Ao root annulus 3.06 cm    LVIDs 3.78 2.1 - 4.0 cm    FS 29 28 - 44 %    LA volume 89.40 cm3    Sinus 3.26 cm    STJ 3.37 cm    Ascending aorta 3.42 cm    LV mass 381.59 g    LA size 4.15 cm    TAPSE 2.27 cm    Left Ventricle Relative Wall Thickness 0.56 cm    AV mean gradient 9 mmHg    AV valve area 2.09 cm2    AV Velocity Ratio 0.51     AV index (prosthetic) 0.61     MV valve area p 1/2 method 5.12 cm2    E/A ratio 4.38     Mean e' 0.06 m/s    E wave deceleration time 148.05 msec    IVRT 87.54 msec    LVOT diameter 2.09 cm    LVOT area 3.4 cm2    LVOT peak krunal 1.08 m/s    LVOT peak VTI 19.80 cm    Ao peak krunal 2.12 m/s    Ao VTI 32.5 cm    RVOT peak krunal 0.54 m/s    RVOT peak VTI 13.9 cm    Mr max krunal 5.03 m/s    LVOT stroke volume 67.89 cm3    AV peak gradient 18 mmHg    PV mean gradient 0.70 mmHg    E/E' ratio 15.33 m/s    MV Peak E Krunal 0.92 m/s    TR Max Krunal 3.79 m/s    MV stenosis pressure 1/2 time 42.93 ms    MV Peak A Krunal 0.21 m/s    LV Systolic Volume 61.16 mL    LV Systolic Volume Index 34.0 mL/m2    LV Diastolic Volume 136.00 mL    LV Diastolic Volume Index 75.56 mL/m2    LA Volume Index 49.7 mL/m2    LV Mass Index 212 g/m2    RA Major Axis 5.77 cm    Left Atrium Minor Axis 6.48 cm    Left Atrium Major Axis 6.87 cm    Triscuspid Valve Regurgitation Peak Gradient 57 mmHg    RA Width 3.67 cm    Right Atrial Pressure (from IVC) 3 mmHg    TV rest pulmonary artery pressure 60 mmHg    EF 55 %   Echo   Result Value Ref Range    BSA 1.79 m2    TR Max Krunal 4.48 m/s    Triscuspid Valve Regurgitation Peak Gradient 80 mmHg    EF 55 %   POCT glucose   Result Value  Ref Range    POCT Glucose 137 (H) 70 - 110 mg/dL   POCT glucose   Result Value Ref Range    POCT Glucose 138 (H) 70 - 110 mg/dL         Significant Imaging: I have reviewed all pertinent imaging results/findings within the past 24 hours.    MRI Hip Without Contrast Right   Final Result      Limited evaluation secondary to motion.      The right femoral head neck junction cyst represents a benign synovial herniation pit.         Electronically signed by: Rikki Ballard MD   Date:    12/24/2022   Time:    07:41      US Guided Needle Placement   Final Result      Percutaneous ultrasound-guided aspiration of the left hip joint, yielding 4 mL of purulent fluid. No drainage catheter was left in place.      Plan:      Follow-up fluid studies.      ______________________________________________________________________         Electronically signed by: Juan M Panda   Date:    12/23/2022   Time:    10:50      CT Head Without Contrast   Final Result      No CT evidence of acute intracranial abnormality.      All CT scans at this facility are performed  using dose modulation techniques as appropriate to performed exam including the following:  automated exposure control; adjustment of mA and/or kV according to the patients size (this includes techniques or standardized protocols for targeted exams where dose is matched to indication/reason for exam: i.e. extremities or head);  iterative reconstruction technique.         Electronically signed by: Rikki Ballard MD   Date:    12/22/2022   Time:    10:46      X-Ray Chest AP Portable   Final Result      Unchanged cardiomegaly with unchanged appearance of pericardial effusion compared to 12/19/2022. This is enlarged compared to 02/20/2018.         Electronically signed by: Juan M Panda   Date:    12/22/2022   Time:    10:31      X-Ray Chest AP Portable   Final Result      Severe cardiomegaly. Left lung base obscured by the heart. There may be a small left pleural effusion. Consider further  evaluation with PA and lateral chest radiographs. No pneumothorax.         Electronically signed by: Juan M Panda   Date:    12/19/2022   Time:    14:24      MRI Hip Without Contrast Left   Final Result      1. No left hip fracture.   2. Asymmetric large left hip joint effusion.  Small right hip joint effusion.   3. Paralabral cyst along the superior border of the acetabulum suspicious for a superior labral tear.         Electronically signed by: Bob Chester MD   Date:    12/19/2022   Time:    14:32      CT Pelvis Without Contrast   Final Result      No femur fracture. Small left hip joint effusion. Focal calcification lateral to the left acetabulum raising question of avulsion of the rectus femoris ligament or iliofemoral ligament. Consider further evaluation with MRI left hip without IV contrast on a nonemergent basis, or as clinically warranted.         Electronically signed by: Juan M Panda   Date:    12/19/2022   Time:    11:29      X-Ray Pelvis Routine AP   Final Result      No acute abnormality.         Electronically signed by: Juan M Panda   Date:    12/19/2022   Time:    10:17

## 2022-12-28 NOTE — PT/OT/SLP PROGRESS
Physical Therapy      Patient Name:  Meaghan Potter   MRN:  [id number]    09:15 a.m.  Patient unavailable at this time due to dialysis. Will follow up during next scheduled PT session.

## 2022-12-28 NOTE — SUBJECTIVE & OBJECTIVE
Interval History:   67 year old woman with history of ESRD, now with left hip septic arthritis .  Blood culture- 12/24- strep [place]  12/25- no growth   She is afebrile    Review of Systems   Constitutional:  Negative for chills and fatigue.   HENT:  Negative for congestion, ear pain, facial swelling, sinus pressure and sore throat.    Eyes:  Negative for pain.   Respiratory:  Negative for apnea, chest tightness, shortness of breath and stridor.    Cardiovascular:  Negative for chest pain, palpitations and leg swelling.   Gastrointestinal:  Negative for abdominal distention, abdominal pain, diarrhea and nausea.   Endocrine: Negative for polydipsia and polyphagia.   Genitourinary:  Negative for decreased urine volume, difficulty urinating, frequency and genital sores.   Musculoskeletal:  Positive for arthralgias, gait problem and myalgias.   Neurological:  Negative for light-headedness and headaches.   Hematological:  Negative for adenopathy.   Psychiatric/Behavioral:  Negative for agitation, confusion and decreased concentration.    Objective:     Vital Signs (Most Recent):  Temp: 98.5 °F (36.9 °C) (12/28/22 0751)  Pulse: 81 (12/28/22 0751)  Resp: 14 (12/28/22 0751)  BP: (!) 121/57 (12/28/22 0751)  SpO2: 97 % (12/28/22 0751)   Vital Signs (24h Range):  Temp:  [97.8 °F (36.6 °C)-98.6 °F (37 °C)] 98.5 °F (36.9 °C)  Pulse:  [70-90] 81  Resp:  [14-19] 14  SpO2:  [94 %-99 %] 97 %  BP: ()/(52-65) 121/57     Weight: 78.8 kg (173 lb 11.6 oz)  Body mass index is 29.82 kg/m².    Estimated Creatinine Clearance: 6.1 mL/min (A) (based on SCr of 9.1 mg/dL (H)).    Physical Exam  Vitals and nursing note reviewed.   Constitutional:       Appearance: She is well-developed.   HENT:      Head: Normocephalic and atraumatic.   Eyes:      Conjunctiva/sclera: Conjunctivae normal.      Pupils: Pupils are equal, round, and reactive to light.   Neck:      Thyroid: No thyroid mass or thyromegaly.   Cardiovascular:      Rate and  Rhythm: Normal rate.      Heart sounds: Normal heart sounds.   Pulmonary:      Effort: Pulmonary effort is normal. No accessory muscle usage or respiratory distress.      Breath sounds: Normal breath sounds.   Abdominal:      General: Bowel sounds are normal.      Palpations: Abdomen is soft. There is no mass.      Tenderness: There is no abdominal tenderness.   Musculoskeletal:      Cervical back: Normal range of motion and neck supple.      Comments: Dressing over left hip   Skin:     Findings: No rash.   Neurological:      Mental Status: She is alert and oriented to person, place, and time.       Significant Labs: CBC:   Recent Labs   Lab 12/28/22  0536   WBC 7.14   HGB 10.0*   HCT 30.1*        CMP: No results for input(s): NA, K, CL, CO2, GLU, BUN, CREATININE, CALCIUM, PROT, ALBUMIN, BILITOT, ALKPHOS, AST, ALT, ANIONGAP, EGFRNONAA in the last 48 hours.    Invalid input(s): ESTGFAFR[place]  Wound Culture:   Recent Labs   Lab 12/23/22  1007   LABAERO STREPTOCOCCUS GORDONII  Moderate  *     All pertinent labs within the past 24 hours have been reviewed.    Significant Imaging: I have reviewed all pertinent imaging results/findings within the past 24 hours.

## 2022-12-28 NOTE — ASSESSMENT & PLAN NOTE
Patient currently follows Dr. Ribera from nephrology with last reported dialysis completed last Friday. Patient currently on M/W/F HD at [facility] via LUE fistula but missed Monday due to not feeling well and being in the ED. Labs consistent with history of ESRD on HD.   Plan:  -continue home medications, titrate as needed  -nephrology consulted -HD performed on 12/20/22 and now back on MWF schedule

## 2022-12-28 NOTE — PROGRESS NOTES
[facility] Medicine  Progress Note    Patient Name: Meaghan Potter  MRN: [id number]  Patient Class: IP- Inpatient   Admission Date: 12/19/2022  Length of Stay: 7 days  Attending Physician: Jonathan Smith, *  Primary Care Provider: Primary Doctor No        Subjective:     Principal Problem:Pyogenic arthritis of left hip        HPI:  Meaghan Potter is a 67 y.o. female with a PMH  has a past medical history of ESRD (end stage renal disease) on dialysis, GERD (gastroesophageal reflux disease), and Hypertension. who presented as a transfer from [facility] for higher level cardiology and orthopedic care after patient was found to have sustained a left superior labral tear of her acetabulum noted on MRI in addition to new onset atrial fibrillation with RVR requiring diltiazem drip.  Patient reported being in her usual state of health prior to onset of symptoms and reported acute onset of left hip pain while performing leg exercises in bed earlier today.  Patient reported hearing and feeling a pop in her left hip followed by immediate pain which has remained constant and currently rated 6/10 in severity. Pain was described as throbbing/pulling in nature with no known alleviating factors and aggravating factors including weight-bearing, movement, and palpation to the affected area.  She denied endorsing any numbness, tingling, discoloration, or penetrating trauma, but did express decreased range of motion and muscle strength secondary to exacerbation of pain.  While at outside facility, patient became short of breath while lying flat while obtaining MRI of hip and was found to be in atrial fibrillation with RVR requiring diltiazem drip. She denied endorsing any chest pain, lightheadedness, dizziness, visual disturbances, fever, chills, sweats, nausea, vomiting, abdominal pain, changes in bowel/bladder habits, or onset neurological deficits.  All other review of systems negative except as noted  above.  Patient reports being back at her baseline and continues to complain only of left hip pain.  Orthopedics and cardiology consulted and awaiting further evaluation/recommendations.  Of note, patient missed hemodialysis today secondary to pain and going to the emergency room and usually receives HD via left upper extremity fistula on Monday/Wednesday/Friday.  Follows Dr. Ribera outpatient. Nephrology consulted to continue HD inpatient.     PCP: Primary Doctor No        Overview/Hospital Course:  Pt admitted to Observation for Atrial fibrillation (new onset) with RVR with . Cardiology consulted with Cardizem gtt initiated. NSR on monitor with HR in 80's.  BNP elevated and Troponin trended upward (0.076>0.095) with Heparin infusion initiated. Echo showed with concentric hypertrophy and normal systolic function. Atrial fibrillation observed.Biatrial atrial enlargement. Grade III left ventricular diastolic dysfunction. PA systolic pressure is 60 mmHg. Moderate right ventricular enlargement with normal right ventricular systolic function. Pulmonary hypertension. EF 55%. Moderate to severe tricuspid regurgitation. Mild-to-moderate mitral regurgitation. Severe right atrial enlargement. Large posterior pericardial effusion. No evidence of tamponade. Troponin elevated and flat with no cardiac symptoms reported. Pt also reported L groin pain upon admit. Orthopedic Surgery consulted and pt found to have sustained a left superior labral tear of her acetabulum noted on MRI with further orthopedic work up to be completed outpatient. Hx of ESRD noted with last HD on 12/16/22- Nephrology consulted with HD performed today. AVG to LUE with +bruit/+thrill present. On 12/21/22, MRI of R hip results pending. IR consulted for evaluation of aspiration/injection of left hip. Rate and rhythm controlled on Cardizem gtt. Heparin infusion continued pending joint aspiration.  Pericardial effusion discussed with Nephrology and  "Cardiology for recommendations.   Patient went to have aspiration of left hip however upon transferring from bed to table had an episode described as seizure-like" seems more that it was hypotension related.  Medications adjusted  She remains on p.o. Cardizem as well as Cardizem drip.  Patient underwent arthrocentesis with 4 cc of pus removed 12/23.  Sent for Gram stain, culture, crystals.  Discussed with Dr. Johnson , orthopedics,crystals are negative patient underwent washout with  Cultures 12/24.  Blood cultures and culture from left hip reveal Gram-positive and Gram-negative.  Patient paced on ceftazidime and vancomycin empirically until cultures return.  12/26 She is complaining of weakness . The blood and drainage cx are (+) for STREPTOCOCCUS GORDONII  sensitive  to rocephin .  ID consulted .  12/27 NAEON . ID consulted and rec IV rocephin qd x 4 week from the last negative blood cx . Cardiology consulted for KATE .   12/28 Plan  for a KATE tomorrow am . S/P HD today  . She is complaining od left hip pain .       Interval History:     Review of Systems   Constitutional:  Negative for chills and fatigue.   HENT:  Negative for congestion, ear pain, facial swelling, sinus pressure and sore throat.    Eyes:  Negative for pain.   Respiratory:  Negative for apnea, chest tightness, shortness of breath and stridor.    Cardiovascular:  Negative for chest pain, palpitations and leg swelling.   Gastrointestinal:  Negative for abdominal distention, abdominal pain, diarrhea and nausea.   Endocrine: Negative for polydipsia and polyphagia.   Genitourinary:  Negative for decreased urine volume, difficulty urinating, frequency and genital sores.   Musculoskeletal:  Positive for arthralgias, gait problem and myalgias.   Neurological:  Negative for light-headedness and headaches.   Hematological:  Negative for adenopathy.   Psychiatric/Behavioral:  Negative for agitation, confusion and decreased concentration.    Objective: "     Vital Signs (Most Recent):  Temp: 98.5 °F (36.9 °C) (12/28/22 1605)  Pulse: 99 (12/28/22 1605)  Resp: 18 (12/28/22 1616)  BP: 112/67 (12/28/22 1605)  SpO2: 95 % (12/28/22 1605) Vital Signs (24h Range):  Temp:  [97.8 °F (36.6 °C)-98.6 °F (37 °C)] 98.5 °F (36.9 °C)  Pulse:  [77-99] 99  Resp:  [14-20] 18  SpO2:  [95 %-99 %] 95 %  BP: (106-121)/(56-67) 112/67     Weight: 78.8 kg (173 lb 11.6 oz)  Body mass index is 29.82 kg/m².    Intake/Output Summary (Last 24 hours) at 12/28/2022 1746  Last data filed at 12/28/2022 1218  Gross per 24 hour   Intake 780 ml   Output 2000 ml   Net -1220 ml      Physical Exam  Vitals and nursing note reviewed.   Constitutional:       Appearance: She is well-developed.   HENT:      Head: Normocephalic and atraumatic.   Eyes:      Conjunctiva/sclera: Conjunctivae normal.      Pupils: Pupils are equal, round, and reactive to light.   Neck:      Thyroid: No thyroid mass or thyromegaly.   Cardiovascular:      Rate and Rhythm: Normal rate.      Heart sounds: Normal heart sounds.   Pulmonary:      Effort: Pulmonary effort is normal. No accessory muscle usage or respiratory distress.      Breath sounds: Normal breath sounds.   Abdominal:      General: Bowel sounds are normal.      Palpations: Abdomen is soft. There is no mass.      Tenderness: There is no abdominal tenderness.   Musculoskeletal:      Cervical back: Normal range of motion and neck supple.      Comments: Dressing over left hip   Skin:     Findings: No rash.   Neurological:      Mental Status: She is alert and oriented to person, place, and time.       Significant Labs: All pertinent labs within the past 24 hours have been reviewed.    Results for orders placed or performed during the hospital encounter of 12/19/22   Gram stain    Specimen: Hip, Left; Joint Fluid   Result Value Ref Range    Gram Stain Result Many WBC's     Gram Stain Result Few Gram positive cocci     Gram Stain Result Rare Gram negative rods    AFB culture     Specimen: Hip, Left; Joint Fluid   Result Value Ref Range    AFB Culture & Smear Culture in progress     AFB CULTURE STAIN No acid fast bacilli seen.    Blood culture    Specimen: Antecubital, Right Arm; Blood   Result Value Ref Range    Blood Culture, Routine       Gram stain jad bottle: Gram positive cocci in chains resembling Strep    Blood Culture, Routine       Gram stain aer bottle: Gram positive cocci in chains resembling Strep    Blood Culture, Routine       Results called to and read back by: Coco Baer LPN 12/24/2022  19:35    Blood Culture, Routine STREPTOCOCCUS GORDONII (A)        Susceptibility    Streptococcus gordonii - CULTURE, BLOOD     Ceftriaxone <=0.25 Sensitive mcg/mL     Cefepime 0.5 Sensitive mcg/mL     Penicillin 0.12 Sensitive mcg/mL     Vancomycin 0.5 Sensitive mcg/mL   Blood culture    Specimen: Antecubital, Right Arm; Blood   Result Value Ref Range    Blood Culture, Routine       Gram stain jad bottle: Gram positive cocci in chains resembling Strep    Blood Culture, Routine       Results called to and read back by: Coco Baer LPN 12/24/2022  19:35    Blood Culture, Routine       Gram stain aer bottle: Gram positive cocci in chains resembling Strep    Blood Culture, Routine       Positive results previously called 12/25/2022  00:37    Blood Culture, Routine (A)      STREPTOCOCCUS GORDONII  For susceptibility see order #[id number]     Aerobic culture    Specimen: Hip, Left; Joint Fluid   Result Value Ref Range    Aerobic Bacterial Culture STREPTOCOCCUS GORDONII  Moderate   (A)        Susceptibility    Streptococcus gordonii - CULTURE, AEROBIC  (SPECIFY SOURCE)     Ceftriaxone <=0.25 Sensitive mcg/mL     Cefepime <=0.25 Sensitive mcg/mL     Penicillin 0.12 Sensitive mcg/mL     Vancomycin 0.5 Sensitive mcg/mL   Tissue culture    Specimen: Hip, Left; Tissue   Result Value Ref Range    Aerobic Culture - Tissue (A)      STREPTOCOCCUS GORDONII  Moderate  For susceptibility see order #  [id number]      Gram Stain Result Many WBC's     Gram Stain Result Rare Gram positive cocci     Gram Stain Result Rare Gram negative rods    Blood culture    Specimen: Blood   Result Value Ref Range    Blood Culture, Routine No Growth to date     Blood Culture, Routine No Growth to date     Blood Culture, Routine No Growth to date    CBC Auto Differential   Result Value Ref Range    WBC 6.44 3.90 - 12.70 K/uL    RBC 4.05 4.00 - 5.40 M/uL    Hemoglobin 12.7 12.0 - 16.0 g/dL    Hematocrit 38.8 37.0 - 48.5 %    MCV 96 82 - 98 fL    MCH 31.4 (H) 27.0 - 31.0 pg    MCHC 32.7 32.0 - 36.0 g/dL    RDW 14.9 (H) 11.5 - 14.5 %    Platelets 103 (L) 150 - 450 K/uL    MPV 12.6 9.2 - 12.9 fL    Immature Granulocytes 0.3 0.0 - 0.5 %    Gran # (ANC) 5.1 1.8 - 7.7 K/uL    Immature Grans (Abs) 0.02 0.00 - 0.04 K/uL    Lymph # 0.6 (L) 1.0 - 4.8 K/uL    Mono # 0.7 0.3 - 1.0 K/uL    Eos # 0.0 0.0 - 0.5 K/uL    Baso # 0.02 0.00 - 0.20 K/uL    nRBC 0 0 /100 WBC    Gran % 78.6 (H) 38.0 - 73.0 %    Lymph % 9.6 (L) 18.0 - 48.0 %    Mono % 11.2 4.0 - 15.0 %    Eosinophil % 0.0 0.0 - 8.0 %    Basophil % 0.3 0.0 - 1.9 %    Differential Method Automated    Comprehensive Metabolic Panel   Result Value Ref Range    Sodium 139 136 - 145 mmol/L    Potassium 5.6 (H) 3.5 - 5.1 mmol/L    Chloride 102 95 - 110 mmol/L    CO2 21 (L) 23 - 29 mmol/L    Glucose 106 70 - 110 mg/dL    BUN 54 (H) 8 - 23 mg/dL    Creatinine 9.6 (H) 0.5 - 1.4 mg/dL    Calcium 9.6 8.7 - 10.5 mg/dL    Total Protein 7.9 6.0 - 8.4 g/dL    Albumin 3.9 3.5 - 5.2 g/dL    Total Bilirubin 1.3 (H) 0.1 - 1.0 mg/dL    Alkaline Phosphatase 112 55 - 135 U/L    AST 32 10 - 40 U/L    ALT 22 10 - 44 U/L    Anion Gap 16 8 - 16 mmol/L    eGFR 4.1 (A) >60 mL/min/1.73 m^2   BNP   Result Value Ref Range    BNP 4,792 (H) 0 - 99 pg/mL   CK   Result Value Ref Range     20 - 180 U/L   Troponin I   Result Value Ref Range    Troponin I 0.076 (H) 0.000 - 0.026 ng/mL   Troponin I   Result Value Ref Range     Troponin I 0.095 (H) 0.000 - 0.026 ng/mL   Comprehensive Metabolic Panel (CMP)   Result Value Ref Range    Sodium 137 136 - 145 mmol/L    Potassium 6.1 (H) 3.5 - 5.1 mmol/L    Chloride 102 95 - 110 mmol/L    CO2 20 (L) 23 - 29 mmol/L    Glucose 87 70 - 110 mg/dL    BUN 66 (H) 8 - 23 mg/dL    Creatinine 10.6 (H) 0.5 - 1.4 mg/dL    Calcium 9.8 8.7 - 10.5 mg/dL    Total Protein 7.4 6.0 - 8.4 g/dL    Albumin 3.4 (L) 3.5 - 5.2 g/dL    Total Bilirubin 1.5 (H) 0.1 - 1.0 mg/dL    Alkaline Phosphatase 102 55 - 135 U/L    AST 23 10 - 40 U/L    ALT 16 10 - 44 U/L    Anion Gap 15 8 - 16 mmol/L    eGFR 4 (A) >60 mL/min/1.73 m^2   CBC with Automated Differential   Result Value Ref Range    WBC 6.35 3.90 - 12.70 K/uL    RBC 3.75 (L) 4.00 - 5.40 M/uL    Hemoglobin 11.6 (L) 12.0 - 16.0 g/dL    Hematocrit 35.7 (L) 37.0 - 48.5 %    MCV 95 82 - 98 fL    MCH 30.9 27.0 - 31.0 pg    MCHC 32.5 32.0 - 36.0 g/dL    RDW 14.7 (H) 11.5 - 14.5 %    Platelets 95 (L) 150 - 450 K/uL    MPV 12.7 9.2 - 12.9 fL    Immature Granulocytes 0.3 0.0 - 0.5 %    Gran # (ANC) 4.4 1.8 - 7.7 K/uL    Immature Grans (Abs) 0.02 0.00 - 0.04 K/uL    Lymph # 1.0 1.0 - 4.8 K/uL    Mono # 0.9 0.3 - 1.0 K/uL    Eos # 0.0 0.0 - 0.5 K/uL    Baso # 0.02 0.00 - 0.20 K/uL    nRBC 0 0 /100 WBC    Gran % 69.7 38.0 - 73.0 %    Lymph % 15.1 (L) 18.0 - 48.0 %    Mono % 14.6 4.0 - 15.0 %    Eosinophil % 0.0 0.0 - 8.0 %    Basophil % 0.3 0.0 - 1.9 %    Differential Method Automated    APTT   Result Value Ref Range    aPTT 35.4 (H) 21.0 - 32.0 sec   Protime-INR   Result Value Ref Range    Prothrombin Time 11.7 9.0 - 12.5 sec    INR 1.1 0.8 - 1.2   Troponin I   Result Value Ref Range    Troponin I 0.091 (H) 0.000 - 0.026 ng/mL   Basic Metabolic Panel   Result Value Ref Range    Sodium 137 136 - 145 mmol/L    Potassium 4.6 3.5 - 5.1 mmol/L    Chloride 95 95 - 110 mmol/L    CO2 24 23 - 29 mmol/L    Glucose 119 (H) 70 - 110 mg/dL    BUN 31 (H) 8 - 23 mg/dL    Creatinine 6.3 (H) 0.5 -  1.4 mg/dL    Calcium 9.4 8.7 - 10.5 mg/dL    Anion Gap 18 (H) 8 - 16 mmol/L    eGFR 7 (A) >60 mL/min/1.73 m^2   Hepatitis panel, acute   Result Value Ref Range    Hepatitis B Surface Ag Non-reactive Non-reactive    Hep B C IgM Non-reactive Non-reactive    Hep A IgM Non-reactive Non-reactive    Hepatitis C Ab Non-reactive Non-reactive   Hepatitis B Surface Antibody, Qual/Quant   Result Value Ref Range    Hep. B Surf Ab, Qual POSITIVE     Hep. B Surf Ab, Quant. 31 mIU/mL   Comprehensive Metabolic Panel (CMP)   Result Value Ref Range    Sodium 137 136 - 145 mmol/L    Potassium 5.3 (H) 3.5 - 5.1 mmol/L    Chloride 94 (L) 95 - 110 mmol/L    CO2 27 23 - 29 mmol/L    Glucose 97 70 - 110 mg/dL    BUN 43 (H) 8 - 23 mg/dL    Creatinine 7.8 (H) 0.5 - 1.4 mg/dL    Calcium 8.8 8.7 - 10.5 mg/dL    Total Protein 6.7 6.0 - 8.4 g/dL    Albumin 3.1 (L) 3.5 - 5.2 g/dL    Total Bilirubin 1.5 (H) 0.1 - 1.0 mg/dL    Alkaline Phosphatase 91 55 - 135 U/L    AST 35 10 - 40 U/L    ALT 16 10 - 44 U/L    Anion Gap 16 8 - 16 mmol/L    eGFR 5 (A) >60 mL/min/1.73 m^2   CBC with Automated Differential   Result Value Ref Range    WBC 6.15 3.90 - 12.70 K/uL    RBC 3.70 (L) 4.00 - 5.40 M/uL    Hemoglobin 11.3 (L) 12.0 - 16.0 g/dL    Hematocrit 35.6 (L) 37.0 - 48.5 %    MCV 96 82 - 98 fL    MCH 30.5 27.0 - 31.0 pg    MCHC 31.7 (L) 32.0 - 36.0 g/dL    RDW 14.5 11.5 - 14.5 %    Platelets 100 (L) 150 - 450 K/uL    MPV 12.5 9.2 - 12.9 fL    Immature Granulocytes 0.3 0.0 - 0.5 %    Gran # (ANC) 5.0 1.8 - 7.7 K/uL    Immature Grans (Abs) 0.02 0.00 - 0.04 K/uL    Lymph # 0.5 (L) 1.0 - 4.8 K/uL    Mono # 0.7 0.3 - 1.0 K/uL    Eos # 0.0 0.0 - 0.5 K/uL    Baso # 0.01 0.00 - 0.20 K/uL    nRBC 0 0 /100 WBC    Gran % 80.6 (H) 38.0 - 73.0 %    Lymph % 7.3 (L) 18.0 - 48.0 %    Mono % 11.4 4.0 - 15.0 %    Eosinophil % 0.2 0.0 - 8.0 %    Basophil % 0.2 0.0 - 1.9 %    Differential Method Automated    CBC auto differential   Result Value Ref Range    WBC 6.15 3.90 -  12.70 K/uL    RBC 3.70 (L) 4.00 - 5.40 M/uL    Hemoglobin 11.3 (L) 12.0 - 16.0 g/dL    Hematocrit 35.6 (L) 37.0 - 48.5 %    MCV 96 82 - 98 fL    MCH 30.5 27.0 - 31.0 pg    MCHC 31.7 (L) 32.0 - 36.0 g/dL    RDW 14.5 11.5 - 14.5 %    Platelets 100 (L) 150 - 450 K/uL    MPV 12.5 9.2 - 12.9 fL    Immature Granulocytes 0.3 0.0 - 0.5 %    Gran # (ANC) 5.0 1.8 - 7.7 K/uL    Immature Grans (Abs) 0.02 0.00 - 0.04 K/uL    Lymph # 0.5 (L) 1.0 - 4.8 K/uL    Mono # 0.7 0.3 - 1.0 K/uL    Eos # 0.0 0.0 - 0.5 K/uL    Baso # 0.01 0.00 - 0.20 K/uL    nRBC 0 0 /100 WBC    Gran % 80.6 (H) 38.0 - 73.0 %    Lymph % 7.3 (L) 18.0 - 48.0 %    Mono % 11.4 4.0 - 15.0 %    Eosinophil % 0.2 0.0 - 8.0 %    Basophil % 0.2 0.0 - 1.9 %    Differential Method Automated    Phosphorus   Result Value Ref Range    Phosphorus 6.0 (H) 2.7 - 4.5 mg/dL   APTT   Result Value Ref Range    aPTT 45.3 (H) 21.0 - 32.0 sec   APTT   Result Value Ref Range    aPTT 59.0 (H) 21.0 - 32.0 sec   WBC & Diff,Body Fluid Joint Fluid, Left Hip   Result Value Ref Range    Body Fluid Type Joint Fluid, Left Hip     Fluid Appearance Bloody     Fluid Color Red     WBC, Body Fluid 409324 /cu mm    Segs, Fluid 100 %   Body fluid crystal Joint Fluid, Left Hip   Result Value Ref Range    Body Fluid Source, Crystal Exam Joint Fluid, Left Hip     Body Fluid Crystal Negative Negative   Comprehensive Metabolic Panel (CMP)   Result Value Ref Range    Sodium 137 136 - 145 mmol/L    Potassium 4.5 3.5 - 5.1 mmol/L    Chloride 95 95 - 110 mmol/L    CO2 27 23 - 29 mmol/L    Glucose 113 (H) 70 - 110 mg/dL    BUN 36 (H) 8 - 23 mg/dL    Creatinine 6.5 (H) 0.5 - 1.4 mg/dL    Calcium 9.5 8.7 - 10.5 mg/dL    Total Protein 7.0 6.0 - 8.4 g/dL    Albumin 2.7 (L) 3.5 - 5.2 g/dL    Total Bilirubin 1.5 (H) 0.1 - 1.0 mg/dL    Alkaline Phosphatase 99 55 - 135 U/L    AST 36 10 - 40 U/L    ALT 16 10 - 44 U/L    Anion Gap 15 8 - 16 mmol/L    eGFR 7 (A) >60 mL/min/1.73 m^2   CBC with Automated Differential    Result Value Ref Range    WBC 6.66 3.90 - 12.70 K/uL    RBC 3.81 (L) 4.00 - 5.40 M/uL    Hemoglobin 11.8 (L) 12.0 - 16.0 g/dL    Hematocrit 36.7 (L) 37.0 - 48.5 %    MCV 96 82 - 98 fL    MCH 31.0 27.0 - 31.0 pg    MCHC 32.2 32.0 - 36.0 g/dL    RDW 14.4 11.5 - 14.5 %    Platelets 130 (L) 150 - 450 K/uL    MPV 11.4 9.2 - 12.9 fL    Immature Granulocytes 0.5 0.0 - 0.5 %    Gran # (ANC) 5.2 1.8 - 7.7 K/uL    Immature Grans (Abs) 0.03 0.00 - 0.04 K/uL    Lymph # 0.6 (L) 1.0 - 4.8 K/uL    Mono # 0.8 0.3 - 1.0 K/uL    Eos # 0.0 0.0 - 0.5 K/uL    Baso # 0.01 0.00 - 0.20 K/uL    nRBC 0 0 /100 WBC    Gran % 77.9 (H) 38.0 - 73.0 %    Lymph % 8.9 (L) 18.0 - 48.0 %    Mono % 12.3 4.0 - 15.0 %    Eosinophil % 0.2 0.0 - 8.0 %    Basophil % 0.2 0.0 - 1.9 %    Differential Method Automated    CBC auto differential   Result Value Ref Range    WBC 6.66 3.90 - 12.70 K/uL    RBC 3.81 (L) 4.00 - 5.40 M/uL    Hemoglobin 11.8 (L) 12.0 - 16.0 g/dL    Hematocrit 36.7 (L) 37.0 - 48.5 %    MCV 96 82 - 98 fL    MCH 31.0 27.0 - 31.0 pg    MCHC 32.2 32.0 - 36.0 g/dL    RDW 14.4 11.5 - 14.5 %    Platelets 130 (L) 150 - 450 K/uL    MPV 11.4 9.2 - 12.9 fL    Immature Granulocytes 0.5 0.0 - 0.5 %    Gran # (ANC) 5.2 1.8 - 7.7 K/uL    Immature Grans (Abs) 0.03 0.00 - 0.04 K/uL    Lymph # 0.6 (L) 1.0 - 4.8 K/uL    Mono # 0.8 0.3 - 1.0 K/uL    Eos # 0.0 0.0 - 0.5 K/uL    Baso # 0.01 0.00 - 0.20 K/uL    nRBC 0 0 /100 WBC    Gran % 77.9 (H) 38.0 - 73.0 %    Lymph % 8.9 (L) 18.0 - 48.0 %    Mono % 12.3 4.0 - 15.0 %    Eosinophil % 0.2 0.0 - 8.0 %    Basophil % 0.2 0.0 - 1.9 %    Differential Method Automated    APTT   Result Value Ref Range    aPTT 54.7 (H) 21.0 - 32.0 sec   CBC auto differential   Result Value Ref Range    WBC 7.65 3.90 - 12.70 K/uL    RBC 3.81 (L) 4.00 - 5.40 M/uL    Hemoglobin 11.6 (L) 12.0 - 16.0 g/dL    Hematocrit 36.4 (L) 37.0 - 48.5 %    MCV 96 82 - 98 fL    MCH 30.4 27.0 - 31.0 pg    MCHC 31.9 (L) 32.0 - 36.0 g/dL    RDW 14.6 (H)  11.5 - 14.5 %    Platelets 142 (L) 150 - 450 K/uL    MPV 11.1 9.2 - 12.9 fL    Immature Granulocytes 0.9 (H) 0.0 - 0.5 %    Gran # (ANC) 5.7 1.8 - 7.7 K/uL    Immature Grans (Abs) 0.07 (H) 0.00 - 0.04 K/uL    Lymph # 0.7 (L) 1.0 - 4.8 K/uL    Mono # 1.2 (H) 0.3 - 1.0 K/uL    Eos # 0.1 0.0 - 0.5 K/uL    Baso # 0.02 0.00 - 0.20 K/uL    nRBC 0 0 /100 WBC    Gran % 74.1 (H) 38.0 - 73.0 %    Lymph % 9.0 (L) 18.0 - 48.0 %    Mono % 15.0 4.0 - 15.0 %    Eosinophil % 0.7 0.0 - 8.0 %    Basophil % 0.3 0.0 - 1.9 %    Differential Method Automated    APTT   Result Value Ref Range    aPTT 50.3 (H) 21.0 - 32.0 sec   Renal Function Panel   Result Value Ref Range    Glucose 111 (H) 70 - 110 mg/dL    Sodium 134 (L) 136 - 145 mmol/L    Potassium 4.8 3.5 - 5.1 mmol/L    Chloride 93 (L) 95 - 110 mmol/L    CO2 28 23 - 29 mmol/L    BUN 53 (H) 8 - 23 mg/dL    Calcium 10.0 8.7 - 10.5 mg/dL    Creatinine 8.3 (H) 0.5 - 1.4 mg/dL    Albumin 2.6 (L) 3.5 - 5.2 g/dL    Phosphorus 5.7 (H) 2.7 - 4.5 mg/dL    eGFR 5 (A) >60 mL/min/1.73 m^2    Anion Gap 13 8 - 16 mmol/L   Uric acid   Result Value Ref Range    Uric Acid 4.5 2.4 - 5.7 mg/dL   Pathologist Review of Laboratory Test   Result Value Ref Range    Pathologist Review, Body Fluid REVIEWED    Protime-INR   Result Value Ref Range    Prothrombin Time 10.8 9.0 - 12.5 sec    INR 1.0 0.8 - 1.2   APTT   Result Value Ref Range    aPTT 30.1 21.0 - 32.0 sec   CBC Auto Differential   Result Value Ref Range    WBC 6.51 3.90 - 12.70 K/uL    RBC 3.86 (L) 4.00 - 5.40 M/uL    Hemoglobin 11.6 (L) 12.0 - 16.0 g/dL    Hematocrit 36.6 (L) 37.0 - 48.5 %    MCV 95 82 - 98 fL    MCH 30.1 27.0 - 31.0 pg    MCHC 31.7 (L) 32.0 - 36.0 g/dL    RDW 14.4 11.5 - 14.5 %    Platelets 144 (L) 150 - 450 K/uL    MPV 11.4 9.2 - 12.9 fL    Immature Granulocytes 0.6 (H) 0.0 - 0.5 %    Gran # (ANC) 4.8 1.8 - 7.7 K/uL    Immature Grans (Abs) 0.04 0.00 - 0.04 K/uL    Lymph # 0.5 (L) 1.0 - 4.8 K/uL    Mono # 1.1 (H) 0.3 - 1.0 K/uL     Eos # 0.0 0.0 - 0.5 K/uL    Baso # 0.02 0.00 - 0.20 K/uL    nRBC 0 0 /100 WBC    Gran % 74.3 (H) 38.0 - 73.0 %    Lymph % 8.1 (L) 18.0 - 48.0 %    Mono % 16.1 (H) 4.0 - 15.0 %    Eosinophil % 0.6 0.0 - 8.0 %    Basophil % 0.3 0.0 - 1.9 %    Differential Method Automated    Renal Function Panel   Result Value Ref Range    Glucose 164 (H) 70 - 110 mg/dL    Sodium 135 (L) 136 - 145 mmol/L    Potassium 3.9 3.5 - 5.1 mmol/L    Chloride 95 95 - 110 mmol/L    CO2 24 23 - 29 mmol/L    BUN 38 (H) 8 - 23 mg/dL    Calcium 9.1 8.7 - 10.5 mg/dL    Creatinine 6.2 (H) 0.5 - 1.4 mg/dL    Albumin 2.4 (L) 3.5 - 5.2 g/dL    Phosphorus 3.6 2.7 - 4.5 mg/dL    eGFR 7 (A) >60 mL/min/1.73 m^2    Anion Gap 16 8 - 16 mmol/L   CBC auto differential   Result Value Ref Range    WBC 6.51 3.90 - 12.70 K/uL    RBC 3.86 (L) 4.00 - 5.40 M/uL    Hemoglobin 11.6 (L) 12.0 - 16.0 g/dL    Hematocrit 36.6 (L) 37.0 - 48.5 %    MCV 95 82 - 98 fL    MCH 30.1 27.0 - 31.0 pg    MCHC 31.7 (L) 32.0 - 36.0 g/dL    RDW 14.4 11.5 - 14.5 %    Platelets 144 (L) 150 - 450 K/uL    MPV 11.4 9.2 - 12.9 fL    Immature Granulocytes 0.6 (H) 0.0 - 0.5 %    Gran # (ANC) 4.8 1.8 - 7.7 K/uL    Immature Grans (Abs) 0.04 0.00 - 0.04 K/uL    Lymph # 0.5 (L) 1.0 - 4.8 K/uL    Mono # 1.1 (H) 0.3 - 1.0 K/uL    Eos # 0.0 0.0 - 0.5 K/uL    Baso # 0.02 0.00 - 0.20 K/uL    nRBC 0 0 /100 WBC    Gran % 74.3 (H) 38.0 - 73.0 %    Lymph % 8.1 (L) 18.0 - 48.0 %    Mono % 16.1 (H) 4.0 - 15.0 %    Eosinophil % 0.6 0.0 - 8.0 %    Basophil % 0.3 0.0 - 1.9 %    Differential Method Automated    APTT   Result Value Ref Range    aPTT 34.8 (H) 21.0 - 32.0 sec   APTT   Result Value Ref Range    aPTT 30.8 21.0 - 32.0 sec   Renal Function Panel   Result Value Ref Range    Glucose 96 70 - 110 mg/dL    Sodium 133 (L) 136 - 145 mmol/L    Potassium 5.7 (H) 3.5 - 5.1 mmol/L    Chloride 99 95 - 110 mmol/L    CO2 18 (L) 23 - 29 mmol/L    BUN 54 (H) 8 - 23 mg/dL    Calcium 8.8 8.7 - 10.5 mg/dL    Creatinine 7.7  (H) 0.5 - 1.4 mg/dL    Albumin 2.3 (L) 3.5 - 5.2 g/dL    Phosphorus 5.3 (H) 2.7 - 4.5 mg/dL    eGFR 5 (A) >60 mL/min/1.73 m^2    Anion Gap 16 8 - 16 mmol/L   CBC auto differential   Result Value Ref Range    WBC 8.08 3.90 - 12.70 K/uL    RBC 3.55 (L) 4.00 - 5.40 M/uL    Hemoglobin 10.9 (L) 12.0 - 16.0 g/dL    Hematocrit 33.8 (L) 37.0 - 48.5 %    MCV 95 82 - 98 fL    MCH 30.7 27.0 - 31.0 pg    MCHC 32.2 32.0 - 36.0 g/dL    RDW 14.2 11.5 - 14.5 %    Platelets 154 150 - 450 K/uL    MPV 11.0 9.2 - 12.9 fL    Immature Granulocytes 0.4 0.0 - 0.5 %    Gran # (ANC) 6.3 1.8 - 7.7 K/uL    Immature Grans (Abs) 0.03 0.00 - 0.04 K/uL    Lymph # 0.5 (L) 1.0 - 4.8 K/uL    Mono # 1.2 (H) 0.3 - 1.0 K/uL    Eos # 0.0 0.0 - 0.5 K/uL    Baso # 0.02 0.00 - 0.20 K/uL    nRBC 0 0 /100 WBC    Gran % 78.3 (H) 38.0 - 73.0 %    Lymph % 5.7 (L) 18.0 - 48.0 %    Mono % 15.2 (H) 4.0 - 15.0 %    Eosinophil % 0.2 0.0 - 8.0 %    Basophil % 0.2 0.0 - 1.9 %    Differential Method Automated    Vancomycin, Random   Result Value Ref Range    Vancomycin, Random 11.0 Not established ug/mL   Renal Function Panel   Result Value Ref Range    Glucose 115 (H) 70 - 110 mg/dL    Sodium 134 (L) 136 - 145 mmol/L    Potassium 4.8 3.5 - 5.1 mmol/L    Chloride 93 (L) 95 - 110 mmol/L    CO2 24 23 - 29 mmol/L    BUN 69 (H) 8 - 23 mg/dL    Calcium 10.4 8.7 - 10.5 mg/dL    Creatinine 9.1 (H) 0.5 - 1.4 mg/dL    Albumin 2.5 (L) 3.5 - 5.2 g/dL    Phosphorus 5.7 (H) 2.7 - 4.5 mg/dL    eGFR 4 (A) >60 mL/min/1.73 m^2    Anion Gap 17 (H) 8 - 16 mmol/L   Vancomycin, random   Result Value Ref Range    Vancomycin, Random 15.3 Not established ug/mL   Vancomycin, random   Result Value Ref Range    Vancomycin, Random 10.3 Not established ug/mL   CBC auto differential   Result Value Ref Range    WBC 7.14 3.90 - 12.70 K/uL    RBC 3.07 (L) 4.00 - 5.40 M/uL    Hemoglobin 10.0 (L) 12.0 - 16.0 g/dL    Hematocrit 30.1 (L) 37.0 - 48.5 %    MCV 98 82 - 98 fL    MCH 32.6 (H) 27.0 - 31.0 pg     MCHC 33.2 32.0 - 36.0 g/dL    RDW 14.5 11.5 - 14.5 %    Platelets 264 150 - 450 K/uL    MPV 10.1 9.2 - 12.9 fL    Immature Granulocytes 1.0 (H) 0.0 - 0.5 %    Gran # (ANC) 5.3 1.8 - 7.7 K/uL    Immature Grans (Abs) 0.07 (H) 0.00 - 0.04 K/uL    Lymph # 0.9 (L) 1.0 - 4.8 K/uL    Mono # 0.8 0.3 - 1.0 K/uL    Eos # 0.1 0.0 - 0.5 K/uL    Baso # 0.03 0.00 - 0.20 K/uL    nRBC 0 0 /100 WBC    Gran % 73.5 (H) 38.0 - 73.0 %    Lymph % 12.2 (L) 18.0 - 48.0 %    Mono % 11.5 4.0 - 15.0 %    Eosinophil % 1.4 0.0 - 8.0 %    Basophil % 0.4 0.0 - 1.9 %    Platelet Estimate Appears normal     Differential Method Automated    Basic metabolic panel   Result Value Ref Range    Sodium 134 (L) 136 - 145 mmol/L    Potassium 4.5 3.5 - 5.1 mmol/L    Chloride 99 95 - 110 mmol/L    CO2 21 (L) 23 - 29 mmol/L    Glucose 93 70 - 110 mg/dL    BUN 47 (H) 8 - 23 mg/dL    Creatinine 7.4 (H) 0.5 - 1.4 mg/dL    Calcium 10.3 8.7 - 10.5 mg/dL    Anion Gap 14 8 - 16 mmol/L    eGFR 6 (A) >60 mL/min/1.73 m^2   Echo   Result Value Ref Range    BSA 1.83 m2    TDI SEPTAL 0.06 m/s    LV LATERAL E/E' RATIO 15.33 m/s    LV SEPTAL E/E' RATIO 15.33 m/s    LA WIDTH 3.80 cm    IVC diameter 2.82 cm    Left Ventricular Outflow Tract Mean Velocity 0.63 cm/s    Left Ventricular Outflow Tract Mean Gradient 2.01 mmHg    TDI LATERAL 0.06 m/s    LVIDd 5.31 3.5 - 6.0 cm    IVS 1.67 (A) 0.6 - 1.1 cm    Posterior Wall 1.49 (A) 0.6 - 1.1 cm    Ao root annulus 3.06 cm    LVIDs 3.78 2.1 - 4.0 cm    FS 29 28 - 44 %    LA volume 89.40 cm3    Sinus 3.26 cm    STJ 3.37 cm    Ascending aorta 3.42 cm    LV mass 381.59 g    LA size 4.15 cm    TAPSE 2.27 cm    Left Ventricle Relative Wall Thickness 0.56 cm    AV mean gradient 9 mmHg    AV valve area 2.09 cm2    AV Velocity Ratio 0.51     AV index (prosthetic) 0.61     MV valve area p 1/2 method 5.12 cm2    E/A ratio 4.38     Mean e' 0.06 m/s    E wave deceleration time 148.05 msec    IVRT 87.54 msec    LVOT diameter 2.09 cm    LVOT area  3.4 cm2    LVOT peak krunal 1.08 m/s    LVOT peak VTI 19.80 cm    Ao peak krunal 2.12 m/s    Ao VTI 32.5 cm    RVOT peak krunal 0.54 m/s    RVOT peak VTI 13.9 cm    Mr max krunal 5.03 m/s    LVOT stroke volume 67.89 cm3    AV peak gradient 18 mmHg    PV mean gradient 0.70 mmHg    E/E' ratio 15.33 m/s    MV Peak E Krunal 0.92 m/s    TR Max Krunal 3.79 m/s    MV stenosis pressure 1/2 time 42.93 ms    MV Peak A Krunal 0.21 m/s    LV Systolic Volume 61.16 mL    LV Systolic Volume Index 34.0 mL/m2    LV Diastolic Volume 136.00 mL    LV Diastolic Volume Index 75.56 mL/m2    LA Volume Index 49.7 mL/m2    LV Mass Index 212 g/m2    RA Major Axis 5.77 cm    Left Atrium Minor Axis 6.48 cm    Left Atrium Major Axis 6.87 cm    Triscuspid Valve Regurgitation Peak Gradient 57 mmHg    RA Width 3.67 cm    Right Atrial Pressure (from IVC) 3 mmHg    TV rest pulmonary artery pressure 60 mmHg    EF 55 %   Echo   Result Value Ref Range    BSA 1.79 m2    TR Max Krunal 4.48 m/s    Triscuspid Valve Regurgitation Peak Gradient 80 mmHg    EF 55 %   POCT glucose   Result Value Ref Range    POCT Glucose 137 (H) 70 - 110 mg/dL   POCT glucose   Result Value Ref Range    POCT Glucose 138 (H) 70 - 110 mg/dL         Significant Imaging: I have reviewed all pertinent imaging results/findings within the past 24 hours.    MRI Hip Without Contrast Right   Final Result      Limited evaluation secondary to motion.      The right femoral head neck junction cyst represents a benign synovial herniation pit.         Electronically signed by: Rikki Ballard MD   Date:    12/24/2022   Time:    07:41      US Guided Needle Placement   Final Result      Percutaneous ultrasound-guided aspiration of the left hip joint, yielding 4 mL of purulent fluid. No drainage catheter was left in place.      Plan:      Follow-up fluid studies.      ______________________________________________________________________         Electronically signed by: Juan M Panda   Date:    12/23/2022   Time:    10:50       CT Head Without Contrast   Final Result      No CT evidence of acute intracranial abnormality.      All CT scans at this facility are performed  using dose modulation techniques as appropriate to performed exam including the following:  automated exposure control; adjustment of mA and/or kV according to the patients size (this includes techniques or standardized protocols for targeted exams where dose is matched to indication/reason for exam: i.e. extremities or head);  iterative reconstruction technique.         Electronically signed by: Rikki Ballard MD   Date:    12/22/2022   Time:    10:46      X-Ray Chest AP Portable   Final Result      Unchanged cardiomegaly with unchanged appearance of pericardial effusion compared to 12/19/2022. This is enlarged compared to 02/20/2018.         Electronically signed by: Juan M Panda   Date:    12/22/2022   Time:    10:31      X-Ray Chest AP Portable   Final Result      Severe cardiomegaly. Left lung base obscured by the heart. There may be a small left pleural effusion. Consider further evaluation with PA and lateral chest radiographs. No pneumothorax.         Electronically signed by: Juan M Panda   Date:    12/19/2022   Time:    14:24      MRI Hip Without Contrast Left   Final Result      1. No left hip fracture.   2. Asymmetric large left hip joint effusion.  Small right hip joint effusion.   3. Paralabral cyst along the superior border of the acetabulum suspicious for a superior labral tear.         Electronically signed by: Bob Chester MD   Date:    12/19/2022   Time:    14:32      CT Pelvis Without Contrast   Final Result      No femur fracture. Small left hip joint effusion. Focal calcification lateral to the left acetabulum raising question of avulsion of the rectus femoris ligament or iliofemoral ligament. Consider further evaluation with MRI left hip without IV contrast on a nonemergent basis, or as clinically warranted.         Electronically signed by: Juan M Panda    Date:    12/19/2022   Time:    11:29      X-Ray Pelvis Routine AP   Final Result      No acute abnormality.         Electronically signed by: Juan M Panda   Date:    12/19/2022   Time:    10:17              Assessment/Plan:      * Pyogenic arthritis of left hip  Patient presented with acute onset left hip pain x1 day duration in absence of trauma while doing leg exercises in bed and was found to asymmetric large left hip joint effusion, small right hip joint effusion, and evidence of superior labral tear of her left acetabulum noted on MRI.  Patient noted to have 5/5 strength throughout with sensation to light touch grossly intact throughout however, TTP throughout left groin.  Orthopedics consulted and awaiting further evaluation/recommendations.  Plan:  -optimize pain control  -bowel regimen  -PT/OT  -ortho surgery following    The patient underwent arthrocentesis by IR 12/23 with 4 cc pus obtained.  Sent for cell count which revealed 187,000 white blood cells 100% segs.  Crystals negative  and culture Gram-negative and Gram-positive.  Patient went for washout 12/24.  Placed on empiric vanco and ceftazidime awaiting sensitivities    Blood cultures positive STREPTOCOCCUS GORDONII  ID consulted     Streptococcal bacteremia  Cont IV rocephin   F/U KATE    Pericardial effusion  -large posterior pericardial effusion   -pt appears asymptomatic   -Nephrology following with HD performed on 12/21/22 and a Monday Wednesday Friday schedule  -Cardiology following- will follow and evaluate after next HD       Bone cyst of right femur  -Orthopedic Surgery following   -follow up outpatient recommended          Shortness of breath  Patient reported acute onset shortness a breath while obtaining MRI and was found to have evidence of severe cardiomegaly with small left pleural effusion but negative for infectious processes noted on CXR. Patient afebrile without leukocytosis. BNP elevated at 4792. Troponin 0.095. Exam positive for  bilateral edema but negative for elevated JVD or crackles on lung ausculation. Patient currently saturating % on 2L via NC in no acute distress and without use of accessory muscles noted.  Patient without history of CHF.  Cardiology consulted.  Echo ordered and pending.  Nephrology consulted to resume HD inpatient.  Will trial dose of Lasix if clinical status worsens.  Plan:  -titrate oxygen therapy as needed  -incentive spirometry  -echo reviewed   -f/u cardiology and nephrology  -monitor Is/Os  -low salt/cardiac/renal diet    -Duonebs prn       Tear of left acetabular labrum  -Orthopedic surgery following  -MRI showed no fracture and asymmetric large left hip joint effusion.  Small right hip joint effusion. Paralabral cyst along the superior border of the acetabulum suspicious for a superior labral tear.   -analgesia as needed   -antiemetics as needed   - the left hip may treat with therapy and anti-inflammatories (per Orthopedic Surgery)   -Aspiration of the left hip by Interventional Radiology as above    Gastroesophageal reflux disease without esophagitis  Chronic. Stable. Currently asymptomatic. Home medications include PPI/Antacids as needed.  Plan:  -Continue PPI/Antacids as needed       Primary hypertension  BP currently ranging from 90s systolic.    Plan:  -Optimize pain control   -Continue home medications, titrate as needed   -Monitor BP  -Low salt/renal diet   -IV hydralazine prn for SBP>160 or DBP>90   -f/u nephrology for HD inpatient- HD performed on 12/20/22       ESRD (end stage renal disease) on dialysis  Patient currently follows Dr. Ribera from nephrology with last reported dialysis completed last Friday. Patient currently on M/W/F HD at [facility] via LUE fistula but missed Monday due to not feeling well and being in the ED. Labs consistent with history of ESRD on HD.   Plan:  -continue home medications, titrate as needed  -nephrology consulted -HD performed on 12/20/22 and now back on  MWF schedule      Atrial fibrillation  Patient with new onset Paroxysmal (<7 days) atrial fibrillation with RVR which is uncontrolled currently with Calcium Channel Blocker. Patient is currently in atrial fibrillation with rate in the 105-130s on diltiazem. [id number] Score: 2. HASBLED Score: 3. Anticoagulation indicated. Anticoagulation done with heparin .  Troponin measuring 0.095 with EKG obtained at outside facility revealing SVT/atrial fibrillation with RVR at rate of 176. Troponin likely elevated in setting of ESRD and demand ischemia from arrhythmia as patient denied endorsing chest pain.  Plan:  -telemetry- afib with rates   -cardizem drip transitioned to oral dosing  -f/u cardiology   -nephrology for HD   -Heparin gtt -transitioned to eliquis  -CHADS-VASc Score- 3         VTE Risk Mitigation (From admission, onward)         Ordered     apixaban tablet 5 mg  2 times daily         12/25/22 1402     Place sequential compression device  Until discontinued         12/19/22 2119     IP VTE LOW RISK PATIENT  Once         12/19/22 2119                Discharge Planning   ISAAK:      Code Status: Full Code   Is the patient medically ready for discharge?:     Reason for patient still in hospital (select all that apply): Treatment  Discharge Plan A: Home                  Jonathan Smith MD  Department of Hospital Medicine   [facility] - Telemetry ([facility])

## 2022-12-28 NOTE — PROGRESS NOTES
"[facility])  Infectious Disease  Progress Note    Patient Name: Meaghan Potter  MRN: [id number]  Admission Date: 12/19/2022  Length of Stay: 7 days  Attending Physician: Jonathan Smith, *  Primary Care Provider: Primary Doctor No    Isolation Status: No active isolations  Assessment/Plan:      * Pyogenic arthritis of left hip  S/p left hip arthrotomy- will follow Ortho- will plan to treat for 4 weeks from date of negative blood cultures with IV rocephin 2 gram daily, follow repeat blood culture          12/28- will follow repeat blood culture, follow Ortho, continue Rocephin    Streptococcal bacteremia  Echo showed large pericardial effusion       Will plan to do KATE if feasible     The practical choice that can be given daily will be to use IV vancomycin as she is on HD .  IV Rocephin once daily is the preferred regime .  Final duration will depend on KATE      12/28- will follow KATE, on Rocephin     Pericardial effusion  Cardiology follow up    Tear of left acetabular labrum  Follow ortho    ESRD (end stage renal disease) on dialysis  HD as tolerated     Atrial fibrillation  Rate control as per primary team         Anticipated Disposition:     Thank you for your consult. I will follow-up with patient. Please contact us if you have any additional questions.    Gerardo Schwartz MD, [facility]  Infectious Disease  [facility])    Subjective:     Principal Problem:Pyogenic arthritis of left hip    HPI:   67 y.o. woman with  past medical history of ESRD on dialysis, GERD and Hypertension transferred to the hospital with history of atrial fib and noted to have left superior labral tear of the acetabulum  /small right hip joint effusion and a large left hip joint effusion on MRI.  She was seen by Ortho and has left hip arthrotomy with op findings -  "Shahid purulence in the left hip; capsule intact and no extension outside joint.  No softening of bone."  Cultures- 12/23- blood culture- strep " gordoni   Hip culture - strep gordoni  She also had US guided aspiration of the hip.     Component      Latest Ref Rng & Units 12/25/2022 12/24/2022 12/24/2022            5:42 AM  5:42 AM   WBC      3.90 - 12.70 K/uL 8.08 6.51 6.51     Interval History:   67 year old woman with history of ESRD, now with left hip septic arthritis .  Blood culture- 12/24- strep gordonia  12/25- no growth   She is afebrile    Review of Systems   Constitutional:  Negative for chills and fatigue.   HENT:  Negative for congestion, ear pain, facial swelling, sinus pressure and sore throat.    Eyes:  Negative for pain.   Respiratory:  Negative for apnea, chest tightness, shortness of breath and stridor.    Cardiovascular:  Negative for chest pain, palpitations and leg swelling.   Gastrointestinal:  Negative for abdominal distention, abdominal pain, diarrhea and nausea.   Endocrine: Negative for polydipsia and polyphagia.   Genitourinary:  Negative for decreased urine volume, difficulty urinating, frequency and genital sores.   Musculoskeletal:  Positive for arthralgias, gait problem and myalgias.   Neurological:  Negative for light-headedness and headaches.   Hematological:  Negative for adenopathy.   Psychiatric/Behavioral:  Negative for agitation, confusion and decreased concentration.    Objective:     Vital Signs (Most Recent):  Temp: 98.5 °F (36.9 °C) (12/28/22 0751)  Pulse: 81 (12/28/22 0751)  Resp: 14 (12/28/22 0751)  BP: (!) 121/57 (12/28/22 0751)  SpO2: 97 % (12/28/22 0751)   Vital Signs (24h Range):  Temp:  [97.8 °F (36.6 °C)-98.6 °F (37 °C)] 98.5 °F (36.9 °C)  Pulse:  [70-90] 81  Resp:  [14-19] 14  SpO2:  [94 %-99 %] 97 %  BP: ()/(52-65) 121/57     Weight: 78.8 kg (173 lb 11.6 oz)  Body mass index is 29.82 kg/m².    Estimated Creatinine Clearance: 6.1 mL/min (A) (based on SCr of 9.1 mg/dL (H)).    Physical Exam  Vitals and nursing note reviewed.   Constitutional:       Appearance: She is well-developed.   HENT:      Head:  Normocephalic and atraumatic.   Eyes:      Conjunctiva/sclera: Conjunctivae normal.      Pupils: Pupils are equal, round, and reactive to light.   Neck:      Thyroid: No thyroid mass or thyromegaly.   Cardiovascular:      Rate and Rhythm: Normal rate.      Heart sounds: Normal heart sounds.   Pulmonary:      Effort: Pulmonary effort is normal. No accessory muscle usage or respiratory distress.      Breath sounds: Normal breath sounds.   Abdominal:      General: Bowel sounds are normal.      Palpations: Abdomen is soft. There is no mass.      Tenderness: There is no abdominal tenderness.   Musculoskeletal:      Cervical back: Normal range of motion and neck supple.      Comments: Dressing over left hip   Skin:     Findings: No rash.   Neurological:      Mental Status: She is alert and oriented to person, place, and time.       Significant Labs: CBC:   Recent Labs   Lab 12/28/22  0536   WBC 7.14   HGB 10.0*   HCT 30.1*        CMP: No results for input(s): NA, K, CL, CO2, GLU, BUN, CREATININE, CALCIUM, PROT, ALBUMIN, BILITOT, ALKPHOS, AST, ALT, ANIONGAP, EGFRNONAA in the last 48 hours.    Invalid input(s): ESTGFAFRICA  Wound Culture:   Recent Labs   Lab 12/23/22  1007   LABAERO STREPTOCOCCUS GORDONII  Moderate  *     All pertinent labs within the past 24 hours have been reviewed.    Significant Imaging: I have reviewed all pertinent imaging results/findings within the past 24 hours.

## 2022-12-28 NOTE — ASSESSMENT & PLAN NOTE
Echo showed large pericardial effusion       Will plan to do KATE if feasible     The practical choice that can be given daily will be to use IV vancomycin as she is on HD .  IV Rocephin once daily is the preferred regime .  Final duration will depend on KATE      12/28- will follow KATE, on Rocephin

## 2022-12-29 ENCOUNTER — TELEPHONE (OUTPATIENT)
Dept: ORTHOPEDICS | Facility: CLINIC | Age: 67
End: 2022-12-29
Payer: COMMERCIAL

## 2022-12-29 LAB
BSA FOR ECHO PROCEDURE: 1.9 M2
EJECTION FRACTION: 65 %
RA PRESSURE: 3 MMHG

## 2022-12-29 PROCEDURE — 37000008 HC ANESTHESIA 1ST 15 MINUTES: Performed by: INTERNAL MEDICINE

## 2022-12-29 PROCEDURE — 25000003 PHARM REV CODE 250: Performed by: INTERNAL MEDICINE

## 2022-12-29 PROCEDURE — 63600175 PHARM REV CODE 636 W HCPCS: Performed by: INTERNAL MEDICINE

## 2022-12-29 PROCEDURE — 93320 PR DOPPLER ECHO HEART,COMPLETE: ICD-10-PCS | Mod: 26,,, | Performed by: INTERNAL MEDICINE

## 2022-12-29 PROCEDURE — 99233 PR SUBSEQUENT HOSPITAL CARE,LEVL III: ICD-10-PCS | Mod: GT,NSCH,, | Performed by: INTERNAL MEDICINE

## 2022-12-29 PROCEDURE — 93312 PR ECHO HEART,TRANSESOPHAGEAL: ICD-10-PCS | Mod: 26,,, | Performed by: INTERNAL MEDICINE

## 2022-12-29 PROCEDURE — 63600175 PHARM REV CODE 636 W HCPCS: Performed by: RADIOLOGY

## 2022-12-29 PROCEDURE — 93320 DOPPLER ECHO COMPLETE: CPT | Performed by: INTERNAL MEDICINE

## 2022-12-29 PROCEDURE — 99233 SBSQ HOSP IP/OBS HIGH 50: CPT | Mod: GT,NSCH,, | Performed by: INTERNAL MEDICINE

## 2022-12-29 PROCEDURE — 63600175 PHARM REV CODE 636 W HCPCS: Performed by: NURSE ANESTHETIST, CERTIFIED REGISTERED

## 2022-12-29 PROCEDURE — 25000003 PHARM REV CODE 250: Performed by: HOSPITALIST

## 2022-12-29 PROCEDURE — 25000003 PHARM REV CODE 250: Performed by: NURSE ANESTHETIST, CERTIFIED REGISTERED

## 2022-12-29 PROCEDURE — 93320 DOPPLER ECHO COMPLETE: CPT | Mod: 26,,, | Performed by: INTERNAL MEDICINE

## 2022-12-29 PROCEDURE — 99233 SBSQ HOSP IP/OBS HIGH 50: CPT | Mod: ,,, | Performed by: INTERNAL MEDICINE

## 2022-12-29 PROCEDURE — 21400001 HC TELEMETRY ROOM

## 2022-12-29 PROCEDURE — 93312 ECHO TRANSESOPHAGEAL: CPT | Mod: 26,,, | Performed by: INTERNAL MEDICINE

## 2022-12-29 PROCEDURE — 99233 PR SUBSEQUENT HOSPITAL CARE,LEVL III: ICD-10-PCS | Mod: ,,, | Performed by: INTERNAL MEDICINE

## 2022-12-29 RX ORDER — LIDOCAINE HYDROCHLORIDE 20 MG/ML
INJECTION, SOLUTION EPIDURAL; INFILTRATION; INTRACAUDAL; PERINEURAL
Status: DISCONTINUED | OUTPATIENT
Start: 2022-12-29 | End: 2022-12-29

## 2022-12-29 RX ORDER — SEVELAMER CARBONATE 800 MG/1
800 TABLET, FILM COATED ORAL
Status: DISCONTINUED | OUTPATIENT
Start: 2022-12-29 | End: 2023-01-07 | Stop reason: HOSPADM

## 2022-12-29 RX ORDER — LIDOCAINE HYDROCHLORIDE 20 MG/ML
SOLUTION OROPHARYNGEAL
Status: DISCONTINUED | OUTPATIENT
Start: 2022-12-29 | End: 2022-12-29 | Stop reason: HOSPADM

## 2022-12-29 RX ORDER — FENTANYL CITRATE 50 UG/ML
INJECTION, SOLUTION INTRAMUSCULAR; INTRAVENOUS CODE/TRAUMA/SEDATION MEDICATION
Status: COMPLETED | OUTPATIENT
Start: 2022-12-29 | End: 2022-12-29

## 2022-12-29 RX ORDER — PROPOFOL 10 MG/ML
VIAL (ML) INTRAVENOUS
Status: DISCONTINUED | OUTPATIENT
Start: 2022-12-29 | End: 2022-12-29

## 2022-12-29 RX ADMIN — SODIUM CHLORIDE: 9 INJECTION, SOLUTION INTRAVENOUS at 04:12

## 2022-12-29 RX ADMIN — FENTANYL CITRATE 50 MCG: 0.05 INJECTION, SOLUTION INTRAMUSCULAR; INTRAVENOUS at 03:12

## 2022-12-29 RX ADMIN — BRIMONIDINE TARTRATE 1 DROP: 2 SOLUTION/ DROPS OPHTHALMIC at 08:12

## 2022-12-29 RX ADMIN — PROPOFOL 40 MG: 10 INJECTION, EMULSION INTRAVENOUS at 08:12

## 2022-12-29 RX ADMIN — APIXABAN 5 MG: 2.5 TABLET, FILM COATED ORAL at 09:12

## 2022-12-29 RX ADMIN — SODIUM CHLORIDE: 9 INJECTION, SOLUTION INTRAVENOUS at 08:12

## 2022-12-29 RX ADMIN — LIDOCAINE HYDROCHLORIDE 100 MG: 20 INJECTION, SOLUTION EPIDURAL; INFILTRATION; INTRACAUDAL; PERINEURAL at 08:12

## 2022-12-29 RX ADMIN — BRIMONIDINE TARTRATE 1 DROP: 2 SOLUTION/ DROPS OPHTHALMIC at 09:12

## 2022-12-29 RX ADMIN — HYDROCODONE BITARTRATE AND ACETAMINOPHEN 1 TABLET: 5; 325 TABLET ORAL at 09:12

## 2022-12-29 RX ADMIN — APIXABAN 5 MG: 2.5 TABLET, FILM COATED ORAL at 08:12

## 2022-12-29 RX ADMIN — SEVELAMER CARBONATE 800 MG: 800 TABLET, FILM COATED ORAL at 04:12

## 2022-12-29 RX ADMIN — NEPHROCAP 1 CAPSULE: 1 CAP ORAL at 09:12

## 2022-12-29 RX ADMIN — PROPOFOL 50 MG: 10 INJECTION, EMULSION INTRAVENOUS at 08:12

## 2022-12-29 RX ADMIN — CEFTRIAXONE 2 G: 2 INJECTION, POWDER, FOR SOLUTION INTRAMUSCULAR; INTRAVENOUS at 04:12

## 2022-12-29 NOTE — ASSESSMENT & PLAN NOTE
-large posterior pericardial effusion   -pt appears asymptomatic   -Nephrology following with HD performed on 12/21/22 and a Monday Wednesday Friday schedule  -Cardiology following- will follow and evaluate after next HD   -KATE show mild to moderate pericardial effusion

## 2022-12-29 NOTE — NURSING
Transferred pt to bed in supine position and transported pt back to tele room 241. Bedside report given to pt's nurse, Nicole. Right IJ DL tunneled PICC intact with no bleeding. Pt denies pain at this time and appears stable.

## 2022-12-29 NOTE — PROGRESS NOTES
"[facility])  Infectious Disease  Progress Note    Patient Name: Meaghan Potter  MRN: [id number]  Admission Date: 12/19/2022  Length of Stay: 8 days  Attending Physician: Jonathan Smith, *  Primary Care Provider: Primary Doctor No    Isolation Status: No active isolations  Assessment/Plan:      * Pyogenic arthritis of left hip  S/p left hip arthrotomy- will follow Ortho- will plan to treat for 4 weeks from date of negative blood cultures with IV rocephin 2 gram daily, follow repeat blood culture          12/28- will follow repeat blood culture, follow Ortho, continue Rocephin    Streptococcal bacteremia  Echo showed large pericardial effusion       Will plan to do KATE if feasible     The practical choice that can be given daily will be to use IV vancomycin as she is on HD .  IV Rocephin once daily is the preferred regime .  Final duration will depend on KATE    12/28- will continue Rocephin for now, need close follow up      12/28- will follow KATE, on Rocephin     Pericardial effusion  Cardiology follow up    Tear of left acetabular labrum  Follow ortho    ESRD (end stage renal disease) on dialysis  HD as tolerated     Atrial fibrillation  Rate control as per primary team         Anticipated Disposition:     Thank you for your consult. I will follow-up with patient. Please contact us if you have any additional questions.    Gerardo Schwartz MD, [facility]  Infectious Disease  [facility])    Subjective:     Principal Problem:Pyogenic arthritis of left hip    HPI:   67 y.o. woman with  past medical history of ESRD on dialysis, GERD and Hypertension transferred to the hospital with history of atrial fib and noted to have left superior labral tear of the acetabulum  /small right hip joint effusion and a large left hip joint effusion on MRI.  She was seen by Ortho and has left hip arthrotomy with op findings -  "Shahid purulence in the left hip; capsule intact and no extension outside joint.  " "No softening of bone."  Cultures- 12/23- blood culture- strep gordoni   Hip culture - strep gordoni  She also had US guided aspiration of the hip.     Component      Latest Ref Rng & Units 12/25/2022 12/24/2022 12/24/2022            5:42 AM  5:42 AM   WBC      3.90 - 12.70 K/uL 8.08 6.51 6.51     Interval History:   67 year old woman with history of ESRD, now with left hip septic arthritis .  Blood culture- 12/24- strep gord[place]  12/25- no growth   She is afebrile    12/28- no acute events overnight .  KATE is pending   Review of Systems   Constitutional:  Negative for chills and fatigue.   HENT:  Negative for congestion, ear pain, facial swelling, sinus pressure and sore throat.    Eyes:  Negative for pain.   Respiratory:  Negative for apnea, chest tightness, shortness of breath and stridor.    Cardiovascular:  Negative for chest pain, palpitations and leg swelling.   Gastrointestinal:  Negative for abdominal distention, abdominal pain, diarrhea and nausea.   Endocrine: Negative for polydipsia and polyphagia.   Genitourinary:  Negative for decreased urine volume, difficulty urinating, frequency and genital sores.   Musculoskeletal:  Positive for arthralgias, gait problem and myalgias.   Neurological:  Negative for light-headedness and headaches.   Hematological:  Negative for adenopathy.   Psychiatric/Behavioral:  Negative for agitation, confusion and decreased concentration.    Objective:     Vital Signs (Most Recent):  Temp: 98 °F (36.7 °C) (12/29/22 0721)  Pulse: 88 (12/29/22 0721)  Resp: 14 (12/29/22 0721)  BP: (!) 121/57 (12/29/22 0721)  SpO2: 95 % (12/29/22 0721)   Vital Signs (24h Range):  Temp:  [97.5 °F (36.4 °C)-99.1 °F (37.3 °C)] 98 °F (36.7 °C)  Pulse:  [] 88  Resp:  [14-20] 14  SpO2:  [95 %-99 %] 95 %  BP: (102-125)/(56-67) 121/57     Weight: 80 kg (176 lb 5.9 oz)  Body mass index is 30.27 kg/m².    Estimated Creatinine Clearance: 7.5 mL/min (A) (based on SCr of 7.4 mg/dL (H)).    Physical " Exam  Vitals and nursing note reviewed.   Constitutional:       Appearance: She is well-developed.   HENT:      Head: Normocephalic and atraumatic.   Eyes:      Conjunctiva/sclera: Conjunctivae normal.      Pupils: Pupils are equal, round, and reactive to light.   Neck:      Thyroid: No thyroid mass or thyromegaly.   Cardiovascular:      Rate and Rhythm: Normal rate.      Heart sounds: Normal heart sounds.   Pulmonary:      Effort: Pulmonary effort is normal. No accessory muscle usage or respiratory distress.      Breath sounds: Normal breath sounds.   Abdominal:      General: Bowel sounds are normal.      Palpations: Abdomen is soft. There is no mass.      Tenderness: There is no abdominal tenderness.   Musculoskeletal:      Cervical back: Normal range of motion and neck supple.      Comments: Dressing over left hip   Skin:     Findings: No rash.   Neurological:      Mental Status: She is alert and oriented to person, place, and time.       Significant Labs: CBC:   Recent Labs   Lab 12/28/22  0536   WBC 7.14   HGB 10.0*   HCT 30.1*          CMP:   Recent Labs   Lab 12/28/22  0536   *   K 4.5   CL 99   CO2 21*   GLU 93   BUN 47*   CREATININE 7.4*   CALCIUM 10.3   ANIONGAP 14     Wound Culture:   Recent Labs   Lab 12/23/22  1007   LABAERO STREPTOCOCCUS GORDONII  Moderate  *       All pertinent labs within the past 24 hours have been reviewed.    Significant Imaging: I have reviewed all pertinent imaging results/findings within the past 24 hours.

## 2022-12-29 NOTE — PROGRESS NOTES
[facility] - Telemetry ([facility])  Nephrology  Progress Note    Patient Name: Meaghan Potter  MRN: [id number]  Admission Date: 12/19/2022  Hospital Length of Stay: 8 days  Attending Provider: Jonathan Smith, *   Primary Care Physician: Primary Doctor No  Principal Problem:Pyogenic arthritis of left hip  Reason for Consult: Management of ESRD  Primary Nephrologist: Dr. Ribera/[facility]       Subjective:     HPI: 66 yo female with ESRD on MWF last HD last Friday (12/16) admitted as a transfer for Cardiology, Ortho and Nephrology. She is currently seen on dialysis, using a LUE AVF. Dialysis vintage is 13 years. Her only complaint to me is pain in both hips described as 'stiff'   No current SOB, no chest pain but describes poor appetite since in pain.     12/21  - describes left hip pain as shooting pain  - eating lunch, not great appetite    12/22  - bed transfer in Radiology with 'episode' this am   - seen in room s/p Ativan IV X 1  - s/p HD yesterday     *For 12/23/22: First visit with patient; seen and examined; chart reviewed. Denies any discomfort.     *For 12/24/22: Off floor in surgery; chart reviewed.      *For 12/25/22: Tolerated hip washout yesterday.    *For 12/26/22: Seen and examined on HD; none voiced at HD initiation.     *For 12/27/22: Seen and examined; chart reviewed; voices no acute issues overnight.     *For 12/28/22: Seen and examined; none voiced; seen on HD.    *For 12/29/22: None voiced overnight.    Review of patient's allergies indicates:   Allergen Reactions    Amoxicillin Rash     Current Facility-Administered Medications   Medication Frequency    0.9%  NaCl infusion Once    0.9%  NaCl infusion Once    0.9%  NaCl infusion PRN    acetaminophen suppository 650 mg Q6H PRN    acetaminophen tablet 650 mg Q8H PRN    albuterol-ipratropium 2.5 mg-0.5 mg/3 mL nebulizer solution 3 mL Q6H PRN    aluminum-magnesium hydroxide-simethicone 200-200-20 mg/5 mL suspension 30 mL QID PRN    apixaban  tablet 5 mg BID    brimonidine 0.2% ophthalmic solution 1 drop BID    BUPivacaine (PF) 0.25% (2.5 mg/ml) injection 12.5 mg Once    calcitRIOL capsule 0.5 mcg Every Mon, Wed, Fri    cefTRIAXone (ROCEPHIN) 2 g in dextrose 5 % in water (D5W) 5 % 50 mL IVPB (MB+) Q24H    dextrose 10% bolus 125 mL 125 mL PRN    dextrose 10% bolus 250 mL 250 mL PRN    diltiaZEM 24 hr capsule 240 mg Daily    ergocalciferol capsule 50,000 Units Q7 Days    glucagon (human recombinant) injection 1 mg PRN    glucose chewable tablet 16 g PRN    glucose chewable tablet 24 g PRN    HYDROcodone-acetaminophen 5-325 mg per tablet 1 tablet Q6H PRN    melatonin tablet 6 mg Nightly PRN    morphine injection 2 mg Q4H PRN    naloxone 0.4 mg/mL injection 0.02 mg PRN    ondansetron injection 4 mg Q8H PRN    promethazine tablet 25 mg Q6H PRN    senna-docusate 8.6-50 mg per tablet 1 tablet BID PRN    sodium chloride 0.9% bolus 250 mL 250 mL PRN    sodium chloride 0.9% bolus 250 mL 250 mL PRN    sodium chloride 0.9% bolus 250 mL 250 mL PRN    sodium chloride 0.9% bolus 250 mL 250 mL PRN    sodium chloride 0.9% flush 3 mL Q12H PRN    vitamin renal formula (B-complex-vitamin c-folic acid) 1 mg per capsule 1 capsule Daily           Review of Systems   Constitutional:  Negative for fever.   Musculoskeletal:  Positive for arthralgias.   Allergic/Immunologic: Positive for immunocompromised state.   Objective:     Vital Signs (Most Recent):  Temp: 97.5 °F (36.4 °C) (12/29/22 1204)  Pulse: 87 (12/29/22 1204)  Resp: 20 (12/29/22 1204)  BP: (!) 109/51 (12/29/22 1204)  SpO2: (!) 93 % (12/29/22 1204) Vital Signs (24h Range):  Temp:  [97.5 °F (36.4 °C)-99.1 °F (37.3 °C)] 97.5 °F (36.4 °C)  Pulse:  [] 87  Resp:  [14-20] 20  SpO2:  [93 %-99 %] 93 %  BP: ()/(44-67) 109/51     Weight: 79.8 kg (176 lb) (12/29/22 0759)  Body mass index is 30.21 kg/m².  Body surface area is 1.9 meters squared.    I/O last 3 completed shifts:  In: 780 [P.O.:780]  Out: 2000  [Other:2000]    Physical Exam  Vitals and nursing note reviewed.   Constitutional:       General: She is not in acute distress.     Appearance: She is obese.   Cardiovascular:      Rate and Rhythm: Normal rate.   Pulmonary:      Effort: Pulmonary effort is normal. No respiratory distress.   Neurological:      Mental Status: She is alert and oriented to person, place, and time.   Psychiatric:         Mood and Affect: Mood normal.       Significant Labs: reviewed        Assessment/Plan:     Active Diagnoses:    Diagnosis Date Noted POA    PRINCIPAL PROBLEM:  Pyogenic arthritis of left hip [M00.9] 12/20/2022 Yes    Streptococcal bacteremia [R78.81, B95.5] 12/27/2022 Yes    Hyperkalemia, diminished renal excretion [E87.5] 12/25/2022 Yes    Pericardial effusion [I31.39] 12/21/2022 Yes    ESRD (end stage renal disease) on dialysis [N18.6, Z99.2] 12/20/2022 Not Applicable    Primary hypertension [I10] 12/20/2022 Yes    Gastroesophageal reflux disease without esophagitis [K21.9] 12/20/2022 Yes    Tear of left acetabular labrum [S73.192A] 12/20/2022 Yes    Bone cyst of right femur [M85.651] 12/20/2022 Yes    Atrial fibrillation [I48.91] 12/19/2022 Yes      Problems Resolved During this Admission:       ESRD on MWF  - next dialysis tomorrow  - s/p HD Tuesday and Wednesday    Mineral and Bone Disease  - calcitriol 0.5mcg MWF  - continue D2 weekly   - reports no phos binders at home but elevated here, monitor phos level and continue low phos diet    HTN  - adjustments made by HM and Cardiology     Anemia of ESRD  - no ESAs indicated yet   - monitor H&H     Cardiomegaly, quite severe on recent CXR  - pericardial effusion noted on Echo  - etiology of it being uremic is extremely low as she does not miss dialysis and clearance labs from outpatient setting have been above goal  - the pericardial sac is not an area of fluid where dialysis UF can remove     Afib with RVR  - off diltiazem gtts and on po diltiazem IR q6  - per  Cardiology    Hip pain  - Ortho notes reviewed    Dr. Glover to assume Nephrology care in am     *For 12/23/22: HD today; second shift; likely hip surgery tomorrow; meds and labs reviewed.  Volume status appears acceptable.  Blood cultures requested to be obtained on HD. Will arrange.  Following closely with you.     *For 12/24/22: Will attempt to revisit; labs and meds reviewed; next HD session Monday. Following.     *For 12/25/22: Next HD session Monday; meds and labs reviewed; following closely with you.  NB K 5.7; on Lokelma, will dc ARB; low K diet needed.      *For 12/26/22: HD in session; aiming 1-2 liters UF; access stable; meds and labs reviewed; K and acid base status acceptable.  Following.     NB Called by HD RN: BP low in 90's apparently was given cardizem this am; will be unable to perform any UF accordingly.     *For 12/27/22: Next HD session in am; meds and labs reviewed; had 500 cc UF yesterday with HD; following closely with you.  Access stable.     *For 12/28/22: Aiming 1.5 liters due to marginal BP: access stable; meds and labs reviewed; following closely with you.      *For 12/29/22: Tunneled PICC being placed; has UE AVF; next HD session is for am; labs and meds reviewed in detail; following closely with you.  Renagel added for mild hyperphosphatemia; My colleague assumes care in am.  Had 1.5 liters net yesterday.      Moe Glover MD  Nephrology

## 2022-12-29 NOTE — NURSING
"AAOx4  NAD  VSS    /65   Pulse 99   Temp 98.4 °F (36.9 °C)   Resp 20   Ht 5' 4" (1.626 m)   Wt 79.8 kg (176 lb)   SpO2 98%   Breastfeeding No   BMI 30.21 kg/m²     KATE performed this morning. R IJ tunneled PICC placed this evening via IR.     Pt sitting up in bed. Bed in low & locked position with call light in reach. Will continue to monitor.  "

## 2022-12-29 NOTE — ASSESSMENT & PLAN NOTE
Patient with new onset Paroxysmal (<7 days) atrial fibrillation with RVR which is uncontrolled currently with Calcium Channel Blocker. Patient is currently in atrial fibrillation with rate in the 105-130s on diltiazem. [id number] Score: 2. HASBLED Score: 3. Anticoagulation indicated. Anticoagulation done with heparin .  Troponin measuring 0.095 with EKG obtained at outside facility revealing SVT/atrial fibrillation with RVR at rate of 176. Troponin likely elevated in setting of ESRD and demand ischemia from arrhythmia as patient denied endorsing chest pain.  Plan:  -cardizem drip transitioned to oral dosing  -f/u cardiology   -nephrology for HD   -Heparin gtt -transitioned to eliquis  -CHADS-VASc Score- 3

## 2022-12-29 NOTE — PT/OT/SLP PROGRESS
Physical Therapy      Patient Name:  Meaghan Potter   MRN:  [id number]    07:45 A.M.  Patient out of room at this time for a procedure. Per nurse request, PT session held today due to patient being likely being more sedated.   Will follow up during next scheduled PT session.

## 2022-12-29 NOTE — INTERVAL H&P NOTE
The patient has been examined and the H&P has been reviewed:    I concur with the findings and no changes have occurred since H&P was written.    Anesthesia/Surgery risks, benefits and alternative options discussed and understood by patient/family.          Active Hospital Problems    Diagnosis  POA    *Pyogenic arthritis of left hip [M00.9]  Yes    Streptococcal bacteremia [R78.81, B95.5]  Yes    Hyperkalemia, diminished renal excretion [E87.5]  Yes     Patient placed on Lokelma 10 g t.i.d. unusually controlled with hemodialysis.      Pericardial effusion [I31.39]  Yes    ESRD (end stage renal disease) on dialysis [N18.6, Z99.2]  Not Applicable    Primary hypertension [I10]  Yes    Gastroesophageal reflux disease without esophagitis [K21.9]  Yes    Tear of left acetabular labrum [S73.192A]  Yes    Bone cyst of right femur [M85.651]  Yes    Atrial fibrillation [I48.91]  Yes      Resolved Hospital Problems   No resolved problems to display.

## 2022-12-29 NOTE — NURSING
Pt awakened after procedure and remains AAOx3 at time of transfer.    Transferred pt to room 241 via bed in no apparent distress.   Report given to Nicole. No further questions at this time.

## 2022-12-29 NOTE — ASSESSMENT & PLAN NOTE
Patient presented with acute onset left hip pain x1 day duration in absence of trauma while doing leg exercises in bed and was found to asymmetric large left hip joint effusion, small right hip joint effusion, and evidence of superior labral tear of her left acetabulum noted on MRI.  Patient noted to have 5/5 strength throughout with sensation to light touch grossly intact throughout however, TTP throughout left groin.  Orthopedics consulted and awaiting further evaluation/recommendations.  Plan:  -optimize pain control  -bowel regimen  -PT/OT  -ortho surgery following    The patient underwent arthrocentesis by IR 12/23 with 4 cc pus obtained.  Sent for cell count which revealed 187,000 white blood cells 100% segs.  Crystals negative  and culture Gram-negative and Gram-positive.  Patient went for washout 12/24.  Placed on empiric vanco and ceftazidime awaiting sensitivities    -Blood cultures positive STREPTOCOCCUS GORDONII  -ID consulted.  Rec :to treat for 4 weeks from date of negative blood cultures with IV rocephin 2 gram daily  -Repeated blood cx NGTD

## 2022-12-29 NOTE — NURSING
"Pt returned to room via bed, post KATE. No intervention or Cardioversion done at this time. Pt tolerated procedure well, no complications noted.    AAOx4  NAD  VSS    /60   Pulse 90   Temp 97.8 °F (36.6 °C)   Resp 16   Ht 5' 4" (1.626 m)   Wt 79.8 kg (176 lb)   SpO2 99%   Breastfeeding No   BMI 30.21 kg/m²     Pt lying in bed. Bed in low & locked position with call light in reach. Will continue to monitor.  "

## 2022-12-29 NOTE — ASSESSMENT & PLAN NOTE
Echo showed large pericardial effusion       Will plan to do KATE if feasible     The practical choice that can be given daily will be to use IV vancomycin as she is on HD .  IV Rocephin once daily is the preferred regime .  Final duration will depend on KATE    12/28- will continue Rocephin for now, need close follow up      12/28- will follow KATE, on Rocephin

## 2022-12-29 NOTE — SUBJECTIVE & OBJECTIVE
Interval History:  NAEON . C/P left hip pain .     Review of Systems   Constitutional:  Negative for chills and fatigue.   HENT:  Negative for congestion, ear pain, facial swelling, sinus pressure and sore throat.    Eyes:  Negative for pain.   Respiratory:  Negative for apnea, chest tightness, shortness of breath and stridor.    Cardiovascular:  Negative for chest pain, palpitations and leg swelling.   Gastrointestinal:  Negative for abdominal distention, abdominal pain, diarrhea and nausea.   Endocrine: Negative for polydipsia and polyphagia.   Genitourinary:  Negative for decreased urine volume, difficulty urinating, frequency and genital sores.   Musculoskeletal:  Positive for arthralgias, gait problem and myalgias.   Neurological:  Negative for light-headedness and headaches.   Hematological:  Negative for adenopathy.   Psychiatric/Behavioral:  Negative for agitation, confusion and decreased concentration.    Objective:     Vital Signs (Most Recent):  Temp: 97.8 °F (36.6 °C) (12/29/22 0748)  Pulse: 90 (12/29/22 0837)  Resp: 16 (12/29/22 0748)  BP: 113/60 (12/29/22 0932)  SpO2: 99 % (12/29/22 0748) Vital Signs (24h Range):  Temp:  [97.5 °F (36.4 °C)-99.1 °F (37.3 °C)] 97.8 °F (36.6 °C)  Pulse:  [] 90  Resp:  [14-20] 16  SpO2:  [95 %-99 %] 99 %  BP: ()/(44-67) 113/60     Weight: 79.8 kg (176 lb)  Body mass index is 30.21 kg/m².    Intake/Output Summary (Last 24 hours) at 12/29/2022 1019  Last data filed at 12/29/2022 0823  Gross per 24 hour   Intake 800 ml   Output 2000 ml   Net -1200 ml      Physical Exam  Vitals and nursing note reviewed.   Constitutional:       Appearance: She is well-developed.   HENT:      Head: Normocephalic and atraumatic.   Eyes:      Conjunctiva/sclera: Conjunctivae normal.      Pupils: Pupils are equal, round, and reactive to light.   Neck:      Thyroid: No thyroid mass or thyromegaly.   Cardiovascular:      Rate and Rhythm: Normal rate.      Heart sounds: Normal heart  sounds.   Pulmonary:      Effort: Pulmonary effort is normal. No accessory muscle usage or respiratory distress.      Breath sounds: Normal breath sounds.   Abdominal:      General: Bowel sounds are normal.      Palpations: Abdomen is soft. There is no mass.      Tenderness: There is no abdominal tenderness.   Musculoskeletal:      Cervical back: Normal range of motion and neck supple.      Comments: Dressing over left hip   Skin:     Findings: No rash.   Neurological:      Mental Status: She is alert and oriented to person, place, and time.       Significant Labs: All pertinent labs within the past 24 hours have been reviewed.      Significant Imaging: I have reviewed all pertinent imaging results/findings within the past 24 hours.

## 2022-12-29 NOTE — ASSESSMENT & PLAN NOTE
Patient currently follows Dr. Ribera from nephrology with last reported dialysis completed last Friday. Patient currently on M/W/F HD at [facility] via LUE fistula but missed Monday due to not feeling well and being in the ED. Labs consistent with history of ESRD on HD.   Plan:  -continue home medications, titrate as needed  -nephrology consulted -HD performed on 12/20/22 and now back on MWF schedule

## 2022-12-29 NOTE — ANESTHESIA POSTPROCEDURE EVALUATION
Anesthesia Post Evaluation    Patient: Meaghan Potter    Procedure(s) Performed: Procedure(s) (LRB):  ECHOCARDIOGRAM, TRANSESOPHAGEAL (N/A)    Final Anesthesia Type: MAC      Patient location: [facility].  Patient participation: Yes- Able to Participate  Level of consciousness: awake and alert  Post-procedure vital signs: reviewed and stable  Pain management: adequate  Airway patency: patent    PONV status at discharge: No PONV  Anesthetic complications: no      Cardiovascular status: blood pressure returned to baseline  Respiratory status: unassisted and spontaneous ventilation  Hydration status: euvolemic  Follow-up not needed.          Vitals Value Taken Time   /54 12/29/22 0748   Temp 36.7 °C (98 °F) 12/29/22 0721   Pulse 99 12/29/22 0748   Resp 16 12/29/22 0748   SpO2 99 % 12/29/22 0748         No case tracking events are documented in the log.      Pain/Jessica Score: Pain Rating Prior to Med Admin: 6 (12/28/2022  9:23 PM)  Pain Rating Post Med Admin: 4 (12/28/2022 10:23 PM)  Jessica Score: 10 (12/29/2022  7:59 AM)

## 2022-12-29 NOTE — SEDATION DOCUMENTATION
Pt positioned supine on procedure table and CM applied. VSS and NADN. No IV sedation to be used as pt is not NPO. Pt verbalized understanding of procedure.

## 2022-12-29 NOTE — TRANSFER OF CARE
"Anesthesia Transfer of Care Note    Patient: Meaghan Potter    Procedure(s) Performed: Procedure(s) (LRB):  ECHOCARDIOGRAM, TRANSESOPHAGEAL (N/A)    Patient location: [facility].    Anesthesia Type: MAC    Transport from OR: Transported from OR on room air with adequate spontaneous ventilation    Post pain: adequate analgesia    Post assessment: no apparent anesthetic complications    Post vital signs: stable    Level of consciousness: sedated    Nausea/Vomiting: no nausea/vomiting    Complications: none    Transfer of care protocol was followed      Last vitals:   Visit Vitals  BP (!) 152/54   Pulse 99   Temp 36.7 °C (98 °F) (Oral)   Resp 16   Ht 5' 4" (1.626 m)   Wt 80 kg (176 lb 5.9 oz)   SpO2 99%   BMI 30.27 kg/m²     "

## 2022-12-29 NOTE — PLAN OF CARE
Pt oriented x4 VSS  Plan of care reviewed. pt verbalizes understanding.  Patient moving/ turning independently.  Patient afib on monitor  Bed low, side rails up x2, wheels locked, call light in reach.  Pt instructed to call for assistance

## 2022-12-29 NOTE — ASSESSMENT & PLAN NOTE
-Cont IV rocephin   -KATE: Negative for IE Moderate to mild pericardial effusion  .   -Cont IV rocephin

## 2022-12-29 NOTE — BRIEF OP NOTE
[facility] - Cath Lab (Hospital)  Brief Operative Note  Cardiology    SUMMARY     Surgery Date: 12/29/2022     Surgeon(s) and Role:     * Mary Silva MD - Primary    Assisting Surgeon: None    Pre-op Diagnosis:  Bacteremia [R78.81]  Pericardial effusion [I31.39]    Post-op Diagnosis: Post-Op Diagnosis Codes:     * Bacteremia [R78.81]     * Pericardial effusion [I31.39]    Procedure Performed:   Procedure(s) (LRB):  ECHOCARDIOGRAM, TRANSESOPHAGEAL (N/A)    Technical Procedures Used:   No vegetations   Normal EF   Mild to mod effusion       Estimated Blood Loss: * No values recorded between 12/29/2022  8:16 AM and 12/29/2022  8:47 AM *         Specimens:   Specimen (24h ago, onward)      None

## 2022-12-29 NOTE — PROGRESS NOTES
[facility] Medicine  Progress Note    Patient Name: Meaghan Potter  MRN: [id number]  Patient Class: IP- Inpatient   Admission Date: 12/19/2022  Length of Stay: 8 days  Attending Physician: Jonathan Smith, *  Primary Care Provider: Primary Doctor No        Subjective:     Principal Problem:Pyogenic arthritis of left hip        HPI:  Meaghan Potter is a 67 y.o. female with a PMH  has a past medical history of ESRD (end stage renal disease) on dialysis, GERD (gastroesophageal reflux disease), and Hypertension. who presented as a transfer from [facility] for higher level cardiology and orthopedic care after patient was found to have sustained a left superior labral tear of her acetabulum noted on MRI in addition to new onset atrial fibrillation with RVR requiring diltiazem drip.  Patient reported being in her usual state of health prior to onset of symptoms and reported acute onset of left hip pain while performing leg exercises in bed earlier today.  Patient reported hearing and feeling a pop in her left hip followed by immediate pain which has remained constant and currently rated 6/10 in severity. Pain was described as throbbing/pulling in nature with no known alleviating factors and aggravating factors including weight-bearing, movement, and palpation to the affected area.  She denied endorsing any numbness, tingling, discoloration, or penetrating trauma, but did express decreased range of motion and muscle strength secondary to exacerbation of pain.  While at outside facility, patient became short of breath while lying flat while obtaining MRI of hip and was found to be in atrial fibrillation with RVR requiring diltiazem drip. She denied endorsing any chest pain, lightheadedness, dizziness, visual disturbances, fever, chills, sweats, nausea, vomiting, abdominal pain, changes in bowel/bladder habits, or onset neurological deficits.  All other review of systems negative except as noted  above.  Patient reports being back at her baseline and continues to complain only of left hip pain.  Orthopedics and cardiology consulted and awaiting further evaluation/recommendations.  Of note, patient missed hemodialysis today secondary to pain and going to the emergency room and usually receives HD via left upper extremity fistula on Monday/Wednesday/Friday.  Follows Dr. Ribera outpatient. Nephrology consulted to continue HD inpatient.     PCP: Primary Doctor No        Overview/Hospital Course:  Pt admitted to Observation for Atrial fibrillation (new onset) with RVR with . Cardiology consulted with Cardizem gtt initiated. NSR on monitor with HR in 80's.  BNP elevated and Troponin trended upward (0.076>0.095) with Heparin infusion initiated. Echo showed with concentric hypertrophy and normal systolic function. Atrial fibrillation observed.Biatrial atrial enlargement. Grade III left ventricular diastolic dysfunction. PA systolic pressure is 60 mmHg. Moderate right ventricular enlargement with normal right ventricular systolic function. Pulmonary hypertension. EF 55%. Moderate to severe tricuspid regurgitation. Mild-to-moderate mitral regurgitation. Severe right atrial enlargement. Large posterior pericardial effusion. No evidence of tamponade. Troponin elevated and flat with no cardiac symptoms reported. Pt also reported L groin pain upon admit. Orthopedic Surgery consulted and pt found to have sustained a left superior labral tear of her acetabulum noted on MRI with further orthopedic work up to be completed outpatient. Hx of ESRD noted with last HD on 12/16/22- Nephrology consulted with HD performed today. AVG to LUE with +bruit/+thrill present. On 12/21/22, MRI of R hip results pending. IR consulted for evaluation of aspiration/injection of left hip. Rate and rhythm controlled on Cardizem gtt. Heparin infusion continued pending joint aspiration.  Pericardial effusion discussed with Nephrology and  "Cardiology for recommendations.   Patient went to have aspiration of left hip however upon transferring from bed to table had an episode described as seizure-like" seems more that it was hypotension related.  Medications adjusted  She remains on p.o. Cardizem as well as Cardizem drip.  Patient underwent arthrocentesis with 4 cc of pus removed 12/23.  Sent for Gram stain, culture, crystals.  Discussed with Dr. Johnson , orthopedics,crystals are negative patient underwent washout with  Cultures 12/24.  Blood cultures and culture from left hip reveal Gram-positive and Gram-negative.  Patient paced on ceftazidime and vancomycin empirically until cultures return.  12/26 She is complaining of weakness . The blood and drainage cx are (+) for STREPTOCOCCUS GORDONII  sensitive  to rocephin .  ID consulted .  12/27 NAEON . ID consulted and rec IV rocephin qd x 4 week from the last negative blood cx . Cardiology consulted for KATE .   12/28 Plan  for a KATE tomorrow am . S/P HD today  . She is complaining od left hip pain .   12/29 S/P KATE which show negative for IE and mild to moderate pericardial effusion .  Pt  will need 4 week of IVAB  from the last negative blood cx . She  is complaining of left hip pain  . Plan to consult CM for SNF placement .       Interval History:  NAEON . C/P left hip pain .     Review of Systems   Constitutional:  Negative for chills and fatigue.   HENT:  Negative for congestion, ear pain, facial swelling, sinus pressure and sore throat.    Eyes:  Negative for pain.   Respiratory:  Negative for apnea, chest tightness, shortness of breath and stridor.    Cardiovascular:  Negative for chest pain, palpitations and leg swelling.   Gastrointestinal:  Negative for abdominal distention, abdominal pain, diarrhea and nausea.   Endocrine: Negative for polydipsia and polyphagia.   Genitourinary:  Negative for decreased urine volume, difficulty urinating, frequency and genital sores.   Musculoskeletal:  Positive " for arthralgias, gait problem and myalgias.   Neurological:  Negative for light-headedness and headaches.   Hematological:  Negative for adenopathy.   Psychiatric/Behavioral:  Negative for agitation, confusion and decreased concentration.    Objective:     Vital Signs (Most Recent):  Temp: 97.8 °F (36.6 °C) (12/29/22 0748)  Pulse: 90 (12/29/22 0837)  Resp: 16 (12/29/22 0748)  BP: 113/60 (12/29/22 0932)  SpO2: 99 % (12/29/22 0748) Vital Signs (24h Range):  Temp:  [97.5 °F (36.4 °C)-99.1 °F (37.3 °C)] 97.8 °F (36.6 °C)  Pulse:  [] 90  Resp:  [14-20] 16  SpO2:  [95 %-99 %] 99 %  BP: ()/(44-67) 113/60     Weight: 79.8 kg (176 lb)  Body mass index is 30.21 kg/m².    Intake/Output Summary (Last 24 hours) at 12/29/2022 1019  Last data filed at 12/29/2022 0823  Gross per 24 hour   Intake 800 ml   Output 2000 ml   Net -1200 ml      Physical Exam  Vitals and nursing note reviewed.   Constitutional:       Appearance: She is well-developed.   HENT:      Head: Normocephalic and atraumatic.   Eyes:      Conjunctiva/sclera: Conjunctivae normal.      Pupils: Pupils are equal, round, and reactive to light.   Neck:      Thyroid: No thyroid mass or thyromegaly.   Cardiovascular:      Rate and Rhythm: Normal rate.      Heart sounds: Normal heart sounds.   Pulmonary:      Effort: Pulmonary effort is normal. No accessory muscle usage or respiratory distress.      Breath sounds: Normal breath sounds.   Abdominal:      General: Bowel sounds are normal.      Palpations: Abdomen is soft. There is no mass.      Tenderness: There is no abdominal tenderness.   Musculoskeletal:      Cervical back: Normal range of motion and neck supple.      Comments: Dressing over left hip   Skin:     Findings: No rash.   Neurological:      Mental Status: She is alert and oriented to person, place, and time.       Significant Labs: All pertinent labs within the past 24 hours have been reviewed.      Significant Imaging: I have reviewed all pertinent  imaging results/findings within the past 24 hours.        Assessment/Plan:      * Pyogenic arthritis of left hip  Patient presented with acute onset left hip pain x1 day duration in absence of trauma while doing leg exercises in bed and was found to asymmetric large left hip joint effusion, small right hip joint effusion, and evidence of superior labral tear of her left acetabulum noted on MRI.  Patient noted to have 5/5 strength throughout with sensation to light touch grossly intact throughout however, TTP throughout left groin.  Orthopedics consulted and awaiting further evaluation/recommendations.  Plan:  -optimize pain control  -bowel regimen  -PT/OT  -ortho surgery following    The patient underwent arthrocentesis by IR 12/23 with 4 cc pus obtained.  Sent for cell count which revealed 187,000 white blood cells 100% segs.  Crystals negative  and culture Gram-negative and Gram-positive.  Patient went for washout 12/24.  Placed on empiric vanco and ceftazidime awaiting sensitivities    -Blood cultures positive STREPTOCOCCUS GORDONII  -ID consulted.  Rec :to treat for 4 weeks from date of negative blood cultures with IV rocephin 2 gram daily  -Repeated blood cx NGTD      Streptococcal bacteremia  -Cont IV rocephin   -KATE: Negative for IE Moderate to mild pericardial effusion  .   -Cont IV rocephin     Hyperkalemia, diminished renal excretion  Cont HD      Pericardial effusion  -large posterior pericardial effusion   -pt appears asymptomatic   -Nephrology following with HD performed on 12/21/22 and a Monday Wednesday Friday schedule  -Cardiology following- will follow and evaluate after next HD   -KATE show mild to moderate pericardial effusion     Bone cyst of right femur  -Orthopedic Surgery following   -follow up outpatient recommended          Shortness of breath  Patient reported acute onset shortness a breath while obtaining MRI and was found to have evidence of severe cardiomegaly with small left pleural  effusion but negative for infectious processes noted on CXR. Patient afebrile without leukocytosis. BNP elevated at 4792. Troponin 0.095. Exam positive for bilateral edema but negative for elevated JVD or crackles on lung ausculation. Patient currently saturating % on 2L via NC in no acute distress and without use of accessory muscles noted.  Patient without history of CHF.  Cardiology consulted.  Echo ordered and pending.  Nephrology consulted to resume HD inpatient.  Will trial dose of Lasix if clinical status worsens.  Plan:  -titrate oxygen therapy as needed  -incentive spirometry  -echo reviewed   -f/u cardiology and nephrology  -monitor Is/Os  -low salt/cardiac/renal diet    -Duonebs prn       Tear of left acetabular labrum  -Orthopedic surgery following  -MRI showed no fracture and asymmetric large left hip joint effusion.  Small right hip joint effusion. Paralabral cyst along the superior border of the acetabulum suspicious for a superior labral tear.   -analgesia as needed   -antiemetics as needed   - the left hip may treat with therapy and anti-inflammatories (per Orthopedic Surgery)   -Aspiration of the left hip by Interventional Radiology as above    Gastroesophageal reflux disease without esophagitis  Chronic. Stable. Currently asymptomatic. Home medications include PPI/Antacids as needed.  Plan:  -Continue PPI/Antacids as needed       Primary hypertension  BP currently ranging from 90s systolic.    Plan:  -Optimize pain control   -Continue home medications, titrate as needed   -Monitor BP  -Low salt/renal diet   -IV hydralazine prn for SBP>160 or DBP>90   -f/u nephrology for HD inpatient- HD performed on 12/20/22       ESRD (end stage renal disease) on dialysis  Patient currently follows Dr. Ribera from nephrology with last reported dialysis completed last Friday. Patient currently on M/W/F HD at [facility] via LUE fistula but missed Monday due to not feeling well and being in the ED. Labs  consistent with history of ESRD on HD.   Plan:  -continue home medications, titrate as needed  -nephrology consulted -HD performed on 12/20/22 and now back on MWF schedule      Atrial fibrillation  Patient with new onset Paroxysmal (<7 days) atrial fibrillation with RVR which is uncontrolled currently with Calcium Channel Blocker. Patient is currently in atrial fibrillation with rate in the 105-130s on diltiazem. [id number] Score: 2. HASBLED Score: 3. Anticoagulation indicated. Anticoagulation done with heparin .  Troponin measuring 0.095 with EKG obtained at outside facility revealing SVT/atrial fibrillation with RVR at rate of 176. Troponin likely elevated in setting of ESRD and demand ischemia from arrhythmia as patient denied endorsing chest pain.  Plan:  -cardizem drip transitioned to oral dosing  -f/u cardiology   -nephrology for HD   -Heparin gtt -transitioned to eliquis  -CHADS-VASc Score- 3         VTE Risk Mitigation (From admission, onward)         Ordered     apixaban tablet 5 mg  2 times daily         12/25/22 1402     Place sequential compression device  Until discontinued         12/19/22 2119     IP VTE LOW RISK PATIENT  Once         12/19/22 2119                Discharge Planning   ISAAK:      Code Status: Full Code   Is the patient medically ready for discharge?:     Reason for patient still in hospital (select all that apply): Treatment  Discharge Plan A: Home                  Jonathan Smith MD  Department of Hospital Medicine   [facility] - Telemetry ([facility])

## 2022-12-29 NOTE — SUBJECTIVE & OBJECTIVE
Interval History:   67 year old woman with history of ESRD, now with left hip septic arthritis .  Blood culture- 12/24- strep g[place]  12/25- no growth   She is afebrile    12/28- no acute events overnight .  KATE is pending   Review of Systems   Constitutional:  Negative for chills and fatigue.   HENT:  Negative for congestion, ear pain, facial swelling, sinus pressure and sore throat.    Eyes:  Negative for pain.   Respiratory:  Negative for apnea, chest tightness, shortness of breath and stridor.    Cardiovascular:  Negative for chest pain, palpitations and leg swelling.   Gastrointestinal:  Negative for abdominal distention, abdominal pain, diarrhea and nausea.   Endocrine: Negative for polydipsia and polyphagia.   Genitourinary:  Negative for decreased urine volume, difficulty urinating, frequency and genital sores.   Musculoskeletal:  Positive for arthralgias, gait problem and myalgias.   Neurological:  Negative for light-headedness and headaches.   Hematological:  Negative for adenopathy.   Psychiatric/Behavioral:  Negative for agitation, confusion and decreased concentration.    Objective:     Vital Signs (Most Recent):  Temp: 98 °F (36.7 °C) (12/29/22 0721)  Pulse: 88 (12/29/22 0721)  Resp: 14 (12/29/22 0721)  BP: (!) 121/57 (12/29/22 0721)  SpO2: 95 % (12/29/22 0721)   Vital Signs (24h Range):  Temp:  [97.5 °F (36.4 °C)-99.1 °F (37.3 °C)] 98 °F (36.7 °C)  Pulse:  [] 88  Resp:  [14-20] 14  SpO2:  [95 %-99 %] 95 %  BP: (102-125)/(56-67) 121/57     Weight: 80 kg (176 lb 5.9 oz)  Body mass index is 30.27 kg/m².    Estimated Creatinine Clearance: 7.5 mL/min (A) (based on SCr of 7.4 mg/dL (H)).    Physical Exam  Vitals and nursing note reviewed.   Constitutional:       Appearance: She is well-developed.   HENT:      Head: Normocephalic and atraumatic.   Eyes:      Conjunctiva/sclera: Conjunctivae normal.      Pupils: Pupils are equal, round, and reactive to light.   Neck:      Thyroid: No thyroid mass or  thyromegaly.   Cardiovascular:      Rate and Rhythm: Normal rate.      Heart sounds: Normal heart sounds.   Pulmonary:      Effort: Pulmonary effort is normal. No accessory muscle usage or respiratory distress.      Breath sounds: Normal breath sounds.   Abdominal:      General: Bowel sounds are normal.      Palpations: Abdomen is soft. There is no mass.      Tenderness: There is no abdominal tenderness.   Musculoskeletal:      Cervical back: Normal range of motion and neck supple.      Comments: Dressing over left hip   Skin:     Findings: No rash.   Neurological:      Mental Status: She is alert and oriented to person, place, and time.       Significant Labs: CBC:   Recent Labs   Lab 12/28/22  0536   WBC 7.14   HGB 10.0*   HCT 30.1*          CMP:   Recent Labs   Lab 12/28/22  0536   *   K 4.5   CL 99   CO2 21*   GLU 93   BUN 47*   CREATININE 7.4*   CALCIUM 10.3   ANIONGAP 14     Wound Culture:   Recent Labs   Lab 12/23/22  1007   LABAERO STREPTOCOCCUS GORDONII  Moderate  *       All pertinent labs within the past 24 hours have been reviewed.    Significant Imaging: I have reviewed all pertinent imaging results/findings within the past 24 hours.

## 2022-12-30 LAB — BACTERIA BLD CULT: NORMAL

## 2022-12-30 PROCEDURE — 97530 THERAPEUTIC ACTIVITIES: CPT | Mod: CQ

## 2022-12-30 PROCEDURE — 25000003 PHARM REV CODE 250: Performed by: STUDENT IN AN ORGANIZED HEALTH CARE EDUCATION/TRAINING PROGRAM

## 2022-12-30 PROCEDURE — 63600175 PHARM REV CODE 636 W HCPCS: Performed by: INTERNAL MEDICINE

## 2022-12-30 PROCEDURE — 21400001 HC TELEMETRY ROOM

## 2022-12-30 PROCEDURE — 25000003 PHARM REV CODE 250: Performed by: INTERNAL MEDICINE

## 2022-12-30 PROCEDURE — 25000003 PHARM REV CODE 250: Performed by: HOSPITALIST

## 2022-12-30 PROCEDURE — 80100016 HC MAINTENANCE HEMODIALYSIS

## 2022-12-30 PROCEDURE — 99232 PR SUBSEQUENT HOSPITAL CARE,LEVL II: ICD-10-PCS | Mod: ,,, | Performed by: INTERNAL MEDICINE

## 2022-12-30 PROCEDURE — 99232 SBSQ HOSP IP/OBS MODERATE 35: CPT | Mod: ,,, | Performed by: INTERNAL MEDICINE

## 2022-12-30 PROCEDURE — 63600175 PHARM REV CODE 636 W HCPCS: Performed by: HOSPITALIST

## 2022-12-30 RX ADMIN — APIXABAN 5 MG: 2.5 TABLET, FILM COATED ORAL at 09:12

## 2022-12-30 RX ADMIN — CALCITRIOL CAPSULES 0.25 MCG 0.5 MCG: 0.25 CAPSULE ORAL at 10:12

## 2022-12-30 RX ADMIN — BRIMONIDINE TARTRATE 1 DROP: 2 SOLUTION/ DROPS OPHTHALMIC at 10:12

## 2022-12-30 RX ADMIN — HYDROCODONE BITARTRATE AND ACETAMINOPHEN 1 TABLET: 5; 325 TABLET ORAL at 08:12

## 2022-12-30 RX ADMIN — BRIMONIDINE TARTRATE 1 DROP: 2 SOLUTION/ DROPS OPHTHALMIC at 08:12

## 2022-12-30 RX ADMIN — SEVELAMER CARBONATE 800 MG: 800 TABLET, FILM COATED ORAL at 04:12

## 2022-12-30 RX ADMIN — APIXABAN 5 MG: 2.5 TABLET, FILM COATED ORAL at 08:12

## 2022-12-30 RX ADMIN — SEVELAMER CARBONATE 800 MG: 800 TABLET, FILM COATED ORAL at 10:12

## 2022-12-30 RX ADMIN — ONDANSETRON 4 MG: 2 INJECTION INTRAMUSCULAR; INTRAVENOUS at 10:12

## 2022-12-30 RX ADMIN — CEFTRIAXONE 2 G: 2 INJECTION, POWDER, FOR SOLUTION INTRAMUSCULAR; INTRAVENOUS at 04:12

## 2022-12-30 RX ADMIN — NEPHROCAP 1 CAPSULE: 1 CAP ORAL at 10:12

## 2022-12-30 NOTE — ASSESSMENT & PLAN NOTE
Patient currently follows Dr. Ribera from nephrology with last reported dialysis completed last Friday. Patient currently on M/W/F HD at [facility] via LUE fistula but missed Monday due to not feeling well and being in the ED. Labs consistent with history of ESRD on HD.   Plan:  -continue home medications, titrate as needed  -nephrology consulted -HD performed on 12/20/22 and now back on MWF schedule

## 2022-12-30 NOTE — ASSESSMENT & PLAN NOTE
Echo showed large pericardial effusion       Will plan to do KATE if feasible     The practical choice that can be given daily will be to use IV vancomycin as she is on HD .  IV Rocephin once daily is the preferred regime .  Final duration will depend on KATE    12/29- KATE no vegetation -  Will plan to complete 4 weeks of vanco post HD, will need dental work while on antibiotics    12/28- will continue Rocephin for now, need close follow up      12/28- will follow KATE, on Rocephin

## 2022-12-30 NOTE — ASSESSMENT & PLAN NOTE
Patient with new onset Paroxysmal (<7 days) atrial fibrillation with RVR which is uncontrolled currently with Calcium Channel Blocker. Patient is currently in atrial fibrillation with rate in the 105-130s on diltiazem. [id number] Score: 2. HASBLED Score: 3. Anticoagulation indicated. Anticoagulation done with heparin .  Troponin measuring 0.095 with EKG obtained at outside facility revealing SVT/atrial fibrillation with RVR at rate of 176. Troponin likely elevated in setting of ESRD and demand ischemia from arrhythmia as patient denied endorsing chest pain.  Plan:  -cardizem drip transitioned to oral dosing  -f/u cardiology   -nephrology for HD   -Heparin gtt -transitioned to eliquis  -CHADS-VASc Score- 3

## 2022-12-30 NOTE — SUBJECTIVE & OBJECTIVE
Interval History:   67 year old woman with history of ESRD, now with left hip septic arthritis .  Blood culture- 12/24- strep g[place]  12/25- no growth   She is afebrile    12/28- no acute events overnight .  KATE is pending     12/29-  No acute events noted.  KATE-no vegetation   Review of Systems   Constitutional:  Negative for chills and fatigue.   HENT:  Negative for congestion, ear pain, facial swelling, sinus pressure and sore throat.    Eyes:  Negative for pain.   Respiratory:  Negative for apnea, chest tightness, shortness of breath and stridor.    Cardiovascular:  Negative for chest pain, palpitations and leg swelling.   Gastrointestinal:  Negative for abdominal distention, abdominal pain, diarrhea and nausea.   Endocrine: Negative for polydipsia and polyphagia.   Genitourinary:  Negative for decreased urine volume, difficulty urinating, frequency and genital sores.   Musculoskeletal:  Positive for arthralgias, gait problem and myalgias.   Neurological:  Negative for light-headedness and headaches.   Hematological:  Negative for adenopathy.   Psychiatric/Behavioral:  Negative for agitation, confusion and decreased concentration.    Objective:     Vital Signs (Most Recent):  Temp: 97.6 °F (36.4 °C) (12/30/22 0428)  Pulse: 95 (12/30/22 0428)  Resp: 17 (12/30/22 0428)  BP: (!) 113/53 (12/30/22 0428)  SpO2: (!) 93 % (12/30/22 0428)   Vital Signs (24h Range):  Temp:  [97.5 °F (36.4 °C)-98.4 °F (36.9 °C)] 97.6 °F (36.4 °C)  Pulse:  [] 95  Resp:  [12-20] 17  SpO2:  [93 %-100 %] 93 %  BP: ()/(44-84) 113/53     Weight: 80.2 kg (176 lb 12.9 oz)  Body mass index is 30.35 kg/m².    Estimated Creatinine Clearance: 7.6 mL/min (A) (based on SCr of 7.4 mg/dL (H)).    Physical Exam  Vitals and nursing note reviewed.   Constitutional:       Appearance: She is well-developed.   HENT:      Head: Normocephalic and atraumatic.   Eyes:      Conjunctiva/sclera: Conjunctivae normal.      Pupils: Pupils are equal, round,  and reactive to light.   Neck:      Thyroid: No thyroid mass or thyromegaly.   Cardiovascular:      Rate and Rhythm: Normal rate.      Heart sounds: Normal heart sounds.   Pulmonary:      Effort: Pulmonary effort is normal. No accessory muscle usage or respiratory distress.      Breath sounds: Normal breath sounds.   Abdominal:      General: Bowel sounds are normal.      Palpations: Abdomen is soft. There is no mass.      Tenderness: There is no abdominal tenderness.   Musculoskeletal:      Cervical back: Normal range of motion and neck supple.      Comments: Dressing over left hip   Skin:     Findings: No rash.   Neurological:      Mental Status: She is alert and oriented to person, place, and time.       Significant Labs: CBC:   No results for input(s): WBC, HGB, HCT, PLT in the last 48 hours.    CMP: No results for input(s): NA, K, CL, CO2, GLU, BUN, CREATININE, CALCIUM, PROT, ALBUMIN, BILITOT, ALKPHOS, AST, ALT, ANIONGAP, EGFRNONAA in the last 48 hours.    Invalid input(s): ESTGFAFRICA    Wound Culture:   Recent Labs   Lab 12/23/22  1007   LABAERO STREPTOCOCCUS GORDONII  Moderate  *       All pertinent labs within the past 24 hours have been reviewed.    Significant Imaging: I have reviewed all pertinent imaging results/findings within the past 24 hours.

## 2022-12-30 NOTE — PLAN OF CARE
Accepted by [facility]       12/30/22 5840   Post-Acute Status   Post-Acute Authorization Placement   Post-Acute Placement Status Pending payor review/awaiting authorization (if required)   Discharge Plan   Discharge Plan A Skilled Nursing Facility

## 2022-12-30 NOTE — PLAN OF CARE
Duration: 3 hours    Stable/unstable: stable    Ultrafiltration volume: 2 liters net    Notes: Tolerated, No access issues

## 2022-12-30 NOTE — ASSESSMENT & PLAN NOTE
S/p left hip arthrotomy- will follow Ortho- will plan to treat for 4 weeks from date of negative blood cultures with IV rocephin 2 gram daily, follow repeat blood culture          12/28- will follow repeat blood culture, follow Ortho, continue Rocephin    12/29- will treat with 4 weeks of Vanco POST HD  EOC 01/29/23

## 2022-12-30 NOTE — PLAN OF CARE
POC reviewed with pt. Pt verbalizes understanding of POC. No questions at this time.  AAOx4.   Afib on cardiac monitor with intermittent tachycardia.  Pt remains free of falls.  Requires assistance with ADLs.  Medications given as ordered.  POCT glucose monitored.   PRN pain medication given per request.  No complaints or distress noted @ this time.  Safety measures in place. Will continue to monitor.  Informed pt to call for assistance before getting up. Pt verbalizes understanding.   Hourly rounding complete.

## 2022-12-30 NOTE — SUBJECTIVE & OBJECTIVE
Interval History:     Review of Systems   Constitutional:  Negative for chills and fatigue.   HENT:  Negative for congestion, ear pain, facial swelling, sinus pressure and sore throat.    Eyes:  Negative for pain.   Respiratory:  Negative for apnea, chest tightness, shortness of breath and stridor.    Cardiovascular:  Negative for chest pain, palpitations and leg swelling.   Gastrointestinal:  Negative for abdominal distention, abdominal pain, diarrhea and nausea.   Endocrine: Negative for polydipsia and polyphagia.   Genitourinary:  Negative for decreased urine volume, difficulty urinating, frequency and genital sores.   Musculoskeletal:  Positive for arthralgias, gait problem and myalgias.   Neurological:  Negative for light-headedness and headaches.   Hematological:  Negative for adenopathy.   Psychiatric/Behavioral:  Negative for agitation, confusion and decreased concentration.    Objective:     Vital Signs (Most Recent):  Temp: 98.7 °F (37.1 °C) (12/30/22 1214)  Pulse: 104 (12/30/22 1214)  Resp: 18 (12/30/22 1214)  BP: 115/83 (12/30/22 1214)  SpO2: (!) 92 % (12/30/22 1214)   Vital Signs (24h Range):  Temp:  [97.4 °F (36.3 °C)-98.7 °F (37.1 °C)] 98.7 °F (37.1 °C)  Pulse:  [] 104  Resp:  [12-20] 18  SpO2:  [92 %-100 %] 92 %  BP: (103-139)/(53-84) 115/83     Weight: 80.2 kg (176 lb 12.9 oz)  Body mass index is 30.35 kg/m².    Intake/Output Summary (Last 24 hours) at 12/30/2022 1331  Last data filed at 12/30/2022 0855  Gross per 24 hour   Intake 418 ml   Output --   Net 418 ml     Physical Exam  Vitals and nursing note reviewed.   Constitutional:       Appearance: She is well-developed.   HENT:      Head: Normocephalic and atraumatic.   Eyes:      Conjunctiva/sclera: Conjunctivae normal.      Pupils: Pupils are equal, round, and reactive to light.   Neck:      Thyroid: No thyroid mass or thyromegaly.   Cardiovascular:      Rate and Rhythm: Normal rate.      Heart sounds: Normal heart sounds.   Pulmonary:       Effort: Pulmonary effort is normal. No accessory muscle usage or respiratory distress.      Breath sounds: Normal breath sounds.   Abdominal:      General: Bowel sounds are normal.      Palpations: Abdomen is soft. There is no mass.      Tenderness: There is no abdominal tenderness.   Musculoskeletal:      Cervical back: Normal range of motion and neck supple.      Comments: Dressing over left hip   Skin:     Findings: No rash.   Neurological:      Mental Status: She is alert and oriented to person, place, and time.       Significant Labs: All pertinent labs within the past 24 hours have been reviewed.      Significant Imaging: I have reviewed all pertinent imaging results/findings within the past 24 hours.

## 2022-12-30 NOTE — PROGRESS NOTES
[facility] - Telemetry ([facility])  Nephrology  Progress Note    Patient Name: Meaghan Potter  MRN: [id number]  Admission Date: 12/19/2022  Hospital Length of Stay: 9 days  Attending Provider: Jonathan Smith, *   Primary Care Physician: Primary Doctor No  Principal Problem:Pyogenic arthritis of left hip    Consults  Subjective:     Interval History:  Patient was seen in her hospital room.  In bed resting comfortably.  No acute distress noted.    Review of systems:  No shortness of breath or chest discomfort.  No fevers or chills.  No nausea vomiting diarrhea.    Review of patient's allergies indicates:   Allergen Reactions    Amoxicillin Rash     Current Facility-Administered Medications   Medication Frequency    0.9%  NaCl infusion Once    0.9%  NaCl infusion Once    0.9%  NaCl infusion PRN    acetaminophen suppository 650 mg Q6H PRN    acetaminophen tablet 650 mg Q8H PRN    albuterol-ipratropium 2.5 mg-0.5 mg/3 mL nebulizer solution 3 mL Q6H PRN    aluminum-magnesium hydroxide-simethicone 200-200-20 mg/5 mL suspension 30 mL QID PRN    apixaban tablet 5 mg BID    brimonidine 0.2% ophthalmic solution 1 drop BID    BUPivacaine (PF) 0.25% (2.5 mg/ml) injection 12.5 mg Once    calcitRIOL capsule 0.5 mcg Every Mon, Wed, Fri    cefTRIAXone (ROCEPHIN) 2 g in dextrose 5 % in water (D5W) 5 % 50 mL IVPB (MB+) Q24H    dextrose 10% bolus 125 mL 125 mL PRN    dextrose 10% bolus 250 mL 250 mL PRN    diltiaZEM 24 hr capsule 240 mg Daily    ergocalciferol capsule 50,000 Units Q7 Days    glucagon (human recombinant) injection 1 mg PRN    glucose chewable tablet 16 g PRN    glucose chewable tablet 24 g PRN    HYDROcodone-acetaminophen 5-325 mg per tablet 1 tablet Q6H PRN    melatonin tablet 6 mg Nightly PRN    morphine injection 2 mg Q4H PRN    naloxone 0.4 mg/mL injection 0.02 mg PRN    ondansetron injection 4 mg Q8H PRN    promethazine tablet 25 mg Q6H PRN    senna-docusate 8.6-50 mg per tablet 1 tablet BID PRN    sevelamer  carbonate tablet 800 mg TID WM    sodium chloride 0.9% bolus 250 mL 250 mL PRN    sodium chloride 0.9% bolus 250 mL 250 mL PRN    sodium chloride 0.9% bolus 250 mL 250 mL PRN    sodium chloride 0.9% bolus 250 mL 250 mL PRN    sodium chloride 0.9% flush 3 mL Q12H PRN    vitamin renal formula (B-complex-vitamin c-folic acid) 1 mg per capsule 1 capsule Daily       Objective:     Vital Signs (Most Recent):  Temp: 97.4 °F (36.3 °C) (12/30/22 0749)  Pulse: 92 (12/30/22 0749)  Resp: 18 (12/30/22 0749)  BP: 103/61 (12/30/22 0749)  SpO2: 95 % (12/30/22 0749) Vital Signs (24h Range):  Temp:  [97.4 °F (36.3 °C)-98.4 °F (36.9 °C)] 97.4 °F (36.3 °C)  Pulse:  [] 92  Resp:  [12-20] 18  SpO2:  [93 %-100 %] 95 %  BP: (103-139)/(51-84) 103/61     Weight: 80.2 kg (176 lb 12.9 oz) (12/30/22 0043)  Body mass index is 30.35 kg/m².  Body surface area is 1.9 meters squared.    I/O last 3 completed shifts:  In: 800 [P.O.:600; IV Piggyback:200]  Out: -     Physical Exam  Constitutional:       Appearance: Normal appearance.   HENT:      Head: Normocephalic and atraumatic.   Eyes:      General: No scleral icterus.     Extraocular Movements: Extraocular movements intact.      Pupils: Pupils are equal, round, and reactive to light.   Pulmonary:      Effort: Pulmonary effort is normal.      Breath sounds: No stridor.   Musculoskeletal:      Right lower leg: No edema.      Left lower leg: No edema.   Skin:     General: Skin is warm and dry.   Neurological:      General: No focal deficit present.      Mental Status: She is alert and oriented to person, place, and time.   Psychiatric:         Mood and Affect: Mood normal.         Behavior: Behavior normal.       Significant Labs:sureBMP:   Recent Labs   Lab 12/28/22  0536   GLU 93   *   K 4.5   CL 99   CO2 21*   BUN 47*   CREATININE 7.4*   CALCIUM 10.3     CMP:   Recent Labs   Lab 12/26/22  0617 12/28/22  0536   * 93   CALCIUM 10.4 10.3   ALBUMIN 2.5*  --    * 134*   K 4.8  4.5   CO2 24 21*   CL 93* 99   BUN 69* 47*   CREATININE 9.1* 7.4*     All labs within the past 24 hours have been reviewed.    Significant Imaging:  Labs: Reviewed      Assessment/Plan:     Active Diagnoses:    Diagnosis Date Noted POA    PRINCIPAL PROBLEM:  Pyogenic arthritis of left hip [M00.9] 12/20/2022 Yes    Streptococcal bacteremia [R78.81, B95.5] 12/27/2022 Yes    Hyperkalemia, diminished renal excretion [E87.5] 12/25/2022 Yes    Pericardial effusion [I31.39] 12/21/2022 Yes    ESRD (end stage renal disease) on dialysis [N18.6, Z99.2] 12/20/2022 Not Applicable    Primary hypertension [I10] 12/20/2022 Yes    Gastroesophageal reflux disease without esophagitis [K21.9] 12/20/2022 Yes    Tear of left acetabular labrum [S73.192A] 12/20/2022 Yes    Bone cyst of right femur [M85.651] 12/20/2022 Yes    Atrial fibrillation [I48.91] 12/19/2022 Yes      Problems Resolved During this Admission:       Assessment plan:    1. End-stage renal disease:  Dialysis is scheduled for later today.  Will plan to continue Monday Wednesday Friday dialysis unless otherwise indicated.    2. Potassium has been stable.  The most recent was 4 5.    3. Septic hip: Defer to Orthopedic surgery and primary service for management.    Thank you for your consult.     Bruce Ortega MD  Nephrology  [facility] ([facility])

## 2022-12-30 NOTE — PLAN OF CARE
Spoke to patient regarding SNF choices.  She does not want to go to [facility].  She would like to go to a facility in [place] but wants her sister to make the decision as she is aware of some facilities.  Discussed with the patient that it can be difficult locating an accepting facility due to her needing dialysisi.  Preference signed for first Available.  Spoke with sister Karina Willson.  Along with another family member who is a nurse, they chose [facility] and [facility].  Referral sent in [facility].  Notified Emani with [facility] and Jazmin with [facility] of referrals sent.       12/30/22 1107   Post-Acute Status   Post-Acute Authorization Placement   Post-Acute Placement Status Referrals Sent   Discharge Plan   Discharge Plan A Skilled Nursing Facility     Per [facility], they have no beds.      4:25pm still awaiting response from [facility].

## 2022-12-30 NOTE — PROGRESS NOTES
[facility])  Infectious Disease  Progress Note    Patient Name: Meaghan Potter  MRN: [id number]  Admission Date: 12/19/2022  Length of Stay: 9 days  Attending Physician: Jonathan Smith, *  Primary Care Provider: Primary Doctor No    Isolation Status: No active isolations  Assessment/Plan:      * Pyogenic arthritis of left hip  S/p left hip arthrotomy- will follow Ortho- will plan to treat for 4 weeks from date of negative blood cultures with IV rocephin 2 gram daily, follow repeat blood culture          12/28- will follow repeat blood culture, follow Ortho, continue Rocephin    12/29- will treat with 4 weeks of  Rocephin   EOC 01/29/23    Streptococcal bacteremia  Echo showed large pericardial effusion       Will plan to do KATE if feasible     The practical choice that can be given daily will be to use IV vancomycin as she is on HD .  IV Rocephin once daily is the preferred regime .  Final duration will depend on KATE    12/29- KATE no vegetation -  Will plan to complete 4 weeks of rocephin , will need dental work while on antibiotics    12/28- will continue Rocephin for now, need close follow up      12/28- will follow KATE, on Rocephin     Primary hypertension  BP control as per primary team    ESRD (end stage renal disease) on dialysis  HD as tolerated     Atrial fibrillation  Rate control as per primary team         Anticipated Disposition:     Thank you for your consult. I will follow-up with patient. Please contact us if you have any additional questions.    Gerardo Schwartz MD, [facility]  Infectious Disease  [facility])    Subjective:     Principal Problem:Pyogenic arthritis of left hip    HPI:   67 y.o. woman with  past medical history of ESRD on dialysis, GERD and Hypertension transferred to the hospital with history of atrial fib and noted to have left superior labral tear of the acetabulum  /small right hip joint effusion and a large left hip joint effusion on MRI.  She was  "seen by Ortho and has left hip arthrotomy with op findings -  "Shahid purulence in the left hip; capsule intact and no extension outside joint.  No softening of bone."  Cultures- 12/23- blood culture- strep gordoni   Hip culture - strep gordoni  She also had US guided aspiration of the hip.     Component      Latest Ref Rng & Units 12/25/2022 12/24/2022 12/24/2022            5:42 AM  5:42 AM   WBC      3.90 - 12.70 K/uL 8.08 6.51 6.51     Interval History:   67 year old woman with history of ESRD, now with left hip septic arthritis .  Blood culture- 12/24- strep gordonia  12/25- no growth   She is afebrile    12/28- no acute events overnight .  KATE is pending     12/29-  No acute events noted.  KATE-no vegetation   Review of Systems   Constitutional:  Negative for chills and fatigue.   HENT:  Negative for congestion, ear pain, facial swelling, sinus pressure and sore throat.    Eyes:  Negative for pain.   Respiratory:  Negative for apnea, chest tightness, shortness of breath and stridor.    Cardiovascular:  Negative for chest pain, palpitations and leg swelling.   Gastrointestinal:  Negative for abdominal distention, abdominal pain, diarrhea and nausea.   Endocrine: Negative for polydipsia and polyphagia.   Genitourinary:  Negative for decreased urine volume, difficulty urinating, frequency and genital sores.   Musculoskeletal:  Positive for arthralgias, gait problem and myalgias.   Neurological:  Negative for light-headedness and headaches.   Hematological:  Negative for adenopathy.   Psychiatric/Behavioral:  Negative for agitation, confusion and decreased concentration.    Objective:     Vital Signs (Most Recent):  Temp: 97.6 °F (36.4 °C) (12/30/22 0428)  Pulse: 95 (12/30/22 0428)  Resp: 17 (12/30/22 0428)  BP: (!) 113/53 (12/30/22 0428)  SpO2: (!) 93 % (12/30/22 0428)   Vital Signs (24h Range):  Temp:  [97.5 °F (36.4 °C)-98.4 °F (36.9 °C)] 97.6 °F (36.4 °C)  Pulse:  [] 95  Resp:  [12-20] 17  SpO2:  [93 %-100 " %] 93 %  BP: ()/(44-84) 113/53     Weight: 80.2 kg (176 lb 12.9 oz)  Body mass index is 30.35 kg/m².    Estimated Creatinine Clearance: 7.6 mL/min (A) (based on SCr of 7.4 mg/dL (H)).    Physical Exam  Vitals and nursing note reviewed.   Constitutional:       Appearance: She is well-developed.   HENT:      Head: Normocephalic and atraumatic.   Eyes:      Conjunctiva/sclera: Conjunctivae normal.      Pupils: Pupils are equal, round, and reactive to light.   Neck:      Thyroid: No thyroid mass or thyromegaly.   Cardiovascular:      Rate and Rhythm: Normal rate.      Heart sounds: Normal heart sounds.   Pulmonary:      Effort: Pulmonary effort is normal. No accessory muscle usage or respiratory distress.      Breath sounds: Normal breath sounds.   Abdominal:      General: Bowel sounds are normal.      Palpations: Abdomen is soft. There is no mass.      Tenderness: There is no abdominal tenderness.   Musculoskeletal:      Cervical back: Normal range of motion and neck supple.      Comments: Dressing over left hip   Skin:     Findings: No rash.   Neurological:      Mental Status: She is alert and oriented to person, place, and time.       Significant Labs: CBC:   No results for input(s): WBC, HGB, HCT, PLT in the last 48 hours.    CMP: No results for input(s): NA, K, CL, CO2, GLU, BUN, CREATININE, CALCIUM, PROT, ALBUMIN, BILITOT, ALKPHOS, AST, ALT, ANIONGAP, EGFRNONAA in the last 48 hours.    Invalid input(s): ESTGFAFRICA    Wound Culture:   Recent Labs   Lab 12/23/22  1007   LABAERO STREPTOCOCCUS GORDONII  Moderate  *       All pertinent labs within the past 24 hours have been reviewed.    Significant Imaging: I have reviewed all pertinent imaging results/findings within the past 24 hours.

## 2022-12-30 NOTE — PROGRESS NOTES
[facility] Medicine  Progress Note    Patient Name: Meaghan Potter  MRN: [id number]  Patient Class: IP- Inpatient   Admission Date: 12/19/2022  Length of Stay: 9 days  Attending Physician: Jonathan Smith, *  Primary Care Provider: Primary Doctor No        Subjective:     Principal Problem:Pyogenic arthritis of left hip        HPI:  Meaghan Potter is a 67 y.o. female with a PMH  has a past medical history of ESRD (end stage renal disease) on dialysis, GERD (gastroesophageal reflux disease), and Hypertension. who presented as a transfer from [facility] for higher level cardiology and orthopedic care after patient was found to have sustained a left superior labral tear of her acetabulum noted on MRI in addition to new onset atrial fibrillation with RVR requiring diltiazem drip.  Patient reported being in her usual state of health prior to onset of symptoms and reported acute onset of left hip pain while performing leg exercises in bed earlier today.  Patient reported hearing and feeling a pop in her left hip followed by immediate pain which has remained constant and currently rated 6/10 in severity. Pain was described as throbbing/pulling in nature with no known alleviating factors and aggravating factors including weight-bearing, movement, and palpation to the affected area.  She denied endorsing any numbness, tingling, discoloration, or penetrating trauma, but did express decreased range of motion and muscle strength secondary to exacerbation of pain.  While at outside facility, patient became short of breath while lying flat while obtaining MRI of hip and was found to be in atrial fibrillation with RVR requiring diltiazem drip. She denied endorsing any chest pain, lightheadedness, dizziness, visual disturbances, fever, chills, sweats, nausea, vomiting, abdominal pain, changes in bowel/bladder habits, or onset neurological deficits.  All other review of systems negative except as noted  above.  Patient reports being back at her baseline and continues to complain only of left hip pain.  Orthopedics and cardiology consulted and awaiting further evaluation/recommendations.  Of note, patient missed hemodialysis today secondary to pain and going to the emergency room and usually receives HD via left upper extremity fistula on Monday/Wednesday/Friday.  Follows Dr. Ribera outpatient. Nephrology consulted to continue HD inpatient.     PCP: Primary Doctor No        Overview/Hospital Course:  Pt admitted to Observation for Atrial fibrillation (new onset) with RVR with . Cardiology consulted with Cardizem gtt initiated. NSR on monitor with HR in 80's.  BNP elevated and Troponin trended upward (0.076>0.095) with Heparin infusion initiated. Echo showed with concentric hypertrophy and normal systolic function. Atrial fibrillation observed.Biatrial atrial enlargement. Grade III left ventricular diastolic dysfunction. PA systolic pressure is 60 mmHg. Moderate right ventricular enlargement with normal right ventricular systolic function. Pulmonary hypertension. EF 55%. Moderate to severe tricuspid regurgitation. Mild-to-moderate mitral regurgitation. Severe right atrial enlargement. Large posterior pericardial effusion. No evidence of tamponade. Troponin elevated and flat with no cardiac symptoms reported. Pt also reported L groin pain upon admit. Orthopedic Surgery consulted and pt found to have sustained a left superior labral tear of her acetabulum noted on MRI with further orthopedic work up to be completed outpatient. Hx of ESRD noted with last HD on 12/16/22- Nephrology consulted with HD performed today. AVG to LUE with +bruit/+thrill present. On 12/21/22, MRI of R hip results pending. IR consulted for evaluation of aspiration/injection of left hip. Rate and rhythm controlled on Cardizem gtt. Heparin infusion continued pending joint aspiration.  Pericardial effusion discussed with Nephrology and  "Cardiology for recommendations.   Patient went to have aspiration of left hip however upon transferring from bed to table had an episode described as seizure-like" seems more that it was hypotension related.  Medications adjusted  She remains on p.o. Cardizem as well as Cardizem drip.  Patient underwent arthrocentesis with 4 cc of pus removed 12/23.  Sent for Gram stain, culture, crystals.  Discussed with Dr. Johnson , orthopedics,crystals are negative patient underwent washout with  Cultures 12/24.  Blood cultures and culture from left hip reveal Gram-positive and Gram-negative.  Patient paced on ceftazidime and vancomycin empirically until cultures return.  12/26 She is complaining of weakness . The blood and drainage cx are (+) for STREPTOCOCCUS GORDONII  sensitive  to rocephin .  ID consulted .  12/27 NAEON . ID consulted and rec IV rocephin qd x 4 week from the last negative blood cx . Cardiology consulted for KATE .   12/28 Plan  for a KATE tomorrow am . S/P HD today  . She is complaining od left hip pain .   12/29 S/P KATE which show negative for IE and mild to moderate pericardial effusion .  Pt  will need 4 week of IVAB  from the last negative blood cx . She  is complaining of left hip pain  . Plan to consult CM for SNF placement .   12/30 CT maxillofacial show Multifocal periodontal disease without definite associated dental abscess.  S/P Tunneled PICC line yesterday . Plan to d/c to SNF  for IVAB rocephin qd x 4 weeks .       Interval History:     Review of Systems   Constitutional:  Negative for chills and fatigue.   HENT:  Negative for congestion, ear pain, facial swelling, sinus pressure and sore throat.    Eyes:  Negative for pain.   Respiratory:  Negative for apnea, chest tightness, shortness of breath and stridor.    Cardiovascular:  Negative for chest pain, palpitations and leg swelling.   Gastrointestinal:  Negative for abdominal distention, abdominal pain, diarrhea and nausea.   Endocrine: Negative " for polydipsia and polyphagia.   Genitourinary:  Negative for decreased urine volume, difficulty urinating, frequency and genital sores.   Musculoskeletal:  Positive for arthralgias, gait problem and myalgias.   Neurological:  Negative for light-headedness and headaches.   Hematological:  Negative for adenopathy.   Psychiatric/Behavioral:  Negative for agitation, confusion and decreased concentration.    Objective:     Vital Signs (Most Recent):  Temp: 98.7 °F (37.1 °C) (12/30/22 1214)  Pulse: 104 (12/30/22 1214)  Resp: 18 (12/30/22 1214)  BP: 115/83 (12/30/22 1214)  SpO2: (!) 92 % (12/30/22 1214)   Vital Signs (24h Range):  Temp:  [97.4 °F (36.3 °C)-98.7 °F (37.1 °C)] 98.7 °F (37.1 °C)  Pulse:  [] 104  Resp:  [12-20] 18  SpO2:  [92 %-100 %] 92 %  BP: (103-139)/(53-84) 115/83     Weight: 80.2 kg (176 lb 12.9 oz)  Body mass index is 30.35 kg/m².    Intake/Output Summary (Last 24 hours) at 12/30/2022 1331  Last data filed at 12/30/2022 0855  Gross per 24 hour   Intake 418 ml   Output --   Net 418 ml     Physical Exam  Vitals and nursing note reviewed.   Constitutional:       Appearance: She is well-developed.   HENT:      Head: Normocephalic and atraumatic.   Eyes:      Conjunctiva/sclera: Conjunctivae normal.      Pupils: Pupils are equal, round, and reactive to light.   Neck:      Thyroid: No thyroid mass or thyromegaly.   Cardiovascular:      Rate and Rhythm: Normal rate.      Heart sounds: Normal heart sounds.   Pulmonary:      Effort: Pulmonary effort is normal. No accessory muscle usage or respiratory distress.      Breath sounds: Normal breath sounds.   Abdominal:      General: Bowel sounds are normal.      Palpations: Abdomen is soft. There is no mass.      Tenderness: There is no abdominal tenderness.   Musculoskeletal:      Cervical back: Normal range of motion and neck supple.      Comments: Dressing over left hip   Skin:     Findings: No rash.   Neurological:      Mental Status: She is alert and  oriented to person, place, and time.       Significant Labs: All pertinent labs within the past 24 hours have been reviewed.      Significant Imaging: I have reviewed all pertinent imaging results/findings within the past 24 hours.        Assessment/Plan:      * Pyogenic arthritis of left hip  Patient presented with acute onset left hip pain x1 day duration in absence of trauma while doing leg exercises in bed and was found to asymmetric large left hip joint effusion, small right hip joint effusion, and evidence of superior labral tear of her left acetabulum noted on MRI.  Patient noted to have 5/5 strength throughout with sensation to light touch grossly intact throughout however, TTP throughout left groin.  Orthopedics consulted and awaiting further evaluation/recommendations.  Plan:  -optimize pain control  -bowel regimen  -PT/OT  -ortho surgery following    The patient underwent arthrocentesis by IR 12/23 with 4 cc pus obtained.  Sent for cell count which revealed 187,000 white blood cells 100% segs.  Crystals negative  and culture Gram-negative and Gram-positive.  Patient went for washout 12/24.  Placed on empiric vanco and ceftazidime awaiting sensitivities    -Blood cultures positive STREPTOCOCCUS GORDONII  -ID consulted.  Rec :to treat for 4 weeks from date of negative blood cultures with IV rocephin 2 gram daily  -Repeated blood cx NGTD      Streptococcal bacteremia  -Cont IV rocephin   -KATE: Negative for IE Moderate to mild pericardial effusion  .   -Cont IV rocephin     Hyperkalemia, diminished renal excretion  Cont HD      Pericardial effusion  -large posterior pericardial effusion   -pt appears asymptomatic   -Nephrology following with HD performed on 12/21/22 and a Monday Wednesday Friday schedule  -Cardiology following- will follow and evaluate after next HD   -KATE show mild to moderate pericardial effusion     Bone cyst of right femur  -Orthopedic Surgery following   -follow up outpatient  recommended          Shortness of breath  Patient reported acute onset shortness a breath while obtaining MRI and was found to have evidence of severe cardiomegaly with small left pleural effusion but negative for infectious processes noted on CXR. Patient afebrile without leukocytosis. BNP elevated at 4792. Troponin 0.095. Exam positive for bilateral edema but negative for elevated JVD or crackles on lung ausculation. Patient currently saturating % on 2L via NC in no acute distress and without use of accessory muscles noted.  Patient without history of CHF.  Cardiology consulted.  Echo ordered and pending.  Nephrology consulted to resume HD inpatient.  Will trial dose of Lasix if clinical status worsens.  Plan:  -titrate oxygen therapy as needed  -incentive spirometry  -echo reviewed   -f/u cardiology and nephrology  -monitor Is/Os  -low salt/cardiac/renal diet    -Duonebs prn       Tear of left acetabular labrum  -Orthopedic surgery following  -MRI showed no fracture and asymmetric large left hip joint effusion.  Small right hip joint effusion. Paralabral cyst along the superior border of the acetabulum suspicious for a superior labral tear.   -analgesia as needed   -antiemetics as needed   - the left hip may treat with therapy and anti-inflammatories (per Orthopedic Surgery)   -Aspiration of the left hip by Interventional Radiology as above    Gastroesophageal reflux disease without esophagitis  Chronic. Stable. Currently asymptomatic. Home medications include PPI/Antacids as needed.  Plan:  -Continue PPI/Antacids as needed       Primary hypertension  BP currently ranging from 90s systolic.    Plan:  -Optimize pain control   -Continue home medications, titrate as needed   -Monitor BP  -Low salt/renal diet   -IV hydralazine prn for SBP>160 or DBP>90   -f/u nephrology for HD inpatient- HD performed on 12/20/22       ESRD (end stage renal disease) on dialysis  Patient currently follows Dr. Ribera from  nephrology with last reported dialysis completed last Friday. Patient currently on M/W/F HD at [facility] via LUE fistula but missed Monday due to not feeling well and being in the ED. Labs consistent with history of ESRD on HD.   Plan:  -continue home medications, titrate as needed  -nephrology consulted -HD performed on 12/20/22 and now back on MWF schedule      Atrial fibrillation  Patient with new onset Paroxysmal (<7 days) atrial fibrillation with RVR which is uncontrolled currently with Calcium Channel Blocker. Patient is currently in atrial fibrillation with rate in the 105-130s on diltiazem. [id number] Score: 2. HASBLED Score: 3. Anticoagulation indicated. Anticoagulation done with heparin .  Troponin measuring 0.095 with EKG obtained at outside facility revealing SVT/atrial fibrillation with RVR at rate of 176. Troponin likely elevated in setting of ESRD and demand ischemia from arrhythmia as patient denied endorsing chest pain.  Plan:  -cardizem drip transitioned to oral dosing  -f/u cardiology   -nephrology for HD   -Heparin gtt -transitioned to eliquis  -CHADS-VASc Score- 3         VTE Risk Mitigation (From admission, onward)         Ordered     apixaban tablet 5 mg  2 times daily         12/25/22 1402     Place sequential compression device  Until discontinued         12/19/22 2119     IP VTE LOW RISK PATIENT  Once         12/19/22 2119                Discharge Planning   ISAAK:      Code Status: Full Code   Is the patient medically ready for discharge?:     Reason for patient still in hospital (select all that apply): Treatment  Discharge Plan A: Skilled Nursing Facility                  Jonathan Smith MD  Department of Hospital Medicine   [facility] ([facility])

## 2022-12-30 NOTE — PT/OT/SLP PROGRESS
"Physical Therapy  Treatment    Meaghan Potter   MRN: [id number]   Admitting Diagnosis: Pyogenic arthritis of left hip    PT Received On: 12/30/22  PT Start Time: 0800     PT Stop Time: 0815    PT Total Time (min): 15 min       Billable Minutes:  Therapeutic Activity 15    Treatment Type: Treatment  PT/PTA: PTA     PTA Visit Number: 2       General Precautions: Standard, fall  Orthopedic Precautions: LLE weight bearing as tolerated  Braces: N/A  Respiratory Status: Room air         Subjective:  Communicated with patient's nurse and completed Epic chart review prior to session.  Patient agreed to T/F to chair and education as she had just started eating breakfast and wanted to finish before dialysis.   "They tell me I have to wait to get up until therapy comes."    Pain/Comfort  Pain Rating 1: 5/10  Location - Side 1: Left  Location 1: hip  Pain Addressed 1: Other (see comments) (ACTIVITY PACING)  Pain Rating Post-Intervention 1: 5/10    Objective:   Patient found with: bed alarm, telemetry, PICC line    Supine > sit EOB: Min A     Seated EOB x5 min total focusing on increased tolerance to upright position, core stability, trunk control and CV endurance.  Maintained self supported sitting balance w/ SPV    STS from EOB > RW: Min A (VC for hand placement)    Stand pivot T/F to chair w/ RW: Min A (VC for sequencing of task)    Educated patient on importance of increased tolerance to upright position and direct impact on CV endurance and strength. Patient encouraged to sit up in chair/ EOB, for a minimum of 2 consecutive hours, 3x per day. Encouraged patient to perform AROM TE to BLE throughout the day within all available planes of motion. Re enforced importance of utilizing call light to meet needs in room and not attempt to get up without staff assistance. Patient verbalized understanding and agreed to comply.       AM-PAC 6 CLICK MOBILITY  How much help from another person does this patient currently need?   1 = Unable, " Total/Dependent Assistance  2 = A lot, Maximum/Moderate Assistance  3 = A little, Minimum/Contact Guard/Supervision  4 = None, Modified [place]/Independent    Turning over in bed (including adjusting bedclothes, sheets and blankets)?: 3  Sitting down on and standing up from a chair with arms (e.g., wheelchair, bedside commode, etc.): 3  Moving from lying on back to sitting on the side of the bed?: 3  Moving to and from a bed to a chair (including a wheelchair)?: 3  Need to walk in hospital room?: 3  Climbing 3-5 steps with a railing?: 1  Basic Mobility Total Score: 16    AM-PAC Raw Score CMS G-Code Modifier Level of Impairment Assistance   6 % Total / Unable   7 - 9 CM 80 - 100% Maximal Assist   10 - 14 CL 60 - 80% Moderate Assist   15 - 19 CK 40 - 60% Moderate Assist   20 - 22 CJ 20 - 40% Minimal Assist   23 CI 1-20% SBA / CGA   24 CH 0% Independent/ Mod I     Patient left up in chair with call button in reach.    Assessment:  Meaghan Potter is a 67 y.o. female with a medical diagnosis of Pyogenic arthritis of left hip and presents with overall decline in functional mobility. Patient would continue to benefit from skilled PT to address functional limitations listed below in order to return to PLOF/decrease caregiver burden.      Rehab identified problem list/impairments: weakness, impaired endurance, impaired self care skills, impaired functional mobility, gait instability, impaired balance, decreased coordination, decreased lower extremity function, decreased safety awareness, pain, decreased ROM, impaired cardiopulmonary response to activity    Rehab potential is fair.    Activity tolerance: Fair    Discharge recommendations: home health PT (WITH 24 HR SPV)      Barriers to discharge:      Equipment recommendations: walker, rolling     GOALS:   Multidisciplinary Problems       Physical Therapy Goals          Problem: Physical Therapy    Goal Priority Disciplines Outcome Goal Variances Interventions    Physical Therapy Goal     PT, PT/OT      Description: LT23  1. PT WILL COMPLETE BED MOBILITY WITH S  2. PT WILL T/F TO CHAIR WITH RW AND S  3. PT WILL GT TRAIN X 150' WITH RW AND S                       PLAN:    Patient to be seen 3 x/week to address the above listed problems via gait training, therapeutic activities, therapeutic exercises  Plan of Care expires: 23  Plan of Care reviewed with: patient         2022

## 2022-12-31 LAB
BASOPHILS # BLD AUTO: 0.04 K/UL (ref 0–0.2)
BASOPHILS NFR BLD: 0.4 % (ref 0–1.9)
DIFFERENTIAL METHOD: ABNORMAL
EOSINOPHIL # BLD AUTO: 0.1 K/UL (ref 0–0.5)
EOSINOPHIL NFR BLD: 0.6 % (ref 0–8)
ERYTHROCYTE [DISTWIDTH] IN BLOOD BY AUTOMATED COUNT: 14.1 % (ref 11.5–14.5)
HCT VFR BLD AUTO: 33.4 % (ref 37–48.5)
HGB BLD-MCNC: 11.2 G/DL (ref 12–16)
IMM GRANULOCYTES # BLD AUTO: 0.2 K/UL (ref 0–0.04)
IMM GRANULOCYTES NFR BLD AUTO: 2 % (ref 0–0.5)
LYMPHOCYTES # BLD AUTO: 0.7 K/UL (ref 1–4.8)
LYMPHOCYTES NFR BLD: 7.6 % (ref 18–48)
MCH RBC QN AUTO: 32.6 PG (ref 27–31)
MCHC RBC AUTO-ENTMCNC: 33.5 G/DL (ref 32–36)
MCV RBC AUTO: 97 FL (ref 82–98)
MONOCYTES # BLD AUTO: 0.7 K/UL (ref 0.3–1)
MONOCYTES NFR BLD: 7.5 % (ref 4–15)
NEUTROPHILS # BLD AUTO: 8 K/UL (ref 1.8–7.7)
NEUTROPHILS NFR BLD: 81.9 % (ref 38–73)
NRBC BLD-RTO: 0 /100 WBC
PLATELET # BLD AUTO: 399 K/UL (ref 150–450)
PLATELET BLD QL SMEAR: ABNORMAL
PMV BLD AUTO: 10.6 FL (ref 9.2–12.9)
RBC # BLD AUTO: 3.44 M/UL (ref 4–5.4)
WBC # BLD AUTO: 9.78 K/UL (ref 3.9–12.7)

## 2022-12-31 PROCEDURE — 63600175 PHARM REV CODE 636 W HCPCS: Performed by: HOSPITALIST

## 2022-12-31 PROCEDURE — 63600175 PHARM REV CODE 636 W HCPCS: Performed by: INTERNAL MEDICINE

## 2022-12-31 PROCEDURE — 97116 GAIT TRAINING THERAPY: CPT | Mod: CQ

## 2022-12-31 PROCEDURE — 25000003 PHARM REV CODE 250: Performed by: INTERNAL MEDICINE

## 2022-12-31 PROCEDURE — 21400001 HC TELEMETRY ROOM

## 2022-12-31 PROCEDURE — 99232 SBSQ HOSP IP/OBS MODERATE 35: CPT | Mod: ,,, | Performed by: INTERNAL MEDICINE

## 2022-12-31 PROCEDURE — 25000003 PHARM REV CODE 250: Performed by: HOSPITALIST

## 2022-12-31 PROCEDURE — 97530 THERAPEUTIC ACTIVITIES: CPT | Mod: CQ

## 2022-12-31 PROCEDURE — 85025 COMPLETE CBC W/AUTO DIFF WBC: CPT | Performed by: INTERNAL MEDICINE

## 2022-12-31 PROCEDURE — 99232 PR SUBSEQUENT HOSPITAL CARE,LEVL II: ICD-10-PCS | Mod: ,,, | Performed by: INTERNAL MEDICINE

## 2022-12-31 PROCEDURE — 25000003 PHARM REV CODE 250: Performed by: STUDENT IN AN ORGANIZED HEALTH CARE EDUCATION/TRAINING PROGRAM

## 2022-12-31 PROCEDURE — 36415 COLL VENOUS BLD VENIPUNCTURE: CPT | Performed by: INTERNAL MEDICINE

## 2022-12-31 RX ORDER — POLYETHYLENE GLYCOL 3350 17 G/17G
17 POWDER, FOR SOLUTION ORAL DAILY
Status: DISCONTINUED | OUTPATIENT
Start: 2022-12-31 | End: 2023-01-07 | Stop reason: HOSPADM

## 2022-12-31 RX ORDER — AMOXICILLIN 250 MG
1 CAPSULE ORAL DAILY
Status: DISCONTINUED | OUTPATIENT
Start: 2023-01-01 | End: 2022-12-31

## 2022-12-31 RX ORDER — AMOXICILLIN 250 MG
1 CAPSULE ORAL DAILY
Status: DISCONTINUED | OUTPATIENT
Start: 2022-12-31 | End: 2023-01-07 | Stop reason: HOSPADM

## 2022-12-31 RX ORDER — POLYETHYLENE GLYCOL 3350 17 G/17G
17 POWDER, FOR SOLUTION ORAL DAILY
Status: DISCONTINUED | OUTPATIENT
Start: 2023-01-01 | End: 2022-12-31

## 2022-12-31 RX ADMIN — POLYETHYLENE GLYCOL 3350 17 G: 17 POWDER, FOR SOLUTION ORAL at 03:12

## 2022-12-31 RX ADMIN — CEFTRIAXONE 2 G: 2 INJECTION, POWDER, FOR SOLUTION INTRAMUSCULAR; INTRAVENOUS at 05:12

## 2022-12-31 RX ADMIN — DILTIAZEM HYDROCHLORIDE 240 MG: 240 CAPSULE, COATED, EXTENDED RELEASE ORAL at 08:12

## 2022-12-31 RX ADMIN — SEVELAMER CARBONATE 800 MG: 800 TABLET, FILM COATED ORAL at 12:12

## 2022-12-31 RX ADMIN — ONDANSETRON 4 MG: 2 INJECTION INTRAMUSCULAR; INTRAVENOUS at 10:12

## 2022-12-31 RX ADMIN — APIXABAN 5 MG: 2.5 TABLET, FILM COATED ORAL at 08:12

## 2022-12-31 RX ADMIN — BRIMONIDINE TARTRATE 1 DROP: 2 SOLUTION/ DROPS OPHTHALMIC at 08:12

## 2022-12-31 RX ADMIN — SEVELAMER CARBONATE 800 MG: 800 TABLET, FILM COATED ORAL at 05:12

## 2022-12-31 RX ADMIN — SENNOSIDES AND DOCUSATE SODIUM 1 TABLET: 50; 8.6 TABLET ORAL at 08:12

## 2022-12-31 RX ADMIN — SEVELAMER CARBONATE 800 MG: 800 TABLET, FILM COATED ORAL at 08:12

## 2022-12-31 RX ADMIN — SENNOSIDES AND DOCUSATE SODIUM 1 TABLET: 8.6; 5 TABLET ORAL at 03:12

## 2022-12-31 RX ADMIN — NEPHROCAP 1 CAPSULE: 1 CAP ORAL at 08:12

## 2022-12-31 RX ADMIN — HYDROCODONE BITARTRATE AND ACETAMINOPHEN 1 TABLET: 5; 325 TABLET ORAL at 08:12

## 2022-12-31 RX ADMIN — SODIUM CHLORIDE: 9 INJECTION, SOLUTION INTRAVENOUS at 05:12

## 2022-12-31 NOTE — PROGRESS NOTES
[facility] ([facility])  Nephrology  Progress Note    Patient Name: Meaghan Potter  MRN: [id number]  Admission Date: 12/19/2022  Hospital Length of Stay: 10 days  Attending Provider: Jonathan Smith, *   Primary Care Physician: Primary Doctor No  Principal Problem:Pyogenic arthritis of left hip    Consults  Subjective:     Interval History:  Patient was seen in her hospital room.  In bed resting comfortably.  No acute distress noted.    Review of systems:  No shortness of breath or chest discomfort.  No fevers or chills.  States that she has some nausea this morning.    Review of patient's allergies indicates:   Allergen Reactions    Amoxicillin Rash     Current Facility-Administered Medications   Medication Frequency    0.9%  NaCl infusion Once    0.9%  NaCl infusion Once    0.9%  NaCl infusion PRN    acetaminophen suppository 650 mg Q6H PRN    acetaminophen tablet 650 mg Q8H PRN    albuterol-ipratropium 2.5 mg-0.5 mg/3 mL nebulizer solution 3 mL Q6H PRN    aluminum-magnesium hydroxide-simethicone 200-200-20 mg/5 mL suspension 30 mL QID PRN    apixaban tablet 5 mg BID    brimonidine 0.2% ophthalmic solution 1 drop BID    BUPivacaine (PF) 0.25% (2.5 mg/ml) injection 12.5 mg Once    calcitRIOL capsule 0.5 mcg Every Mon, Wed, Fri    cefTRIAXone (ROCEPHIN) 2 g in dextrose 5 % in water (D5W) 5 % 50 mL IVPB (MB+) Q24H    dextrose 10% bolus 125 mL 125 mL PRN    dextrose 10% bolus 250 mL 250 mL PRN    diltiaZEM 24 hr capsule 240 mg Daily    ergocalciferol capsule 50,000 Units Q7 Days    glucagon (human recombinant) injection 1 mg PRN    glucose chewable tablet 16 g PRN    glucose chewable tablet 24 g PRN    HYDROcodone-acetaminophen 5-325 mg per tablet 1 tablet Q6H PRN    melatonin tablet 6 mg Nightly PRN    morphine injection 2 mg Q4H PRN    naloxone 0.4 mg/mL injection 0.02 mg PRN    ondansetron injection 4 mg Q8H PRN    promethazine tablet 25 mg Q6H PRN    senna-docusate 8.6-50 mg per tablet 1 tablet BID PRN     sevelamer carbonate tablet 800 mg TID WM    sodium chloride 0.9% bolus 250 mL 250 mL PRN    sodium chloride 0.9% bolus 250 mL 250 mL PRN    sodium chloride 0.9% bolus 250 mL 250 mL PRN    sodium chloride 0.9% bolus 250 mL 250 mL PRN    sodium chloride 0.9% flush 3 mL Q12H PRN    vitamin renal formula (B-complex-vitamin c-folic acid) 1 mg per capsule 1 capsule Daily       Objective:     Vital Signs (Most Recent):  Temp: 97.7 °F (36.5 °C) (12/31/22 0745)  Pulse: 90 (12/31/22 0745)  Resp: 18 (12/31/22 0745)  BP: (!) 125/55 (12/31/22 0745)  SpO2: (!) 91 % (12/31/22 0745) Vital Signs (24h Range):  Temp:  [97.4 °F (36.3 °C)-98.7 °F (37.1 °C)] 97.7 °F (36.5 °C)  Pulse:  [] 90  Resp:  [16-18] 18  SpO2:  [91 %-98 %] 91 %  BP: ()/(55-83) 125/55     Weight: 77 kg (169 lb 12.1 oz) (12/31/22 0022)  Body mass index is 29.14 kg/m².  Body surface area is 1.86 meters squared.    I/O last 3 completed shifts:  In: 118 [P.O.:118]  Out: 2500 [Other:2500]    Physical Exam  Constitutional:       Appearance: Normal appearance.   HENT:      Head: Normocephalic and atraumatic.   Eyes:      General: No scleral icterus.     Extraocular Movements: Extraocular movements intact.      Pupils: Pupils are equal, round, and reactive to light.   Pulmonary:      Effort: Pulmonary effort is normal.      Breath sounds: No stridor.   Musculoskeletal:      Right lower leg: No edema.      Left lower leg: No edema.   Skin:     General: Skin is warm and dry.   Neurological:      General: No focal deficit present.      Mental Status: She is alert and oriented to person, place, and time.   Psychiatric:         Mood and Affect: Mood normal.         Behavior: Behavior normal.       Significant Labs:sureBMP:   Recent Labs   Lab 12/28/22  0536   GLU 93   *   K 4.5   CL 99   CO2 21*   BUN 47*   CREATININE 7.4*   CALCIUM 10.3       CMP:   Recent Labs   Lab 12/26/22  0617 12/28/22  0536   * 93   CALCIUM 10.4 10.3   ALBUMIN 2.5*  --    *  134*   K 4.8 4.5   CO2 24 21*   CL 93* 99   BUN 69* 47*   CREATININE 9.1* 7.4*       All labs within the past 24 hours have been reviewed.    Significant Imaging:  Labs: Reviewed      Assessment/Plan:     Active Diagnoses:    Diagnosis Date Noted POA    PRINCIPAL PROBLEM:  Pyogenic arthritis of left hip [M00.9] 12/20/2022 Yes    Streptococcal bacteremia [R78.81, B95.5] 12/27/2022 Yes    Hyperkalemia, diminished renal excretion [E87.5] 12/25/2022 Yes    Pericardial effusion [I31.39] 12/21/2022 Yes    ESRD (end stage renal disease) on dialysis [N18.6, Z99.2] 12/20/2022 Not Applicable    Primary hypertension [I10] 12/20/2022 Yes    Gastroesophageal reflux disease without esophagitis [K21.9] 12/20/2022 Yes    Tear of left acetabular labrum [S73.192A] 12/20/2022 Yes    Bone cyst of right femur [M85.651] 12/20/2022 Yes    Atrial fibrillation [I48.91] 12/19/2022 Yes      Problems Resolved During this Admission:       Assessment plan:    1. End-stage renal disease:  Last dialysis treatment was yesterday, uneventful.  Net ultrafiltration approximately 2.5 L.  Will plan to continue Monday Wednesday Friday dialysis unless otherwise indicated.    2. Potassium has been stable.  The most recent was 4 5.    3. Septic hip: Defer to Orthopedic surgery and primary service for management.    Thank you for your consult.     Bruce Ortega MD  Nephrology  [facility] ([facility])

## 2022-12-31 NOTE — ASSESSMENT & PLAN NOTE
Patient with new onset Paroxysmal (<7 days) atrial fibrillation with RVR which is uncontrolled currently with Calcium Channel Blocker. Patient is currently in atrial fibrillation with rate in the 105-130s on diltiazem. [id number] Score: 2. HASBLED Score: 3. Anticoagulation indicated. Anticoagulation done with heparin .  Troponin measuring 0.095 with EKG obtained at outside facility revealing SVT/atrial fibrillation with RVR at rate of 176. Troponin likely elevated in setting of ESRD and demand ischemia from arrhythmia as patient denied endorsing chest pain.  Plan:  -cardizem drip transitioned to oral dosing  -f/u cardiology   -nephrology for HD   -Heparin gtt -transitioned to eliquis  -CHADS-VASc Score- 3

## 2022-12-31 NOTE — SUBJECTIVE & OBJECTIVE
Interval History:     Review of Systems   Constitutional:  Negative for chills and fatigue.   HENT:  Negative for congestion, ear pain, facial swelling, sinus pressure and sore throat.    Eyes:  Negative for pain.   Respiratory:  Negative for apnea, chest tightness, shortness of breath and stridor.    Cardiovascular:  Negative for chest pain, palpitations and leg swelling.   Gastrointestinal:  Negative for abdominal distention, abdominal pain, diarrhea and nausea.   Endocrine: Negative for polydipsia and polyphagia.   Genitourinary:  Negative for decreased urine volume, difficulty urinating, frequency and genital sores.   Musculoskeletal:  Positive for arthralgias, gait problem and myalgias.   Neurological:  Negative for light-headedness and headaches.   Hematological:  Negative for adenopathy.   Psychiatric/Behavioral:  Negative for agitation, confusion and decreased concentration.    Objective:     Vital Signs (Most Recent):  Temp: 97.9 °F (36.6 °C) (12/31/22 1131)  Pulse: 86 (12/31/22 1131)  Resp: 18 (12/31/22 1131)  BP: (!) 125/55 (12/31/22 0745)  SpO2: 96 % (12/31/22 1131)   Vital Signs (24h Range):  Temp:  [97.4 °F (36.3 °C)-98 °F (36.7 °C)] 97.9 °F (36.6 °C)  Pulse:  [] 86  Resp:  [16-18] 18  SpO2:  [91 %-98 %] 96 %  BP: ()/(55-64) 125/55     Weight: 77 kg (169 lb 12.1 oz)  Body mass index is 29.14 kg/m².    Intake/Output Summary (Last 24 hours) at 12/31/2022 1221  Last data filed at 12/30/2022 1539  Gross per 24 hour   Intake --   Output 2500 ml   Net -2500 ml      Physical Exam  Vitals and nursing note reviewed.   Constitutional:       Appearance: She is well-developed.   HENT:      Head: Normocephalic and atraumatic.   Eyes:      Conjunctiva/sclera: Conjunctivae normal.      Pupils: Pupils are equal, round, and reactive to light.   Neck:      Thyroid: No thyroid mass or thyromegaly.   Cardiovascular:      Rate and Rhythm: Normal rate.      Heart sounds: Normal heart sounds.   Pulmonary:       Effort: Pulmonary effort is normal. No accessory muscle usage or respiratory distress.      Breath sounds: Normal breath sounds.   Abdominal:      General: Bowel sounds are normal.      Palpations: Abdomen is soft. There is no mass.      Tenderness: There is no abdominal tenderness.   Musculoskeletal:      Cervical back: Normal range of motion and neck supple.      Comments: Dressing over left hip   Skin:     Findings: No rash.   Neurological:      Mental Status: She is alert and oriented to person, place, and time.       Significant Labs: All pertinent labs within the past 24 hours have been reviewed.      Significant Imaging: I have reviewed all pertinent imaging results/findings within the past 24 hours.

## 2022-12-31 NOTE — PT/OT/SLP PROGRESS
Physical Therapy  Treatment    Meaghan Potter   MRN: [id number]   Admitting Diagnosis: Pyogenic arthritis of left hip       PT Start Time: 0955     PT Stop Time: 1020    PT Total Time (min): 25 min       Billable Minutes:  Gait Training 10 and Therapeutic Activity 15    Treatment Type: Treatment  PT/PTA: PTA     PTA Visit Number: 3       General Precautions: Standard, fall  Orthopedic Precautions: LLE weight bearing as tolerated  Braces: N/A     Subjective:  Communicated with ROCIO prior to session.      Pain/Comfort  Pain Rating 1: 0/10  Pain Rating Post-Intervention 1: 0/10    Treatment and Education:    AM-PAC 6 CLICK MOBILITY  How much help from another person does this patient currently need?   1 = Unable, Total/Dependent Assistance  2 = A lot, Maximum/Moderate Assistance  3 = A little, Minimum/Contact Guard/Supervision  4 = None, Modified [place]/Independent    Turning over in bed (including adjusting bedclothes, sheets and blankets)?: 3  Sitting down on and standing up from a chair with arms (e.g., wheelchair, bedside commode, etc.): 3  Moving from lying on back to sitting on the side of the bed?: 3  Moving to and from a bed to a chair (including a wheelchair)?: 3  Need to walk in hospital room?: 3  Climbing 3-5 steps with a railing?: 1  Basic Mobility Total Score: 16    AM-PAC Raw Score CMS G-Code Modifier Level of Impairment Assistance   6 % Total / Unable   7 - 9 CM 80 - 100% Maximal Assist   10 - 14 CL 60 - 80% Moderate Assist   15 - 19 CK 40 - 60% Moderate Assist   20 - 22 CJ 20 - 40% Minimal Assist   23 CI 1-20% SBA / CGA   24 CH 0% Independent/ Mod I     Patient left up in chair with all lines intact, call button in reach, chair alarm off, and NSG notified.    Assessment:  Meaghan Potter is a 67 y.o. female with a medical diagnosis of Pyogenic arthritis of left hip and presents with .    Rehab identified problem list/impairments: weakness, gait instability, decreased upper extremity function,  decreased ROM, impaired cardiopulmonary response to activity, decreased lower extremity function, impaired endurance, decreased safety awareness, impaired self care skills, impaired functional mobility    Rehab potential is good.    Activity tolerance: Fair    Discharge recommendations: home health PT ( [facility])      Barriers to discharge:      Equipment recommendations: bedside commode, walker, rolling, wheelchair     GOALS:   Multidisciplinary Problems       Physical Therapy Goals          Problem: Physical Therapy    Goal Priority Disciplines Outcome Goal Variances Interventions   Physical Therapy Goal     PT, PT/OT      Description: LT23  1. PT WILL COMPLETE BED MOBILITY WITH S  2. PT WILL T/F TO CHAIR WITH RW AND S  3. PT WILL GT TRAIN X 150' WITH RW AND S                       PLAN:    Patient to be seen 3 x/week to address the above listed problems via gait training, therapeutic activities, therapeutic exercises  Plan of Care expires: 23  Plan of Care reviewed with: patient         2022

## 2022-12-31 NOTE — PROGRESS NOTES
[facility] Medicine  Progress Note    Patient Name: Meaghan Potter  MRN: [id number]  Patient Class: IP- Inpatient   Admission Date: 12/19/2022  Length of Stay: 10 days  Attending Physician: Jonathan Smith, *  Primary Care Provider: Primary Doctor No        Subjective:     Principal Problem:Pyogenic arthritis of left hip        HPI:  Meaghan Potter is a 67 y.o. female with a PMH  has a past medical history of ESRD (end stage renal disease) on dialysis, GERD (gastroesophageal reflux disease), and Hypertension. who presented as a transfer from [facility] for higher level cardiology and orthopedic care after patient was found to have sustained a left superior labral tear of her acetabulum noted on MRI in addition to new onset atrial fibrillation with RVR requiring diltiazem drip.  Patient reported being in her usual state of health prior to onset of symptoms and reported acute onset of left hip pain while performing leg exercises in bed earlier today.  Patient reported hearing and feeling a pop in her left hip followed by immediate pain which has remained constant and currently rated 6/10 in severity. Pain was described as throbbing/pulling in nature with no known alleviating factors and aggravating factors including weight-bearing, movement, and palpation to the affected area.  She denied endorsing any numbness, tingling, discoloration, or penetrating trauma, but did express decreased range of motion and muscle strength secondary to exacerbation of pain.  While at outside facility, patient became short of breath while lying flat while obtaining MRI of hip and was found to be in atrial fibrillation with RVR requiring diltiazem drip. She denied endorsing any chest pain, lightheadedness, dizziness, visual disturbances, fever, chills, sweats, nausea, vomiting, abdominal pain, changes in bowel/bladder habits, or onset neurological deficits.  All other review of systems negative except as noted  above.  Patient reports being back at her baseline and continues to complain only of left hip pain.  Orthopedics and cardiology consulted and awaiting further evaluation/recommendations.  Of note, patient missed hemodialysis today secondary to pain and going to the emergency room and usually receives HD via left upper extremity fistula on Monday/Wednesday/Friday.  Follows Dr. Ribera outpatient. Nephrology consulted to continue HD inpatient.     PCP: Primary Doctor No        Overview/Hospital Course:  Pt admitted to Observation for Atrial fibrillation (new onset) with RVR with . Cardiology consulted with Cardizem gtt initiated. NSR on monitor with HR in 80's.  BNP elevated and Troponin trended upward (0.076>0.095) with Heparin infusion initiated. Echo showed with concentric hypertrophy and normal systolic function. Atrial fibrillation observed.Biatrial atrial enlargement. Grade III left ventricular diastolic dysfunction. PA systolic pressure is 60 mmHg. Moderate right ventricular enlargement with normal right ventricular systolic function. Pulmonary hypertension. EF 55%. Moderate to severe tricuspid regurgitation. Mild-to-moderate mitral regurgitation. Severe right atrial enlargement. Large posterior pericardial effusion. No evidence of tamponade. Troponin elevated and flat with no cardiac symptoms reported. Pt also reported L groin pain upon admit. Orthopedic Surgery consulted and pt found to have sustained a left superior labral tear of her acetabulum noted on MRI with further orthopedic work up to be completed outpatient. Hx of ESRD noted with last HD on 12/16/22- Nephrology consulted with HD performed today. AVG to LUE with +bruit/+thrill present. On 12/21/22, MRI of R hip results pending. IR consulted for evaluation of aspiration/injection of left hip. Rate and rhythm controlled on Cardizem gtt. Heparin infusion continued pending joint aspiration.  Pericardial effusion discussed with Nephrology and  "Cardiology for recommendations.   Patient went to have aspiration of left hip however upon transferring from bed to table had an episode described as seizure-like" seems more that it was hypotension related.  Medications adjusted  She remains on p.o. Cardizem as well as Cardizem drip.  Patient underwent arthrocentesis with 4 cc of pus removed 12/23.  Sent for Gram stain, culture, crystals.  Discussed with Dr. Johnson , orthopedics,crystals are negative patient underwent washout with  Cultures 12/24.  Blood cultures and culture from left hip reveal Gram-positive and Gram-negative.  Patient paced on ceftazidime and vancomycin empirically until cultures return.  12/26 She is complaining of weakness . The blood and drainage cx are (+) for STREPTOCOCCUS GORDONII  sensitive  to rocephin .  ID consulted .  12/27 NAEON . ID consulted and rec IV rocephin qd x 4 week from the last negative blood cx . Cardiology consulted for KATE .   12/28 Plan  for a KATE tomorrow am . S/P HD today  . She is complaining od left hip pain .   12/29 S/P KATE which show negative for IE and mild to moderate pericardial effusion .  Pt  will need 4 week of IVAB  from the last negative blood cx . She  is complaining of left hip pain  . Plan to consult CM for SNF placement .   12/30 CT maxillofacial show Multifocal periodontal disease without definite associated dental abscess.  S/P Tunneled PICC line yesterday . Plan to d/c to SNF  for IVAB rocephin qd x 4 weeks .   12/31 NAEON . Awaiting SNF placement.        Interval History:     Review of Systems   Constitutional:  Negative for chills and fatigue.   HENT:  Negative for congestion, ear pain, facial swelling, sinus pressure and sore throat.    Eyes:  Negative for pain.   Respiratory:  Negative for apnea, chest tightness, shortness of breath and stridor.    Cardiovascular:  Negative for chest pain, palpitations and leg swelling.   Gastrointestinal:  Negative for abdominal distention, abdominal pain, " diarrhea and nausea.   Endocrine: Negative for polydipsia and polyphagia.   Genitourinary:  Negative for decreased urine volume, difficulty urinating, frequency and genital sores.   Musculoskeletal:  Positive for arthralgias, gait problem and myalgias.   Neurological:  Negative for light-headedness and headaches.   Hematological:  Negative for adenopathy.   Psychiatric/Behavioral:  Negative for agitation, confusion and decreased concentration.    Objective:     Vital Signs (Most Recent):  Temp: 97.9 °F (36.6 °C) (12/31/22 1131)  Pulse: 86 (12/31/22 1131)  Resp: 18 (12/31/22 1131)  BP: (!) 125/55 (12/31/22 0745)  SpO2: 96 % (12/31/22 1131)   Vital Signs (24h Range):  Temp:  [97.4 °F (36.3 °C)-98 °F (36.7 °C)] 97.9 °F (36.6 °C)  Pulse:  [] 86  Resp:  [16-18] 18  SpO2:  [91 %-98 %] 96 %  BP: ()/(55-64) 125/55     Weight: 77 kg (169 lb 12.1 oz)  Body mass index is 29.14 kg/m².    Intake/Output Summary (Last 24 hours) at 12/31/2022 1221  Last data filed at 12/30/2022 1539  Gross per 24 hour   Intake --   Output 2500 ml   Net -2500 ml      Physical Exam  Vitals and nursing note reviewed.   Constitutional:       Appearance: She is well-developed.   HENT:      Head: Normocephalic and atraumatic.   Eyes:      Conjunctiva/sclera: Conjunctivae normal.      Pupils: Pupils are equal, round, and reactive to light.   Neck:      Thyroid: No thyroid mass or thyromegaly.   Cardiovascular:      Rate and Rhythm: Normal rate.      Heart sounds: Normal heart sounds.   Pulmonary:      Effort: Pulmonary effort is normal. No accessory muscle usage or respiratory distress.      Breath sounds: Normal breath sounds.   Abdominal:      General: Bowel sounds are normal.      Palpations: Abdomen is soft. There is no mass.      Tenderness: There is no abdominal tenderness.   Musculoskeletal:      Cervical back: Normal range of motion and neck supple.      Comments: Dressing over left hip   Skin:     Findings: No rash.   Neurological:       Mental Status: She is alert and oriented to person, place, and time.       Significant Labs: All pertinent labs within the past 24 hours have been reviewed.      Significant Imaging: I have reviewed all pertinent imaging results/findings within the past 24 hours.        Assessment/Plan:      * Pyogenic arthritis of left hip  Patient presented with acute onset left hip pain x1 day duration in absence of trauma while doing leg exercises in bed and was found to asymmetric large left hip joint effusion, small right hip joint effusion, and evidence of superior labral tear of her left acetabulum noted on MRI.  Patient noted to have 5/5 strength throughout with sensation to light touch grossly intact throughout however, TTP throughout left groin.  Orthopedics consulted and awaiting further evaluation/recommendations.  Plan:  -optimize pain control  -bowel regimen  -PT/OT  -ortho surgery following    The patient underwent arthrocentesis by IR 12/23 with 4 cc pus obtained.  Sent for cell count which revealed 187,000 white blood cells 100% segs.  Crystals negative  and culture Gram-negative and Gram-positive.  Patient went for washout 12/24.  Placed on empiric vanco and ceftazidime awaiting sensitivities    -Blood cultures positive STREPTOCOCCUS GORDONII  -ID consulted.  Rec :to treat for 4 weeks from date of negative blood cultures with IV rocephin 2 gram daily  -Repeated blood cx NGTD      Streptococcal bacteremia  -Cont IV rocephin   -KATE: Negative for IE Moderate to mild pericardial effusion  .   -Cont IV rocephin     Hyperkalemia, diminished renal excretion  Cont HD      Pericardial effusion  -large posterior pericardial effusion   -pt appears asymptomatic   -Nephrology following with HD performed on 12/21/22 and a Monday Wednesday Friday schedule  -Cardiology following- will follow and evaluate after next HD   -KATE show mild to moderate pericardial effusion     Bone cyst of right femur  -Orthopedic Surgery following    -follow up outpatient recommended          Shortness of breath  Patient reported acute onset shortness a breath while obtaining MRI and was found to have evidence of severe cardiomegaly with small left pleural effusion but negative for infectious processes noted on CXR. Patient afebrile without leukocytosis. BNP elevated at 4792. Troponin 0.095. Exam positive for bilateral edema but negative for elevated JVD or crackles on lung ausculation. Patient currently saturating % on 2L via NC in no acute distress and without use of accessory muscles noted.  Patient without history of CHF.  Cardiology consulted.  Echo ordered and pending.  Nephrology consulted to resume HD inpatient.  Will trial dose of Lasix if clinical status worsens.  Plan:  -titrate oxygen therapy as needed  -incentive spirometry  -echo reviewed   -f/u cardiology and nephrology  -monitor Is/Os  -low salt/cardiac/renal diet    -Duonebs prn       Tear of left acetabular labrum  -Orthopedic surgery following  -MRI showed no fracture and asymmetric large left hip joint effusion.  Small right hip joint effusion. Paralabral cyst along the superior border of the acetabulum suspicious for a superior labral tear.   -analgesia as needed   -antiemetics as needed   - the left hip may treat with therapy and anti-inflammatories (per Orthopedic Surgery)   -Aspiration of the left hip by Interventional Radiology as above    Gastroesophageal reflux disease without esophagitis  Chronic. Stable. Currently asymptomatic. Home medications include PPI/Antacids as needed.  Plan:  -Continue PPI/Antacids as needed       Primary hypertension  BP currently ranging from 90s systolic.    Plan:  -Optimize pain control   -Continue home medications, titrate as needed   -Monitor BP  -Low salt/renal diet   -IV hydralazine prn for SBP>160 or DBP>90   -f/u nephrology for HD inpatient- HD performed on 12/20/22       ESRD (end stage renal disease) on dialysis  Patient currently follows  Dr. Ribera from nephrology with last reported dialysis completed last Friday. Patient currently on M/W/F HD at [facility] via LUE fistula but missed Monday due to not feeling well and being in the ED. Labs consistent with history of ESRD on HD.   Plan:  -continue home medications, titrate as needed  -nephrology consulted -HD performed on 12/20/22 and now back on MWF schedule      Atrial fibrillation  Patient with new onset Paroxysmal (<7 days) atrial fibrillation with RVR which is uncontrolled currently with Calcium Channel Blocker. Patient is currently in atrial fibrillation with rate in the 105-130s on diltiazem. [id number] Score: 2. HASBLED Score: 3. Anticoagulation indicated. Anticoagulation done with heparin .  Troponin measuring 0.095 with EKG obtained at outside facility revealing SVT/atrial fibrillation with RVR at rate of 176. Troponin likely elevated in setting of ESRD and demand ischemia from arrhythmia as patient denied endorsing chest pain.  Plan:  -cardizem drip transitioned to oral dosing  -f/u cardiology   -nephrology for HD   -Heparin gtt -transitioned to eliquis  -CHADS-VASc Score- 3         VTE Risk Mitigation (From admission, onward)         Ordered     apixaban tablet 5 mg  2 times daily         12/25/22 1402     Place sequential compression device  Until discontinued         12/19/22 2119     IP VTE LOW RISK PATIENT  Once         12/19/22 2119                Discharge Planning   ISAAK:      Code Status: Full Code   Is the patient medically ready for discharge?:     Reason for patient still in hospital (select all that apply): Treatment  Discharge Plan A: Skilled Nursing Facility                  Jonathan Smith MD  Department of Hospital Medicine   [place] - Telemetry ([facility])

## 2022-12-31 NOTE — ASSESSMENT & PLAN NOTE
Patient currently follows Dr. Ribera from nephrology with last reported dialysis completed last Friday. Patient currently on M/W/F HD at [facility] via LUE fistula but missed Monday due to not feeling well and being in the ED. Labs consistent with history of ESRD on HD.   Plan:  -continue home medications, titrate as needed  -nephrology consulted -HD performed on 12/20/22 and now back on MWF schedule

## 2022-12-31 NOTE — PROGRESS NOTES
[facility] ([facility])  Adult Nutrition  Progress Note    SUMMARY       Recommendations    Recommendation/Intervention:   1.  Encourage po intake   2. Recommendations of oral supplements    3. Monitor weights and labs   4.  Collaboration with medical providers    Goals: Patient to consume >75% of EEN prior to RD follow up.  Nutrition Goal Status: progressing towards goal  Communication of RD Recs: other (comment) (poc)    Assessment and Plan    Nutrition Problem  Increased nutrition     Related to (etiology):   Condition associated with diagnosis: ESRD on HD    Signs and Symptoms (as evidenced by):   Increased nutrient demands  Nausea/vomiting    Interventions/Recommendations (treatment strategy):  1.  Encourage po intake   2. Recommendations of oral supplements    3. Monitor weights and labs   4.  Collaboration with medical providers    Nutrition Diagnosis Status:   New         Malnutrition Assessment                                       Reason for Assessment    Reason For Assessment: length of stay  Diagnosis: renal disease, other (see comments) (Pyogenic arthritis of left hip)  Patient Active Problem List   Diagnosis    Atrial fibrillation    ESRD (end stage renal disease) on dialysis    Primary hypertension    Gastroesophageal reflux disease without esophagitis    Pyogenic arthritis of left hip    Tear of left acetabular labrum    Shortness of breath    Bone cyst of right femur    Pericardial effusion    Hyperkalemia, diminished renal excretion    Streptococcal bacteremia       Interdisciplinary Rounds: did not attend (RD remote)    General Information Comments:   12/31: Patient on a renal diet with ONS.  Patient with episode of nausea and vomiting today.  PO intake fair.  Meal intake noted between 25-75% at this time.  Labs reviewed.  NKFA.  LBM:12/29.  Patient is ESRD on HD.  RD to continue to monitor po intake and tolerance.  (RD remote)  Nutrition Discharge Planning: Patient to consume adequate po  "intake prior and post discharge.    Nutrition Risk Screen    Nutrition Risk Screen: no indicators present    Nutrition/Diet History    Spiritual, Cultural Beliefs, Tenriism Practices, Values that Affect Care: no  Food Allergies: NKFA  Factors Affecting Nutritional Intake: nausea/vomiting    Anthropometrics    Temp: 97.9 °F (36.6 °C)  Height Method: Stated  Height: 5' 4" (162.6 cm)  Height (inches): 64 in  Weight Method: Bed Scale  Weight: 77 kg (169 lb 12.1 oz)  Weight (lb): 169.76 lb  Ideal Body Weight (IBW), Female: 120 lb  % Ideal Body Weight, Female (lb): 141.47 %  BMI (Calculated): 29.1  BMI Grade: 25 - 29.9 - overweight       Lab/Procedures/Meds    Pertinent Labs Reviewed: reviewed  Pertinent Medications Reviewed: reviewed  BMP  Lab Results   Component Value Date     (L) 12/28/2022    K 4.5 12/28/2022    CL 99 12/28/2022    CO2 21 (L) 12/28/2022    BUN 47 (H) 12/28/2022    CREATININE 7.4 (H) 12/28/2022    CALCIUM 10.3 12/28/2022    ANIONGAP 14 12/28/2022    EGFRNORACEVR 6 (A) 12/28/2022     No results found for: LABA1C, HGBA1C  Lab Results   Component Value Date    CALCIUM 10.3 12/28/2022    PHOS 5.7 (H) 12/26/2022     Scheduled Meds:   sodium chloride 0.9%   Intravenous Once    sodium chloride 0.9%   Intravenous Once    apixaban  5 mg Oral BID    brimonidine 0.2%  1 drop Both Eyes BID    BUPivacaine (PF) 0.25% (2.5 mg/ml)  5 mL Intra-articular Once    calcitRIOL  0.5 mcg Oral Every Mon, Wed, Fri    cefTRIAXone (ROCEPHIN) IVPB  2 g Intravenous Q24H    diltiaZEM  240 mg Oral Daily    ergocalciferol  50,000 Units Oral Q7 Days    polyethylene glycol  17 g Oral Daily    senna-docusate 8.6-50 mg  1 tablet Oral Daily    sevelamer carbonate  800 mg Oral TID WM    vitamin renal formula (B-complex-vitamin c-folic acid)  1 capsule Oral Daily     Continuous Infusions:  PRN Meds:.sodium chloride 0.9%, acetaminophen, acetaminophen, albuterol-ipratropium, aluminum-magnesium hydroxide-simethicone, dextrose 10%, " dextrose 10%, glucagon (human recombinant), glucose, glucose, HYDROcodone-acetaminophen, melatonin, morphine, naloxone, ondansetron, promethazine, senna-docusate 8.6-50 mg, sodium chloride 0.9%, sodium chloride 0.9%, sodium chloride 0.9%, sodium chloride 0.9%, sodium chloride 0.9%    Physical Findings/Assessment         Estimated/Assessed Needs    Weight Used For Calorie Calculations: 77 kg (169 lb 12.1 oz)  Energy Calorie Requirements (kcal): 25-30kcals/kg (1925-2310kcals/day)  Energy Need Method: Kcal/kg  Protein Requirements: 1.2g/kg  Weight Used For Protein Calculations: 77 kg (169 lb 12.1 oz)  Fluid Requirements (mL): 1000+output  Estimated Fluid Requirement Method: other (see comments) (ESRD on HD)  RDA Method (mL): 25  CHO Requirement: 250      Nutrition Prescription Ordered    Current Diet Order: renal    Evaluation of Received Nutrient/Fluid Intake  Last Bowel Movement: 12/29/22    % Kcal Needs: 25-75%  % Protein Needs: 25-75%  Energy Calories Required: not meeting needs  Protein Required: not meeting needs  Fluid Required: meeting needs  Tolerance: other (see comments)  % Intake of Estimated Energy Needs: 25 - 50 %  % Meal Intake: 25 - 50 %  Intake/Output - Last 3 Shifts         12/29 0700  12/30 0659 12/30 0700 12/31 0659 12/31 0700 01/01 0659    P.O. 600 118     IV Piggyback 200      Total Intake(mL/kg) 800 (10) 118 (1.5)     Urine (mL/kg/hr)       Other  2500     Total Output  2500     Net +800 -2382            Urine Occurrence 0 x      Stool Occurrence 1 x              Nutrition Risk    Level of Risk/Frequency of Follow-up: moderate     Monitor and Evaluation    Food and Nutrient Intake: energy intake, food and beverage intake  Food and Nutrient Adminstration: diet order  Knowledge/Beliefs/Attitudes: food and nutrition knowledge/skill, beliefs and attitudes  Physical Activity and Function: nutrition-related ADLs and IADLs, factors affecting access to physical activity  Anthropometric Measurements:  height/length, weight, weight change, body mass index  Biochemical Data, Medical Tests and Procedures: electrolyte and renal panel, lipid profile, gastrointestinal profile, glucose/endocrine profile, inflammatory profile     Nutrition Follow-Up    RD Follow-up?: Yes    Millicent Jimenez MS, RDN, LDN

## 2022-12-31 NOTE — PLAN OF CARE
Nutrition Plan of Care:    Recommendations     Recommendation/Intervention:   1.  Encourage po intake   2. Recommendations of oral supplements    3. Monitor weights and labs   4.  Collaboration with medical providers     Goals: Patient to consume >75% of EEN prior to RD follow up.  Nutrition Goal Status: progressing towards goal  Communication of RD Recs: other (comment) (poc)     Assessment and Plan     Nutrition Problem  Increased nutrition      Related to (etiology):   Condition associated with diagnosis: ESRD on HD     Signs and Symptoms (as evidenced by):   Increased nutrient demands  Nausea/vomiting     Interventions/Recommendations (treatment strategy):  1.  Encourage po intake   2. Recommendations of oral supplements    3. Monitor weights and labs   4.  Collaboration with medical providers     Nutrition Diagnosis Status:   Isael Jimenez, MS, RDN, LDN

## 2022-12-31 NOTE — PLAN OF CARE
BED MOB CG    SIT<-->STAND EOB, COMMODE CG    RW 18', 2 X 7' CG     USED BATHROOM WITHOUT ASSISTANCE FOR HYGIENE    PT BECAME NAUSEATED AND VOMITED AFTER COMPLETING THIS SESSION. NSG AWARE    ED ON CALL DON'T FALL PROCEDURE

## 2022-12-31 NOTE — PLAN OF CARE
POC reviewed with pt. Pt verbalizes understanding of POC. No questions at this time.  AAOx4. NADN.  Afib on cardiac monitor.  Pt remains free of falls   No complaints at this time.  Safety measures in place.  Informed pt to call for assistance before getting up. Pt verbalizes understanding.

## 2023-01-01 PROBLEM — K59.00 CONSTIPATION: Status: ACTIVE | Noted: 2023-01-01

## 2023-01-01 LAB
ANION GAP SERPL CALC-SCNC: 13 MMOL/L (ref 8–16)
BASOPHILS # BLD AUTO: 0.04 K/UL (ref 0–0.2)
BASOPHILS NFR BLD: 0.4 % (ref 0–1.9)
BUN SERPL-MCNC: 38 MG/DL (ref 8–23)
CALCIUM SERPL-MCNC: 9.8 MG/DL (ref 8.7–10.5)
CHLORIDE SERPL-SCNC: 92 MMOL/L (ref 95–110)
CO2 SERPL-SCNC: 27 MMOL/L (ref 23–29)
CREAT SERPL-MCNC: 6.8 MG/DL (ref 0.5–1.4)
DIFFERENTIAL METHOD: ABNORMAL
EOSINOPHIL # BLD AUTO: 0.1 K/UL (ref 0–0.5)
EOSINOPHIL NFR BLD: 0.6 % (ref 0–8)
ERYTHROCYTE [DISTWIDTH] IN BLOOD BY AUTOMATED COUNT: 14.3 % (ref 11.5–14.5)
EST. GFR  (NO RACE VARIABLE): 6 ML/MIN/1.73 M^2
GLUCOSE SERPL-MCNC: 107 MG/DL (ref 70–110)
HCT VFR BLD AUTO: 32.8 % (ref 37–48.5)
HGB BLD-MCNC: 10.6 G/DL (ref 12–16)
IMM GRANULOCYTES # BLD AUTO: 0.14 K/UL (ref 0–0.04)
IMM GRANULOCYTES NFR BLD AUTO: 1.3 % (ref 0–0.5)
LYMPHOCYTES # BLD AUTO: 0.7 K/UL (ref 1–4.8)
LYMPHOCYTES NFR BLD: 6.4 % (ref 18–48)
MCH RBC QN AUTO: 31.5 PG (ref 27–31)
MCHC RBC AUTO-ENTMCNC: 32.3 G/DL (ref 32–36)
MCV RBC AUTO: 98 FL (ref 82–98)
MONOCYTES # BLD AUTO: 0.8 K/UL (ref 0.3–1)
MONOCYTES NFR BLD: 7.1 % (ref 4–15)
NEUTROPHILS # BLD AUTO: 9.3 K/UL (ref 1.8–7.7)
NEUTROPHILS NFR BLD: 84.2 % (ref 38–73)
NRBC BLD-RTO: 0 /100 WBC
PLATELET # BLD AUTO: 454 K/UL (ref 150–450)
PMV BLD AUTO: 10.2 FL (ref 9.2–12.9)
POTASSIUM SERPL-SCNC: 4 MMOL/L (ref 3.5–5.1)
RBC # BLD AUTO: 3.36 M/UL (ref 4–5.4)
SODIUM SERPL-SCNC: 132 MMOL/L (ref 136–145)
WBC # BLD AUTO: 11.01 K/UL (ref 3.9–12.7)

## 2023-01-01 PROCEDURE — 25000003 PHARM REV CODE 250: Performed by: INTERNAL MEDICINE

## 2023-01-01 PROCEDURE — 63600175 PHARM REV CODE 636 W HCPCS: Performed by: HOSPITALIST

## 2023-01-01 PROCEDURE — 80048 BASIC METABOLIC PNL TOTAL CA: CPT | Performed by: INTERNAL MEDICINE

## 2023-01-01 PROCEDURE — 36415 COLL VENOUS BLD VENIPUNCTURE: CPT | Performed by: INTERNAL MEDICINE

## 2023-01-01 PROCEDURE — 25000003 PHARM REV CODE 250: Performed by: HOSPITALIST

## 2023-01-01 PROCEDURE — 21400001 HC TELEMETRY ROOM

## 2023-01-01 PROCEDURE — 99232 SBSQ HOSP IP/OBS MODERATE 35: CPT | Mod: ,,, | Performed by: INTERNAL MEDICINE

## 2023-01-01 PROCEDURE — 99232 PR SUBSEQUENT HOSPITAL CARE,LEVL II: ICD-10-PCS | Mod: ,,, | Performed by: INTERNAL MEDICINE

## 2023-01-01 PROCEDURE — 63600175 PHARM REV CODE 636 W HCPCS: Performed by: INTERNAL MEDICINE

## 2023-01-01 PROCEDURE — 85025 COMPLETE CBC W/AUTO DIFF WBC: CPT | Performed by: INTERNAL MEDICINE

## 2023-01-01 RX ORDER — DILTIAZEM HYDROCHLORIDE 120 MG/1
120 CAPSULE, COATED, EXTENDED RELEASE ORAL DAILY
Status: DISCONTINUED | OUTPATIENT
Start: 2023-01-01 | End: 2023-01-05

## 2023-01-01 RX ADMIN — SENNOSIDES AND DOCUSATE SODIUM 1 TABLET: 8.6; 5 TABLET ORAL at 08:01

## 2023-01-01 RX ADMIN — PROMETHAZINE HYDROCHLORIDE 25 MG: 25 TABLET ORAL at 10:01

## 2023-01-01 RX ADMIN — Medication 1 ENEMA: at 02:01

## 2023-01-01 RX ADMIN — HYDROCODONE BITARTRATE AND ACETAMINOPHEN 1 TABLET: 5; 325 TABLET ORAL at 08:01

## 2023-01-01 RX ADMIN — SEVELAMER CARBONATE 800 MG: 800 TABLET, FILM COATED ORAL at 05:01

## 2023-01-01 RX ADMIN — NEPHROCAP 1 CAPSULE: 1 CAP ORAL at 08:01

## 2023-01-01 RX ADMIN — APIXABAN 5 MG: 2.5 TABLET, FILM COATED ORAL at 08:01

## 2023-01-01 RX ADMIN — SEVELAMER CARBONATE 800 MG: 800 TABLET, FILM COATED ORAL at 08:01

## 2023-01-01 RX ADMIN — SEVELAMER CARBONATE 800 MG: 800 TABLET, FILM COATED ORAL at 11:01

## 2023-01-01 RX ADMIN — POLYETHYLENE GLYCOL 3350 17 G: 17 POWDER, FOR SOLUTION ORAL at 08:01

## 2023-01-01 RX ADMIN — ONDANSETRON 4 MG: 2 INJECTION INTRAMUSCULAR; INTRAVENOUS at 08:01

## 2023-01-01 RX ADMIN — CEFTRIAXONE 2 G: 2 INJECTION, POWDER, FOR SOLUTION INTRAMUSCULAR; INTRAVENOUS at 05:01

## 2023-01-01 RX ADMIN — BRIMONIDINE TARTRATE 1 DROP: 2 SOLUTION/ DROPS OPHTHALMIC at 08:01

## 2023-01-01 RX ADMIN — DILTIAZEM HYDROCHLORIDE 120 MG: 120 CAPSULE, COATED, EXTENDED RELEASE ORAL at 08:01

## 2023-01-01 RX ADMIN — SODIUM CHLORIDE 30 ML/HR: 9 INJECTION, SOLUTION INTRAVENOUS at 05:01

## 2023-01-01 NOTE — PLAN OF CARE
Ongoing (interventions implemented as appropriate)  Pt is alert and oriented.  VSS  Pt able to make needs known.  Pt remained afebrile throughout this shift.   Pt remained free of falls this shift.   Pt denies pain this shift.  Plan of care reviewed. Patient verbalizes understanding.   Pt is a turn q2. Frequent weight shifting encouraged.  Patient normal sinus rhythm on monitor.   Bed low, side rails up x 2, wheels locked, call light in reach.   Hourly rounding completed.   Will continue to observe.

## 2023-01-01 NOTE — SUBJECTIVE & OBJECTIVE
Interval History:     Review of Systems   Constitutional:  Negative for chills and fatigue.   HENT:  Negative for congestion, ear pain, facial swelling, sinus pressure and sore throat.    Eyes:  Negative for pain.   Respiratory:  Negative for apnea, chest tightness, shortness of breath and stridor.    Cardiovascular:  Negative for chest pain, palpitations and leg swelling.   Gastrointestinal:  Positive for constipation, nausea and vomiting. Negative for abdominal distention, abdominal pain and diarrhea.   Endocrine: Negative for polydipsia and polyphagia.   Genitourinary:  Negative for decreased urine volume, difficulty urinating, frequency and genital sores.   Musculoskeletal:  Positive for arthralgias, gait problem and myalgias.   Neurological:  Negative for light-headedness and headaches.   Hematological:  Negative for adenopathy.   Psychiatric/Behavioral:  Negative for agitation, confusion and decreased concentration.    Objective:     Vital Signs (Most Recent):  Temp: 97.8 °F (36.6 °C) (01/01/23 1104)  Pulse: 82 (01/01/23 1300)  Resp: 18 (01/01/23 1104)  BP: 103/61 (01/01/23 1104)  SpO2: 96 % (01/01/23 1104) Vital Signs (24h Range):  Temp:  [97.4 °F (36.3 °C)-98.8 °F (37.1 °C)] 97.8 °F (36.6 °C)  Pulse:  [73-98] 82  Resp:  [16-18] 18  SpO2:  [90 %-96 %] 96 %  BP: ()/(53-61) 103/61     Weight: 77 kg (169 lb 12.1 oz)  Body mass index is 29.14 kg/m².  No intake or output data in the 24 hours ending 01/01/23 1354   Physical Exam  Vitals and nursing note reviewed.   Constitutional:       Appearance: She is well-developed.   HENT:      Head: Normocephalic and atraumatic.   Eyes:      Conjunctiva/sclera: Conjunctivae normal.      Pupils: Pupils are equal, round, and reactive to light.   Neck:      Thyroid: No thyroid mass or thyromegaly.   Cardiovascular:      Rate and Rhythm: Normal rate.      Heart sounds: Normal heart sounds.   Pulmonary:      Effort: Pulmonary effort is normal. No accessory muscle usage or  respiratory distress.      Breath sounds: Normal breath sounds.   Abdominal:      General: Bowel sounds are normal.      Palpations: Abdomen is soft. There is no mass.      Tenderness: There is no abdominal tenderness.   Musculoskeletal:      Cervical back: Normal range of motion and neck supple.      Comments: Dressing over left hip   Skin:     Findings: No rash.   Neurological:      Mental Status: She is alert and oriented to person, place, and time.       Significant Labs: All pertinent labs within the past 24 hours have been reviewed.      Significant Imaging: I have reviewed all pertinent imaging results/findings within the past 24 hours.

## 2023-01-01 NOTE — ASSESSMENT & PLAN NOTE
Patient with new onset Paroxysmal (<7 days) atrial fibrillation with RVR which is uncontrolled currently with Calcium Channel Blocker. Patient is currently in atrial fibrillation with rate in the 105-130s on diltiazem. [id number] Score: 2. HASBLED Score: 3. Anticoagulation indicated. Anticoagulation done with heparin .  Troponin measuring 0.095 with EKG obtained at outside facility revealing SVT/atrial fibrillation with RVR at rate of 176. Troponin likely elevated in setting of ESRD and demand ischemia from arrhythmia as patient denied endorsing chest pain.  Plan:  -cardizem drip transitioned to oral dosing  -f/u cardiology   -nephrology for HD   -Heparin gtt -transitioned to eliquis  -CHADS-VASc Score- 3   -Decreased cardizem due to low BP

## 2023-01-01 NOTE — ASSESSMENT & PLAN NOTE
Patient currently follows Dr. Ribera from nephrology with last reported dialysis completed last Friday. Patient currently on M/W/F HD at [facility] via LUE fistula but missed Monday due to not feeling well and being in the ED. Labs consistent with history of ESRD on HD.   Plan:  -continue home medications, titrate as needed  -nephrology consulted -HD performed on 12/20/22 and now back on MWF schedule

## 2023-01-01 NOTE — PROGRESS NOTES
[facility] ([facility])  Nephrology  Progress Note    Patient Name: Meaghan Potter  MRN: [id number]  Admission Date: 12/19/2022  Hospital Length of Stay: 11 days  Attending Provider: Jonathan Smith, *   Primary Care Physician: Primary Doctor No  Principal Problem:Pyogenic arthritis of left hip    Consults  Subjective:     Interval History:  Patient was seen in her hospital room.  In bed resting comfortably.  No acute distress noted.    Review of systems:  No shortness of breath or chest discomfort.  No fevers or chills.  She continues to have some mild nausea.    Review of patient's allergies indicates:   Allergen Reactions    Amoxicillin Rash     Current Facility-Administered Medications   Medication Frequency    0.9%  NaCl infusion Once    0.9%  NaCl infusion Once    0.9%  NaCl infusion PRN    acetaminophen suppository 650 mg Q6H PRN    acetaminophen tablet 650 mg Q8H PRN    albuterol-ipratropium 2.5 mg-0.5 mg/3 mL nebulizer solution 3 mL Q6H PRN    aluminum-magnesium hydroxide-simethicone 200-200-20 mg/5 mL suspension 30 mL QID PRN    apixaban tablet 5 mg BID    brimonidine 0.2% ophthalmic solution 1 drop BID    BUPivacaine (PF) 0.25% (2.5 mg/ml) injection 12.5 mg Once    calcitRIOL capsule 0.5 mcg Every Mon, Wed, Fri    cefTRIAXone (ROCEPHIN) 2 g in dextrose 5 % in water (D5W) 5 % 50 mL IVPB (MB+) Q24H    dextrose 10% bolus 125 mL 125 mL PRN    dextrose 10% bolus 250 mL 250 mL PRN    diltiaZEM 24 hr capsule 120 mg Daily    ergocalciferol capsule 50,000 Units Q7 Days    glucagon (human recombinant) injection 1 mg PRN    glucose chewable tablet 16 g PRN    glucose chewable tablet 24 g PRN    HYDROcodone-acetaminophen 5-325 mg per tablet 1 tablet Q6H PRN    melatonin tablet 6 mg Nightly PRN    morphine injection 2 mg Q4H PRN    naloxone 0.4 mg/mL injection 0.02 mg PRN    ondansetron injection 4 mg Q8H PRN    polyethylene glycol packet 17 g Daily    promethazine tablet 25 mg Q6H PRN    senna-docusate  8.6-50 mg per tablet 1 tablet BID PRN    senna-docusate 8.6-50 mg per tablet 1 tablet Daily    sevelamer carbonate tablet 800 mg TID WM    sodium chloride 0.9% bolus 250 mL 250 mL PRN    sodium chloride 0.9% bolus 250 mL 250 mL PRN    sodium chloride 0.9% bolus 250 mL 250 mL PRN    sodium chloride 0.9% bolus 250 mL 250 mL PRN    sodium chloride 0.9% flush 3 mL Q12H PRN    vitamin renal formula (B-complex-vitamin c-folic acid) 1 mg per capsule 1 capsule Daily       Objective:     Vital Signs (Most Recent):  Temp: 97.4 °F (36.3 °C) (01/01/23 0725)  Pulse: 95 (01/01/23 0900)  Resp: 18 (01/01/23 0725)  BP: (!) 98/56 (01/01/23 0725)  SpO2: (!) 93 % (01/01/23 0725) Vital Signs (24h Range):  Temp:  [97.4 °F (36.3 °C)-98.8 °F (37.1 °C)] 97.4 °F (36.3 °C)  Pulse:  [] 95  Resp:  [16-18] 18  SpO2:  [90 %-96 %] 93 %  BP: ()/(53-58) 98/56     Weight: 77 kg (169 lb 12.1 oz) (12/31/22 1534)  Body mass index is 29.14 kg/m².  Body surface area is 1.86 meters squared.    I/O last 3 completed shifts:  In: 440 [P.O.:440]  Out: -     Physical Exam  Constitutional:       Appearance: Normal appearance.   HENT:      Head: Normocephalic and atraumatic.   Eyes:      General: No scleral icterus.     Extraocular Movements: Extraocular movements intact.      Pupils: Pupils are equal, round, and reactive to light.   Pulmonary:      Effort: Pulmonary effort is normal.      Breath sounds: No stridor.   Musculoskeletal:      Right lower leg: No edema.      Left lower leg: No edema.   Skin:     General: Skin is warm and dry.   Neurological:      General: No focal deficit present.      Mental Status: She is alert and oriented to person, place, and time.   Psychiatric:         Mood and Affect: Mood normal.         Behavior: Behavior normal.       Significant Labs:sureBMP:   Recent Labs   Lab 01/01/23  0759      *   K 4.0   CL 92*   CO2 27   BUN 38*   CREATININE 6.8*   CALCIUM 9.8       CMP:   Recent Labs   Lab 12/26/22  0617  12/28/22  0536 01/01/23  0759   *   < > 107   CALCIUM 10.4   < > 9.8   ALBUMIN 2.5*  --   --    *   < > 132*   K 4.8   < > 4.0   CO2 24   < > 27   CL 93*   < > 92*   BUN 69*   < > 38*   CREATININE 9.1*   < > 6.8*    < > = values in this interval not displayed.       All labs within the past 24 hours have been reviewed.    Significant Imaging:  Labs: Reviewed      Assessment/Plan:     Active Diagnoses:    Diagnosis Date Noted POA    PRINCIPAL PROBLEM:  Pyogenic arthritis of left hip [M00.9] 12/20/2022 Yes    Streptococcal bacteremia [R78.81, B95.5] 12/27/2022 Yes    Hyperkalemia, diminished renal excretion [E87.5] 12/25/2022 Yes    Pericardial effusion [I31.39] 12/21/2022 Yes    ESRD (end stage renal disease) on dialysis [N18.6, Z99.2] 12/20/2022 Not Applicable    Primary hypertension [I10] 12/20/2022 Yes    Gastroesophageal reflux disease without esophagitis [K21.9] 12/20/2022 Yes    Tear of left acetabular labrum [S73.192A] 12/20/2022 Yes    Bone cyst of right femur [M85.651] 12/20/2022 Yes    Atrial fibrillation [I48.91] 12/19/2022 Yes      Problems Resolved During this Admission:       Assessment plan:    1. End-stage renal disease:  Dialysis is planned again for tomorrow.  Will plan to continue Monday Wednesday Friday schedule unless otherwise indicated.    2. Potassium has been stable.  The most recent was 4.0.    3. Bicarbonate is stable at 27.    4. Septic hip: Defer to Orthopedic surgery and primary service for management.    Thank you for your consult.     Bruce Ortega MD  Nephrology  [facility] Tele[facility] ([facility])

## 2023-01-01 NOTE — PROGRESS NOTES
[facility] Medicine  Progress Note    Patient Name: Meaghan Potter  MRN: [id number]  Patient Class: IP- Inpatient   Admission Date: 12/19/2022  Length of Stay: 11 days  Attending Physician: Jonathan Smith, *  Primary Care Provider: Primary Doctor No        Subjective:     Principal Problem:Pyogenic arthritis of left hip        HPI:  Meaghan Potter is a 67 y.o. female with a PMH  has a past medical history of ESRD (end stage renal disease) on dialysis, GERD (gastroesophageal reflux disease), and Hypertension. who presented as a transfer from [facility] for higher level cardiology and orthopedic care after patient was found to have sustained a left superior labral tear of her acetabulum noted on MRI in addition to new onset atrial fibrillation with RVR requiring diltiazem drip.  Patient reported being in her usual state of health prior to onset of symptoms and reported acute onset of left hip pain while performing leg exercises in bed earlier today.  Patient reported hearing and feeling a pop in her left hip followed by immediate pain which has remained constant and currently rated 6/10 in severity. Pain was described as throbbing/pulling in nature with no known alleviating factors and aggravating factors including weight-bearing, movement, and palpation to the affected area.  She denied endorsing any numbness, tingling, discoloration, or penetrating trauma, but did express decreased range of motion and muscle strength secondary to exacerbation of pain.  While at outside facility, patient became short of breath while lying flat while obtaining MRI of hip and was found to be in atrial fibrillation with RVR requiring diltiazem drip. She denied endorsing any chest pain, lightheadedness, dizziness, visual disturbances, fever, chills, sweats, nausea, vomiting, abdominal pain, changes in bowel/bladder habits, or onset neurological deficits.  All other review of systems negative except as noted  above.  Patient reports being back at her baseline and continues to complain only of left hip pain.  Orthopedics and cardiology consulted and awaiting further evaluation/recommendations.  Of note, patient missed hemodialysis today secondary to pain and going to the emergency room and usually receives HD via left upper extremity fistula on Monday/Wednesday/Friday.  Follows Dr. Ribera outpatient. Nephrology consulted to continue HD inpatient.     PCP: Primary Doctor No        Overview/Hospital Course:  Pt admitted to Observation for Atrial fibrillation (new onset) with RVR with . Cardiology consulted with Cardizem gtt initiated. NSR on monitor with HR in 80's.  BNP elevated and Troponin trended upward (0.076>0.095) with Heparin infusion initiated. Echo showed with concentric hypertrophy and normal systolic function. Atrial fibrillation observed.Biatrial atrial enlargement. Grade III left ventricular diastolic dysfunction. PA systolic pressure is 60 mmHg. Moderate right ventricular enlargement with normal right ventricular systolic function. Pulmonary hypertension. EF 55%. Moderate to severe tricuspid regurgitation. Mild-to-moderate mitral regurgitation. Severe right atrial enlargement. Large posterior pericardial effusion. No evidence of tamponade. Troponin elevated and flat with no cardiac symptoms reported. Pt also reported L groin pain upon admit. Orthopedic Surgery consulted and pt found to have sustained a left superior labral tear of her acetabulum noted on MRI with further orthopedic work up to be completed outpatient. Hx of ESRD noted with last HD on 12/16/22- Nephrology consulted with HD performed today. AVG to LUE with +bruit/+thrill present. On 12/21/22, MRI of R hip results pending. IR consulted for evaluation of aspiration/injection of left hip. Rate and rhythm controlled on Cardizem gtt. Heparin infusion continued pending joint aspiration.  Pericardial effusion discussed with Nephrology and  "Cardiology for recommendations.   Patient went to have aspiration of left hip however upon transferring from bed to table had an episode described as seizure-like" seems more that it was hypotension related.  Medications adjusted  She remains on p.o. Cardizem as well as Cardizem drip.  Patient underwent arthrocentesis with 4 cc of pus removed 12/23.  Sent for Gram stain, culture, crystals.  Discussed with Dr. Johnson , orthopedics,crystals are negative patient underwent washout with  Cultures 12/24.  Blood cultures and culture from left hip reveal Gram-positive and Gram-negative.  Patient paced on ceftazidime and vancomycin empirically until cultures return.  12/26 She is complaining of weakness . The blood and drainage cx are (+) for STREPTOCOCCUS GORDONII  sensitive  to rocephin .  ID consulted .  12/27 NAEON . ID consulted and rec IV rocephin qd x 4 week from the last negative blood cx . Cardiology consulted for KATE .   12/28 Plan  for a KATE tomorrow am . S/P HD today  . She is complaining od left hip pain .   12/29 S/P KATE which show negative for IE and mild to moderate pericardial effusion .  Pt  will need 4 week of IVAB  from the last negative blood cx . She  is complaining of left hip pain  . Plan to consult CM for SNF placement .   12/30 CT maxillofacial show Multifocal periodontal disease without definite associated dental abscess.  S/P Tunneled PICC line yesterday . Plan to d/c to SNF  for IVAB rocephin qd x 4 weeks .   12/31 NAEON . Awaiting SNF placement.    1/1/23 C/O nausea and vomit . Abd XR show constipation. Plan to cont miralax colace and start enemas .      Interval History:     Review of Systems   Constitutional:  Negative for chills and fatigue.   HENT:  Negative for congestion, ear pain, facial swelling, sinus pressure and sore throat.    Eyes:  Negative for pain.   Respiratory:  Negative for apnea, chest tightness, shortness of breath and stridor.    Cardiovascular:  Negative for chest pain, " palpitations and leg swelling.   Gastrointestinal:  Positive for constipation, nausea and vomiting. Negative for abdominal distention, abdominal pain and diarrhea.   Endocrine: Negative for polydipsia and polyphagia.   Genitourinary:  Negative for decreased urine volume, difficulty urinating, frequency and genital sores.   Musculoskeletal:  Positive for arthralgias, gait problem and myalgias.   Neurological:  Negative for light-headedness and headaches.   Hematological:  Negative for adenopathy.   Psychiatric/Behavioral:  Negative for agitation, confusion and decreased concentration.    Objective:     Vital Signs (Most Recent):  Temp: 97.8 °F (36.6 °C) (01/01/23 1104)  Pulse: 82 (01/01/23 1300)  Resp: 18 (01/01/23 1104)  BP: 103/61 (01/01/23 1104)  SpO2: 96 % (01/01/23 1104) Vital Signs (24h Range):  Temp:  [97.4 °F (36.3 °C)-98.8 °F (37.1 °C)] 97.8 °F (36.6 °C)  Pulse:  [73-98] 82  Resp:  [16-18] 18  SpO2:  [90 %-96 %] 96 %  BP: ()/(53-61) 103/61     Weight: 77 kg (169 lb 12.1 oz)  Body mass index is 29.14 kg/m².  No intake or output data in the 24 hours ending 01/01/23 1354   Physical Exam  Vitals and nursing note reviewed.   Constitutional:       Appearance: She is well-developed.   HENT:      Head: Normocephalic and atraumatic.   Eyes:      Conjunctiva/sclera: Conjunctivae normal.      Pupils: Pupils are equal, round, and reactive to light.   Neck:      Thyroid: No thyroid mass or thyromegaly.   Cardiovascular:      Rate and Rhythm: Normal rate.      Heart sounds: Normal heart sounds.   Pulmonary:      Effort: Pulmonary effort is normal. No accessory muscle usage or respiratory distress.      Breath sounds: Normal breath sounds.   Abdominal:      General: Bowel sounds are normal.      Palpations: Abdomen is soft. There is no mass.      Tenderness: There is no abdominal tenderness.   Musculoskeletal:      Cervical back: Normal range of motion and neck supple.      Comments: Dressing over left hip   Skin:      Findings: No rash.   Neurological:      Mental Status: She is alert and oriented to person, place, and time.       Significant Labs: All pertinent labs within the past 24 hours have been reviewed.      Significant Imaging: I have reviewed all pertinent imaging results/findings within the past 24 hours.        Assessment/Plan:      * Pyogenic arthritis of left hip  Patient presented with acute onset left hip pain x1 day duration in absence of trauma while doing leg exercises in bed and was found to asymmetric large left hip joint effusion, small right hip joint effusion, and evidence of superior labral tear of her left acetabulum noted on MRI.  Patient noted to have 5/5 strength throughout with sensation to light touch grossly intact throughout however, TTP throughout left groin.  Orthopedics consulted and awaiting further evaluation/recommendations.  Plan:  -optimize pain control  -bowel regimen  -PT/OT  -ortho surgery following    The patient underwent arthrocentesis by IR 12/23 with 4 cc pus obtained.  Sent for cell count which revealed 187,000 white blood cells 100% segs.  Crystals negative  and culture Gram-negative and Gram-positive.  Patient went for washout 12/24.  Placed on empiric vanco and ceftazidime awaiting sensitivities    -Blood cultures positive STREPTOCOCCUS GORDONII  -ID consulted.  Rec :to treat for 4 weeks from date of negative blood cultures with IV rocephin 2 gram daily  -Repeated blood cx NGTD      Constipation  -cont miralax and senakot  -start enemas prn       Streptococcal bacteremia  -Cont IV rocephin   -KATE: Negative for IE Moderate to mild pericardial effusion  .   -Cont IV rocephin     Hyperkalemia, diminished renal excretion  Cont HD      Pericardial effusion  -large posterior pericardial effusion   -pt appears asymptomatic   -Nephrology following with HD performed on 12/21/22 and a Monday Wednesday Friday schedule  -Cardiology following- will follow and evaluate after next HD   -KATE  show mild to moderate pericardial effusion     Bone cyst of right femur  -Orthopedic Surgery following   -follow up outpatient recommended          Shortness of breath  Patient reported acute onset shortness a breath while obtaining MRI and was found to have evidence of severe cardiomegaly with small left pleural effusion but negative for infectious processes noted on CXR. Patient afebrile without leukocytosis. BNP elevated at 4792. Troponin 0.095. Exam positive for bilateral edema but negative for elevated JVD or crackles on lung ausculation. Patient currently saturating % on 2L via NC in no acute distress and without use of accessory muscles noted.  Patient without history of CHF.  Cardiology consulted.  Echo ordered and pending.  Nephrology consulted to resume HD inpatient.  Will trial dose of Lasix if clinical status worsens.  Plan:  -titrate oxygen therapy as needed  -incentive spirometry  -echo reviewed   -f/u cardiology and nephrology  -monitor Is/Os  -low salt/cardiac/renal diet    -Duonebs prn       Tear of left acetabular labrum  -Orthopedic surgery following  -MRI showed no fracture and asymmetric large left hip joint effusion.  Small right hip joint effusion. Paralabral cyst along the superior border of the acetabulum suspicious for a superior labral tear.   -analgesia as needed   -antiemetics as needed   - the left hip may treat with therapy and anti-inflammatories (per Orthopedic Surgery)   -Aspiration of the left hip by Interventional Radiology as above    Gastroesophageal reflux disease without esophagitis  Chronic. Stable. Currently asymptomatic. Home medications include PPI/Antacids as needed.  Plan:  -Continue PPI/Antacids as needed       Primary hypertension  BP currently ranging from 90s systolic.    Plan:  -Optimize pain control   -Continue home medications, titrate as needed   -Monitor BP  -Low salt/renal diet   -IV hydralazine prn for SBP>160 or DBP>90   -f/u nephrology for HD  inpatient- HD performed on 12/20/22       ESRD (end stage renal disease) on dialysis  Patient currently follows Dr. Ribera from nephrology with last reported dialysis completed last Friday. Patient currently on M/W/F HD at [facility] via LUE fistula but missed Monday due to not feeling well and being in the ED. Labs consistent with history of ESRD on HD.   Plan:  -continue home medications, titrate as needed  -nephrology consulted -HD performed on 12/20/22 and now back on MWF schedule      Atrial fibrillation  Patient with new onset Paroxysmal (<7 days) atrial fibrillation with RVR which is uncontrolled currently with Calcium Channel Blocker. Patient is currently in atrial fibrillation with rate in the 105-130s on diltiazem. [id number] Score: 2. HASBLED Score: 3. Anticoagulation indicated. Anticoagulation done with heparin .  Troponin measuring 0.095 with EKG obtained at outside facility revealing SVT/atrial fibrillation with RVR at rate of 176. Troponin likely elevated in setting of ESRD and demand ischemia from arrhythmia as patient denied endorsing chest pain.  Plan:  -cardizem drip transitioned to oral dosing  -f/u cardiology   -nephrology for HD   -Heparin gtt -transitioned to eliquis  -CHADS-VASc Score- 3   -Decreased cardizem due to low BP         VTE Risk Mitigation (From admission, onward)         Ordered     apixaban tablet 5 mg  2 times daily         12/25/22 1402     Place sequential compression device  Until discontinued         12/19/22 2119     IP VTE LOW RISK PATIENT  Once         12/19/22 2119                Discharge Planning   ISAAK:      Code Status: Full Code   Is the patient medically ready for discharge?:     Reason for patient still in hospital (select all that apply): Treatment  Discharge Plan A: Skilled Nursing Facility                  Jonathan Smith MD  Department of Hospital Medicine   [facility] - Telemetry ([facility])

## 2023-01-02 PROCEDURE — 63600175 PHARM REV CODE 636 W HCPCS: Performed by: INTERNAL MEDICINE

## 2023-01-02 PROCEDURE — 25000003 PHARM REV CODE 250: Performed by: INTERNAL MEDICINE

## 2023-01-02 PROCEDURE — 99232 PR SUBSEQUENT HOSPITAL CARE,LEVL II: ICD-10-PCS | Mod: ,,, | Performed by: INTERNAL MEDICINE

## 2023-01-02 PROCEDURE — 80100016 HC MAINTENANCE HEMODIALYSIS

## 2023-01-02 PROCEDURE — 99232 SBSQ HOSP IP/OBS MODERATE 35: CPT | Mod: ,,, | Performed by: INTERNAL MEDICINE

## 2023-01-02 PROCEDURE — 25000003 PHARM REV CODE 250: Performed by: HOSPITALIST

## 2023-01-02 PROCEDURE — 21400001 HC TELEMETRY ROOM

## 2023-01-02 PROCEDURE — 63600175 PHARM REV CODE 636 W HCPCS: Performed by: HOSPITALIST

## 2023-01-02 RX ADMIN — NEPHROCAP 1 CAPSULE: 1 CAP ORAL at 08:01

## 2023-01-02 RX ADMIN — MORPHINE SULFATE 2 MG: 2 INJECTION, SOLUTION INTRAMUSCULAR; INTRAVENOUS at 09:01

## 2023-01-02 RX ADMIN — BRIMONIDINE TARTRATE 1 DROP: 2 SOLUTION/ DROPS OPHTHALMIC at 09:01

## 2023-01-02 RX ADMIN — SENNOSIDES AND DOCUSATE SODIUM 1 TABLET: 8.6; 5 TABLET ORAL at 08:01

## 2023-01-02 RX ADMIN — POLYETHYLENE GLYCOL 3350 17 G: 17 POWDER, FOR SOLUTION ORAL at 08:01

## 2023-01-02 RX ADMIN — BRIMONIDINE TARTRATE 1 DROP: 2 SOLUTION/ DROPS OPHTHALMIC at 08:01

## 2023-01-02 RX ADMIN — APIXABAN 5 MG: 2.5 TABLET, FILM COATED ORAL at 08:01

## 2023-01-02 RX ADMIN — SEVELAMER CARBONATE 800 MG: 800 TABLET, FILM COATED ORAL at 05:01

## 2023-01-02 RX ADMIN — SEVELAMER CARBONATE 800 MG: 800 TABLET, FILM COATED ORAL at 08:01

## 2023-01-02 RX ADMIN — ERGOCALCIFEROL 50000 UNITS: 1.25 CAPSULE ORAL at 08:01

## 2023-01-02 RX ADMIN — CEFTRIAXONE 2 G: 2 INJECTION, POWDER, FOR SOLUTION INTRAMUSCULAR; INTRAVENOUS at 05:01

## 2023-01-02 RX ADMIN — DILTIAZEM HYDROCHLORIDE 120 MG: 120 CAPSULE, COATED, EXTENDED RELEASE ORAL at 08:01

## 2023-01-02 RX ADMIN — MORPHINE SULFATE 2 MG: 2 INJECTION, SOLUTION INTRAMUSCULAR; INTRAVENOUS at 08:01

## 2023-01-02 RX ADMIN — APIXABAN 5 MG: 2.5 TABLET, FILM COATED ORAL at 09:01

## 2023-01-02 RX ADMIN — CALCITRIOL CAPSULES 0.25 MCG 0.5 MCG: 0.25 CAPSULE ORAL at 08:01

## 2023-01-02 RX ADMIN — SEVELAMER CARBONATE 800 MG: 800 TABLET, FILM COATED ORAL at 11:01

## 2023-01-02 RX ADMIN — SODIUM CHLORIDE: 9 INJECTION, SOLUTION INTRAVENOUS at 05:01

## 2023-01-02 NOTE — PROGRESS NOTES
[facility] Medicine  Progress Note    Patient Name: Meaghan Potter  MRN: [id number]  Patient Class: IP- Inpatient   Admission Date: 12/19/2022  Length of Stay: 12 days  Attending Physician: Linda Carranza MD  Primary Care Provider: Primary Doctor No      Subjective:     Principal Problem:Pyogenic arthritis of left hip    HPI:  Meaghan Potter is a 67 y.o. female with a PMH  has a past medical history of ESRD (end stage renal disease) on dialysis, GERD (gastroesophageal reflux disease), and Hypertension. who presented as a transfer from [facility] for higher level cardiology and orthopedic care after patient was found to have sustained a left superior labral tear of her acetabulum noted on MRI in addition to new onset atrial fibrillation with RVR requiring diltiazem drip.  Patient reported being in her usual state of health prior to onset of symptoms and reported acute onset of left hip pain while performing leg exercises in bed earlier today.  Patient reported hearing and feeling a pop in her left hip followed by immediate pain which has remained constant and currently rated 6/10 in severity. Pain was described as throbbing/pulling in nature with no known alleviating factors and aggravating factors including weight-bearing, movement, and palpation to the affected area.  She denied endorsing any numbness, tingling, discoloration, or penetrating trauma, but did express decreased range of motion and muscle strength secondary to exacerbation of pain.  While at outside facility, patient became short of breath while lying flat while obtaining MRI of hip and was found to be in atrial fibrillation with RVR requiring diltiazem drip. She denied endorsing any chest pain, lightheadedness, dizziness, visual disturbances, fever, chills, sweats, nausea, vomiting, abdominal pain, changes in bowel/bladder habits, or onset neurological deficits.  All other review of systems negative except as noted above.   Patient reports being back at her baseline and continues to complain only of left hip pain.  Orthopedics and cardiology consulted and awaiting further evaluation/recommendations.  Of note, patient missed hemodialysis today secondary to pain and going to the emergency room and usually receives HD via left upper extremity fistula on Monday/Wednesday/Friday.  Follows Dr. Ribera outpatient. Nephrology consulted to continue HD inpatient.     PCP: Primary Doctor No        Overview/Hospital Course:  Pt admitted to Observation for Atrial fibrillation (new onset) with RVR with . Cardiology consulted with Cardizem gtt initiated. NSR on monitor with HR in 80's.  BNP elevated and Troponin trended upward (0.076>0.095) with Heparin infusion initiated. Echo showed with concentric hypertrophy and normal systolic function. Atrial fibrillation observed.Biatrial atrial enlargement. Grade III left ventricular diastolic dysfunction. PA systolic pressure is 60 mmHg. Moderate right ventricular enlargement with normal right ventricular systolic function. Pulmonary hypertension. EF 55%. Moderate to severe tricuspid regurgitation. Mild-to-moderate mitral regurgitation. Severe right atrial enlargement. Large posterior pericardial effusion. No evidence of tamponade. Troponin elevated and flat with no cardiac symptoms reported. Pt also reported L groin pain upon admit. Orthopedic Surgery consulted and pt found to have sustained a left superior labral tear of her acetabulum noted on MRI with further orthopedic work up to be completed outpatient. Hx of ESRD noted with last HD on 12/16/22- Nephrology consulted with HD performed today. AVG to LUE with +bruit/+thrill present. On 12/21/22, MRI of R hip results pending. IR consulted for evaluation of aspiration/injection of left hip. Rate and rhythm controlled on Cardizem gtt. Heparin infusion continued pending joint aspiration.  Pericardial effusion discussed with Nephrology and Cardiology for  "recommendations.   Patient went to have aspiration of left hip however upon transferring from bed to table had an episode described as seizure-like" seems more that it was hypotension related.  Medications adjusted  She remains on p.o. Cardizem as well as Cardizem drip.  Patient underwent arthrocentesis with 4 cc of pus removed 12/23.  Sent for Gram stain, culture, crystals.  Discussed with Dr. Johnson , orthopedics,crystals are negative patient underwent washout with  Cultures 12/24.  Blood cultures and culture from left hip reveal Gram-positive and Gram-negative.  Patient paced on ceftazidime and vancomycin empirically until cultures return.  12/26 She is complaining of weakness . The blood and drainage cx are (+) for STREPTOCOCCUS GORDONII  sensitive  to rocephin .  ID consulted .  12/27 NAEON . ID consulted and rec IV rocephin qd x 4 week from the last negative blood cx . Cardiology consulted for KATE .   12/28 Plan  for a KATE tomorrow am . S/P HD today  . She is complaining od left hip pain .   12/29 S/P KATE which show negative for IE and mild to moderate pericardial effusion .  Pt  will need 4 week of IVAB  from the last negative blood cx . She  is complaining of left hip pain  . Plan to consult CM for SNF placement .   12/30 CT maxillofacial show Multifocal periodontal disease without definite associated dental abscess.  S/P Tunneled PICC line yesterday . Plan to d/c to SNF  for IVAB rocephin qd x 4 weeks .   12/31 NAEON . Awaiting SNF placement.    1/1/23 C/O nausea and vomit . Abd XR show constipation. Plan to cont miralax colace and start enemas .  1/2: constipation improved, BM overnight; awaiting snf placement      Interval History: no acute events overnight, dialysis scheduled for today, had BM overnight, denies any N/V or ab pain; awaiting skilled placement    Review of Systems   Constitutional:  Positive for fatigue.   Objective:     Vital Signs (Most Recent):  Temp: 98.1 °F (36.7 °C) (01/02/23 " 0701)  Pulse: 87 (01/02/23 0900)  Resp: 18 (01/02/23 0851)  BP: (!) 102/58 (01/02/23 0701)  SpO2: (!) 93 % (01/02/23 0701)   Vital Signs (24h Range):  Temp:  [97.8 °F (36.6 °C)-98.1 °F (36.7 °C)] 98.1 °F (36.7 °C)  Pulse:  [78-87] 87  Resp:  [16-19] 18  SpO2:  [90 %-96 %] 93 %  BP: (101-112)/(58-66) 102/58     Weight: 79.3 kg (174 lb 13.2 oz)  Body mass index is 30.01 kg/m².  No intake or output data in the 24 hours ending 01/02/23 1154   Physical Exam  Vitals and nursing note reviewed.   Constitutional:       Comments: Chronically ill appearing    Cardiovascular:      Rate and Rhythm: Normal rate and regular rhythm.      Pulses: Normal pulses.      Heart sounds: Normal heart sounds.   Pulmonary:      Effort: Pulmonary effort is normal.      Breath sounds: Normal breath sounds. No wheezing.   Abdominal:      General: Bowel sounds are normal. There is no distension.      Palpations: Abdomen is soft.      Tenderness: There is no abdominal tenderness.   Skin:     General: Skin is warm and dry.      Comments: Dressing left hip    Neurological:      Mental Status: She is alert and oriented to person, place, and time.       Significant Labs: All pertinent labs within the past 24 hours have been reviewed.    Significant Imaging: I have reviewed all pertinent imaging results/findings within the past 24 hours.      Assessment/Plan:      * Pyogenic arthritis of left hip  Patient presented with acute onset left hip pain x1 day duration in absence of trauma while doing leg exercises in bed and was found to asymmetric large left hip joint effusion, small right hip joint effusion, and evidence of superior labral tear of her left acetabulum noted on MRI.  Patient noted to have 5/5 strength throughout with sensation to light touch grossly intact throughout however, TTP throughout left groin.  Orthopedics consulted and awaiting further evaluation/recommendations.  Plan:  -optimize pain control  -bowel regimen  -PT/OT  -ortho surgery  following    The patient underwent arthrocentesis by IR 12/23 with 4 cc pus obtained.  Sent for cell count which revealed 187,000 white blood cells 100% segs.  Crystals negative  and culture Gram-negative and Gram-positive.  Patient went for washout 12/24.  Placed on empiric vanco and ceftazidime    -Blood cultures positive STREPTOCOCCUS GORDONII  -Repeated blood cx NGTD  -ID followed: Rec :to treat for 4 weeks from date of negative blood cultures with IV rocephin 2 gram daily; STEVEN 01/29/23        Streptococcal bacteremia  -Dr. Schwartz with ID followed,   - ID recs: cont IV rocephin STEVEN 01/29/23  -KATE: Negative for IE Moderate to mild pericardial effusion  .       Tear of left acetabular labrum  -Orthopedic surgery following  -MRI showed no fracture and asymmetric large left hip joint effusion.  Small right hip joint effusion. Paralabral cyst along the superior border of the acetabulum suspicious for a superior labral tear.   -analgesia as needed   -antiemetics as needed   - the left hip may treat with therapy and anti-inflammatories (per Orthopedic Surgery)       ESRD (end stage renal disease) on dialysis  - Patient currently follows Dr. Ribera as outpatient, Patient currently on M/W/F HD at [facility] via LUE fistula - -nephrology following, continue HD MWF    Constipation  -improved, last BM 1/1  -cont miralax and senakot    Pericardial effusion  -pt appears asymptomatic   -Nephrology following with HD performed on 12/21/22 and a Monday Wednesday Friday schedule  -KATE show mild to moderate pericardial effusion     Bone cyst of right femur  -Orthopedic Surgery following   -follow up outpatient recommended    Gastroesophageal reflux disease without esophagitis  Chronic. Stable. Currently asymptomatic. Home medications include PPI/Antacids as needed.  Plan:  -Continue PPI/Antacids as needed       Primary hypertension  -Continue home Cardizem   -Monitor BP  -IV hydralazine prn for SBP>160 or DBP>90       Atrial  fibrillation  Patient with new onset Paroxysmal (<7 days) atrial fibrillation with RVR which is uncontrolled currently with Calcium Channel Blocker. Patient is currently in atrial fibrillation with rate in the 105-130s on diltiazem. [id number] Score: 2. HASBLED Score: 3. Anticoagulation indicated. Anticoagulation done with Eliquis.  Troponin measuring 0.095 with EKG obtained at outside facility revealing SVT/atrial fibrillation with RVR at rate of 176. Troponin likely elevated in setting of ESRD and demand ischemia from arrhythmia as patient denied endorsing chest pain.  Plan:  -previously on cardizem drip has been transitioned to PO   -f/u cardiology   -nephrology for HD            VTE Risk Mitigation (From admission, onward)         Ordered     apixaban tablet 5 mg  2 times daily         12/25/22 1402     Place sequential compression device  Until discontinued         12/19/22 2119     IP VTE LOW RISK PATIENT  Once         12/19/22 2119                Discharge Planning   ISAAK:      Code Status: Full Code   Is the patient medically ready for discharge?:     Reason for patient still in hospital (select all that apply): Other (specify) pending skilled placement   Discharge Plan A: Skilled Nursing Facility                  Allison Naik NP  Department of Hospital Medicine   [facility] Tele[facility] ([facility])

## 2023-01-02 NOTE — ASSESSMENT & PLAN NOTE
- Patient currently follows Dr. Ribera as outpatient, Patient currently on M/W/F HD at [facility] via LUE fistula - -nephrology following, continue HD MWF

## 2023-01-02 NOTE — ASSESSMENT & PLAN NOTE
Patient with new onset Paroxysmal (<7 days) atrial fibrillation with RVR which is uncontrolled currently with Calcium Channel Blocker. Patient is currently in atrial fibrillation with rate in the 105-130s on diltiazem. [id number] Score: 2. HASBLED Score: 3. Anticoagulation indicated. Anticoagulation done with Eliquis.  Troponin measuring 0.095 with EKG obtained at outside facility revealing SVT/atrial fibrillation with RVR at rate of 176. Troponin likely elevated in setting of ESRD and demand ischemia from arrhythmia as patient denied endorsing chest pain.  Plan:  -previously on cardizem drip has been transitioned to PO   -f/u cardiology   -nephrology for HD

## 2023-01-02 NOTE — SUBJECTIVE & OBJECTIVE
Interval History: no acute events overnight, dialysis scheduled for today, had BM overnight, denies any N/V or ab pain; awaiting skilled placement    Review of Systems   Constitutional:  Positive for fatigue.   Objective:     Vital Signs (Most Recent):  Temp: 98.1 °F (36.7 °C) (01/02/23 0701)  Pulse: 87 (01/02/23 0900)  Resp: 18 (01/02/23 0851)  BP: (!) 102/58 (01/02/23 0701)  SpO2: (!) 93 % (01/02/23 0701)   Vital Signs (24h Range):  Temp:  [97.8 °F (36.6 °C)-98.1 °F (36.7 °C)] 98.1 °F (36.7 °C)  Pulse:  [78-87] 87  Resp:  [16-19] 18  SpO2:  [90 %-96 %] 93 %  BP: (101-112)/(58-66) 102/58     Weight: 79.3 kg (174 lb 13.2 oz)  Body mass index is 30.01 kg/m².  No intake or output data in the 24 hours ending 01/02/23 1154   Physical Exam  Vitals and nursing note reviewed.   Constitutional:       Comments: Chronically ill appearing    Cardiovascular:      Rate and Rhythm: Normal rate and regular rhythm.      Pulses: Normal pulses.      Heart sounds: Normal heart sounds.   Pulmonary:      Effort: Pulmonary effort is normal.      Breath sounds: Normal breath sounds. No wheezing.   Abdominal:      General: Bowel sounds are normal. There is no distension.      Palpations: Abdomen is soft.      Tenderness: There is no abdominal tenderness.   Skin:     General: Skin is warm and dry.      Comments: Dressing left hip    Neurological:      Mental Status: She is alert and oriented to person, place, and time.       Significant Labs: All pertinent labs within the past 24 hours have been reviewed.    Significant Imaging: I have reviewed all pertinent imaging results/findings within the past 24 hours.

## 2023-01-02 NOTE — PROGRESS NOTES
[facility] - Tele[facility] ([facility])  Nephrology  Progress Note    Patient Name: Meaghan Potter  MRN: [id number]  Admission Date: 12/19/2022  Hospital Length of Stay: 12 days  Attending Provider: Linda Carranza MD   Primary Care Physician: Primary Doctor No  Principal Problem:Pyogenic arthritis of left hip    Consults  Subjective:     Interval History:  Patient was seen in her hospital room.  In bed resting comfortably.  No acute distress noted.    Review of systems:  No shortness of breath or chest discomfort.  No fevers or chills.  She continues to have some mild nausea.    Review of patient's allergies indicates:   Allergen Reactions    Amoxicillin Rash     Current Facility-Administered Medications   Medication Frequency    0.9%  NaCl infusion Once    0.9%  NaCl infusion Once    0.9%  NaCl infusion PRN    acetaminophen suppository 650 mg Q6H PRN    acetaminophen tablet 650 mg Q8H PRN    albuterol-ipratropium 2.5 mg-0.5 mg/3 mL nebulizer solution 3 mL Q6H PRN    aluminum-magnesium hydroxide-simethicone 200-200-20 mg/5 mL suspension 30 mL QID PRN    apixaban tablet 5 mg BID    brimonidine 0.2% ophthalmic solution 1 drop BID    BUPivacaine (PF) 0.25% (2.5 mg/ml) injection 12.5 mg Once    calcitRIOL capsule 0.5 mcg Every Mon, Wed, Fri    cefTRIAXone (ROCEPHIN) 2 g in dextrose 5 % in water (D5W) 5 % 50 mL IVPB (MB+) Q24H    dextrose 10% bolus 125 mL 125 mL PRN    dextrose 10% bolus 250 mL 250 mL PRN    diltiaZEM 24 hr capsule 120 mg Daily    ergocalciferol capsule 50,000 Units Q7 Days    glucagon (human recombinant) injection 1 mg PRN    glucose chewable tablet 16 g PRN    glucose chewable tablet 24 g PRN    HYDROcodone-acetaminophen 5-325 mg per tablet 1 tablet Q6H PRN    melatonin tablet 6 mg Nightly PRN    morphine injection 2 mg Q4H PRN    naloxone 0.4 mg/mL injection 0.02 mg PRN    ondansetron injection 4 mg Q8H PRN    polyethylene glycol packet 17 g Daily    promethazine tablet 25 mg Q6H PRN    senna-docusate 8.6-50 mg  per tablet 1 tablet BID PRN    senna-docusate 8.6-50 mg per tablet 1 tablet Daily    sevelamer carbonate tablet 800 mg TID WM    sodium chloride 0.9% bolus 250 mL 250 mL PRN    sodium chloride 0.9% bolus 250 mL 250 mL PRN    sodium chloride 0.9% bolus 250 mL 250 mL PRN    sodium chloride 0.9% bolus 250 mL 250 mL PRN    sodium chloride 0.9% bolus 250 mL 250 mL PRN    sodium chloride 0.9% flush 3 mL Q12H PRN    vitamin renal formula (B-complex-vitamin c-folic acid) 1 mg per capsule 1 capsule Daily       Objective:     Vital Signs (Most Recent):  Temp: 98.1 °F (36.7 °C) (01/02/23 0701)  Pulse: 78 (01/02/23 0701)  Resp: 18 (01/02/23 0851)  BP: (!) 102/58 (01/02/23 0701)  SpO2: (!) 93 % (01/02/23 0701) Vital Signs (24h Range):  Temp:  [97.8 °F (36.6 °C)-98.1 °F (36.7 °C)] 98.1 °F (36.7 °C)  Pulse:  [78-85] 78  Resp:  [16-19] 18  SpO2:  [90 %-96 %] 93 %  BP: (101-112)/(58-66) 102/58     Weight: 79.3 kg (174 lb 13.2 oz) (01/02/23 0010)  Body mass index is 30.01 kg/m².  Body surface area is 1.89 meters squared.    No intake/output data recorded.    Physical Exam  Constitutional:       Appearance: Normal appearance.   HENT:      Head: Normocephalic and atraumatic.   Eyes:      General: No scleral icterus.     Extraocular Movements: Extraocular movements intact.      Pupils: Pupils are equal, round, and reactive to light.   Pulmonary:      Effort: Pulmonary effort is normal.      Breath sounds: No stridor.   Musculoskeletal:      Right lower leg: No edema.      Left lower leg: No edema.   Skin:     General: Skin is warm and dry.   Neurological:      General: No focal deficit present.      Mental Status: She is alert and oriented to person, place, and time.   Psychiatric:         Mood and Affect: Mood normal.         Behavior: Behavior normal.       Significant Labs:sureBMP:   Recent Labs   Lab 01/01/23  0759      *   K 4.0   CL 92*   CO2 27   BUN 38*   CREATININE 6.8*   CALCIUM 9.8       CMP:   Recent Labs   Lab  01/01/23  0759      CALCIUM 9.8   *   K 4.0   CO2 27   CL 92*   BUN 38*   CREATININE 6.8*       All labs within the past 24 hours have been reviewed.    Significant Imaging:  Labs: Reviewed      Assessment/Plan:     Active Diagnoses:    Diagnosis Date Noted POA    PRINCIPAL PROBLEM:  Pyogenic arthritis of left hip [M00.9] 12/20/2022 Yes    Constipation [K59.00] 01/01/2023 Yes    Streptococcal bacteremia [R78.81, B95.5] 12/27/2022 Yes    Hyperkalemia, diminished renal excretion [E87.5] 12/25/2022 Yes    Pericardial effusion [I31.39] 12/21/2022 Yes    ESRD (end stage renal disease) on dialysis [N18.6, Z99.2] 12/20/2022 Not Applicable    Primary hypertension [I10] 12/20/2022 Yes    Gastroesophageal reflux disease without esophagitis [K21.9] 12/20/2022 Yes    Tear of left acetabular labrum [S73.192A] 12/20/2022 Yes    Bone cyst of right femur [M85.651] 12/20/2022 Yes    Atrial fibrillation [I48.91] 12/19/2022 Yes      Problems Resolved During this Admission:       Assessment plan:    1. End-stage renal disease:  Dialysis is scheduled for later today.  Orders in place.  Discussed with dialysis staff.    2. Potassium has been stable.  The most recent was 4.0.    3. Bicarbonate is stable at 27.    4. Septic hip: Defer to Orthopedic surgery and primary service for management.    Thank you for your consult.     Bruce Ortega MD  Nephrology  [facility] - Telemetry ([facility])

## 2023-01-02 NOTE — ASSESSMENT & PLAN NOTE
-pt appears asymptomatic   -Nephrology following with HD performed on 12/21/22 and a Monday Wednesday Friday schedule  -KATE show mild to moderate pericardial effusion

## 2023-01-02 NOTE — ASSESSMENT & PLAN NOTE
-Dr. Schwartz with ID followed,   - ID recs: cont IV rocephin STEVEN 01/29/23  -KATE: Negative for IE Moderate to mild pericardial effusion  .

## 2023-01-02 NOTE — ASSESSMENT & PLAN NOTE
-Orthopedic surgery following  -MRI showed no fracture and asymmetric large left hip joint effusion.  Small right hip joint effusion. Paralabral cyst along the superior border of the acetabulum suspicious for a superior labral tear.   -analgesia as needed   -antiemetics as needed   - the left hip may treat with therapy and anti-inflammatories (per Orthopedic Surgery)

## 2023-01-02 NOTE — PLAN OF CARE
Duration: 3 hours    Stable/unstable: stable    Ultrafiltration volume: 1150cc    Notes: Tolerated, No access issues.

## 2023-01-02 NOTE — ASSESSMENT & PLAN NOTE
Patient presented with acute onset left hip pain x1 day duration in absence of trauma while doing leg exercises in bed and was found to asymmetric large left hip joint effusion, small right hip joint effusion, and evidence of superior labral tear of her left acetabulum noted on MRI.  Patient noted to have 5/5 strength throughout with sensation to light touch grossly intact throughout however, TTP throughout left groin.  Orthopedics consulted and awaiting further evaluation/recommendations.  Plan:  -optimize pain control  -bowel regimen  -PT/OT  -ortho surgery following    The patient underwent arthrocentesis by IR 12/23 with 4 cc pus obtained.  Sent for cell count which revealed 187,000 white blood cells 100% segs.  Crystals negative  and culture Gram-negative and Gram-positive.  Patient went for washout 12/24.  Placed on empiric vanco and ceftazidime    -Blood cultures positive STREPTOCOCCUS GORDONII  -Repeated blood cx NGTD  -ID followed: Rec :to treat for 4 weeks from date of negative blood cultures with IV rocephin 2 gram daily; STEVEN 01/29/23

## 2023-01-03 PROCEDURE — 25000003 PHARM REV CODE 250: Performed by: INTERNAL MEDICINE

## 2023-01-03 PROCEDURE — 63600175 PHARM REV CODE 636 W HCPCS: Performed by: INTERNAL MEDICINE

## 2023-01-03 PROCEDURE — 21400001 HC TELEMETRY ROOM

## 2023-01-03 PROCEDURE — 97530 THERAPEUTIC ACTIVITIES: CPT | Mod: CQ

## 2023-01-03 PROCEDURE — 63600175 PHARM REV CODE 636 W HCPCS: Performed by: HOSPITALIST

## 2023-01-03 PROCEDURE — 97116 GAIT TRAINING THERAPY: CPT | Mod: CQ

## 2023-01-03 PROCEDURE — 99232 PR SUBSEQUENT HOSPITAL CARE,LEVL II: ICD-10-PCS | Mod: ,,, | Performed by: INTERNAL MEDICINE

## 2023-01-03 PROCEDURE — 99232 SBSQ HOSP IP/OBS MODERATE 35: CPT | Mod: ,,, | Performed by: INTERNAL MEDICINE

## 2023-01-03 RX ADMIN — POLYETHYLENE GLYCOL 3350 17 G: 17 POWDER, FOR SOLUTION ORAL at 08:01

## 2023-01-03 RX ADMIN — APIXABAN 5 MG: 2.5 TABLET, FILM COATED ORAL at 09:01

## 2023-01-03 RX ADMIN — BRIMONIDINE TARTRATE 1 DROP: 2 SOLUTION/ DROPS OPHTHALMIC at 09:01

## 2023-01-03 RX ADMIN — SENNOSIDES AND DOCUSATE SODIUM 1 TABLET: 8.6; 5 TABLET ORAL at 08:01

## 2023-01-03 RX ADMIN — CEFTRIAXONE 2 G: 2 INJECTION, POWDER, FOR SOLUTION INTRAMUSCULAR; INTRAVENOUS at 04:01

## 2023-01-03 RX ADMIN — BRIMONIDINE TARTRATE 1 DROP: 2 SOLUTION/ DROPS OPHTHALMIC at 08:01

## 2023-01-03 RX ADMIN — DILTIAZEM HYDROCHLORIDE 120 MG: 120 CAPSULE, COATED, EXTENDED RELEASE ORAL at 08:01

## 2023-01-03 RX ADMIN — ONDANSETRON 4 MG: 2 INJECTION INTRAMUSCULAR; INTRAVENOUS at 08:01

## 2023-01-03 RX ADMIN — SODIUM CHLORIDE 250 ML: 9 INJECTION, SOLUTION INTRAVENOUS at 11:01

## 2023-01-03 RX ADMIN — APIXABAN 5 MG: 2.5 TABLET, FILM COATED ORAL at 08:01

## 2023-01-03 RX ADMIN — SEVELAMER CARBONATE 800 MG: 800 TABLET, FILM COATED ORAL at 05:01

## 2023-01-03 RX ADMIN — SEVELAMER CARBONATE 800 MG: 800 TABLET, FILM COATED ORAL at 08:01

## 2023-01-03 RX ADMIN — NEPHROCAP 1 CAPSULE: 1 CAP ORAL at 08:01

## 2023-01-03 RX ADMIN — SEVELAMER CARBONATE 800 MG: 800 TABLET, FILM COATED ORAL at 11:01

## 2023-01-03 NOTE — ASSESSMENT & PLAN NOTE
Patient with new onset Paroxysmal (<7 days) atrial fibrillation with RVR which is uncontrolled currently with Calcium Channel Blocker. Patient is currently in atrial fibrillation with rate in the 105-130s on diltiazem. [id number] Score: 2. HASBLED Score: 3. Anticoagulation indicated. Anticoagulation done with Eliquis.  Troponin measuring 0.095 with EKG obtained at outside facility revealing SVT/atrial fibrillation with RVR at rate of 176. Troponin likely elevated in setting of ESRD and demand ischemia from arrhythmia as patient denied endorsing chest pain.  Plan:  -previously on cardizem drip has been transitioned to PO   -f/u cardiology   -nephrology for HD

## 2023-01-03 NOTE — ASSESSMENT & PLAN NOTE
- Patient currently follows Dr. Ribera as outpatient, Patient currently on M/W/F HD at [facility] via LUE fistula - -nephrology following, continue HD MWF

## 2023-01-03 NOTE — PROGRESS NOTES
[facility] Medicine  Progress Note    Patient Name: Meaghan Potter  MRN: [id number]  Patient Class: IP- Inpatient   Admission Date: 12/19/2022  Length of Stay: 13 days  Attending Physician: Linda Carranza MD  Primary Care Provider: Primary Doctor No        Subjective:     Principal Problem:Pyogenic arthritis of left hip        HPI:  Meaghan Potter is a 67 y.o. female with a PMH  has a past medical history of ESRD (end stage renal disease) on dialysis, GERD (gastroesophageal reflux disease), and Hypertension. who presented as a transfer from [facility] for higher level cardiology and orthopedic care after patient was found to have sustained a left superior labral tear of her acetabulum noted on MRI in addition to new onset atrial fibrillation with RVR requiring diltiazem drip.  Patient reported being in her usual state of health prior to onset of symptoms and reported acute onset of left hip pain while performing leg exercises in bed earlier today.  Patient reported hearing and feeling a pop in her left hip followed by immediate pain which has remained constant and currently rated 6/10 in severity. Pain was described as throbbing/pulling in nature with no known alleviating factors and aggravating factors including weight-bearing, movement, and palpation to the affected area.  She denied endorsing any numbness, tingling, discoloration, or penetrating trauma, but did express decreased range of motion and muscle strength secondary to exacerbation of pain.  While at outside facility, patient became short of breath while lying flat while obtaining MRI of hip and was found to be in atrial fibrillation with RVR requiring diltiazem drip. She denied endorsing any chest pain, lightheadedness, dizziness, visual disturbances, fever, chills, sweats, nausea, vomiting, abdominal pain, changes in bowel/bladder habits, or onset neurological deficits.  All other review of systems negative except as noted  above.  Patient reports being back at her baseline and continues to complain only of left hip pain.  Orthopedics and cardiology consulted and awaiting further evaluation/recommendations.  Of note, patient missed hemodialysis today secondary to pain and going to the emergency room and usually receives HD via left upper extremity fistula on Monday/Wednesday/Friday.  Follows Dr. Ribera outpatient. Nephrology consulted to continue HD inpatient.     PCP: Primary Doctor No        Overview/Hospital Course:  Pt admitted to Observation for Atrial fibrillation (new onset) with RVR with . Cardiology consulted with Cardizem gtt initiated. NSR on monitor with HR in 80's.  BNP elevated and Troponin trended upward (0.076>0.095) with Heparin infusion initiated. Echo showed with concentric hypertrophy and normal systolic function. Atrial fibrillation observed.Biatrial atrial enlargement. Grade III left ventricular diastolic dysfunction. PA systolic pressure is 60 mmHg. Moderate right ventricular enlargement with normal right ventricular systolic function. Pulmonary hypertension. EF 55%. Moderate to severe tricuspid regurgitation. Mild-to-moderate mitral regurgitation. Severe right atrial enlargement. Large posterior pericardial effusion. No evidence of tamponade. Troponin elevated and flat with no cardiac symptoms reported. Pt also reported L groin pain upon admit. Orthopedic Surgery consulted and pt found to have sustained a left superior labral tear of her acetabulum noted on MRI with further orthopedic work up to be completed outpatient. Hx of ESRD noted with last HD on 12/16/22- Nephrology consulted with HD performed today. AVG to LUE with +bruit/+thrill present. On 12/21/22, MRI of R hip results pending. IR consulted for evaluation of aspiration/injection of left hip. Rate and rhythm controlled on Cardizem gtt. Heparin infusion continued pending joint aspiration.  Pericardial effusion discussed with Nephrology and  "Cardiology for recommendations.   Patient went to have aspiration of left hip however upon transferring from bed to table had an episode described as seizure-like" seems more that it was hypotension related.  Medications adjusted  She remains on p.o. Cardizem as well as Cardizem drip.  Patient underwent arthrocentesis with 4 cc of pus removed 12/23.  Sent for Gram stain, culture, crystals.  Discussed with Dr. Johnson , orthopedics,crystals are negative patient underwent washout with  Cultures 12/24.  Blood cultures and culture from left hip reveal Gram-positive and Gram-negative.  Patient paced on ceftazidime and vancomycin empirically until cultures return.  12/26 She is complaining of weakness . The blood and drainage cx are (+) for STREPTOCOCCUS GORDONII  sensitive  to rocephin .  ID consulted .  12/27 NAEON . ID consulted and rec IV rocephin qd x 4 week from the last negative blood cx . Cardiology consulted for KATE .   12/28 Plan  for a KATE tomorrow am . S/P HD today  . She is complaining od left hip pain .   12/29 S/P KATE which show negative for IE and mild to moderate pericardial effusion .  Pt  will need 4 week of IVAB  from the last negative blood cx . She  is complaining of left hip pain  . Plan to consult CM for SNF placement .   12/30 CT maxillofacial show Multifocal periodontal disease without definite associated dental abscess.  S/P Tunneled PICC line yesterday . Plan to d/c to SNF  for IVAB rocephin qd x 4 weeks .   12/31 NAEON . Awaiting SNF placement.    1/1/23 C/O nausea and vomit . Abd XR show constipation. Plan to cont miralax colace and start enemas .  1/2: constipation improved, BM overnight; awaiting snf placement  01/03: BP low normal, pt reports some lightheadedness, given 250 cc bolus. Denies hip pain. Awaiting SNF placement.       No new subjective & objective note has been filed under this hospital service since the last note was generated.      Assessment/Plan:      * Pyogenic arthritis " of left hip  Patient presented with acute onset left hip pain x1 day duration in absence of trauma while doing leg exercises in bed and was found to asymmetric large left hip joint effusion, small right hip joint effusion, and evidence of superior labral tear of her left acetabulum noted on MRI.  Patient noted to have 5/5 strength throughout with sensation to light touch grossly intact throughout however, TTP throughout left groin.  Orthopedics consulted and awaiting further evaluation/recommendations.  Plan:  -optimize pain control  -bowel regimen  -PT/OT  -ortho surgery following    The patient underwent arthrocentesis by IR 12/23 with 4 cc pus obtained.  Sent for cell count which revealed 187,000 white blood cells 100% segs.  Crystals negative  and culture Gram-negative and Gram-positive.  Patient went for washout 12/24.  Placed on empiric vanco and ceftazidime    -Blood cultures positive STREPTOCOCCUS GORDONII  -Repeated blood cx NGTD  -ID followed: Rec :to treat for 4 weeks from date of negative blood cultures with IV rocephin 2 gram daily; STEVEN 01/29/23        Constipation  -improved, last BM 1/1  -cont miralax and senakot      Streptococcal bacteremia  -Dr. Schwartz with ID followed,   - ID recs: cont IV rocephin STEVEN 01/29/23  -KATE: Negative for IE Moderate to mild pericardial effusion  .       Hyperkalemia, diminished renal excretion  Cont HD      Pericardial effusion  -pt appears asymptomatic   -Nephrology following with HD performed on 12/21/22 and a Monday Wednesday Friday schedule  -KATE show mild to moderate pericardial effusion     Bone cyst of right femur  -Orthopedic Surgery following   -follow up outpatient recommended          Shortness of breath  Patient reported acute onset shortness a breath while obtaining MRI and was found to have evidence of severe cardiomegaly with small left pleural effusion but negative for infectious processes noted on CXR. Patient afebrile without leukocytosis. BNP elevated  at 4792. Troponin 0.095. Exam positive for bilateral edema but negative for elevated JVD or crackles on lung ausculation. Patient currently saturating % on 2L via NC in no acute distress and without use of accessory muscles noted.  Patient without history of CHF.  Cardiology consulted.  Echo ordered and pending.  Nephrology consulted to resume HD inpatient.  Will trial dose of Lasix if clinical status worsens.  Plan:  -titrate oxygen therapy as needed  -incentive spirometry  -echo reviewed   -f/u cardiology and nephrology  -monitor Is/Os  -low salt/cardiac/renal diet    -Duonebs prn       Tear of left acetabular labrum  -Orthopedic surgery following  -MRI showed no fracture and asymmetric large left hip joint effusion.  Small right hip joint effusion. Paralabral cyst along the superior border of the acetabulum suspicious for a superior labral tear.   - the left hip may treat with therapy and anti-inflammatories (per Orthopedic Surgery)   - outpatient ortho follow up       Gastroesophageal reflux disease without esophagitis  Chronic. Stable. Currently asymptomatic. Home medications include PPI/Antacids as needed.  Plan:  -Continue PPI/Antacids as needed       Primary hypertension  -Continue home Cardizem   -Monitor BP- given small bolus today for relative hypotension   -IV hydralazine prn for SBP>160 or DBP>90       ESRD (end stage renal disease) on dialysis  - Patient currently follows Dr. Ribera as outpatient, Patient currently on M/W/F HD at [facility] via LUE fistula - -nephrology following, continue HD MWF        Atrial fibrillation  Patient with new onset Paroxysmal (<7 days) atrial fibrillation with RVR which is uncontrolled currently with Calcium Channel Blocker. Patient is currently in atrial fibrillation with rate in the 105-130s on diltiazem. [id number] Score: 2. HASBLED Score: 3. Anticoagulation indicated. Anticoagulation done with Eliquis.  Troponin measuring 0.095 with EKG obtained at outside  facility revealing SVT/atrial fibrillation with RVR at rate of 176. Troponin likely elevated in setting of ESRD and demand ischemia from arrhythmia as patient denied endorsing chest pain.  Plan:  -previously on cardizem drip has been transitioned to PO   -f/u cardiology   -nephrology for HD            VTE Risk Mitigation (From admission, onward)         Ordered     apixaban tablet 5 mg  2 times daily         12/25/22 1402     Place sequential compression device  Until discontinued         12/19/22 2119     IP VTE LOW RISK PATIENT  Once         12/19/22 2119                Discharge Planning   ISAAK:      Code Status: Full Code   Is the patient medically ready for discharge?:     Reason for patient still in hospital (select all that apply): Pending disposition  Discharge Plan A: Skilled Nursing Facility                  Linda Carranza MD  Department of Hospital Medicine   [facility] ([facility])

## 2023-01-03 NOTE — ASSESSMENT & PLAN NOTE
-Orthopedic surgery following  -MRI showed no fracture and asymmetric large left hip joint effusion.  Small right hip joint effusion. Paralabral cyst along the superior border of the acetabulum suspicious for a superior labral tear.   - the left hip may treat with therapy and anti-inflammatories (per Orthopedic Surgery)   - outpatient ortho follow up

## 2023-01-03 NOTE — ASSESSMENT & PLAN NOTE
-Continue home Cardizem   -Monitor BP- given small bolus today for relative hypotension   -IV hydralazine prn for SBP>160 or DBP>90

## 2023-01-03 NOTE — PROGRESS NOTES
[facility] ([facility])  Nephrology  Progress Note    Patient Name: Meaghan Potter  MRN: [id number]  Admission Date: 12/19/2022  Hospital Length of Stay: 13 days  Attending Provider: Linda Carranza MD   Primary Care Physician: Primary Doctor No  Principal Problem:Pyogenic arthritis of left hip    Consults  Subjective:     Interval History:  Patient was seen in her hospital room.  In bed resting comfortably.  No acute distress noted.    Review of systems:  No shortness of breath or chest discomfort.  No fevers or chills.  She continues to have some mild nausea.    Review of patient's allergies indicates:   Allergen Reactions    Amoxicillin Rash     Current Facility-Administered Medications   Medication Frequency    0.9%  NaCl infusion Once    0.9%  NaCl infusion Once    0.9%  NaCl infusion PRN    acetaminophen suppository 650 mg Q6H PRN    acetaminophen tablet 650 mg Q8H PRN    albuterol-ipratropium 2.5 mg-0.5 mg/3 mL nebulizer solution 3 mL Q6H PRN    aluminum-magnesium hydroxide-simethicone 200-200-20 mg/5 mL suspension 30 mL QID PRN    apixaban tablet 5 mg BID    brimonidine 0.2% ophthalmic solution 1 drop BID    BUPivacaine (PF) 0.25% (2.5 mg/ml) injection 12.5 mg Once    calcitRIOL capsule 0.5 mcg Every Mon, Wed, Fri    cefTRIAXone (ROCEPHIN) 2 g in dextrose 5 % in water (D5W) 5 % 50 mL IVPB (MB+) Q24H    dextrose 10% bolus 125 mL 125 mL PRN    dextrose 10% bolus 250 mL 250 mL PRN    diltiaZEM 24 hr capsule 120 mg Daily    ergocalciferol capsule 50,000 Units Q7 Days    glucagon (human recombinant) injection 1 mg PRN    glucose chewable tablet 16 g PRN    glucose chewable tablet 24 g PRN    HYDROcodone-acetaminophen 5-325 mg per tablet 1 tablet Q6H PRN    melatonin tablet 6 mg Nightly PRN    morphine injection 2 mg Q4H PRN    naloxone 0.4 mg/mL injection 0.02 mg PRN    ondansetron injection 4 mg Q8H PRN    polyethylene glycol packet 17 g Daily    promethazine tablet 25 mg Q6H PRN    senna-docusate 8.6-50 mg  per tablet 1 tablet BID PRN    senna-docusate 8.6-50 mg per tablet 1 tablet Daily    sevelamer carbonate tablet 800 mg TID WM    sodium chloride 0.9% bolus 250 mL 250 mL PRN    sodium chloride 0.9% bolus 250 mL 250 mL PRN    sodium chloride 0.9% bolus 250 mL 250 mL PRN    sodium chloride 0.9% bolus 250 mL 250 mL PRN    sodium chloride 0.9% bolus 250 mL 250 mL PRN    sodium chloride 0.9% bolus 250 mL 250 mL Once    sodium chloride 0.9% flush 3 mL Q12H PRN    vitamin renal formula (B-complex-vitamin c-folic acid) 1 mg per capsule 1 capsule Daily       Objective:     Vital Signs (Most Recent):  Temp: 97.6 °F (36.4 °C) (01/03/23 0816)  Pulse: 84 (01/03/23 0816)  Resp: 18 (01/03/23 0816)  BP: 98/61 (01/03/23 0816)  SpO2: 100 % (01/03/23 0816) Vital Signs (24h Range):  Temp:  [97.6 °F (36.4 °C)-98.8 °F (37.1 °C)] 97.6 °F (36.4 °C)  Pulse:  [78-90] 84  Resp:  [14-18] 18  SpO2:  [95 %-100 %] 100 %  BP: (91-98)/(55-61) 98/61     Weight: 79.3 kg (174 lb 13.2 oz) (01/02/23 0010)  Body mass index is 30.01 kg/m².  Body surface area is 1.89 meters squared.    I/O last 3 completed shifts:  In: -   Out: 1650 [Other:1650]    Physical Exam  Constitutional:       Appearance: Normal appearance.   HENT:      Head: Normocephalic and atraumatic.   Eyes:      General: No scleral icterus.     Extraocular Movements: Extraocular movements intact.      Pupils: Pupils are equal, round, and reactive to light.   Pulmonary:      Effort: Pulmonary effort is normal.      Breath sounds: No stridor.   Musculoskeletal:      Right lower leg: No edema.      Left lower leg: No edema.   Skin:     General: Skin is warm and dry.   Neurological:      General: No focal deficit present.      Mental Status: She is alert and oriented to person, place, and time.   Psychiatric:         Mood and Affect: Mood normal.         Behavior: Behavior normal.       Significant Labs:sureBMP:   Recent Labs   Lab 01/01/23  0759      *   K 4.0   CL 92*   CO2 27    BUN 38*   CREATININE 6.8*   CALCIUM 9.8       CMP:   Recent Labs   Lab 01/01/23  0759      CALCIUM 9.8   *   K 4.0   CO2 27   CL 92*   BUN 38*   CREATININE 6.8*       All labs within the past 24 hours have been reviewed.    Significant Imaging:  Labs: Reviewed      Assessment/Plan:     Active Diagnoses:    Diagnosis Date Noted POA    PRINCIPAL PROBLEM:  Pyogenic arthritis of left hip [M00.9] 12/20/2022 Yes    Constipation [K59.00] 01/01/2023 Yes    Streptococcal bacteremia [R78.81, B95.5] 12/27/2022 Yes    Pericardial effusion [I31.39] 12/21/2022 Yes    ESRD (end stage renal disease) on dialysis [N18.6, Z99.2] 12/20/2022 Not Applicable    Primary hypertension [I10] 12/20/2022 Yes    Gastroesophageal reflux disease without esophagitis [K21.9] 12/20/2022 Yes    Tear of left acetabular labrum [S73.192A] 12/20/2022 Yes    Bone cyst of right femur [M85.651] 12/20/2022 Yes    Atrial fibrillation [I48.91] 12/19/2022 Yes      Problems Resolved During this Admission:       Assessment plan:    1. End-stage renal disease:  Last dialysis treatment was yesterday, uneventful.  Net ultrafiltration 1150 cc.  Will plan to continue Monday Wednesday Friday dialysis unless otherwise indicated.    2. Potassium has been stable.  The most recent was 4.0.    3. Bicarbonate is stable at 27.    4. Septic hip: Defer to Orthopedic surgery and primary service for management.    Thank you for your consult.     Bruce Ortega MD  Nephrology  [facility] ([facility])

## 2023-01-03 NOTE — PT/OT/SLP PROGRESS
Physical Therapy  Treatment    Meaghan Potter   MRN: [id number]   Admitting Diagnosis: Pyogenic arthritis of left hip    PT Received On: 01/03/23  PT Start Time: 0800     PT Stop Time: 0830    PT Total Time (min): 30 min       Billable Minutes:  Gait Training 10 and Therapeutic Activity 20    Treatment Type: Treatment  PT/PTA: PTA     PTA Visit Number: 4       General Precautions: Standard, fall  Orthopedic Precautions: LLE weight bearing as tolerated  Braces: N/A  Respiratory Status: Room air         Subjective:  Communicated with patient's nurse, Alexa, and completed Epic chart review prior to session.  Patient agreed to PT session. Requesting assistance to stand at the sink to perform ADL self care tasks.     Pain/Comfort  Pain Rating 1: 0/10  Pain Rating Post-Intervention 1: 0/10    Objective:   Patient found with: bed alarm, telemetry, PICC line    Supine > sit EOB: SPV    Seated EOB x 10 min total focusing on increased tolerance to upright position, core stability, trunk control and CV endurance.   Maintained self supported sitting balance independently  Maintained unsupported sitting balance independently    STS from EOB > RW: CGA - Min A    5ft from EOB > sink w/ RW: Min A    Stand x5 min total at sink w/ BUE self support on counter top through forearms  Required CGA-SBA to maintain standing balance  Completed ADL self care tasks    Patient then requested to ambulate to the toilet.    8ft from sink > toilet CGA-Min A (intermittent VC for safety w/ RW mgmt and sequencing of task)    Stand pivot T/F to toilet w/ RW: CGA (VC for safety w/ RW mgmt)    Educated patient on importance of increased tolerance to upright position and direct impact on CV endurance and strength. Patient encouraged to sit up in chair/ EOB, for a minimum of 2 consecutive hours, 3x per day. Encouraged patient to perform AROM TE to BLE throughout the day within all available planes of motion. Re enforced importance of utilizing call light to  meet needs in room and not attempt to get up without staff assistance. Patient verbalized understanding and agreed to comply.     AM-PAC 6 CLICK MOBILITY  How much help from another person does this patient currently need?   1 = Unable, Total/Dependent Assistance  2 = A lot, Maximum/Moderate Assistance  3 = A little, Minimum/Contact Guard/Supervision  4 = None, Modified Barry/Independent    Turning over in bed (including adjusting bedclothes, sheets and blankets)?: 4  Sitting down on and standing up from a chair with arms (e.g., wheelchair, bedside commode, etc.): 3  Moving from lying on back to sitting on the side of the bed?: 4  Moving to and from a bed to a chair (including a wheelchair)?: 3  Need to walk in hospital room?: 3  Climbing 3-5 steps with a railing?: 1  Basic Mobility Total Score: 18    AM-PAC Raw Score CMS G-Code Modifier Level of Impairment Assistance   6 % Total / Unable   7 - 9 CM 80 - 100% Maximal Assist   10 - 14 CL 60 - 80% Moderate Assist   15 - 19 CK 40 - 60% Moderate Assist   20 - 22 CJ 20 - 40% Minimal Assist   23 CI 1-20% SBA / CGA   24 CH 0% Independent/ Mod I     Patient left  on toilet  with call button in reach and nurse notified. Patient verbalized understanding to call for assistance when finished and not get up alone.     Assessment:  Meaghan Potter is a 67 y.o. female with a medical diagnosis of Pyogenic arthritis of left hip and presents with overall decline in functional mobility. Patient would continue to benefit from skilled PT to address functional limitations listed below in order to return to PLOF/decrease caregiver burden.      Rehab identified problem list/impairments: weakness, impaired endurance, gait instability, impaired balance, decreased coordination, decreased lower extremity function, decreased safety awareness, pain, decreased ROM, impaired cardiopulmonary response to activity    Rehab potential is good.    Activity tolerance: Fair    Discharge  recommendations: home health PT, other (see comments) (w/ 24 hr care and SPV)      Barriers to discharge:      Equipment recommendations: walker, rolling     GOALS:   Multidisciplinary Problems       Physical Therapy Goals          Problem: Physical Therapy    Goal Priority Disciplines Outcome Goal Variances Interventions   Physical Therapy Goal     PT, PT/OT      Description: LT23  1. PT WILL COMPLETE BED MOBILITY WITH S  2. PT WILL T/F TO CHAIR WITH RW AND S  3. PT WILL GT TRAIN X 150' WITH RW AND S                       PLAN:    Patient to be seen 3 x/week to address the above listed problems via gait training, therapeutic activities, therapeutic exercises  Plan of Care expires: 23  Plan of Care reviewed with: patient         2023

## 2023-01-03 NOTE — NURSING
"AAOx4  NAD  VSS    BP (!) 94/55 (BP Location: Right arm, Patient Position: Lying)   Pulse 83   Temp 98.1 °F (36.7 °C) (Oral)   Resp 17   Ht 5' 4" (1.626 m)   Wt 79.3 kg (174 lb 13.2 oz)   SpO2 95%   Breastfeeding No   BMI 30.01 kg/m²     Plan is to DC to SNF, awaiting placement. Pt sitting up in bed. Bed in low & locked position with call light in reach. Will continue to monitor.    "

## 2023-01-04 LAB
ALBUMIN SERPL BCP-MCNC: 2.3 G/DL (ref 3.5–5.2)
ANION GAP SERPL CALC-SCNC: 9 MMOL/L (ref 8–16)
BASOPHILS # BLD AUTO: 0.05 K/UL (ref 0–0.2)
BASOPHILS NFR BLD: 0.6 % (ref 0–1.9)
BUN SERPL-MCNC: 27 MG/DL (ref 8–23)
CALCIUM SERPL-MCNC: 9.2 MG/DL (ref 8.7–10.5)
CHLORIDE SERPL-SCNC: 100 MMOL/L (ref 95–110)
CO2 SERPL-SCNC: 22 MMOL/L (ref 23–29)
CREAT SERPL-MCNC: 5.2 MG/DL (ref 0.5–1.4)
DIFFERENTIAL METHOD: ABNORMAL
EOSINOPHIL # BLD AUTO: 0.1 K/UL (ref 0–0.5)
EOSINOPHIL NFR BLD: 1.3 % (ref 0–8)
ERYTHROCYTE [DISTWIDTH] IN BLOOD BY AUTOMATED COUNT: 14.6 % (ref 11.5–14.5)
EST. GFR  (NO RACE VARIABLE): 9 ML/MIN/1.73 M^2
GLUCOSE SERPL-MCNC: 93 MG/DL (ref 70–110)
HCT VFR BLD AUTO: 28.6 % (ref 37–48.5)
HGB BLD-MCNC: 9.5 G/DL (ref 12–16)
IMM GRANULOCYTES # BLD AUTO: 0.07 K/UL (ref 0–0.04)
IMM GRANULOCYTES NFR BLD AUTO: 0.8 % (ref 0–0.5)
LYMPHOCYTES # BLD AUTO: 0.7 K/UL (ref 1–4.8)
LYMPHOCYTES NFR BLD: 8.6 % (ref 18–48)
MCH RBC QN AUTO: 33.2 PG (ref 27–31)
MCHC RBC AUTO-ENTMCNC: 33.2 G/DL (ref 32–36)
MCV RBC AUTO: 100 FL (ref 82–98)
MONOCYTES # BLD AUTO: 0.6 K/UL (ref 0.3–1)
MONOCYTES NFR BLD: 7.3 % (ref 4–15)
NEUTROPHILS # BLD AUTO: 6.7 K/UL (ref 1.8–7.7)
NEUTROPHILS NFR BLD: 81.4 % (ref 38–73)
NRBC BLD-RTO: 0 /100 WBC
PHOSPHATE SERPL-MCNC: 3.5 MG/DL (ref 2.7–4.5)
PLATELET # BLD AUTO: 316 K/UL (ref 150–450)
PMV BLD AUTO: 10.3 FL (ref 9.2–12.9)
POTASSIUM SERPL-SCNC: 4 MMOL/L (ref 3.5–5.1)
RBC # BLD AUTO: 2.86 M/UL (ref 4–5.4)
SODIUM SERPL-SCNC: 131 MMOL/L (ref 136–145)
WBC # BLD AUTO: 8.25 K/UL (ref 3.9–12.7)

## 2023-01-04 PROCEDURE — 85025 COMPLETE CBC W/AUTO DIFF WBC: CPT | Performed by: INTERNAL MEDICINE

## 2023-01-04 PROCEDURE — 90935 PR HEMODIALYSIS, ONE EVALUATION: ICD-10-PCS | Mod: ,,, | Performed by: INTERNAL MEDICINE

## 2023-01-04 PROCEDURE — 80069 RENAL FUNCTION PANEL: CPT | Performed by: INTERNAL MEDICINE

## 2023-01-04 PROCEDURE — 25000003 PHARM REV CODE 250: Performed by: HOSPITALIST

## 2023-01-04 PROCEDURE — 90935 HEMODIALYSIS ONE EVALUATION: CPT | Mod: ,,, | Performed by: INTERNAL MEDICINE

## 2023-01-04 PROCEDURE — 97530 THERAPEUTIC ACTIVITIES: CPT

## 2023-01-04 PROCEDURE — 21400001 HC TELEMETRY ROOM

## 2023-01-04 PROCEDURE — 97116 GAIT TRAINING THERAPY: CPT

## 2023-01-04 PROCEDURE — 36415 COLL VENOUS BLD VENIPUNCTURE: CPT | Performed by: INTERNAL MEDICINE

## 2023-01-04 PROCEDURE — 25000003 PHARM REV CODE 250: Performed by: INTERNAL MEDICINE

## 2023-01-04 PROCEDURE — 63600175 PHARM REV CODE 636 W HCPCS: Performed by: INTERNAL MEDICINE

## 2023-01-04 PROCEDURE — 63600175 PHARM REV CODE 636 W HCPCS: Performed by: HOSPITALIST

## 2023-01-04 PROCEDURE — 97110 THERAPEUTIC EXERCISES: CPT

## 2023-01-04 RX ADMIN — NEPHROCAP 1 CAPSULE: 1 CAP ORAL at 12:01

## 2023-01-04 RX ADMIN — SEVELAMER CARBONATE 800 MG: 800 TABLET, FILM COATED ORAL at 04:01

## 2023-01-04 RX ADMIN — BRIMONIDINE TARTRATE 1 DROP: 2 SOLUTION/ DROPS OPHTHALMIC at 10:01

## 2023-01-04 RX ADMIN — SENNOSIDES AND DOCUSATE SODIUM 1 TABLET: 8.6; 5 TABLET ORAL at 12:01

## 2023-01-04 RX ADMIN — HYDROCODONE BITARTRATE AND ACETAMINOPHEN 1 TABLET: 5; 325 TABLET ORAL at 12:01

## 2023-01-04 RX ADMIN — ONDANSETRON 4 MG: 2 INJECTION INTRAMUSCULAR; INTRAVENOUS at 12:01

## 2023-01-04 RX ADMIN — CALCITRIOL CAPSULES 0.25 MCG 0.5 MCG: 0.25 CAPSULE ORAL at 12:01

## 2023-01-04 RX ADMIN — APIXABAN 5 MG: 2.5 TABLET, FILM COATED ORAL at 10:01

## 2023-01-04 RX ADMIN — HYDROCODONE BITARTRATE AND ACETAMINOPHEN 1 TABLET: 5; 325 TABLET ORAL at 10:01

## 2023-01-04 RX ADMIN — DILTIAZEM HYDROCHLORIDE 120 MG: 120 CAPSULE, COATED, EXTENDED RELEASE ORAL at 12:01

## 2023-01-04 RX ADMIN — SEVELAMER CARBONATE 800 MG: 800 TABLET, FILM COATED ORAL at 12:01

## 2023-01-04 RX ADMIN — BRIMONIDINE TARTRATE 1 DROP: 2 SOLUTION/ DROPS OPHTHALMIC at 12:01

## 2023-01-04 RX ADMIN — APIXABAN 5 MG: 2.5 TABLET, FILM COATED ORAL at 12:01

## 2023-01-04 RX ADMIN — CEFTRIAXONE 2 G: 2 INJECTION, POWDER, FOR SOLUTION INTRAMUSCULAR; INTRAVENOUS at 04:01

## 2023-01-04 NOTE — ASSESSMENT & PLAN NOTE
-Dr. Schwartz with ID consulted,   - ID recs: cont IV rocephin STEVEN 01/29/23  -KATE: Negative for IE Moderate to mild pericardial effusion  .

## 2023-01-04 NOTE — CONSULTS
Thank you for your consult to [facility]. We have reviewed the patient chart. This patient does not meet criteria for [facility] service at this time due to [facility] service capacity limitations. Will hand back to In-house service..

## 2023-01-04 NOTE — NURSING
01/04/23 1200   Post-Hemodialysis Assessment   Rinseback Volume (mL) 250 mL   Blood Volume Processed (Liters) 62.4 L   Dialyzer Clearance Lightly streaked   Duration of Treatment 180 minutes   Total UF (mL) 2500 mL   Net Fluid Removal 2000   Post-Hemodialysis Comments 3hr H.D. tx completed pt tolerated w/o issues. Pt  De-accessed per P & P. Report given to primary nurse.

## 2023-01-04 NOTE — PROGRESS NOTES
[facility] ER Medicine  Progress Note    Patient Name: Meaghan Potter  MRN: [id number]  Patient Class: IP- Inpatient   Admission Date: 12/19/2022  Length of Stay: 14 days  Attending Physician: Linda Carranza MD  Primary Care Provider: Primary Doctor No        Subjective:     Principal Problem:Pyogenic arthritis of left hip        HPI:  Meaghan Potter is a 67 y.o. female with a PMH  has a past medical history of ESRD (end stage renal disease) on dialysis, GERD (gastroesophageal reflux disease), and Hypertension. who presented as a transfer from [facility] for higher level cardiology and orthopedic care after patient was found to have sustained a left superior labral tear of her acetabulum noted on MRI in addition to new onset atrial fibrillation with RVR requiring diltiazem drip.  Patient reported being in her usual state of health prior to onset of symptoms and reported acute onset of left hip pain while performing leg exercises in bed earlier today.  Patient reported hearing and feeling a pop in her left hip followed by immediate pain which has remained constant and currently rated 6/10 in severity. Pain was described as throbbing/pulling in nature with no known alleviating factors and aggravating factors including weight-bearing, movement, and palpation to the affected area.  She denied endorsing any numbness, tingling, discoloration, or penetrating trauma, but did express decreased range of motion and muscle strength secondary to exacerbation of pain.  While at outside facility, patient became short of breath while lying flat while obtaining MRI of hip and was found to be in atrial fibrillation with RVR requiring diltiazem drip. She denied endorsing any chest pain, lightheadedness, dizziness, visual disturbances, fever, chills, sweats, nausea, vomiting, abdominal pain, changes in bowel/bladder habits, or onset neurological deficits.  All other review of systems negative except as noted  above.  Patient reports being back at her baseline and continues to complain only of left hip pain.  Orthopedics and cardiology consulted and awaiting further evaluation/recommendations.  Of note, patient missed hemodialysis today secondary to pain and going to the emergency room and usually receives HD via left upper extremity fistula on Monday/Wednesday/Friday.  Follows Dr. Ribera outpatient. Nephrology consulted to continue HD inpatient.     PCP: Primary Doctor No        Overview/Hospital Course:  Pt admitted to Observation for Atrial fibrillation (new onset) with RVR with . Cardiology consulted with Cardizem gtt initiated. NSR on monitor with HR in 80's.  BNP elevated and Troponin trended upward (0.076>0.095) with Heparin infusion initiated. Echo showed with concentric hypertrophy and normal systolic function. Atrial fibrillation observed.Biatrial atrial enlargement. Grade III left ventricular diastolic dysfunction. PA systolic pressure is 60 mmHg. Moderate right ventricular enlargement with normal right ventricular systolic function. Pulmonary hypertension. EF 55%. Moderate to severe tricuspid regurgitation. Mild-to-moderate mitral regurgitation. Severe right atrial enlargement. Large posterior pericardial effusion. No evidence of tamponade. Troponin elevated and flat with no cardiac symptoms reported. Pt also reported L groin pain upon admit. Orthopedic Surgery consulted and pt found to have sustained a left superior labral tear of her acetabulum noted on MRI with further orthopedic work up to be completed outpatient. Hx of ESRD noted with last HD on 12/16/22- Nephrology consulted with HD performed today. AVG to LUE with +bruit/+thrill present. On 12/21/22, MRI of R hip results pending. IR consulted for evaluation of aspiration/injection of left hip. Rate and rhythm controlled on Cardizem gtt. Heparin infusion continued pending joint aspiration.  Pericardial effusion discussed with Nephrology and  "Cardiology for recommendations.   Patient went to have aspiration of left hip however upon transferring from bed to table had an episode described as seizure-like" seems more that it was hypotension related.  Medications adjusted  She remains on p.o. Cardizem as well as Cardizem drip.  Patient underwent arthrocentesis with 4 cc of pus removed 12/23.  Sent for Gram stain, culture, crystals.  Discussed with Dr. Johnson , orthopedics,crystals are negative patient underwent washout with  Cultures 12/24.  Blood cultures and culture from left hip reveal Gram-positive and Gram-negative.  Patient paced on ceftazidime and vancomycin empirically until cultures return.  12/26 She is complaining of weakness . The blood and drainage cx are (+) for STREPTOCOCCUS GORDONII  sensitive  to rocephin .  ID consulted .  12/27 NAEON . ID consulted and rec IV rocephin qd x 4 week from the last negative blood cx . Cardiology consulted for KATE .   12/28 Plan  for a KATE tomorrow am . S/P HD today  . She is complaining od left hip pain .   12/29 S/P KATE which show negative for IE and mild to moderate pericardial effusion .  Pt  will need 4 week of IVAB  from the last negative blood cx . She  is complaining of left hip pain  . Plan to consult CM for SNF placement .   12/30 CT maxillofacial show Multifocal periodontal disease without definite associated dental abscess.  S/P Tunneled PICC line yesterday . Plan to d/c to SNF  for IVAB rocephin qd x 4 weeks .   12/31 NAEON . Awaiting SNF placement.    1/1/23 C/O nausea and vomit . Abd XR show constipation. Plan to cont miralax colace and start enemas .  1/2: constipation improved, BM overnight; awaiting snf placement  01/03: BP low normal, pt reports some lightheadedness, given 250 cc bolus. Denies hip pain. Awaiting SNF placement.   01/04/23: HD today. BP remains low normal. Awaiting SNF placement.       Interval History: NAEON. BP remains 90-100s systolic. Underwent HD today with 2L fluid " removal. PT reports feeling tired, hip soreness improving. Denies CP, SOB. Awaiting placement     Review of Systems  Objective:     Vital Signs (Most Recent):  Temp: 98.1 °F (36.7 °C) (01/04/23 1635)  Pulse: 88 (01/04/23 1635)  Resp: 18 (01/04/23 1635)  BP: (!) 94/53 (01/04/23 1635)  SpO2: 97 % (01/04/23 1635)   Vital Signs (24h Range):  Temp:  [97 °F (36.1 °C)-98.2 °F (36.8 °C)] 98.1 °F (36.7 °C)  Pulse:  [] 88  Resp:  [16-19] 18  SpO2:  [96 %-98 %] 97 %  BP: ()/(53-62) 94/53     Weight: 77 kg (169 lb 12.1 oz)  Body mass index is 29.14 kg/m².    Intake/Output Summary (Last 24 hours) at 1/4/2023 1657  Last data filed at 1/4/2023 1200  Gross per 24 hour   Intake 300 ml   Output 2500 ml   Net -2200 ml      Physical Exam  Vitals and nursing note reviewed.   Constitutional:       General: She is not in acute distress.     Appearance: Ill appearance: chronically ill appearing.   Cardiovascular:      Rate and Rhythm: Normal rate and regular rhythm.      Heart sounds: No murmur heard.    No friction rub. No gallop.   Pulmonary:      Effort: Pulmonary effort is normal.      Breath sounds: Normal breath sounds. No wheezing, rhonchi or rales.   Abdominal:      General: Bowel sounds are normal. There is no distension.      Palpations: Abdomen is soft.      Tenderness: There is no abdominal tenderness. There is no guarding or rebound.   Musculoskeletal:      Right lower leg: No edema.      Left lower leg: No edema.      Comments: L hip CDI    Neurological:      Mental Status: She is alert and oriented to person, place, and time. Mental status is at baseline.       Significant Labs: All pertinent labs within the past 24 hours have been reviewed.    Significant Imaging: I have reviewed all pertinent imaging results/findings within the past 24 hours.      Assessment/Plan:      * Pyogenic arthritis of left hip  Patient presented with acute onset left hip pain x1 day duration in absence of trauma while doing leg  exercises in bed and was found to asymmetric large left hip joint effusion, small right hip joint effusion, and evidence of superior labral tear of her left acetabulum noted on MRI.  Patient noted to have 5/5 strength throughout with sensation to light touch grossly intact throughout however, TTP throughout left groin.  Orthopedics consulted and awaiting further evaluation/recommendations.  Plan:  -optimize pain control  -bowel regimen  -PT/OT  -ortho surgery following    The patient underwent arthrocentesis by IR 12/23 with 4 cc pus obtained.  Sent for cell count which revealed 187,000 white blood cells 100% segs.  Crystals negative  and culture Gram-negative and Gram-positive.  Patient went for washout 12/24.  Placed on empiric vanco and ceftazidime    -Blood cultures positive STREPTOCOCCUS GORDONII  -Repeated blood cx NGTD  -ID followed: Rec :to treat for 4 weeks from date of negative blood cultures with IV rocephin 2 gram daily; STEVEN 01/29/23        Constipation  -improved, last BM 1/1  -cont miralax and senakot      Streptococcal bacteremia  -Dr. Schwartz with ID consulted,   - ID recs: cont IV rocephin STEVEN 01/29/23  -KATE: Negative for IE Moderate to mild pericardial effusion  .       Hyperkalemia, diminished renal excretion  Cont HD      Pericardial effusion  -pt appears asymptomatic   -Nephrology following with HD performed on 12/21/22 and a Monday Wednesday Friday schedule  -KATE show mild to moderate pericardial effusion     Bone cyst of right femur  -Orthopedic Surgery following   -follow up outpatient recommended          Shortness of breath  Patient reported acute onset shortness a breath while obtaining MRI and was found to have evidence of severe cardiomegaly with small left pleural effusion but negative for infectious processes noted on CXR. Patient afebrile without leukocytosis. BNP elevated at 4792. Troponin 0.095. Exam positive for bilateral edema but negative for elevated JVD or crackles on lung  ausculation. Patient currently saturating % on 2L via NC in no acute distress and without use of accessory muscles noted.  Patient without history of CHF.  Cardiology consulted.  Echo ordered and pending.  Nephrology consulted to resume HD inpatient.  Will trial dose of Lasix if clinical status worsens.  Plan:  -titrate oxygen therapy as needed  -incentive spirometry  -echo reviewed   -f/u cardiology and nephrology  -monitor Is/Os  -low salt/cardiac/renal diet    -Duonebs prn       Tear of left acetabular labrum  -Orthopedic surgery following  -MRI showed no fracture and asymmetric large left hip joint effusion.  Small right hip joint effusion. Paralabral cyst along the superior border of the acetabulum suspicious for a superior labral tear.   - the left hip may treat with therapy and anti-inflammatories (per Orthopedic Surgery)   - outpatient ortho follow up       Gastroesophageal reflux disease without esophagitis  Chronic. Stable. Currently asymptomatic. Home medications include PPI/Antacids as needed.  Plan:  -Continue PPI/Antacids as needed       Primary hypertension  -Continue home Cardizem   -Monitor BP          ESRD (end stage renal disease) on dialysis  - Patient currently follows Dr. Ribera as outpatient, Patient currently on M/W/F HD at [facility] via LUE fistula - -nephrology following, continue HD MWF        Atrial fibrillation  Patient with new onset Paroxysmal (<7 days) atrial fibrillation with RVR which is uncontrolled currently with Calcium Channel Blocker. Patient is currently in atrial fibrillation with rate in the 105-130s on diltiazem. [id number] Score: 2. HASBLED Score: 3. Anticoagulation indicated. Anticoagulation done with Eliquis.  Troponin measuring 0.095 with EKG obtained at outside facility revealing SVT/atrial fibrillation with RVR at rate of 176. Troponin likely elevated in setting of ESRD and demand ischemia from arrhythmia as patient denied endorsing chest  pain.  Plan:  -previously on cardizem drip has been transitioned to PO   -f/u cardiology   -nephrology for HD            VTE Risk Mitigation (From admission, onward)         Ordered     apixaban tablet 5 mg  2 times daily         12/25/22 1402     Place sequential compression device  Until discontinued         12/19/22 2119     IP VTE LOW RISK PATIENT  Once         12/19/22 2119                Discharge Planning   ISAAK:      Code Status: Full Code   Is the patient medically ready for discharge?:     Reason for patient still in hospital (select all that apply): Pending disposition  Discharge Plan A: Skilled Nursing Facility   Discharge Delays: None known at this time              Linda Carranza MD  Department of Hospital Medicine   [facility] - Telemetry ([facility])

## 2023-01-04 NOTE — PLAN OF CARE
TRAVIS notified by [facility] that patient no longer has [employer] insurance. TRAVIS met with patient in HD to confirm this information. Patient states she now has [employer]. SNF authorization will need to be process with patient's new coverage. Pt does not have [employer] member ID with her in HD room. SW to f/u with [facility] and [facility] to see if they can locate coverage. SW to f/u with patient once out of HD if unable to locate via the above parties.

## 2023-01-04 NOTE — PLAN OF CARE
01/04/23 1401   Post-Acute Status   Post-Acute Authorization Placement   Post-Acute Placement Status Pending payor review/awaiting authorization (if required)   Discharge Delays None known at this time   Discharge Plan   Discharge Plan A Skilled Nursing Facility     [facility] submitting for authorization with pt's [employer] coverage. SW to f/u on auth status.

## 2023-01-04 NOTE — SUBJECTIVE & OBJECTIVE
Interval History: NAEON. BP remains 90-100s systolic. Underwent HD today with 2L fluid removal. PT reports feeling tired, hip soreness improving. Denies CP, SOB. Awaiting placement     Review of Systems  Objective:     Vital Signs (Most Recent):  Temp: 98.1 °F (36.7 °C) (01/04/23 1635)  Pulse: 88 (01/04/23 1635)  Resp: 18 (01/04/23 1635)  BP: (!) 94/53 (01/04/23 1635)  SpO2: 97 % (01/04/23 1635)   Vital Signs (24h Range):  Temp:  [97 °F (36.1 °C)-98.2 °F (36.8 °C)] 98.1 °F (36.7 °C)  Pulse:  [] 88  Resp:  [16-19] 18  SpO2:  [96 %-98 %] 97 %  BP: ()/(53-62) 94/53     Weight: 77 kg (169 lb 12.1 oz)  Body mass index is 29.14 kg/m².    Intake/Output Summary (Last 24 hours) at 1/4/2023 1657  Last data filed at 1/4/2023 1200  Gross per 24 hour   Intake 300 ml   Output 2500 ml   Net -2200 ml      Physical Exam  Vitals and nursing note reviewed.   Constitutional:       General: She is not in acute distress.     Appearance: Ill appearance: chronically ill appearing.   Cardiovascular:      Rate and Rhythm: Normal rate and regular rhythm.      Heart sounds: No murmur heard.    No friction rub. No gallop.   Pulmonary:      Effort: Pulmonary effort is normal.      Breath sounds: Normal breath sounds. No wheezing, rhonchi or rales.   Abdominal:      General: Bowel sounds are normal. There is no distension.      Palpations: Abdomen is soft.      Tenderness: There is no abdominal tenderness. There is no guarding or rebound.   Musculoskeletal:      Right lower leg: No edema.      Left lower leg: No edema.      Comments: L hip CDI    Neurological:      Mental Status: She is alert and oriented to person, place, and time. Mental status is at baseline.       Significant Labs: All pertinent labs within the past 24 hours have been reviewed.    Significant Imaging: I have reviewed all pertinent imaging results/findings within the past 24 hours.

## 2023-01-04 NOTE — ASSESSMENT & PLAN NOTE
- Patient currently follows Dr. Ribera as outpatient, Patient currently on M/W/F HD at [facility] via LUE fistula - -nephrology following, continue HD MWF

## 2023-01-04 NOTE — PROGRESS NOTES
'[place])  Nephrology  Progress Note    Patient Name: Meaghan Potter  MRN: [id number]  Admission Date: 12/19/2022  Hospital Length of Stay: 14 days  Attending Provider: Linda Carranza MD   Primary Care Physician: Primary Doctor No  Principal Problem:Pyogenic arthritis of left hip    Consults  Subjective:     Interval History:  Patient was seen on dialysis.  In bed resting comfortably.  No acute distress noted.    Review of systems:  No shortness of breath or chest discomfort.  No fevers or chills.  She continues to have some mild nausea.    Review of patient's allergies indicates:   Allergen Reactions    Amoxicillin Rash     Current Facility-Administered Medications   Medication Frequency    0.9%  NaCl infusion Once    0.9%  NaCl infusion Once    0.9%  NaCl infusion PRN    acetaminophen suppository 650 mg Q6H PRN    acetaminophen tablet 650 mg Q8H PRN    albuterol-ipratropium 2.5 mg-0.5 mg/3 mL nebulizer solution 3 mL Q6H PRN    apixaban tablet 5 mg BID    brimonidine 0.2% ophthalmic solution 1 drop BID    BUPivacaine (PF) 0.25% (2.5 mg/ml) injection 12.5 mg Once    calcitRIOL capsule 0.5 mcg Every Mon, Wed, Fri    cefTRIAXone (ROCEPHIN) 2 g in dextrose 5 % in water (D5W) 5 % 50 mL IVPB (MB+) Q24H    dextrose 10% bolus 125 mL 125 mL PRN    dextrose 10% bolus 250 mL 250 mL PRN    diltiaZEM 24 hr capsule 120 mg Daily    ergocalciferol capsule 50,000 Units Q7 Days    glucagon (human recombinant) injection 1 mg PRN    glucose chewable tablet 16 g PRN    glucose chewable tablet 24 g PRN    HYDROcodone-acetaminophen 5-325 mg per tablet 1 tablet Q6H PRN    melatonin tablet 6 mg Nightly PRN    naloxone 0.4 mg/mL injection 0.02 mg PRN    ondansetron injection 4 mg Q8H PRN    polyethylene glycol packet 17 g Daily    promethazine tablet 25 mg Q6H PRN    senna-docusate 8.6-50 mg per tablet 1 tablet BID PRN    senna-docusate 8.6-50 mg per tablet 1 tablet Daily    sevelamer carbonate tablet 800 mg TID WM     sodium chloride 0.9% bolus 250 mL 250 mL PRN    sodium chloride 0.9% bolus 250 mL 250 mL PRN    sodium chloride 0.9% bolus 250 mL 250 mL PRN    sodium chloride 0.9% bolus 250 mL 250 mL PRN    sodium chloride 0.9% bolus 250 mL 250 mL PRN    sodium chloride 0.9% bolus 250 mL 250 mL PRN    sodium chloride 0.9% flush 3 mL Q12H PRN    vitamin renal formula (B-complex-vitamin c-folic acid) 1 mg per capsule 1 capsule Daily       Objective:     Vital Signs (Most Recent):  Temp: 97 °F (36.1 °C) (01/04/23 0723)  Pulse: 92 (01/04/23 0930)  Resp: 16 (01/04/23 0723)  BP: (!) 100/57 (01/04/23 0723)  SpO2: 98 % (01/04/23 0723) Vital Signs (24h Range):  Temp:  [97 °F (36.1 °C)-98.2 °F (36.8 °C)] 97 °F (36.1 °C)  Pulse:  [68-92] 92  Resp:  [16-20] 16  SpO2:  [96 %-99 %] 98 %  BP: ()/(53-62) 100/57     Weight: 77 kg (169 lb 12.1 oz) (01/04/23 0029)  Body mass index is 29.14 kg/m².  Body surface area is 1.86 meters squared.    I/O last 3 completed shifts:  In: 510 [P.O.:510]  Out: -     Physical Exam  Constitutional:       Appearance: Normal appearance.   HENT:      Head: Normocephalic and atraumatic.   Eyes:      General: No scleral icterus.     Extraocular Movements: Extraocular movements intact.      Pupils: Pupils are equal, round, and reactive to light.   Pulmonary:      Effort: Pulmonary effort is normal.      Breath sounds: No stridor.   Musculoskeletal:      Right lower leg: No edema.      Left lower leg: No edema.   Skin:     General: Skin is warm and dry.   Neurological:      General: No focal deficit present.      Mental Status: She is alert and oriented to person, place, and time.   Psychiatric:         Mood and Affect: Mood normal.         Behavior: Behavior normal.       Significant Labs:sureBMP:   Recent Labs   Lab 01/04/23  0842   GLU 93   *   K 4.0      CO2 22*   BUN 27*   CREATININE 5.2*   CALCIUM 9.2       CMP:   Recent Labs   Lab 01/04/23  0842   GLU 93   CALCIUM 9.2   ALBUMIN 2.3*   *   K  4.0   CO2 22*      BUN 27*   CREATININE 5.2*       All labs within the past 24 hours have been reviewed.    Significant Imaging:  Labs: Reviewed      Assessment/Plan:     Active Diagnoses:    Diagnosis Date Noted POA    PRINCIPAL PROBLEM:  Pyogenic arthritis of left hip [M00.9] 12/20/2022 Yes    Constipation [K59.00] 01/01/2023 Yes    Streptococcal bacteremia [R78.81, B95.5] 12/27/2022 Yes    Pericardial effusion [I31.39] 12/21/2022 Yes    ESRD (end stage renal disease) on dialysis [N18.6, Z99.2] 12/20/2022 Not Applicable    Primary hypertension [I10] 12/20/2022 Yes    Gastroesophageal reflux disease without esophagitis [K21.9] 12/20/2022 Yes    Tear of left acetabular labrum [S73.192A] 12/20/2022 Yes    Bone cyst of right femur [M85.651] 12/20/2022 Yes    Atrial fibrillation [I48.91] 12/19/2022 Yes      Problems Resolved During this Admission:       Assessment plan:    1. End-stage renal disease:  Patient was seen on dialysis, in bed resting comfortably.  No acute distress noted.  Will plan to continue Monday Wednesday Friday dialysis unless otherwise indicated.    2. Potassium has been stable.  The most recent was 4.0.    3. Bicarbonate is stable at 22.    4. Septic hip: Defer to Orthopedic surgery and primary service for management.    Thank you for your consult.     Bruce Ortega MD  Nephrology  [facility] - Telemetry ([facility])

## 2023-01-04 NOTE — ASSESSMENT & PLAN NOTE
Patient with new onset Paroxysmal (<7 days) atrial fibrillation with RVR which is uncontrolled currently with Calcium Channel Blocker. Patient is currently in atrial fibrillation with rate in the 105-130s on diltiazem. [id number] Score: 2. HASBLED Score: 3. Anticoagulation indicated. Anticoagulation done with Eliquis.  Troponin measuring 0.095 with EKG obtained at outside facility revealing SVT/atrial fibrillation with RVR at rate of 176. Troponin likely elevated in setting of ESRD and demand ischemia from arrhythmia as patient denied endorsing chest pain.  Plan:  -previously on cardizem drip has been transitioned to PO   -f/u cardiology   -nephrology for HD

## 2023-01-04 NOTE — PT/OT/SLP PROGRESS
"Physical Therapy  Treatment    Meaghan Potter   MRN: [id number]   Admitting Diagnosis: Pyogenic arthritis of left hip       PT Start Time: 1449     PT Stop Time: 1530    PT Total Time (min): 41 min       Billable Minutes:  Gait Training 18, Therapeutic Activity 13, and Therapeutic Exercise 10    Treatment Type: Treatment  PT/PTA: PT     PTA Visit Number: 0       General Precautions: Standard, fall  Orthopedic Precautions: LLE weight bearing as tolerated  Braces: N/A  Respiratory Status: Room air    Spiritual, Cultural Beliefs, Anabaptist Practices, Values that Affect Care: no    Subjective:  Communicated with nursing and performed chart review via epic system prior to session.  Attempted this am but pt in HD. In pm pt agreeable to OOB activity following HD stating "I got to"    Pain/Comfort  Pain Rating 1: 0/10    Objective:   Patient found with: telemetry, peripheral IV, PICC line    Functional Mobility:  Bed Mobility:    Facilitated bed mobility with CGA and assist for UE placement and trunk management    Transfers:   Sit<>stand transfers with min A and RW x 3 reps throughout session, stand pivot transfer x 3 reps with CGA/min A and RW    Gait:    Gait training with RW 15ftx2, then 10ft x 2 min A with RW. Intermittent episodes of knee buckling but no LOB, slow pace, flexed posture, with flexed knees and very narrow KATHY    Dynamic stand: POOR+: Needs MIN (minimal ) assist during gait       Treatment and Education:  Educated pt on benefits of consistent participation in PT services to meet functional goals. Educated pt on seated therex to promote strength and joint mobility. Exercises included AP, LAQ, marching x 15 reps. Educated to perform exercises intermittently throughout day to tolerance. Educated pt on importance of sitting OOB to promote endurance and overall activity tolerance. Pt expressed understanding. Educated pt on call don't fall policy and use of call button to alert nursing staff of needs.       AM-PAC 6 " CLICK MOBILITY  How much help from another person does this patient currently need?   1 = Unable, Total/Dependent Assistance  2 = A lot, Maximum/Moderate Assistance  3 = A little, Minimum/Contact Guard/Supervision  4 = None, Modified [place]/Independent    Turning over in bed (including adjusting bedclothes, sheets and blankets)?: 3  Sitting down on and standing up from a chair with arms (e.g., wheelchair, bedside commode, etc.): 3  Moving from lying on back to sitting on the side of the bed?: 3  Moving to and from a bed to a chair (including a wheelchair)?: 3  Need to walk in hospital room?: 3  Climbing 3-5 steps with a railing?: 1  Basic Mobility Total Score: 16    AM-PAC Raw Score CMS G-Code Modifier Level of Impairment Assistance   6 % Total / Unable   7 - 9 CM 80 - 100% Maximal Assist   10 - 14 CL 60 - 80% Moderate Assist   15 - 19 CK 40 - 60% Moderate Assist   20 - 22 CJ 20 - 40% Minimal Assist   23 CI 1-20% SBA / CGA   24 CH 0% Independent/ Mod I     Patient left up in chair with all lines intact, call button in reach, and nursing notified.    Assessment:  Meaghan Potter is a 67 y.o. female with a medical diagnosis of Pyogenic arthritis of left hip and presents with deficits in mobility following surgical intervention. Pt motivated to participate and participated with no s/s of distress. Pt will benefit from continued PT services to address deficits and progress toward baseline level of independence.     Rehab identified problem list/impairments: weakness, impaired endurance, impaired functional mobility, gait instability, decreased safety awareness    Rehab potential is good.    Activity tolerance: Good    Discharge recommendations: home health PT, other (see comments) (w/ 24 hr care and SPV)      Barriers to discharge:      Equipment recommendations: walker, rolling     GOALS:   Multidisciplinary Problems       Physical Therapy Goals          Problem: Physical Therapy    Goal Priority  Disciplines Outcome Goal Variances Interventions   Physical Therapy Goal     PT, PT/OT Ongoing, Progressing     Description: LT23  1. PT WILL COMPLETE BED MOBILITY WITH S  2. PT WILL T/F TO CHAIR WITH RW AND S  3. PT WILL GT TRAIN X 150' WITH RW AND S                       PLAN:    Patient to be seen 3 x/week to address the above listed problems via gait training, therapeutic activities, therapeutic exercises, neuromuscular re-education  Plan of Care expires: 23  Plan of Care reviewed with: patient         2023

## 2023-01-04 NOTE — PLAN OF CARE
Plan of Care: RN Shift Summary & Bedside Handover Report  2023 2:30 PM    Pt admitted on 2022 for Pyogenic arthritis of left hip under Linda Carranza MD service   Code Status Full Code    Past Medical/ Surgical Hx Past Medical History:   Diagnosis Date    ESRD (end stage renal disease) on dialysis     GERD (gastroesophageal reflux disease)     Hypertension       Past Surgical History:   Procedure Laterality Date     SECTION      INCISION AND DRAINAGE, LOWER EXTREMITY Left 2022    Procedure: INCISION AND DRAINAGE, LOWER EXTREMITY;  Surgeon: Kim Johnson MD;  Location: [facility] OR;  Service: Orthopedics;  Laterality: Left;    PLACEMENT OF DIALYSIS ACCESS      TRANSESOPHAGEAL ECHOCARDIOGRAPHY N/A 2022    Procedure: ECHOCARDIOGRAM, TRANSESOPHAGEAL;  Surgeon: Mary Silva MD;  Location: [facility] CATH LAB;  Service: Cardiology;  Laterality: N/A;       HEENT HEENT WDL: WDL except, vision aid   Cognitive/ Neuro Cognitive/Neuro/Behavioral WDL: WDL  Level of Consciousness (AVPU): alert  Orientation: oriented x 4  Keno Coma Scale Score: 15  Additional Documentation: Keno Coma Scale (Group)   Resp Respiratory WDL: WDL  All Lung Fields Breath Sounds: clear  Flow (L/min): 3  Oxygen Concentration (%): 98      Cardiac Cardiac WDL: WDL except, rhythm  Lead Monitored: Lead II  Rhythm: atrial rhythm  Frequency/Ectopy: PVCs  Cardiac/Telemetry Box Number: [id number]   Peripheral/ Neurovascular Peripheral Neurovascular WDL: WDL   VTE core VTE Required Core Measure: Pharmacological prophylaxis initiated/maintained   GI GI WDL: WDL     Last Bowel Movement: 23   Diet Diet renal    Genitourinary WDL: WDL except, voiding ability/characteristics  Voiding Characteristics: anuria, patient on hemodialysis      Skin Skin WDL: WDL  Skin Integrity: incision   Isrrael Score Isrrael Score: 20   Musculoskeletal  Musculoskeletal WDL: WDL except, mobility  General Mobility: generalized weakness   Safety Safety  WDL: WDL  Safety Factors: ID band on, upper side rails raised x 2, call light in reach, wheels locked, bed in low position  Safety Precautions: emergency equipment at bedside   Fall Risk Score Fall Risk Score: 12   LDA(s) Left AV fistula  Right Chest Subclavian      Consults Consults (From admission, onward)          Status Ordering Provider     Inpatient virtual consult to Hospital Medicine  Once        Provider:  Terry Mueller MD    Completed SHANTI SCHAEFFER     Inpatient consult to Social Work  Once        Provider:  (Not yet assigned)    Completed EK ASTORGA     Inpatient consult to Infectious Diseases  Once        Provider:  Gerardo Schwartz MD, FIDSA    Acknowledged KE ASTORGA     Inpatient consult to Interventional Radiology  Once        Provider:  Juan M Panda MD    Completed SOCO GOODWIN     Inpatient consult to Nephrology  Once        Provider:  William Alfonso MD    Completed CLEMENTE CAVANAUGH     Inpatient consult to Orthopedic Surgery  Once        Provider:  Nataliia Pinto MD    Completed SIRISHA HAMMOND     Inpatient consult to Cardiology  Once        Provider:  Kyung Aragon MD    Completed SIRISHA HAMMOND            Shift events  HD - 2L off   Plan of Care for RN report  Continue care as ordered,    Discharge Planning Discharge Plan A: Skilled Nursing Facility    Patient updated on plan of care, all questions answered up to now regarding plan of care, will continue to monitor per hourly rounding & unit practice/ policy    Note: Other exception details noted in flowsheet if not present in summary

## 2023-01-04 NOTE — PLAN OF CARE
TX COMPLETED: facilitated bed mobility with CGA, transfers with min A, ambulated 15 ft x 2, 10ft x 2 with min A and RW. Recommend HHPT with 24 hr spv

## 2023-01-04 NOTE — PLAN OF CARE
Pt's new [employer] coverage located by [facility] NOTA. SW provided this information to [facility] to verify SNF coverage and submit for authorization. TRAVIS to f/u.

## 2023-01-05 ENCOUNTER — TELEPHONE (OUTPATIENT)
Dept: ORTHOPEDICS | Facility: CLINIC | Age: 68
End: 2023-01-05
Payer: MEDICARE

## 2023-01-05 PROCEDURE — 99232 SBSQ HOSP IP/OBS MODERATE 35: CPT | Mod: ,,, | Performed by: INTERNAL MEDICINE

## 2023-01-05 PROCEDURE — 25000003 PHARM REV CODE 250: Performed by: INTERNAL MEDICINE

## 2023-01-05 PROCEDURE — 97530 THERAPEUTIC ACTIVITIES: CPT

## 2023-01-05 PROCEDURE — 25000003 PHARM REV CODE 250: Performed by: HOSPITALIST

## 2023-01-05 PROCEDURE — 63600175 PHARM REV CODE 636 W HCPCS: Performed by: INTERNAL MEDICINE

## 2023-01-05 PROCEDURE — 97110 THERAPEUTIC EXERCISES: CPT

## 2023-01-05 PROCEDURE — 99232 PR SUBSEQUENT HOSPITAL CARE,LEVL II: ICD-10-PCS | Mod: ,,, | Performed by: INTERNAL MEDICINE

## 2023-01-05 PROCEDURE — 21400001 HC TELEMETRY ROOM

## 2023-01-05 RX ORDER — METOPROLOL TARTRATE 25 MG/1
25 TABLET, FILM COATED ORAL 2 TIMES DAILY
Status: DISCONTINUED | OUTPATIENT
Start: 2023-01-06 | End: 2023-01-07 | Stop reason: HOSPADM

## 2023-01-05 RX ADMIN — APIXABAN 5 MG: 2.5 TABLET, FILM COATED ORAL at 09:01

## 2023-01-05 RX ADMIN — POLYETHYLENE GLYCOL 3350 17 G: 17 POWDER, FOR SOLUTION ORAL at 09:01

## 2023-01-05 RX ADMIN — BRIMONIDINE TARTRATE 1 DROP: 2 SOLUTION/ DROPS OPHTHALMIC at 08:01

## 2023-01-05 RX ADMIN — SODIUM CHLORIDE 500 ML: 9 INJECTION, SOLUTION INTRAVENOUS at 12:01

## 2023-01-05 RX ADMIN — SEVELAMER CARBONATE 800 MG: 800 TABLET, FILM COATED ORAL at 09:01

## 2023-01-05 RX ADMIN — DILTIAZEM HYDROCHLORIDE 120 MG: 120 CAPSULE, COATED, EXTENDED RELEASE ORAL at 09:01

## 2023-01-05 RX ADMIN — SEVELAMER CARBONATE 800 MG: 800 TABLET, FILM COATED ORAL at 05:01

## 2023-01-05 RX ADMIN — NEPHROCAP 1 CAPSULE: 1 CAP ORAL at 09:01

## 2023-01-05 RX ADMIN — HYDROCODONE BITARTRATE AND ACETAMINOPHEN 1 TABLET: 5; 325 TABLET ORAL at 08:01

## 2023-01-05 RX ADMIN — Medication 6 MG: at 08:01

## 2023-01-05 RX ADMIN — PROMETHAZINE HYDROCHLORIDE 25 MG: 25 TABLET ORAL at 09:01

## 2023-01-05 RX ADMIN — CEFTRIAXONE 2 G: 2 INJECTION, POWDER, FOR SOLUTION INTRAMUSCULAR; INTRAVENOUS at 05:01

## 2023-01-05 RX ADMIN — APIXABAN 5 MG: 2.5 TABLET, FILM COATED ORAL at 08:01

## 2023-01-05 RX ADMIN — SEVELAMER CARBONATE 800 MG: 800 TABLET, FILM COATED ORAL at 12:01

## 2023-01-05 RX ADMIN — BRIMONIDINE TARTRATE 1 DROP: 2 SOLUTION/ DROPS OPHTHALMIC at 09:01

## 2023-01-05 RX ADMIN — SENNOSIDES AND DOCUSATE SODIUM 1 TABLET: 8.6; 5 TABLET ORAL at 09:01

## 2023-01-05 NOTE — PROGRESS NOTES
[facility] Medicine  Progress Note    Patient Name: Meaghan Potter  MRN: [id number]  Patient Class: IP- Inpatient   Admission Date: 12/19/2022  Length of Stay: 15 days  Attending Physician: Linda Carranza MD  Primary Care Provider: Primary Doctor No        Subjective:     Principal Problem:Pyogenic arthritis of left hip        HPI:  Meaghan Potter is a 67 y.o. female with a PMH  has a past medical history of ESRD (end stage renal disease) on dialysis, GERD (gastroesophageal reflux disease), and Hypertension. who presented as a transfer from [facility] for higher level cardiology and orthopedic care after patient was found to have sustained a left superior labral tear of her acetabulum noted on MRI in addition to new onset atrial fibrillation with RVR requiring diltiazem drip.  Patient reported being in her usual state of health prior to onset of symptoms and reported acute onset of left hip pain while performing leg exercises in bed earlier today.  Patient reported hearing and feeling a pop in her left hip followed by immediate pain which has remained constant and currently rated 6/10 in severity. Pain was described as throbbing/pulling in nature with no known alleviating factors and aggravating factors including weight-bearing, movement, and palpation to the affected area.  She denied endorsing any numbness, tingling, discoloration, or penetrating trauma, but did express decreased range of motion and muscle strength secondary to exacerbation of pain.  While at outside facility, patient became short of breath while lying flat while obtaining MRI of hip and was found to be in atrial fibrillation with RVR requiring diltiazem drip. She denied endorsing any chest pain, lightheadedness, dizziness, visual disturbances, fever, chills, sweats, nausea, vomiting, abdominal pain, changes in bowel/bladder habits, or onset neurological deficits.  All other review of systems negative except as noted  above.  Patient reports being back at her baseline and continues to complain only of left hip pain.  Orthopedics and cardiology consulted and awaiting further evaluation/recommendations.  Of note, patient missed hemodialysis today secondary to pain and going to the emergency room and usually receives HD via left upper extremity fistula on Monday/Wednesday/Friday.  Follows Dr. Ribera outpatient. Nephrology consulted to continue HD inpatient.     PCP: Primary Doctor No        Overview/Hospital Course:  Pt admitted to Observation for Atrial fibrillation (new onset) with RVR with . Cardiology consulted with Cardizem gtt initiated. NSR on monitor with HR in 80's.  BNP elevated and Troponin trended upward (0.076>0.095) with Heparin infusion initiated. Echo showed with concentric hypertrophy and normal systolic function. Atrial fibrillation observed.Biatrial atrial enlargement. Grade III left ventricular diastolic dysfunction. PA systolic pressure is 60 mmHg. Moderate right ventricular enlargement with normal right ventricular systolic function. Pulmonary hypertension. EF 55%. Moderate to severe tricuspid regurgitation. Mild-to-moderate mitral regurgitation. Severe right atrial enlargement. Large posterior pericardial effusion. No evidence of tamponade. Troponin elevated and flat with no cardiac symptoms reported. Pt also reported L groin pain upon admit. Orthopedic Surgery consulted and pt found to have sustained a left superior labral tear of her acetabulum noted on MRI with further orthopedic work up to be completed outpatient. Hx of ESRD noted with last HD on 12/16/22- Nephrology consulted with HD performed today. AVG to LUE with +bruit/+thrill present. On 12/21/22, MRI of R hip results pending. IR consulted for evaluation of aspiration/injection of left hip. Rate and rhythm controlled on Cardizem gtt. Heparin infusion continued pending joint aspiration.  Pericardial effusion discussed with Nephrology and  "Cardiology for recommendations.   Patient went to have aspiration of left hip however upon transferring from bed to table had an episode described as seizure-like" seems more that it was hypotension related.  Medications adjusted  She remains on p.o. Cardizem as well as Cardizem drip.  Patient underwent arthrocentesis with 4 cc of pus removed 12/23.  Sent for Gram stain, culture, crystals.  Discussed with Dr. Johnson , orthopedics,crystals are negative patient underwent washout with  Cultures 12/24.  Blood cultures and culture from left hip reveal Gram-positive and Gram-negative.  Patient paced on ceftazidime and vancomycin empirically until cultures return.  12/26 She is complaining of weakness . The blood and drainage cx are (+) for STREPTOCOCCUS GORDONII  sensitive  to rocephin .  ID consulted .  12/27 NAEON . ID consulted and rec IV rocephin qd x 4 week from the last negative blood cx . Cardiology consulted for KATE .   12/28 Plan  for a KATE tomorrow am . S/P HD today  . She is complaining od left hip pain .   12/29 S/P KATE which show negative for IE and mild to moderate pericardial effusion .  Pt  will need 4 week of IVAB  from the last negative blood cx . She  is complaining of left hip pain  . Plan to consult CM for SNF placement .   12/30 CT maxillofacial show Multifocal periodontal disease without definite associated dental abscess.  S/P Tunneled PICC line yesterday . Plan to d/c to SNF  for IVAB rocephin qd x 4 weeks .   12/31 NAEON . Awaiting SNF placement.    1/1/23 C/O nausea and vomit . Abd XR show constipation. Plan to cont miralax colace and start enemas .  1/2: constipation improved, BM overnight; awaiting snf placement  01/03: BP low normal, pt reports some lightheadedness, given 250 cc bolus. Denies hip pain. Awaiting SNF placement.   01/04/23: HD today. BP remains low normal. Awaiting SNF placement.       Interval History: NAEON. BP remains low. PT had some dizziness and nausea after working with " therapy this AM. Will give 500 cc bolus over 4 hours, hold diltiazem. She denies CP, SOB, palpitations     Review of Systems  Objective:     Vital Signs (Most Recent):  Temp: 96.3 °F (35.7 °C) (01/05/23 0738)  Pulse: 74 (01/05/23 0738)  Resp: 18 (01/05/23 0738)  BP: (!) 95/54 (01/05/23 0738)  SpO2: 96 % (01/05/23 0738)   Vital Signs (24h Range):  Temp:  [96.3 °F (35.7 °C)-98.9 °F (37.2 °C)] 96.3 °F (35.7 °C)  Pulse:  [] 74  Resp:  [17-18] 18  SpO2:  [95 %-98 %] 96 %  BP: ()/(53-57) 95/54     Weight: 75.2 kg (165 lb 12.6 oz)  Body mass index is 28.46 kg/m².    Intake/Output Summary (Last 24 hours) at 1/5/2023 1054  Last data filed at 1/5/2023 0901  Gross per 24 hour   Intake 360 ml   Output 2500 ml   Net -2140 ml      Physical Exam  Vitals and nursing note reviewed.   Constitutional:       General: She is not in acute distress.     Appearance: Normal appearance. Ill appearance: chronically ill appearing.   Cardiovascular:      Rate and Rhythm: Normal rate. Rhythm irregular.      Heart sounds: No murmur heard.    No friction rub. No gallop.   Pulmonary:      Effort: Pulmonary effort is normal.      Breath sounds: Normal breath sounds. No wheezing, rhonchi or rales.      Comments: On room air   Abdominal:      General: Bowel sounds are normal. There is no distension.      Palpations: Abdomen is soft.      Tenderness: There is no abdominal tenderness. There is no guarding or rebound.   Musculoskeletal:      Right lower leg: No edema.      Left lower leg: No edema.      Comments: L hip dressing in place    Neurological:      Mental Status: She is alert and oriented to person, place, and time. Mental status is at baseline.       Significant Labs: All pertinent labs within the past 24 hours have been reviewed.    Significant Imaging: I have reviewed all pertinent imaging results/findings within the past 24 hours.      Assessment/Plan:      * Pyogenic arthritis of left hip  Patient presented with acute onset  left hip pain x1 day duration in absence of trauma while doing leg exercises in bed and was found to asymmetric large left hip joint effusion, small right hip joint effusion, and evidence of superior labral tear of her left acetabulum noted on MRI.  Patient noted to have 5/5 strength throughout with sensation to light touch grossly intact throughout however, TTP throughout left groin.  Orthopedics consulted and awaiting further evaluation/recommendations.  Plan:  -optimize pain control  -bowel regimen  -PT/OT  -ortho surgery following    The patient underwent arthrocentesis by IR 12/23 with 4 cc pus obtained.  Sent for cell count which revealed 187,000 white blood cells 100% segs.  Crystals negative  and culture Gram-negative and Gram-positive.  Patient went for washout 12/24.  Placed on empiric vanco and ceftazidime    -Blood cultures positive STREPTOCOCCUS GORDONII  -Repeated blood cx NGTD  -ID followed: Rec :to treat for 4 weeks from date of negative blood cultures with IV rocephin 2 gram daily; STEVEN 01/29/23      Primary hypertension  - has been having hypotension on Cardizem  - give 500 cc bolus   - stop Cardizem, start PO Metoprolol tomorrow pending BP trends     ESRD (end stage renal disease) on dialysis  - Patient currently follows Dr. Ribera as outpatient, Patient currently on M/W/F HD at [facility] via LUE fistula - -nephrology following, continue HD MWF    Atrial fibrillation  Patient with new onset Paroxysmal (<7 days) atrial fibrillation  which is controlledcurrently with Calcium Channel Blocker. . [id number] Score: 2. HASBLED Score: 3. Anticoagulation indicated. Anticoagulation done with Eliquis.    Troponin likely elevated in setting of ESRD and demand ischemia from arrhythmia as patient denied endorsing chest pain.  Plan:  -previously on cardizem drip has been transitioned to PO   - currently rate controlled Afib, has been having some hypotension on Diltiazem so will hold and transition to PO  Metoprolol in AM   - give 500 cc bolus over 4 hours   - monitor BP       Streptococcal bacteremia  -Dr. Schwartz with ID consulted,   - ID recs: cont IV rocephin STEVEN 01/29/23  -KATE: Negative for IE Moderate to mild pericardial effusion  .     Pericardial effusion  -pt appears asymptomatic   -Nephrology following with HD performed on 12/21/22 and a Monday Wednesday Friday schedule  -KATE show mild to moderate pericardial effusion     Bone cyst of right femur  -Orthopedic Surgery following   -follow up outpatient recommended    Tear of left acetabular labrum  -Orthopedic surgery following  -MRI showed no fracture and asymmetric large left hip joint effusion.  Small right hip joint effusion. Paralabral cyst along the superior border of the acetabulum suspicious for a superior labral tear.   - the left hip may treat with therapy and anti-inflammatories (per Orthopedic Surgery)   - outpatient ortho follow up           VTE Risk Mitigation (From admission, onward)         Ordered     apixaban tablet 5 mg  2 times daily         12/25/22 1402     Place sequential compression device  Until discontinued         12/19/22 2119     IP VTE LOW RISK PATIENT  Once         12/19/22 2119                Discharge Planning   ISAAK:      Code Status: Full Code   Is the patient medically ready for discharge?:     Reason for patient still in hospital (select all that apply): Pending disposition  Discharge Plan A: Skilled Nursing Facility   Discharge Delays: None known at this time              Linda Carranza MD  Department of Hospital Medicine   [facility] - Telemetry ([facility]

## 2023-01-05 NOTE — ASSESSMENT & PLAN NOTE
- Patient currently follows Dr. Ribera as outpatient, Patient currently on M/W/F HD at [facility] via LUE fistula - -nephrology following, continue HD MWF

## 2023-01-05 NOTE — PT/OT/SLP PROGRESS
Physical Therapy  Treatment    Meaghan Potter   MRN: [id number]   Admitting Diagnosis: Pyogenic arthritis of left hip       PT Start Time: 0920     PT Stop Time: 1010    PT Total Time (min): 50 min       Billable Minutes:  Therapeutic Activity 30 and Therapeutic Exercise 20    Treatment Type: Treatment  PT/PTA: PT     PTA Visit Number: 0       General Precautions: Standard, fall  Orthopedic Precautions: N/A  Braces: N/A  Respiratory Status: Room air    Spiritual, Cultural Beliefs, Confucianist Practices, Values that Affect Care: no    Subjective:  Communicated with nursing and performed chart review via epic system prior to session.  Pt agreeable to PT services    Pain/Comfort  Pain Rating 1:  (no pain but complaints of nausea)    Objective:   Patient found with: peripheral IV    Functional Mobility:  Bed Mobility:    CGA with use of bed rails and increased time. Once sitting EOB pt reported increased dizziness and nausea that she had not experienced in supine. BP check after ~5-7 mins: 107/54. Educated on seated BLE exercises including AP, LAQ, and marching x 15 reps to improve circulation and overall activity tolerance. BP following seated activity: 116/77. Pt provided with RW to attempt transfers and gait but pt unable to vomiting at EOB. Nsg notified and able to provide meds. MD notified as well. Pt able to tolerate sitting EOB x 15-20 mins more with SBA balance and cues for safety (pt requested to brush teeth). Pt unable to participate further in EOB/OOB activity and requested to return to bed. Sit to supine with CGA for LE management.       Balance:   Static Sit: FAIR+: Able to take MINIMAL challenges from all directions  Dynamic Sit: FAIR+: Maintains balance through MINIMAL excursions of active trunk motion      Treatment and Education:  Educated pt on benefits of consistent participation in PT services to meet functional goals. Educated pt on importance of sitting OOB to promote endurance and overall activity  tolerance. Pt expressed understanding. Educated pt on call don't fall policy and use of call button to alert nursing staff of needs.       AM-PAC 6 CLICK MOBILITY  How much help from another person does this patient currently need?   1 = Unable, Total/Dependent Assistance  2 = A lot, Maximum/Moderate Assistance  3 = A little, Minimum/Contact Guard/Supervision  4 = None, Modified Comfort/Independent    Turning over in bed (including adjusting bedclothes, sheets and blankets)?: 3  Sitting down on and standing up from a chair with arms (e.g., wheelchair, bedside commode, etc.): 1  Moving from lying on back to sitting on the side of the bed?: 3  Moving to and from a bed to a chair (including a wheelchair)?: 1  Need to walk in hospital room?: 1  Climbing 3-5 steps with a railing?: 1  Basic Mobility Total Score: 10    AM-PAC Raw Score CMS G-Code Modifier Level of Impairment Assistance   6 % Total / Unable   7 - 9 CM 80 - 100% Maximal Assist   10 - 14 CL 60 - 80% Moderate Assist   15 - 19 CK 40 - 60% Moderate Assist   20 - 22 CJ 20 - 40% Minimal Assist   23 CI 1-20% SBA / CGA   24 CH 0% Independent/ Mod I     Patient left supine with all lines intact, call button in reach, bed alarm on, and nursing notified.    Assessment:  Meaghan Potter is a 67 y.o. female with a medical diagnosis of Pyogenic arthritis of left hip and presents with deficits in strength, balance and overall mobility. Pt will benefit from continued PT services to address lingering deficits and progress toward baseline.    Rehab identified problem list/impairments: impaired endurance, weakness, impaired functional mobility, gait instability, decreased safety awareness, decreased coordination    Rehab potential is good.    Activity tolerance: Good    Discharge recommendations: nursing facility, skilled, home health PT      Barriers to discharge:      Equipment recommendations:  (TBD)     GOALS:   Multidisciplinary Problems       Physical Therapy  Goals          Problem: Physical Therapy    Goal Priority Disciplines Outcome Goal Variances Interventions   Physical Therapy Goal     PT, PT/OT Ongoing, Progressing     Description: LT23  1. PT WILL COMPLETE BED MOBILITY WITH S  2. PT WILL T/F TO CHAIR WITH RW AND S  3. PT WILL GT TRAIN X 150' WITH RW AND S                       PLAN:    Patient to be seen 3 x/week to address the above listed problems via gait training, therapeutic activities, therapeutic exercises, neuromuscular re-education  Plan of Care expires: 23  Plan of Care reviewed with: patient         2023

## 2023-01-05 NOTE — ASSESSMENT & PLAN NOTE
Patient with new onset Paroxysmal (<7 days) atrial fibrillation  which is controlledcurrently with Calcium Channel Blocker. . [id number] Score: 2. HASBLED Score: 3. Anticoagulation indicated. Anticoagulation done with Eliquis.    Troponin likely elevated in setting of ESRD and demand ischemia from arrhythmia as patient denied endorsing chest pain.  Plan:  -previously on cardizem drip has been transitioned to PO   - currently rate controlled Afib, has been having some hypotension on Diltiazem so will hold and transition to PO Metoprolol in AM   - give 500 cc bolus over 4 hours   - monitor BP

## 2023-01-05 NOTE — SUBJECTIVE & OBJECTIVE
Interval History: NAEON. BP remains low. PT had some dizziness and nausea after working with therapy this AM. Will give 500 cc bolus over 4 hours, hold diltiazem. She denies CP, SOB, palpitations     Review of Systems  Objective:     Vital Signs (Most Recent):  Temp: 96.3 °F (35.7 °C) (01/05/23 0738)  Pulse: 74 (01/05/23 0738)  Resp: 18 (01/05/23 0738)  BP: (!) 95/54 (01/05/23 0738)  SpO2: 96 % (01/05/23 0738)   Vital Signs (24h Range):  Temp:  [96.3 °F (35.7 °C)-98.9 °F (37.2 °C)] 96.3 °F (35.7 °C)  Pulse:  [] 74  Resp:  [17-18] 18  SpO2:  [95 %-98 %] 96 %  BP: ()/(53-57) 95/54     Weight: 75.2 kg (165 lb 12.6 oz)  Body mass index is 28.46 kg/m².    Intake/Output Summary (Last 24 hours) at 1/5/2023 1054  Last data filed at 1/5/2023 0901  Gross per 24 hour   Intake 360 ml   Output 2500 ml   Net -2140 ml      Physical Exam  Vitals and nursing note reviewed.   Constitutional:       General: She is not in acute distress.     Appearance: Normal appearance. Ill appearance: chronically ill appearing.   Cardiovascular:      Rate and Rhythm: Normal rate. Rhythm irregular.      Heart sounds: No murmur heard.    No friction rub. No gallop.   Pulmonary:      Effort: Pulmonary effort is normal.      Breath sounds: Normal breath sounds. No wheezing, rhonchi or rales.      Comments: On room air   Abdominal:      General: Bowel sounds are normal. There is no distension.      Palpations: Abdomen is soft.      Tenderness: There is no abdominal tenderness. There is no guarding or rebound.   Musculoskeletal:      Right lower leg: No edema.      Left lower leg: No edema.      Comments: L hip dressing in place    Neurological:      Mental Status: She is alert and oriented to person, place, and time. Mental status is at baseline.       Significant Labs: All pertinent labs within the past 24 hours have been reviewed.    Significant Imaging: I have reviewed all pertinent imaging results/findings within the past 24 hours.

## 2023-01-05 NOTE — PLAN OF CARE
TX COMPLETED: facilitated bed mobility with CGA, unable to participate in transfers of gait activity at this time secondary to nausea/vomiting. Nsg made aware. Recommend SNF vs HHPT

## 2023-01-05 NOTE — ASSESSMENT & PLAN NOTE
- has been having hypotension on Cardizem  - give 500 cc bolus   - stop Cardizem, start PO Metoprolol tomorrow pending BP trends

## 2023-01-05 NOTE — PROGRESS NOTES
[facility] ([facility])  Nephrology  Progress Note    Patient Name: Meaghan Potter  MRN: [id number]  Admission Date: 12/19/2022  Hospital Length of Stay: 15 days  Attending Provider: Linda Carranza MD   Primary Care Physician: Primary Doctor No  Principal Problem:Pyogenic arthritis of left hip    Consults  Subjective:     Interval History:  Patient was seen in her hospital room.  In bed resting comfortably.  No acute distress noted.    Review of systems:  No shortness of breath or chest discomfort.  No fevers or chills.  She continues to have some mild nausea.  States she does not like the hospital food.    Review of patient's allergies indicates:   Allergen Reactions    Amoxicillin Rash     Current Facility-Administered Medications   Medication Frequency    0.9%  NaCl infusion Once    0.9%  NaCl infusion Once    0.9%  NaCl infusion PRN    acetaminophen suppository 650 mg Q6H PRN    acetaminophen tablet 650 mg Q8H PRN    albuterol-ipratropium 2.5 mg-0.5 mg/3 mL nebulizer solution 3 mL Q6H PRN    apixaban tablet 5 mg BID    brimonidine 0.2% ophthalmic solution 1 drop BID    BUPivacaine (PF) 0.25% (2.5 mg/ml) injection 12.5 mg Once    calcitRIOL capsule 0.5 mcg Every Mon, Wed, Fri    cefTRIAXone (ROCEPHIN) 2 g in dextrose 5 % in water (D5W) 5 % 50 mL IVPB (MB+) Q24H    dextrose 10% bolus 125 mL 125 mL PRN    dextrose 10% bolus 250 mL 250 mL PRN    ergocalciferol capsule 50,000 Units Q7 Days    glucagon (human recombinant) injection 1 mg PRN    glucose chewable tablet 16 g PRN    glucose chewable tablet 24 g PRN    HYDROcodone-acetaminophen 5-325 mg per tablet 1 tablet Q6H PRN    melatonin tablet 6 mg Nightly PRN    [START ON 1/6/2023] metoprolol tartrate (LOPRESSOR) tablet 25 mg BID    naloxone 0.4 mg/mL injection 0.02 mg PRN    ondansetron injection 4 mg Q8H PRN    polyethylene glycol packet 17 g Daily    promethazine tablet 25 mg Q6H PRN    senna-docusate 8.6-50 mg per tablet 1 tablet BID PRN    senna-docusate  8.6-50 mg per tablet 1 tablet Daily    sevelamer carbonate tablet 800 mg TID WM    sodium chloride 0.9% bolus 250 mL 250 mL PRN    sodium chloride 0.9% bolus 250 mL 250 mL PRN    sodium chloride 0.9% bolus 250 mL 250 mL PRN    sodium chloride 0.9% bolus 250 mL 250 mL PRN    sodium chloride 0.9% bolus 250 mL 250 mL PRN    sodium chloride 0.9% bolus 250 mL 250 mL PRN    sodium chloride 0.9% bolus 500 mL 500 mL Once    sodium chloride 0.9% flush 3 mL Q12H PRN    vitamin renal formula (B-complex-vitamin c-folic acid) 1 mg per capsule 1 capsule Daily       Objective:     Vital Signs (Most Recent):  Temp: 96.3 °F (35.7 °C) (01/05/23 0738)  Pulse: 74 (01/05/23 0738)  Resp: 18 (01/05/23 0738)  BP: (!) 95/54 (01/05/23 0738)  SpO2: 96 % (01/05/23 0738) Vital Signs (24h Range):  Temp:  [96.3 °F (35.7 °C)-98.9 °F (37.2 °C)] 96.3 °F (35.7 °C)  Pulse:  [] 74  Resp:  [17-18] 18  SpO2:  [95 %-98 %] 96 %  BP: ()/(53-57) 95/54     Weight: 75.2 kg (165 lb 12.6 oz) (01/04/23 2339)  Body mass index is 28.46 kg/m².  Body surface area is 1.84 meters squared.    I/O last 3 completed shifts:  In: -   Out: 2500 [Other:2500]    Physical Exam  Constitutional:       Appearance: Normal appearance.   HENT:      Head: Normocephalic and atraumatic.   Eyes:      General: No scleral icterus.     Extraocular Movements: Extraocular movements intact.      Pupils: Pupils are equal, round, and reactive to light.   Pulmonary:      Effort: Pulmonary effort is normal.      Breath sounds: No stridor.   Musculoskeletal:      Right lower leg: No edema.      Left lower leg: No edema.   Skin:     General: Skin is warm and dry.   Neurological:      General: No focal deficit present.      Mental Status: She is alert and oriented to person, place, and time.   Psychiatric:         Mood and Affect: Mood normal.         Behavior: Behavior normal.       Significant Labs:sureBMP:   Recent Labs   Lab 01/04/23  0842   GLU 93   *   K 4.0      CO2 22*    BUN 27*   CREATININE 5.2*   CALCIUM 9.2       CMP:   Recent Labs   Lab 01/04/23  0842   GLU 93   CALCIUM 9.2   ALBUMIN 2.3*   *   K 4.0   CO2 22*      BUN 27*   CREATININE 5.2*       All labs within the past 24 hours have been reviewed.    Significant Imaging:  Labs: Reviewed      Assessment/Plan:     Active Diagnoses:    Diagnosis Date Noted POA    PRINCIPAL PROBLEM:  Pyogenic arthritis of left hip [M00.9] 12/20/2022 Yes    Constipation [K59.00] 01/01/2023 Yes    Streptococcal bacteremia [R78.81, B95.5] 12/27/2022 Yes    Pericardial effusion [I31.39] 12/21/2022 Yes    ESRD (end stage renal disease) on dialysis [N18.6, Z99.2] 12/20/2022 Not Applicable    Primary hypertension [I10] 12/20/2022 Yes    Tear of left acetabular labrum [S73.192A] 12/20/2022 Yes    Bone cyst of right femur [M85.651] 12/20/2022 Yes    Atrial fibrillation [I48.91] 12/19/2022 Yes      Problems Resolved During this Admission:       Assessment plan:    1. End-stage renal disease:  Last dialysis treatment was yesterday.  Uneventful.  Approximately 2 L ultrafiltration.  Will plan to continue Monday Wednesday Friday dialysis unless otherwise indicated.    2. Potassium has been stable.  The most recent was 4.0.    3. Bicarbonate is stable at 22.    4. Septic hip: Defer to Orthopedic surgery and primary service for management.    Thank you for your consult.     Bruce Ortega MD  Nephrology  [facility] Telemetry ([facility])

## 2023-01-06 LAB
ALBUMIN SERPL BCP-MCNC: 2.2 G/DL (ref 3.5–5.2)
ANION GAP SERPL CALC-SCNC: 10 MMOL/L (ref 8–16)
BASOPHILS # BLD AUTO: 0.05 K/UL (ref 0–0.2)
BASOPHILS NFR BLD: 0.6 % (ref 0–1.9)
BUN SERPL-MCNC: 34 MG/DL (ref 8–23)
CALCIUM SERPL-MCNC: 9.3 MG/DL (ref 8.7–10.5)
CHLORIDE SERPL-SCNC: 100 MMOL/L (ref 95–110)
CO2 SERPL-SCNC: 20 MMOL/L (ref 23–29)
CREAT SERPL-MCNC: 6.7 MG/DL (ref 0.5–1.4)
DIFFERENTIAL METHOD: ABNORMAL
EOSINOPHIL # BLD AUTO: 0.1 K/UL (ref 0–0.5)
EOSINOPHIL NFR BLD: 1.1 % (ref 0–8)
ERYTHROCYTE [DISTWIDTH] IN BLOOD BY AUTOMATED COUNT: 14.6 % (ref 11.5–14.5)
EST. GFR  (NO RACE VARIABLE): 6 ML/MIN/1.73 M^2
GLUCOSE SERPL-MCNC: 97 MG/DL (ref 70–110)
HCT VFR BLD AUTO: 30.9 % (ref 37–48.5)
HGB BLD-MCNC: 9.8 G/DL (ref 12–16)
IMM GRANULOCYTES # BLD AUTO: 0.05 K/UL (ref 0–0.04)
IMM GRANULOCYTES NFR BLD AUTO: 0.6 % (ref 0–0.5)
LYMPHOCYTES # BLD AUTO: 0.7 K/UL (ref 1–4.8)
LYMPHOCYTES NFR BLD: 8.5 % (ref 18–48)
MCH RBC QN AUTO: 31.3 PG (ref 27–31)
MCHC RBC AUTO-ENTMCNC: 31.7 G/DL (ref 32–36)
MCV RBC AUTO: 99 FL (ref 82–98)
MONOCYTES # BLD AUTO: 0.5 K/UL (ref 0.3–1)
MONOCYTES NFR BLD: 6.7 % (ref 4–15)
NEUTROPHILS # BLD AUTO: 6.5 K/UL (ref 1.8–7.7)
NEUTROPHILS NFR BLD: 82.5 % (ref 38–73)
NRBC BLD-RTO: 0 /100 WBC
PHOSPHATE SERPL-MCNC: 4.6 MG/DL (ref 2.7–4.5)
PLATELET # BLD AUTO: 268 K/UL (ref 150–450)
PMV BLD AUTO: 10.4 FL (ref 9.2–12.9)
POTASSIUM SERPL-SCNC: 5 MMOL/L (ref 3.5–5.1)
RBC # BLD AUTO: 3.13 M/UL (ref 4–5.4)
SARS-COV-2 RDRP RESP QL NAA+PROBE: NEGATIVE
SODIUM SERPL-SCNC: 130 MMOL/L (ref 136–145)
WBC # BLD AUTO: 7.91 K/UL (ref 3.9–12.7)

## 2023-01-06 PROCEDURE — 90935 PR HEMODIALYSIS, ONE EVALUATION: ICD-10-PCS | Mod: ,,, | Performed by: INTERNAL MEDICINE

## 2023-01-06 PROCEDURE — 21400001 HC TELEMETRY ROOM

## 2023-01-06 PROCEDURE — 25000003 PHARM REV CODE 250: Performed by: INTERNAL MEDICINE

## 2023-01-06 PROCEDURE — U0002 COVID-19 LAB TEST NON-CDC: HCPCS | Performed by: INTERNAL MEDICINE

## 2023-01-06 PROCEDURE — 25000003 PHARM REV CODE 250: Performed by: HOSPITALIST

## 2023-01-06 PROCEDURE — 97116 GAIT TRAINING THERAPY: CPT | Mod: CQ

## 2023-01-06 PROCEDURE — 90935 HEMODIALYSIS ONE EVALUATION: CPT | Mod: ,,, | Performed by: INTERNAL MEDICINE

## 2023-01-06 PROCEDURE — 99024 POSTOP FOLLOW-UP VISIT: CPT | Mod: ,,, | Performed by: PHYSICIAN ASSISTANT

## 2023-01-06 PROCEDURE — 99024 PR POST-OP FOLLOW-UP VISIT: ICD-10-PCS | Mod: ,,, | Performed by: PHYSICIAN ASSISTANT

## 2023-01-06 PROCEDURE — 80069 RENAL FUNCTION PANEL: CPT | Performed by: INTERNAL MEDICINE

## 2023-01-06 PROCEDURE — 36415 COLL VENOUS BLD VENIPUNCTURE: CPT | Performed by: INTERNAL MEDICINE

## 2023-01-06 PROCEDURE — 85025 COMPLETE CBC W/AUTO DIFF WBC: CPT | Performed by: INTERNAL MEDICINE

## 2023-01-06 PROCEDURE — 97530 THERAPEUTIC ACTIVITIES: CPT | Mod: CQ

## 2023-01-06 PROCEDURE — 63600175 PHARM REV CODE 636 W HCPCS: Performed by: INTERNAL MEDICINE

## 2023-01-06 PROCEDURE — 80100014 HC HEMODIALYSIS 1:1

## 2023-01-06 RX ORDER — FAMOTIDINE 10 MG/1
10 TABLET ORAL DAILY
Start: 2023-01-06

## 2023-01-06 RX ORDER — CALCITRIOL 0.5 UG/1
0.5 CAPSULE ORAL
Qty: 6 CAPSULE | Refills: 0
Start: 2023-01-09 | End: 2023-01-23

## 2023-01-06 RX ORDER — AMOXICILLIN 250 MG
1 CAPSULE ORAL DAILY
Qty: 10 TABLET | Refills: 0
Start: 2023-01-07 | End: 2023-01-17

## 2023-01-06 RX ORDER — METOPROLOL TARTRATE 25 MG/1
12.5 TABLET, FILM COATED ORAL 2 TIMES DAILY
Qty: 30 TABLET | Refills: 0
Start: 2023-01-06 | End: 2024-03-06

## 2023-01-06 RX ORDER — HYDROCODONE BITARTRATE AND ACETAMINOPHEN 5; 325 MG/1; MG/1
1 TABLET ORAL EVERY 6 HOURS PRN
Qty: 4 TABLET | Refills: 0 | Status: SHIPPED | OUTPATIENT
Start: 2023-01-06 | End: 2023-01-07

## 2023-01-06 RX ORDER — SEVELAMER CARBONATE 800 MG/1
800 TABLET, FILM COATED ORAL
Qty: 90 TABLET | Refills: 0
Start: 2023-01-06 | End: 2023-02-05

## 2023-01-06 RX ORDER — POLYETHYLENE GLYCOL 3350 17 G/17G
17 POWDER, FOR SOLUTION ORAL DAILY
Qty: 10 EACH | Refills: 0
Start: 2023-01-07 | End: 2023-01-17

## 2023-01-06 RX ORDER — MIDODRINE HYDROCHLORIDE 5 MG/1
5 TABLET ORAL
Status: DISCONTINUED | OUTPATIENT
Start: 2023-01-06 | End: 2023-01-07 | Stop reason: HOSPADM

## 2023-01-06 RX ORDER — MIDODRINE HYDROCHLORIDE 5 MG/1
5 TABLET ORAL
Qty: 90 TABLET | Refills: 0
Start: 2023-01-06 | End: 2024-03-06

## 2023-01-06 RX ADMIN — HYDROCODONE BITARTRATE AND ACETAMINOPHEN 1 TABLET: 5; 325 TABLET ORAL at 08:01

## 2023-01-06 RX ADMIN — NEPHROCAP 1 CAPSULE: 1 CAP ORAL at 08:01

## 2023-01-06 RX ADMIN — POLYETHYLENE GLYCOL 3350 17 G: 17 POWDER, FOR SOLUTION ORAL at 08:01

## 2023-01-06 RX ADMIN — MIDODRINE HYDROCHLORIDE 5 MG: 5 TABLET ORAL at 04:01

## 2023-01-06 RX ADMIN — SENNOSIDES AND DOCUSATE SODIUM 1 TABLET: 8.6; 5 TABLET ORAL at 08:01

## 2023-01-06 RX ADMIN — METOPROLOL TARTRATE 25 MG: 25 TABLET, FILM COATED ORAL at 08:01

## 2023-01-06 RX ADMIN — CALCITRIOL CAPSULES 0.25 MCG 0.5 MCG: 0.25 CAPSULE ORAL at 08:01

## 2023-01-06 RX ADMIN — CEFTRIAXONE 2 G: 2 INJECTION, POWDER, FOR SOLUTION INTRAMUSCULAR; INTRAVENOUS at 04:01

## 2023-01-06 RX ADMIN — MIDODRINE HYDROCHLORIDE 5 MG: 5 TABLET ORAL at 08:01

## 2023-01-06 RX ADMIN — APIXABAN 5 MG: 2.5 TABLET, FILM COATED ORAL at 08:01

## 2023-01-06 RX ADMIN — BRIMONIDINE TARTRATE 1 DROP: 2 SOLUTION/ DROPS OPHTHALMIC at 08:01

## 2023-01-06 RX ADMIN — SEVELAMER CARBONATE 800 MG: 800 TABLET, FILM COATED ORAL at 08:01

## 2023-01-06 RX ADMIN — SEVELAMER CARBONATE 800 MG: 800 TABLET, FILM COATED ORAL at 02:01

## 2023-01-06 NOTE — PROGRESS NOTES
[facility] ([facility])  Nephrology  Progress Note    Patient Name: Meaghan Potter  MRN: [id number]  Admission Date: 12/19/2022  Hospital Length of Stay: 16 days  Attending Provider: Linda Carranza MD   Primary Care Physician: Primary Doctor No  Principal Problem:Pyogenic arthritis of left hip    Consults  Subjective:     Interval History:  Patient was seen in her hospital room.  In bed resting comfortably.  No acute distress noted.    Review of systems:  No shortness of breath or chest discomfort.  No fevers or chills.  She continues to have some mild nausea.  States she does not like the hospital food.    *For 1/6/23: Seen and examined on HD initiation.  BP marginal per staff.    Review of patient's allergies indicates:   Allergen Reactions    Amoxicillin Rash     Current Facility-Administered Medications   Medication Frequency    0.9%  NaCl infusion Once    0.9%  NaCl infusion Once    0.9%  NaCl infusion PRN    acetaminophen suppository 650 mg Q6H PRN    acetaminophen tablet 650 mg Q8H PRN    albuterol-ipratropium 2.5 mg-0.5 mg/3 mL nebulizer solution 3 mL Q6H PRN    apixaban tablet 5 mg BID    brimonidine 0.2% ophthalmic solution 1 drop BID    BUPivacaine (PF) 0.25% (2.5 mg/ml) injection 12.5 mg Once    calcitRIOL capsule 0.5 mcg Every Mon, Wed, Fri    cefTRIAXone (ROCEPHIN) 2 g in dextrose 5 % in water (D5W) 5 % 50 mL IVPB (MB+) Q24H    dextrose 10% bolus 125 mL 125 mL PRN    dextrose 10% bolus 250 mL 250 mL PRN    ergocalciferol capsule 50,000 Units Q7 Days    glucagon (human recombinant) injection 1 mg PRN    glucose chewable tablet 16 g PRN    glucose chewable tablet 24 g PRN    HYDROcodone-acetaminophen 5-325 mg per tablet 1 tablet Q6H PRN    melatonin tablet 6 mg Nightly PRN    metoprolol tartrate (LOPRESSOR) tablet 25 mg BID    midodrine tablet 5 mg TID WM    naloxone 0.4 mg/mL injection 0.02 mg PRN    ondansetron injection 4 mg Q8H PRN    polyethylene glycol packet 17 g Daily    promethazine tablet  25 mg Q6H PRN    senna-docusate 8.6-50 mg per tablet 1 tablet BID PRN    senna-docusate 8.6-50 mg per tablet 1 tablet Daily    sevelamer carbonate tablet 800 mg TID WM    sodium chloride 0.9% bolus 250 mL 250 mL PRN    sodium chloride 0.9% bolus 250 mL 250 mL PRN    sodium chloride 0.9% bolus 250 mL 250 mL PRN    sodium chloride 0.9% bolus 250 mL 250 mL PRN    sodium chloride 0.9% bolus 250 mL 250 mL PRN    sodium chloride 0.9% bolus 250 mL 250 mL PRN    sodium chloride 0.9% flush 3 mL Q12H PRN    vitamin renal formula (B-complex-vitamin c-folic acid) 1 mg per capsule 1 capsule Daily       Objective:     Vital Signs (Most Recent):  Temp: 97.6 °F (36.4 °C) (01/06/23 0803)  Pulse: 69 (01/06/23 0803)  Resp: 18 (01/06/23 0829)  BP: (!) 109/55 (01/06/23 0803)  SpO2: 100 % (01/06/23 0803) Vital Signs (24h Range):  Temp:  [97.4 °F (36.3 °C)-98.6 °F (37 °C)] 97.6 °F (36.4 °C)  Pulse:  [61-83] 69  Resp:  [17-18] 18  SpO2:  [93 %-100 %] 100 %  BP: (106-116)/(52-63) 109/55     Weight: 75.2 kg (165 lb 12.6 oz) (01/04/23 5649)  Body mass index is 28.46 kg/m².  Body surface area is 1.84 meters squared.    I/O last 3 completed shifts:  In: 600 [P.O.:600]  Out: -     Physical Exam  Constitutional:       Appearance: Normal appearance.   HENT:      Head: Normocephalic and atraumatic.   Eyes:      General: No scleral icterus.     Extraocular Movements: Extraocular movements intact.      Pupils: Pupils are equal, round, and reactive to light.   Pulmonary:      Effort: Pulmonary effort is normal.      Breath sounds: No stridor.   Musculoskeletal:      Right lower leg: No edema.      Left lower leg: No edema.   Skin:     General: Skin is warm and dry.   Neurological:      General: No focal deficit present.      Mental Status: She is alert and oriented to person, place, and time.   Psychiatric:         Mood and Affect: Mood normal.         Behavior: Behavior normal.       Significant Labs:sureBMP:   Recent Labs   Lab 01/04/23  0842   GLU  93   *   K 4.0      CO2 22*   BUN 27*   CREATININE 5.2*   CALCIUM 9.2       CMP:   Recent Labs   Lab 01/04/23  0842   GLU 93   CALCIUM 9.2   ALBUMIN 2.3*   *   K 4.0   CO2 22*      BUN 27*   CREATININE 5.2*       All labs within the past 24 hours have been reviewed.    Significant Imaging:  Labs: Reviewed      Assessment/Plan:     Active Diagnoses:    Diagnosis Date Noted POA    PRINCIPAL PROBLEM:  Pyogenic arthritis of left hip [M00.9] 12/20/2022 Yes    Constipation [K59.00] 01/01/2023 Yes    Streptococcal bacteremia [R78.81, B95.5] 12/27/2022 Yes    Pericardial effusion [I31.39] 12/21/2022 Yes    ESRD (end stage renal disease) on dialysis [N18.6, Z99.2] 12/20/2022 Not Applicable    Primary hypertension [I10] 12/20/2022 Yes    Tear of left acetabular labrum [S73.192A] 12/20/2022 Yes    Bone cyst of right femur [M85.651] 12/20/2022 Yes    Atrial fibrillation [I48.91] 12/19/2022 Yes      Problems Resolved During this Admission:       Assessment plan:    1. End-stage renal disease:  Last dialysis treatment was yesterday.  Uneventful.  Approximately 2 L ultrafiltration.  Will plan to continue Monday Wednesday Friday dialysis unless otherwise indicated.    2. Potassium has been stable.  The most recent was 4.0.    3. Bicarbonate is stable at 22.    4. Septic hip: Defer to Orthopedic surgery and primary service for management.    1/6/23: HD planned today and MWF schedule; will aim for 0-1 liters UF as tolerated. Meds and labs reviewed; on midodrine now; will follow closely with you.  Call with interim questions; access stable; LAMONTE AVF.        Moe Glover MD  Nephrology  [facility] ([facility])

## 2023-01-06 NOTE — PROGRESS NOTES
[facility] Medicine  Progress Note    Patient Name: Meaghan Potter  MRN: [id number]  Patient Class: IP- Inpatient   Admission Date: 12/19/2022  Length of Stay: 16 days  Attending Physician: Linda Carranza MD  Primary Care Provider: Primary Doctor No        Subjective:     Principal Problem:Pyogenic arthritis of left hip        HPI:  Meaghan Potter is a 67 y.o. female with a PMH  has a past medical history of ESRD (end stage renal disease) on dialysis, GERD (gastroesophageal reflux disease), and Hypertension. who presented as a transfer from [facility] for higher level cardiology and orthopedic care after patient was found to have sustained a left superior labral tear of her acetabulum noted on MRI in addition to new onset atrial fibrillation with RVR requiring diltiazem drip.  Patient reported being in her usual state of health prior to onset of symptoms and reported acute onset of left hip pain while performing leg exercises in bed earlier today.  Patient reported hearing and feeling a pop in her left hip followed by immediate pain which has remained constant and currently rated 6/10 in severity. Pain was described as throbbing/pulling in nature with no known alleviating factors and aggravating factors including weight-bearing, movement, and palpation to the affected area.  She denied endorsing any numbness, tingling, discoloration, or penetrating trauma, but did express decreased range of motion and muscle strength secondary to exacerbation of pain.  While at outside facility, patient became short of breath while lying flat while obtaining MRI of hip and was found to be in atrial fibrillation with RVR requiring diltiazem drip. She denied endorsing any chest pain, lightheadedness, dizziness, visual disturbances, fever, chills, sweats, nausea, vomiting, abdominal pain, changes in bowel/bladder habits, or onset neurological deficits.  All other review of systems negative except as noted  above.  Patient reports being back at her baseline and continues to complain only of left hip pain.  Orthopedics and cardiology consulted and awaiting further evaluation/recommendations.  Of note, patient missed hemodialysis today secondary to pain and going to the emergency room and usually receives HD via left upper extremity fistula on Monday/Wednesday/Friday.  Follows Dr. Ribera outpatient. Nephrology consulted to continue HD inpatient.     PCP: Primary Doctor No        Overview/Hospital Course:  Pt admitted to Observation for Atrial fibrillation (new onset) with RVR with . Cardiology consulted with Cardizem gtt initiated. NSR on monitor with HR in 80's.  BNP elevated and Troponin trended upward (0.076>0.095) with Heparin infusion initiated. Echo showed with concentric hypertrophy and normal systolic function. Atrial fibrillation observed.Biatrial atrial enlargement. Grade III left ventricular diastolic dysfunction. PA systolic pressure is 60 mmHg. Moderate right ventricular enlargement with normal right ventricular systolic function. Pulmonary hypertension. EF 55%. Moderate to severe tricuspid regurgitation. Mild-to-moderate mitral regurgitation. Severe right atrial enlargement. Large posterior pericardial effusion. No evidence of tamponade. Troponin elevated and flat with no cardiac symptoms reported. Pt also reported L groin pain upon admit. Orthopedic Surgery consulted and pt found to have sustained a left superior labral tear of her acetabulum noted on MRI with further orthopedic work up to be completed outpatient. Hx of ESRD noted with last HD on 12/16/22- Nephrology consulted with HD performed today. AVG to LUE with +bruit/+thrill present. On 12/21/22, MRI of R hip results pending. IR consulted for evaluation of aspiration/injection of left hip. Rate and rhythm controlled on Cardizem gtt. Heparin infusion continued pending joint aspiration.  Pericardial effusion discussed with Nephrology and  "Cardiology for recommendations.   Patient went to have aspiration of left hip however upon transferring from bed to table had an episode described as seizure-like" seems more that it was hypotension related.  Medications adjusted  She remains on p.o. Cardizem as well as Cardizem drip.  Patient underwent arthrocentesis with 4 cc of pus removed 12/23.  Sent for Gram stain, culture, crystals.  Discussed with Dr. Johnson , orthopedics,crystals are negative patient underwent washout with  Cultures 12/24.  Blood cultures and culture from left hip reveal Gram-positive and Gram-negative.  Patient paced on ceftazidime and vancomycin empirically until cultures return.  12/26 She is complaining of weakness . The blood and drainage cx are (+) for STREPTOCOCCUS GORDONII  sensitive  to rocephin .  ID consulted .  12/27 NAEON . ID consulted and rec IV rocephin qd x 4 week from the last negative blood cx . Cardiology consulted for KATE .   12/28 Plan  for a KATE tomorrow am . S/P HD today  . She is complaining od left hip pain .   12/29 S/P KATE which show negative for IE and mild to moderate pericardial effusion .  Pt  will need 4 week of IVAB  from the last negative blood cx . She  is complaining of left hip pain  . Plan to consult CM for SNF placement .   12/30 CT maxillofacial show Multifocal periodontal disease without definite associated dental abscess.  S/P Tunneled PICC line yesterday . Plan to d/c to SNF  for IVAB rocephin qd x 4 weeks .   12/31 NAEON . Awaiting SNF placement.    1/1/23 C/O nausea and vomit . Abd XR show constipation. Plan to cont miralax colace and start enemas .  1/2: constipation improved, BM overnight; awaiting snf placement  01/03: BP low normal, pt reports some lightheadedness, given 250 cc bolus. Denies hip pain. Awaiting SNF placement.   01/04/23: HD today. BP remains low normal. Awaiting SNF placement.   01/05: Had dizziness/nausea with PT, given small fluid bolus         Interval History: NAEON. BP " remains low 100s systolic. Chart reviewed and BP has been 90-120s over last 1-2 weeks while in hospital. Pt awake, alert, seen with RN at bedside. Denies CP, SOB, HA, n/v. No dizziness at rest, worse with positional changes. Will trial midodrine. Pt planned for HD today     Review of Systems  Objective:     Vital Signs (Most Recent):  Temp: 97.6 °F (36.4 °C) (01/06/23 0803)  Pulse: 69 (01/06/23 0803)  Resp: 18 (01/06/23 0803)  BP: (!) 109/55 (01/06/23 0803)  SpO2: 100 % (01/06/23 0803)   Vital Signs (24h Range):  Temp:  [97.4 °F (36.3 °C)-98.6 °F (37 °C)] 97.6 °F (36.4 °C)  Pulse:  [61-83] 69  Resp:  [17-18] 18  SpO2:  [93 %-100 %] 100 %  BP: (106-116)/(52-63) 109/55     Weight: 75.2 kg (165 lb 12.6 oz)  Body mass index is 28.46 kg/m².    Intake/Output Summary (Last 24 hours) at 1/6/2023 0812  Last data filed at 1/5/2023 1723  Gross per 24 hour   Intake 480 ml   Output --   Net 480 ml      Physical Exam  Vitals and nursing note reviewed.   Constitutional:       General: She is not in acute distress.     Appearance: Normal appearance. Ill appearance: chronically ill appearing.   Cardiovascular:      Rate and Rhythm: Normal rate. Rhythm irregular.      Heart sounds: No murmur heard.    No friction rub. No gallop.   Pulmonary:      Effort: Pulmonary effort is normal.      Breath sounds: Normal breath sounds. No wheezing, rhonchi or rales.      Comments: On RA   Abdominal:      General: Bowel sounds are normal. There is no distension.      Palpations: Abdomen is soft.      Tenderness: There is no abdominal tenderness. There is no guarding or rebound.   Musculoskeletal:      Right lower leg: No edema.      Left lower leg: No edema.   Neurological:      Mental Status: She is alert and oriented to person, place, and time. Mental status is at baseline.   Psychiatric:         Mood and Affect: Mood normal.         Behavior: Behavior normal.       Significant Labs: All pertinent labs within the past 24 hours have been  reviewed.    Significant Imaging: I have reviewed all pertinent imaging results/findings within the past 24 hours.      Assessment/Plan:      * Pyogenic arthritis of left hip  Patient presented with acute onset left hip pain x1 day duration in absence of trauma while doing leg exercises in bed and was found to asymmetric large left hip joint effusion, small right hip joint effusion, and evidence of superior labral tear of her left acetabulum noted on MRI.  Patient noted to have 5/5 strength throughout with sensation to light touch grossly intact throughout however, TTP throughout left groin.  Orthopedics consulted and awaiting further evaluation/recommendations.  Plan:  -optimize pain control  -bowel regimen  -PT/OT  -ortho surgery following    The patient underwent arthrocentesis by IR 12/23 with 4 cc pus obtained.  Sent for cell count which revealed 187,000 white blood cells 100% segs.  Crystals negative  and culture Gram-negative and Gram-positive.  Patient went for washout 12/24.  Placed on empiric vanco and ceftazidime    -Blood cultures positive STREPTOCOCCUS GORDONII  -Repeated blood cx NGTD  -ID followed: Rec :to treat for 4 weeks from date of negative blood cultures with IV rocephin 2 gram daily; STEVEN 01/29/2    Atrial fibrillation  Patient with new onset Paroxysmal (<7 days) atrial fibrillation  which is controlledcurrently with Calcium Channel Blocker. . [id number] Score: 2. HASBLED Score: 3. Anticoagulation indicated. Anticoagulation done with Eliquis.    Troponin likely elevated in setting of ESRD and demand ischemia from arrhythmia as patient denied endorsing chest pain.  Plan:  -previously on cardizem drip has been transitioned to PO   - currently rate controlled Afib, has been having some hypotension on Diltiazem so diltiazem discontinued, transition to low dose PO metoprolol   - cont tele monitoring     ESRD (end stage renal disease) on dialysis  - Patient currently follows Dr. Ribera as outpatient,  Patient [place] [facility] [place] - -nephrology following, continue HD MWF  - renally dose meds, avoid nephrotoxic agents     Primary hypertension  - has been having intermittent symtomatic hypotension, per chart review, SBP has been  for most of admission   - continue low dose PO Metoprolol as above   - trial Midodrine 5 TID for BP support  - monitor BP     Streptococcal bacteremia  -Dr. Schwartz with ID consulted,   - ID recs: cont IV rocephin STEVEN 01/29/23  -KATE: Negative for IE Moderate to mild pericardial effusion  .     Pericardial effusion  -pt appears asymptomatic   -Nephrology following with HD performed on 12/21/22 and a Monday Wednesday Friday schedule  -KATE show mild to moderate pericardial effusion     Bone cyst of right femur  -Orthopedic Surgery following   -follow up outpatient recommended    Tear of left acetabular labrum  -Orthopedic surgery following  -MRI showed no fracture and asymmetric large left hip joint effusion.  Small right hip joint effusion. Paralabral cyst along the superior border of the acetabulum suspicious for a superior labral tear.   - the left hip may treat with therapy and anti-inflammatories (per Orthopedic Surgery)   - outpatient ortho follow up           VTE Risk Mitigation (From admission, onward)         Ordered     apixaban tablet 5 mg  2 times daily         12/25/22 1402     Place sequential compression device  Until discontinued         12/19/22 2119     IP VTE LOW RISK PATIENT  Once         12/19/22 2119                Discharge Planning   ISAAK:      Code Status: Full Code   Is the patient medically ready for discharge?:     Reason for patient still in hospital (select all that apply): Patient trending condition and Pending disposition  Discharge Plan A: Skilled Nursing Facility   Discharge Delays: None known at this time      Linda Carranza MD  Department of Hospital Medicine   [facility] - Telemetry ([facility])

## 2023-01-06 NOTE — PROGRESS NOTES
[facility])  Orthopedics  Progress Note    Patient Name: Meaghan Potter  MRN: [id number]  Admission Date: 12/19/2022  Hospital Length of Stay: 16 days  Attending Provider: Linda Carranza MD  Primary Care Provider: Primary Doctor No  Follow-up For: Procedure(s) (LRB):  ECHOCARDIOGRAM, TRANSESOPHAGEAL (N/A)    Post-Operative Day: 8 Days Post-Op  Subjective:     Principal Problem:Pyogenic arthritis of left hip    Principal Orthopedic Problem:  Pyogenic arthritis of left hip    Interval History: Meaghan Potter is a 67-year-old female postop day 13 status post left hip arthrotomy and washout.  She is resting comfortably bed.  She has no complaints at this time    Review of patient's allergies indicates:   Allergen Reactions    Amoxicillin Rash       Current Facility-Administered Medications   Medication    0.9%  NaCl infusion    0.9%  NaCl infusion    0.9%  NaCl infusion    acetaminophen suppository 650 mg    acetaminophen tablet 650 mg    albuterol-ipratropium 2.5 mg-0.5 mg/3 mL nebulizer solution 3 mL    apixaban tablet 5 mg    brimonidine 0.2% ophthalmic solution 1 drop    BUPivacaine (PF) 0.25% (2.5 mg/ml) injection 12.5 mg    calcitRIOL capsule 0.5 mcg    cefTRIAXone (ROCEPHIN) 2 g in dextrose 5 % in water (D5W) 5 % 50 mL IVPB (MB+)    dextrose 10% bolus 125 mL 125 mL    dextrose 10% bolus 250 mL 250 mL    ergocalciferol capsule 50,000 Units    glucagon (human recombinant) injection 1 mg    glucose chewable tablet 16 g    glucose chewable tablet 24 g    HYDROcodone-acetaminophen 5-325 mg per tablet 1 tablet    melatonin tablet 6 mg    metoprolol tartrate (LOPRESSOR) tablet 25 mg    midodrine tablet 5 mg    naloxone 0.4 mg/mL injection 0.02 mg    ondansetron injection 4 mg    polyethylene glycol packet 17 g    promethazine tablet 25 mg    senna-docusate 8.6-50 mg per tablet 1 tablet    senna-docusate 8.6-50 mg per tablet 1 tablet    sevelamer carbonate tablet 800 mg    sodium chloride 0.9% bolus 250  "mL 250 mL    sodium chloride 0.9% bolus 250 mL 250 mL    sodium chloride 0.9% bolus 250 mL 250 mL    sodium chloride 0.9% bolus 250 mL 250 mL    sodium chloride 0.9% bolus 250 mL 250 mL    sodium chloride 0.9% bolus 250 mL 250 mL    sodium chloride 0.9% flush 3 mL    vitamin renal formula (B-complex-vitamin c-folic acid) 1 mg per capsule 1 capsule     Objective:     Vital Signs (Most Recent):  Temp: 97.6 °F (36.4 °C) (01/06/23 0803)  Pulse: 69 (01/06/23 0803)  Resp: 18 (01/06/23 0829)  BP: (!) 109/55 (01/06/23 0803)  SpO2: 100 % (01/06/23 0803)   Vital Signs (24h Range):  Temp:  [97.4 °F (36.3 °C)-98.6 °F (37 °C)] 97.6 °F (36.4 °C)  Pulse:  [61-83] 69  Resp:  [17-18] 18  SpO2:  [93 %-100 %] 100 %  BP: (106-116)/(52-63) 109/55     Weight: 75.2 kg (165 lb 12.6 oz)  Height: 5' 4" (162.6 cm)  Body mass index is 28.46 kg/m².      Intake/Output Summary (Last 24 hours) at 1/6/2023 0836  Last data filed at 1/5/2023 1723  Gross per 24 hour   Intake 480 ml   Output --   Net 480 ml       Ortho/SPM Exam  Left hip:   Sutures intact, incision healing well, no signs of infection   No edema  Minimal TTP  Calf and compartments soft and compressible   Motor exam normal   Sensation and pulses intact     GEN: Well developed, well nourished female. AAOX3. No acute distress.   Head: Normocephalic, atraumatic.   Eyes: RIGO  Neck: Trachea is midline, no adenopathy  Resp: Breathing unlabored.  Neuro: Motor function normal, Cranial nerves intact  Psych: Mood and affect appropriate.      Significant Labs:   Recent Lab Results       None          All pertinent labs within the past 24 hours have been reviewed.    Significant Imaging: I have reviewed and interpreted all pertinent imaging results/findings.    Assessment/Plan:     Active Diagnoses:    Diagnosis Date Noted POA    PRINCIPAL PROBLEM:  Pyogenic arthritis of left hip [M00.9] 12/20/2022 Yes    Constipation [K59.00] 01/01/2023 Yes    Streptococcal bacteremia [R78.81, B95.5] 12/27/2022 " Yes    Pericardial effusion [I31.39] 12/21/2022 Yes    ESRD (end stage renal disease) on dialysis [N18.6, Z99.2] 12/20/2022 Not Applicable    Primary hypertension [I10] 12/20/2022 Yes    Tear of left acetabular labrum [S73.192A] 12/20/2022 Yes    Bone cyst of right femur [M85.651] 12/20/2022 Yes    Atrial fibrillation [I48.91] 12/19/2022 Yes      Problems Resolved During this Admission:     Assessment:   67-year-old female postop day 13 status post left hip arthrotomy and washout     Plan:  Weightbearing as tolerated   We will remove the sutures later today   Antibiotics per ID and primary team   DVT prophylaxis per primary team   We will continue to monitor her right hip for pain, but she is not experiencing any pain at this time, so no intervention necessary     Julien Azul PA-C  Orthopedics  [facility] - Telemetry ([facility])

## 2023-01-06 NOTE — ASSESSMENT & PLAN NOTE
- has been having intermittent symtomatic hypotension, per chart review, SBP has been  for most of admission   - continue low dose PO Metoprolol as above   - trial Midodrine 5 TID for BP support  - monitor BP

## 2023-01-06 NOTE — ASSESSMENT & PLAN NOTE
- Patient currently follows Dr. Ribera as outpatient, Patient [place] [facility] [place] - -nephrology following, continue HD MWF  - renally dose meds, avoid nephrotoxic agents

## 2023-01-06 NOTE — SUBJECTIVE & OBJECTIVE
Interval History: NAEON. BP remains low 100s systolic. Chart reviewed and BP has been 90-120s over last 1-2 weeks while in hospital. Pt awake, alert, seen with RN at bedside. Denies CP, SOB, HA, n/v. No dizziness at rest, worse with positional changes. Will trial midodrine. Pt planned for HD today     Review of Systems  Objective:     Vital Signs (Most Recent):  Temp: 97.6 °F (36.4 °C) (01/06/23 0803)  Pulse: 69 (01/06/23 0803)  Resp: 18 (01/06/23 0803)  BP: (!) 109/55 (01/06/23 0803)  SpO2: 100 % (01/06/23 0803)   Vital Signs (24h Range):  Temp:  [97.4 °F (36.3 °C)-98.6 °F (37 °C)] 97.6 °F (36.4 °C)  Pulse:  [61-83] 69  Resp:  [17-18] 18  SpO2:  [93 %-100 %] 100 %  BP: (106-116)/(52-63) 109/55     Weight: 75.2 kg (165 lb 12.6 oz)  Body mass index is 28.46 kg/m².    Intake/Output Summary (Last 24 hours) at 1/6/2023 0812  Last data filed at 1/5/2023 1723  Gross per 24 hour   Intake 480 ml   Output --   Net 480 ml      Physical Exam  Vitals and nursing note reviewed.   Constitutional:       General: She is not in acute distress.     Appearance: Normal appearance. Ill appearance: chronically ill appearing.   Cardiovascular:      Rate and Rhythm: Normal rate. Rhythm irregular.      Heart sounds: No murmur heard.    No friction rub. No gallop.   Pulmonary:      Effort: Pulmonary effort is normal.      Breath sounds: Normal breath sounds. No wheezing, rhonchi or rales.      Comments: On RA   Abdominal:      General: Bowel sounds are normal. There is no distension.      Palpations: Abdomen is soft.      Tenderness: There is no abdominal tenderness. There is no guarding or rebound.   Musculoskeletal:      Right lower leg: No edema.      Left lower leg: No edema.   Neurological:      Mental Status: She is alert and oriented to person, place, and time. Mental status is at baseline.   Psychiatric:         Mood and Affect: Mood normal.         Behavior: Behavior normal.       Significant Labs: All pertinent labs within the past  24 hours have been reviewed.    Significant Imaging: I have reviewed all pertinent imaging results/findings within the past 24 hours.

## 2023-01-06 NOTE — PLAN OF CARE
Awaiting results of Rapid Covid for patient to transfer to SNF. Spoke to Emani with [facility], they will be able to accept tomorrow if necessary.

## 2023-01-06 NOTE — PROGRESS NOTES
[facility] - Telemetry ([facility])  Adult Nutrition  Progress Note    SUMMARY       Recommendations    Recommendation/Intervention:   1. Recommend as medically appropriate, pt continue on Renal diet  2. When medically appropriate, recommend 750-1500 mL Fluid restriction, or Per MD/NP  3. Recommend continue nausea regimen   4. Recommend continue Novasource BID   5. Encourage PO/Supplement intake  6. Recommend daily weights    Goals:   1. Patient to consume >75% of EEN prior to RD follow up  2. Pt to be initiated on Fluid restriction by RD follow up   3. Pt nausea will be resolved by RD follow up    Nutrition Goal Status: New  Communication of RD Recs: other (comment) (poc)    Assessment and Plan    Nutrition Problem  Increased nutrition      Related to (etiology):   Condition associated with diagnosis: ESRD on HD     Signs and Symptoms (as evidenced by):   Increased nutrient demands  Nausea/vomiting     Interventions/Recommendations (treatment strategy):  1. Recommend as medically appropriate, pt continue on Renal diet  2. When medically appropriate, recommend 750-1500 mL Fluid restriction, or Per MD/NP  3. Recommend continue nausea regimen   4. Recommend continue Novasource BID   5. Encourage PO/Supplement intake  6. Recommend daily weights  7. Collaboration of care with medical providers     Nutrition Diagnosis Status:   Continues       Malnutrition Assessment                                       Reason for Assessment    Reason For Assessment: length of stay  Diagnosis: renal disease, other (see comments) (Pyogenic arthritis of left hip)  Interdisciplinary Rounds: did not attend (RD remote)  General Information Comments: Patient on a renal diet with ONS.  Patient with episode of nausea and vomiting today.  PO intake fair.  Meal intake noted between 25-75% at this time.  Labs reviewed.  NKFA.  LBM:12/29.  Patient is ESRD on HD.  RD to continue to monitor po intake and tolerance.  (RD remote)  Nutrition Discharge  "Planning: Patient to consume adequate po intake prior and post discharge.    Follow up  1/6: Visited pt at bedside, pt not in room, spoke to RN, confirmed pt in dialysis. RN stated pt only able to eat banana for breakfast d/t the smell of the food was making pt nauseous. Per Nephrology note 1/6 "She continues to have some mild nausea.  States she does not like the hospital food". RN stated pt has not reported experiencing any vomiting or diarrhea, no abdominal pain/distention, no chewing/swallowing difficulties, RN has not seen pt consume any Novasource renal ONS. Reviewed chart: LBM 1/5; Skin: incision WDL; Altered skin: L anterior greater trochanter blister, dry/intact; Isrrael score: 19; Edema: None. Labs, meds, weight reviewed. Na (L), Albumin, (L), BUN (H), Cr (H), GFR 9. Note Ondansetron PRN. Weight charted 12/31 169 lbs, 1/4 165 lbs, -4 lb wt loss (2.3% wt change) < 1 week. RD will continue to monitor.     Nutrition Risk Screen    Nutrition Risk Screen: unintentional loss of 10 lbs or more in the past 2 months    Nutrition/Diet History    Spiritual, Cultural Beliefs, Holiness Practices, Values that Affect Care: no  Food Allergies: NKFA  Factors Affecting Nutritional Intake: nausea/vomiting    Anthropometrics    Temp: 97.8 °F (36.6 °C)  Height Method: Stated  Height: 5' 4" (162.6 cm)  Height (inches): 64 in  Weight Method: Bed Scale  Weight: 75.2 kg (165 lb 12.6 oz)  Weight (lb): 165.79 lb  Ideal Body Weight (IBW), Female: 120 lb  % Ideal Body Weight, Female (lb): 141.47 %  BMI (Calculated): 28.4  BMI Grade: 25 - 29.9 - overweight     Wt Readings from Last 15 Encounters:   01/04/23 75.2 kg (165 lb 12.6 oz)   06/03/20 87.5 kg (193 lb)   05/31/19 89.4 kg (197 lb 1.5 oz)   03/19/19 92.2 kg (203 lb 4.2 oz)   03/07/19 93.4 kg (205 lb 14.6 oz)     Lab/Procedures/Meds    Pertinent Labs Reviewed: reviewed  Pertinent Medications Reviewed: reviewed  BMP  Lab Results   Component Value Date     (L) 01/06/2023    K " 5.0 01/06/2023     01/06/2023    CO2 20 (L) 01/06/2023    BUN 34 (H) 01/06/2023    CREATININE 6.7 (H) 01/06/2023    CALCIUM 9.3 01/06/2023    ANIONGAP 10 01/06/2023    EGFRNORACEVR 6 (A) 01/06/2023      Lab Results   Component Value Date    ALBUMIN 2.2 (L) 01/06/2023    Scheduled Meds:   sodium chloride 0.9%   Intravenous Once    sodium chloride 0.9%   Intravenous Once    apixaban  5 mg Oral BID    brimonidine 0.2%  1 drop Both Eyes BID    calcitRIOL  0.5 mcg Oral Every Mon, Wed, Fri    cefTRIAXone (ROCEPHIN) IVPB  2 g Intravenous Q24H    ergocalciferol  50,000 Units Oral Q7 Days    metoprolol tartrate  25 mg Oral BID    midodrine  5 mg Oral TID WM    polyethylene glycol  17 g Oral Daily    senna-docusate 8.6-50 mg  1 tablet Oral Daily    sevelamer carbonate  800 mg Oral TID WM    vitamin renal formula (B-complex-vitamin c-folic acid)  1 capsule Oral Daily     Continuous Infusions:  PRN Meds:.sodium chloride 0.9%, acetaminophen, acetaminophen, albuterol-ipratropium, dextrose 10%, dextrose 10%, glucagon (human recombinant), glucose, glucose, HYDROcodone-acetaminophen, melatonin, naloxone, ondansetron, promethazine, senna-docusate 8.6-50 mg, sodium chloride 0.9%, sodium chloride 0.9%, sodium chloride 0.9%, sodium chloride 0.9%, sodium chloride 0.9%, sodium chloride 0.9%, sodium chloride 0.9%   Physical Findings/Assessment         Estimated/Assessed Needs    Weight Used For Calorie Calculations: 75.2 kg (165 lb 12.6 oz)  Energy Calorie Requirements (kcal): 0796-7105 (25-35 kcal/kg ABW (ESRD on HD))  Energy Need Method: Kcal/kg  Protein Requirements: 75-90 (1.0-1.2 g/kg ABW (ESRD on HD))  Weight Used For Protein Calculations: 75.2 kg (165 lb 12.6 oz)  Fluid Requirements (mL): 750-1500 mL or Per MD/NP (Fluid restriction (ESRD on HD))  Estimated Fluid Requirement Method: other (see comments)  RDA Method (mL): 1880  CHO Requirement: 235-329 (6431-1266 kcal/8)      Nutrition Prescription Ordered    Current Diet Order:  renal diet    Evaluation of Received Nutrient/Fluid Intake  I/O: (Net since admit)  1/6: -8126 mL  % Kcal Needs: %  % Protein Needs: %  Energy Calories Required: not meeting needs  Protein Required: not meeting needs  Fluid Required: meeting needs  Tolerance: other (see comments)  % Intake of Estimated Energy Needs: 75 - 100 %  % Meal Intake: 75 - 100 %    Nutrition Risk    Level of Risk/Frequency of Follow-up: Low (F/u x 1 weekly)     Monitor and Evaluation    Food and Nutrient Intake: energy intake, food and beverage intake  Food and Nutrient Adminstration: diet order  Knowledge/Beliefs/Attitudes: food and nutrition knowledge/skill, beliefs and attitudes  Physical Activity and Function: nutrition-related ADLs and IADLs, factors affecting access to physical activity  Anthropometric Measurements: height/length, weight, weight change, body mass index  Biochemical Data, Medical Tests and Procedures: electrolyte and renal panel, lipid profile, gastrointestinal profile, glucose/endocrine profile, inflammatory profile     Nutrition Follow-Up    RD Follow-up?: Yes  Therese Roth, Registration Eligible, Provisional LDN

## 2023-01-06 NOTE — ASSESSMENT & PLAN NOTE
Patient with new onset Paroxysmal (<7 days) atrial fibrillation  which is controlledcurrently with Calcium Channel Blocker. . [id number] Score: 2. HASBLED Score: 3. Anticoagulation indicated. Anticoagulation done with Eliquis.    Troponin likely elevated in setting of ESRD and demand ischemia from arrhythmia as patient denied endorsing chest pain.  Plan:  -previously on cardizem drip has been transitioned to PO   - currently rate controlled Afib, has been having some hypotension on Diltiazem so diltiazem discontinued, transition to low dose PO metoprolol   - cont tele monitoring

## 2023-01-06 NOTE — PLAN OF CARE
Nutrition Recommendations 1/6:  1. Recommend as medically appropriate, pt continue on Renal diet  2. When medically appropriate, recommend 750-1500 mL Fluid restriction, or Per MD/NP  3. Recommend continue nausea regimen   4. Recommend continue Novasource BID   5. Encourage PO/Supplement intake  6. Recommend daily weights  7. Collaboration of care with medical providers  Therese Roth, Registration Eligible, Provisional LDN

## 2023-01-06 NOTE — PLAN OF CARE
Covid results negative at 1728 today, called to [facility]. Awaiting call back to see if patient can come today.

## 2023-01-06 NOTE — NURSING
01/06/23 1215   Post-Hemodialysis Assessment   Rinseback Volume (mL) 250 mL   Blood Volume Processed (Liters) 59.8 L   Dialyzer Clearance Lightly streaked   Duration of Treatment 180 minutes   Total UF (mL) 1500 mL   Net Fluid Removal 1000   Post-Hemodialysis Comments 3hr H.D. tx completed. Pt tolerated without issues. Pt de-accessed  per P & P. Report given to primary nurse.

## 2023-01-07 VITALS
OXYGEN SATURATION: 96 % | HEIGHT: 64 IN | TEMPERATURE: 98 F | HEART RATE: 66 BPM | RESPIRATION RATE: 17 BRPM | DIASTOLIC BLOOD PRESSURE: 55 MMHG | SYSTOLIC BLOOD PRESSURE: 96 MMHG | BODY MASS INDEX: 27.7 KG/M2 | WEIGHT: 162.25 LBS

## 2023-01-07 PROCEDURE — 25000003 PHARM REV CODE 250: Performed by: INTERNAL MEDICINE

## 2023-01-07 RX ADMIN — APIXABAN 5 MG: 2.5 TABLET, FILM COATED ORAL at 08:01

## 2023-01-07 RX ADMIN — SEVELAMER CARBONATE 800 MG: 800 TABLET, FILM COATED ORAL at 08:01

## 2023-01-07 RX ADMIN — NEPHROCAP 1 CAPSULE: 1 CAP ORAL at 08:01

## 2023-01-07 RX ADMIN — MIDODRINE HYDROCHLORIDE 5 MG: 5 TABLET ORAL at 08:01

## 2023-01-07 RX ADMIN — BRIMONIDINE TARTRATE 1 DROP: 2 SOLUTION/ DROPS OPHTHALMIC at 08:01

## 2023-01-07 RX ADMIN — SENNOSIDES AND DOCUSATE SODIUM 1 TABLET: 8.6; 5 TABLET ORAL at 08:01

## 2023-01-07 RX ADMIN — POLYETHYLENE GLYCOL 3350 17 G: 17 POWDER, FOR SOLUTION ORAL at 08:01

## 2023-01-07 NOTE — DISCHARGE SUMMARY
[facility] ([facility] Medicine  Discharge Summary      Patient Name: Meaghan Potter  MRN: [id number]  [facility]: [id number]  Patient Class: IP- Inpatient  Admission Date: 12/19/2022  Hospital Length of Stay: 16 days  Discharge Date and Time:  01/06/2023  Attending Physician: Farideh att. providers found   Discharging Provider: Linda Carranza MD  Primary Care Provider: Primary Doctor Farideh    Primary Care Team: Networked reference to record PCT     HPI:   Meaghan Potter is a 67 y.o. female with a PMH  has a past medical history of ESRD (end stage renal disease) on dialysis, GERD (gastroesophageal reflux disease), and Hypertension. who presented as a transfer from [facility] for higher level cardiology and orthopedic care after patient was found to have sustained a left superior labral tear of her acetabulum noted on MRI in addition to new onset atrial fibrillation with RVR requiring diltiazem drip.  Patient reported being in her usual state of health prior to onset of symptoms and reported acute onset of left hip pain while performing leg exercises in bed earlier today.  Patient reported hearing and feeling a pop in her left hip followed by immediate pain which has remained constant and currently rated 6/10 in severity. Pain was described as throbbing/pulling in nature with no known alleviating factors and aggravating factors including weight-bearing, movement, and palpation to the affected area.  She denied endorsing any numbness, tingling, discoloration, or penetrating trauma, but did express decreased range of motion and muscle strength secondary to exacerbation of pain.  While at outside facility, patient became short of breath while lying flat while obtaining MRI of hip and was found to be in atrial fibrillation with RVR requiring diltiazem drip. She denied endorsing any chest pain, lightheadedness, dizziness, visual disturbances, fever, chills, sweats, nausea, vomiting, abdominal pain, changes in  bowel/bladder habits, or onset neurological deficits.  All other review of systems negative except as noted above.  Patient reports being back at her baseline and continues to complain only of left hip pain.  Orthopedics and cardiology consulted and awaiting further evaluation/recommendations.  Of note, patient missed hemodialysis today secondary to pain and going to the emergency room and usually receives HD via left upper extremity fistula on Monday/Wednesday/Friday.  Follows Dr. Ribera outpatient. Nephrology consulted to continue HD inpatient.     PCP: Primary Doctor No        Procedure(s) (LRB):  ECHOCARDIOGRAM, TRANSESOPHAGEAL (N/A)      Hospital Course:   Pt admitted to Observation for Atrial fibrillation (new onset) with RVR with . Cardiology consulted with Cardizem gtt initiated. NSR on monitor with HR in 80's.  BNP elevated and Troponin trended upward (0.076>0.095) with Heparin infusion initiated. Echo showed with concentric hypertrophy and normal systolic function. Atrial fibrillation observed.Biatrial atrial enlargement. Grade III left ventricular diastolic dysfunction. PA systolic pressure is 60 mmHg. Moderate right ventricular enlargement with normal right ventricular systolic function. Pulmonary hypertension. EF 55%. Moderate to severe tricuspid regurgitation. Mild-to-moderate mitral regurgitation. Severe right atrial enlargement. Large posterior pericardial effusion. No evidence of tamponade. Troponin elevated and flat with no cardiac symptoms reported. Pt also reported L groin pain upon admit. Orthopedic Surgery consulted and pt found to have sustained a left superior labral tear of her acetabulum noted on MRI with further orthopedic work up to be completed outpatient. Hx of ESRD noted with last HD on 12/16/22- Nephrology consulted with HD performed today. AVG to LUE with +bruit/+thrill present. On 12/21/22, MRI of R hip results pending. IR consulted for evaluation of aspiration/injection of  "left hip. Rate and rhythm controlled on Cardizem gtt. Heparin infusion continued pending joint aspiration.  Pericardial effusion discussed with Nephrology and Cardiology for recommendations.   Patient went to have aspiration of left hip however upon transferring from bed to table had an episode described as seizure-like" seems more that it was hypotension related.  Medications adjusted  She remains on p.o. Cardizem as well as Cardizem drip.  Patient underwent arthrocentesis with 4 cc of pus removed 12/23.  Sent for Gram stain, culture, crystals.  Discussed with Dr. Johnson , orthopedics,crystals are negative patient underwent washout with  Cultures 12/24.  Blood cultures and culture from left hip reveal Gram-positive and Gram-negative.  Patient paced on ceftazidime and vancomycin empirically until cultures return.  12/26 She is complaining of weakness . The blood and drainage cx are (+) for STREPTOCOCCUS GORDONII  sensitive  to rocephin .  ID consulted .  12/27 NAEON . ID consulted and rec IV rocephin qd x 4 week from the last negative blood cx . Cardiology consulted for KATE .   12/28 Plan  for a KATE tomorrow am . S/P HD today  . She is complaining od left hip pain .   12/29 S/P KATE which show negative for IE and mild to moderate pericardial effusion .  Pt  will need 4 week of IVAB  from the last negative blood cx . She  is complaining of left hip pain  . Plan to consult CM for SNF placement .   12/30 CT maxillofacial show Multifocal periodontal disease without definite associated dental abscess.  S/P Tunneled PICC line yesterday . Plan to d/c to SNF  for IVAB rocephin qd x 4 weeks .   12/31 NAEON . Awaiting SNF placement.    1/1/23 C/O nausea and vomit . Abd XR show constipation. Plan to cont miralax colace and start enemas .  1/2: constipation improved, BM overnight; awaiting snf placement  01/03: BP low normal, pt reports some lightheadedness, given 250 cc bolus. Denies hip pain. Awaiting SNF placement. "   01/04/23: HD today. BP remains low normal. Awaiting SNF placement.   01/05: Had dizziness/nausea with PT, given small fluid bolus   01/06: Started midodrine for hypotension- discussed with renal. Underwent HD. Sutures removed per ortho surgery   01/07: BP remains  systolic but pt reports improvement in dizziness with initiation of midodrine. Accepted to [facility] for discharge. Seen and examined on day of discharge and discharged to SNF in stable condition.            Goals of Care Treatment Preferences:  Code Status: Full Code      Consults:   Consults (From admission, onward)          Status Ordering Provider     Inpatient virtual consult to Hospital Medicine  Once        Provider:  Terry Mueller MD    Completed SHANTI SCHAEFFER     Inpatient consult to Social Work  Once        Provider:  (Not yet assigned)    Completed KE ASTORGA     Inpatient consult to Interventional Radiology  Once        Provider:  Juan M Panda MD    Completed SOCO GOODWIN     Inpatient consult to Nephrology  Once        Provider:  William Alfonso MD    Completed CLEMENTE CAVANAUGH     Inpatient consult to Orthopedic Surgery  Once        Provider:  Nataliia Pinto MD    Completed SIRISHA HAMMOND     Inpatient consult to Cardiology  Once        Provider:  Kyung Aragon MD    Completed SIRISHA HAMMOND.            * Pyogenic arthritis of left hip  Patient presented with acute onset left hip pain x1 day duration in absence of trauma while doing leg exercises in bed and was found to asymmetric large left hip joint effusion, small right hip joint effusion, and evidence of superior labral tear of her left acetabulum noted on MRI.  Patient noted to have 5/5 strength throughout with sensation to light touch grossly intact throughout however, TTP throughout left groin.  Orthopedics consulted and awaiting further evaluation/recommendations.  Plan:  -optimize pain control  -bowel regimen  -PT/OT  -ortho  surgery consulted   The patient underwent arthrocentesis by IR 12/23 with 4 cc pus obtained.  Sent for cell count which revealed 187,000 white blood cells 100% segs.  Crystals negative  and culture Gram-negative and Gram-positive.  Patient went for washout 12/24.  Placed on empiric vanco and ceftazidime    -Blood cultures positive STREPTOCOCCUS GORDONII  -Repeated blood cx NGTD  -ID followed: Rec :to treat for 4 weeks from date of negative blood cultures with IV rocephin 2 gram daily; STEVEN 01/29/23. Tunneled PICC placed during admission, will need to be removed once therapy completed.     Streptococcal bacteremia  -Dr. Schwartz with ID consulted,   - ID recs: cont IV rocephin STEVEN 01/29/23  -KATE: Negative for IE Moderate to mild pericardial effusion  .   - Cont abx as above     Atrial fibrillation  Patient with new onset Paroxysmal (<7 days) atrial fibrillation  which is controlledcurrently with Calcium Channel Blocker. . [id number] Score: 2. HASBLED Score: 3. Anticoagulation indicated. Anticoagulation done with Eliquis.    Troponin likely elevated in setting of ESRD and demand ischemia from arrhythmia as patient denied endorsing chest pain.  Plan:  -previously on cardizem drip has been transitioned to PO   - currently rate controlled Afib, has been having some hypotension on Diltiazem so diltiazem discontinued, transition to low dose PO metoprolol 12.5 BID   - monitor VS  - outpatient follow up with cardiology on discharge     Primary hypertension  - has been having intermittent symtomatic hypotension, per chart review, SBP has been  for most of admission   - continue low dose PO Metoprolol as above   - started on Midodrine 5 TID for BP support  - monitor BP and adjust as needed     ESRD (end stage renal disease) on dialysis  - Patient currently follows Dr. Ribera as outpatient, Patient currently on M/W/F HD at [facility] via LUE fistula - -nephrology consulted, continue HD MWF per schedule   - renally dose  meds, avoid nephrotoxic agents     Pericardial effusion  -pt appears asymptomatic   -Nephrology following with HD performed on 12/21/22 and a Monday Wednesday Friday schedule  -KATE show mild to moderate pericardial effusion   - outpatient follow up with cardiology as above     Bone cyst of right femur  -Orthopedic Surgery following   -follow up outpatient as above     Tear of left acetabular labrum  -Orthopedic surgery following  -MRI showed no fracture and asymmetric large left hip joint effusion.  Small right hip joint effusion. Paralabral cyst along the superior border of the acetabulum suspicious for a superior labral tear.   - the left hip may treat with therapy and anti-inflammatories (per Orthopedic Surgery)   - outpatient ortho follow up       Constipation  - continue bowel regimen       Final Active Diagnoses:    Diagnosis Date Noted POA    PRINCIPAL PROBLEM:  Pyogenic arthritis of left hip [M00.9] 12/20/2022 Yes    Constipation [K59.00] 01/01/2023 Yes    Streptococcal bacteremia [R78.81, B95.5] 12/27/2022 Yes    Pericardial effusion [I31.39] 12/21/2022 Yes    ESRD (end stage renal disease) on dialysis [N18.6, Z99.2] 12/20/2022 Not Applicable    Primary hypertension [I10] 12/20/2022 Yes    Tear of left acetabular labrum [S73.192A] 12/20/2022 Yes    Bone cyst of right femur [M85.651] 12/20/2022 Yes    Atrial fibrillation [I48.91] 12/19/2022 Yes      Problems Resolved During this Admission:       Discharged Condition: good    Disposition: Skilled Nursing Facility    Follow Up:   Follow-up Information       Tariq Ribera MD Follow up in 1 week(s).    Specialty: Nephrology  Contact information:  [address] [place] [address] [phone]               Humberto Rivero MD Follow up in 1 week(s).    Specialty: Orthopedic Surgery  Contact information:  [address] [place] [address] [phone]               Gerardo Schwartz MD, [facility] Follow up in 2 week(s).    Specialties:  Infectious Diseases, Hospitalist  Why: follow up infection, call clinic to schedule appointment  Contact information:  [address] [facility] [address]  [phone]               Mary Silva MD. Schedule an appointment as soon as possible for a visit.    Specialties: Interventional Cardiology, Cardiology  Contact information:  [address]  [place] [address]  [phone]               Jenni Mueller MD Follow up.    Specialty: Family Medicine  Why: call to schedule appointment, referral sent to establish primary care  Contact information:  [address]  [place] [address]  [phone]                           Patient Instructions:      Ambulatory referral/consult to Cardiology   Standing Status: Future   Referral Priority: Routine Referral Type: Consultation   Referral Reason: Specialty Services Required   Requested Specialty: Cardiology   Number of Visits Requested: 1     Ambulatory referral/consult to Infectious Disease   Standing Status: Future   Referral Priority: Routine Referral Type: Consultation   Referral Reason: Specialty Services Required   Requested Specialty: Infectious Diseases   Number of Visits Requested: 1     Ambulatory referral/consult to Family Practice   Standing Status: Future   Referral Priority: Routine Referral Type: Consultation   Referral Reason: Specialty Services Required   Requested Specialty: Family Medicine   Number of Visits Requested: 1     Diet renal     Notify your health care provider if you experience any of the following:  temperature >100.4     Notify your health care provider if you experience any of the following:  redness, tenderness, or signs of infection (pain, swelling, redness, odor or green/yellow discharge around incision site)     Notify your health care provider if you experience any of the following:  difficulty breathing or increased cough     Notify your health care provider if you experience any of the following:   persistent dizziness, light-headedness, or visual disturbances     Change dressing (specify)   Order Comments: Daily dry dressing changes     Activity as tolerated       Significant Diagnostic Studies: See Hospital Course     Pending Diagnostic Studies:       None           Medications:  Reconciled Home Medications:      Medication List        START taking these medications      apixaban 5 mg Tab  Commonly known as: ELIQUIS  Take 1 tablet (5 mg total) by mouth 2 (two) times daily.     calcitRIOL 0.5 MCG Cap  Commonly known as: ROCALTROL  Take 1 capsule (0.5 mcg total) by mouth every Mon, Wed, Fri. for 14 days  Start taking on: January 9, 2023     dextrose 5 % in water (D5W) 5 % PgBk 50 mL with cefTRIAXone 2 gram SolR 2 g  Inject 2 g into the vein once daily. for 23 days     metoprolol tartrate 25 MG tablet  Commonly known as: LOPRESSOR  Take 0.5 tablets (12.5 mg total) by mouth 2 (two) times daily.     midodrine 5 MG Tab  Commonly known as: PROAMATINE  Take 1 tablet (5 mg total) by mouth 3 (three) times daily with meals.     polyethylene glycol 17 gram Pwpk  Commonly known as: GLYCOLAX  Take 17 g by mouth once daily. for 10 days     senna-docusate 8.6-50 mg 8.6-50 mg per tablet  Commonly known as: PERICOLACE  Take 1 tablet by mouth once daily. for 10 days     sevelamer carbonate 800 mg Tab  Commonly known as: RENVELA  Take 1 tablet (800 mg total) by mouth 3 (three) times daily with meals.     vitamin renal formula (B-complex-vitamin c-folic acid) 1 mg Cap  Commonly known as: NEPHROCAP  Take 1 capsule by mouth once daily.            CHANGE how you take these medications      famotidine 10 MG tablet  Commonly known as: PEPCID  Take 1 tablet (10 mg total) by mouth once daily.  What changed: when to take this     HYDROcodone-acetaminophen 5-325 mg per tablet  Commonly known as: NORCO  Take 1 tablet by mouth every 6 (six) hours as needed for Pain.  What changed: when to take this            CONTINUE taking these  medications      brimonidine 0.2% 0.2 % Drop  Commonly known as: ALPHAGAN  Place 1 drop into both eyes 2 (two) times daily.     ergocalciferol 50,000 unit Cap  Commonly known as: ERGOCALCIFEROL  Take 50,000 Units by mouth every 7 days.     EScitalopram oxalate 5 MG Tab  Commonly known as: LEXAPRO  Take 5 mg by mouth once daily.     VENTOLIN HFA 90 mcg/actuation inhaler  Generic drug: albuterol  2 puffs 3 (three) times daily as needed.            STOP taking these medications      amLODIPine 5 MG tablet  Commonly known as: NORVASC     cyclobenzaprine 10 MG tablet  Commonly known as: FLEXERIL     hydrALAZINE 25 MG tablet  Commonly known as: APRESOLINE     irbesartan 150 MG tablet  Commonly known as: AVAPRO              Indwelling Lines/Drains at time of discharge:   Lines/Drains/Airways       Central Venous Catheter Line  Duration             Tunneled Central Line Insertion/Assessment - Double Lumen  12/29/22 1601 right internal jugular 9 days              Drain  Duration                  Hemodialysis AV Fistula Left upper arm -- days                    Time spent on the discharge of patient: 36 minutes         Linda Carranza MD  Department of Hospital Medicine  [facility] - Telemetry ([facility])

## 2023-01-07 NOTE — ASSESSMENT & PLAN NOTE
Patient presented with acute onset left hip pain x1 day duration in absence of trauma while doing leg exercises in bed and was found to asymmetric large left hip joint effusion, small right hip joint effusion, and evidence of superior labral tear of her left acetabulum noted on MRI.  Patient noted to have 5/5 strength throughout with sensation to light touch grossly intact throughout however, TTP throughout left groin.  Orthopedics consulted and awaiting further evaluation/recommendations.  Plan:  -optimize pain control  -bowel regimen  -PT/OT  -ortho surgery consulted   The patient underwent arthrocentesis by IR 12/23 with 4 cc pus obtained.  Sent for cell count which revealed 187,000 white blood cells 100% segs.  Crystals negative  and culture Gram-negative and Gram-positive.  Patient went for washout 12/24.  Placed on empiric vanco and ceftazidime    -Blood cultures positive STREPTOCOCCUS GORDONII  -Repeated blood cx NGTD  -ID followed: Rec :to treat for 4 weeks from date of negative blood cultures with IV rocephin 2 gram daily; STEVEN 01/29/23. Tunneled PICC placed during admission, will need to be removed once therapy completed.

## 2023-01-07 NOTE — PT/OT/SLP PROGRESS
"Physical Therapy  Treatment    Meaghan Potter   MRN: [id number]   Admitting Diagnosis: Pyogenic arthritis of left hip    PT Received On: 01/06/23  PT Start Time: 1705     PT Stop Time: 1730    PT Total Time (min): 25 min       Billable Minutes:  Gait Training 15 and Therapeutic Activity 10    Treatment Type: Treatment  PT/PTA: PTA     PTA Visit Number: 1       General Precautions: Standard, fall  Orthopedic Precautions: N/A  Braces: N/A  Respiratory Status: Room air    Spiritual, Cultural Beliefs, Latter-day Practices, Values that Affect Care: no    Subjective:  Communicated with nursing staff, Irene and completed Epic chart review prior to session.  Patient agreeable to PT.    Pain/Comfort  Pain Rating 1: 0/10    Objective:   Patient found with: peripheral IV    Functional Mobility:  Bed Mobility:    Supine to Sit: contact guard assistance  Scooting to edge of bed: contact guard assistance    Transfers:   Sit to Stand: minimal assistance with RW  SPT to bedside chair: minimal assistance with RW    Gait:    40 feet with RW, minimal assistance provided for patient safety.  Verbal stimuli provided to assist in direction with/proximity to assistive device.      Treatment and Education:  Pt educated on the following: Pt verbally agreed in understanding.   Pursed-lip breathing to improve O2 quality.  Continue therapuetic exercises sit in chair or EOB throughout the day to increase activity tolerance and decrease risk for pneumonia and blood clots.  Sit in chair or EOB for all 3 meals and when guests visit.    "Call, don't fall" procedure of pressing red button on call bell for all transfers.         AM-PAC 6 CLICK MOBILITY  How much help from another person does this patient currently need?   1 = Unable, Total/Dependent Assistance  2 = A lot, Maximum/Moderate Assistance  3 = A little, Minimum/Contact Guard/Supervision  4 = None, Modified [place]/Independent    Turning over in bed (including adjusting bedclothes, sheets " and blankets)?: 3  Sitting down on and standing up from a chair with arms (e.g., wheelchair, bedside commode, etc.): 3  Moving from lying on back to sitting on the side of the bed?: 3  Moving to and from a bed to a chair (including a wheelchair)?: 3  Need to walk in hospital room?: 3  Climbing 3-5 steps with a railing?: 1  Basic Mobility Total Score: 16    AM-PAC Raw Score CMS G-Code Modifier Level of Impairment Assistance   6 % Total / Unable   7 - 9 CM 80 - 100% Maximal Assist   10 - 14 CL 60 - 80% Moderate Assist   15 - 19 CK 40 - 60% Moderate Assist   20 - 22 CJ 20 - 40% Minimal Assist   23 CI 1-20% SBA / CGA   24 CH 0% Independent/ Mod I     Patient left up in chair with all lines intact, call button in reach, chair alarm on, and nursing  notified.  All needs met.    Assessment:  Meaghan Potter is a 67 y.o. female with a medical diagnosis of Pyogenic arthritis of left hip and presents with overall decline in functional mobility. Patient tolerated therapy session fair this date as she was able to ambulate for further distance (40 feet) and required less assistance with functional mobility tasks. Patient will benefit from continuance of skilled therapy sessions. Will continue to progress toward goals per patient tolerance and PT plan of care.    Rehab identified problem list/impairments: weakness, impaired endurance, impaired functional mobility, gait instability, impaired balance, decreased coordination    Rehab potential is fair.    Activity tolerance: Fair    Discharge recommendations: nursing facility, skilled, home health PT      Barriers to discharge:      Equipment recommendations: other (see comments) (TBD)     GOALS:   Multidisciplinary Problems       Physical Therapy Goals          Problem: Physical Therapy    Goal Priority Disciplines Outcome Goal Variances Interventions   Physical Therapy Goal     PT, PT/OT Ongoing, Progressing     Description: LT23  1. PT WILL COMPLETE BED MOBILITY WITH  S  2. PT WILL T/F TO CHAIR WITH RW AND S  3. PT WILL GT TRAIN X 150' WITH RW AND S                       PLAN:    Patient to be seen 3 x/week to address the above listed problems via gait training, therapeutic activities  Plan of Care expires: 01/06/23  Plan of Care reviewed with: patient         01/06/2023

## 2023-01-07 NOTE — ASSESSMENT & PLAN NOTE
Patient with new onset Paroxysmal (<7 days) atrial fibrillation  which is controlledcurrently with Calcium Channel Blocker. . [id number] Score: 2. HASBLED Score: 3. Anticoagulation indicated. Anticoagulation done with Eliquis.    Troponin likely elevated in setting of ESRD and demand ischemia from arrhythmia as patient denied endorsing chest pain.  Plan:  -previously on cardizem drip has been transitioned to PO   - currently rate controlled Afib, has been having some hypotension on Diltiazem so diltiazem discontinued, transition to low dose PO metoprolol 12.5 BID   - monitor VS  - outpatient follow up with cardiology on discharge

## 2023-01-07 NOTE — ASSESSMENT & PLAN NOTE
- Patient currently follows Dr. Ribera as outpatient, Patient currently on [place] [facility] [place] fistula - -nephrology consulted, continue HD MWF per schedule   - renally dose meds, avoid nephrotoxic agents

## 2023-01-07 NOTE — NURSING
"Discharge instructions received and reviewed with pt at bedside.  Pt voiced understanding and all questions answered to satisfaction.  Stressed importance to making and keeping all follow up appointments.  Prescriptions brought to bedside and reviewed with pt.  Tele monitor removed and brought to monitor tech.  IV d/c'd with tip intact, pressure dressing applied.  Right IJ left in place for abx at facility.  Report called to [facility]. ETA "before noon."  "

## 2023-01-07 NOTE — ASSESSMENT & PLAN NOTE
- has been having intermittent symtomatic hypotension, per chart review, SBP has been  for most of admission   - continue low dose PO Metoprolol as above   - started on Midodrine 5 TID for BP support  - monitor BP and adjust as needed

## 2023-01-07 NOTE — ASSESSMENT & PLAN NOTE
-Dr. Schwartz with ID consulted,   - ID recs: cont IV rocephin STEVEN 01/29/23  -KATE: Negative for IE Moderate to mild pericardial effusion  .   - Cont abx as above

## 2023-01-07 NOTE — ASSESSMENT & PLAN NOTE
-pt appears asymptomatic   -Nephrology following with HD performed on 12/21/22 and a Monday Wednesday Friday schedule  -KATE show mild to moderate pericardial effusion   - outpatient follow up with cardiology as above

## 2023-01-07 NOTE — PLAN OF CARE
[facility] - Telemetry (Hospital)  Discharge Assessment    Primary Care Provider: Primary Doctor No     Discharge Assessment (most recent)       BRIEF DISCHARGE ASSESSMENT - 12/21/22 0915          Discharge Planning    Assessment Type Discharge Planning Brief Assessment     Resource/Environmental Concerns none     Support Systems Family members     Assistance Needed TBD     Current Living Arrangements home     Patient/Family Anticipates Transition to home     Patient/Family Anticipated Services at Transition none     DME Needed Upon Discharge  none     Discharge Plan A Home     Discharge Plan B Home                   D/C Summary, Covid Test, to [facility]. Gave nurse number to call report. KM, MSW

## 2023-01-09 ENCOUNTER — TELEPHONE (OUTPATIENT)
Dept: ORTHOPEDICS | Facility: CLINIC | Age: 68
End: 2023-01-09
Payer: MEDICARE

## 2023-01-09 NOTE — TELEPHONE ENCOUNTER
----- Message from Nataliia Pinto MD sent at 1/8/2023  7:16 AM CST -----  Regarding: Discharge Follow-up  Patient is s/p Left hip I&D on 12/24/2022 by Dr. Johnson. Julien removed her eryn on Friday. Please have her follow-up in the ortho trauma clinic in the next 2-3 weeks. No xrays needed. Thanks!

## 2023-01-09 NOTE — TELEPHONE ENCOUNTER
Spoke with patient and scheduled her postop appointment. Patient verbalized understanding of appointment date, time and location.

## 2023-01-26 ENCOUNTER — TELEPHONE (OUTPATIENT)
Dept: ORTHOPEDICS | Facility: CLINIC | Age: 68
End: 2023-01-26
Payer: MEDICARE

## 2023-01-26 NOTE — TELEPHONE ENCOUNTER
Called to confirm pt's appt with the [facility] trauma department. Pt states she is unable to come in because she has dialysis scheduled for tomorrow. Pt needed appt moved to a Tuesday or Thursday. Pt verbalized understanding of time, date, and location of appt.

## 2023-01-31 ENCOUNTER — TELEPHONE (OUTPATIENT)
Dept: ORTHOPEDICS | Facility: CLINIC | Age: 68
End: 2023-01-31
Payer: MEDICARE

## 2023-01-31 NOTE — TELEPHONE ENCOUNTER
Spoke with [facility] and they will bring the patient in early due to their Dialysis schedule. Understanding verbalized.

## 2023-01-31 NOTE — TELEPHONE ENCOUNTER
----- Message from Laura Garcia sent at 1/31/2023  2:50 PM CST -----  Contact: maría/ [facility]  Patient is calling to speak with the nurse regarding appointment. Reports needing to know if there is a earlier time on 02/0. Stats the patient had dialysis. Please give maría a call back at [phone]  Thanks david

## 2023-02-01 ENCOUNTER — OFFICE VISIT (OUTPATIENT)
Dept: ORTHOPEDICS | Facility: CLINIC | Age: 68
End: 2023-02-01
Payer: MEDICARE

## 2023-02-01 VITALS
WEIGHT: 162 LBS | DIASTOLIC BLOOD PRESSURE: 70 MMHG | HEART RATE: 73 BPM | SYSTOLIC BLOOD PRESSURE: 117 MMHG | HEIGHT: 64 IN | BODY MASS INDEX: 27.66 KG/M2

## 2023-02-01 DIAGNOSIS — M85.651 BONE CYST OF RIGHT FEMUR: ICD-10-CM

## 2023-02-01 DIAGNOSIS — M00.9 PYOGENIC ARTHRITIS OF LEFT HIP, DUE TO UNSPECIFIED ORGANISM: Primary | ICD-10-CM

## 2023-02-01 PROCEDURE — 1101F PT FALLS ASSESS-DOCD LE1/YR: CPT | Mod: CPTII,S$GLB,, | Performed by: PHYSICIAN ASSISTANT

## 2023-02-01 PROCEDURE — 3066F NEPHROPATHY DOC TX: CPT | Mod: CPTII,S$GLB,, | Performed by: PHYSICIAN ASSISTANT

## 2023-02-01 PROCEDURE — 3074F SYST BP LT 130 MM HG: CPT | Mod: CPTII,S$GLB,, | Performed by: PHYSICIAN ASSISTANT

## 2023-02-01 PROCEDURE — 1159F PR MEDICATION LIST DOCUMENTED IN MEDICAL RECORD: ICD-10-PCS | Mod: CPTII,S$GLB,, | Performed by: PHYSICIAN ASSISTANT

## 2023-02-01 PROCEDURE — 3288F PR FALLS RISK ASSESSMENT DOCUMENTED: ICD-10-PCS | Mod: CPTII,S$GLB,, | Performed by: PHYSICIAN ASSISTANT

## 2023-02-01 PROCEDURE — 3074F PR MOST RECENT SYSTOLIC BLOOD PRESSURE < 130 MM HG: ICD-10-PCS | Mod: CPTII,S$GLB,, | Performed by: PHYSICIAN ASSISTANT

## 2023-02-01 PROCEDURE — 3008F PR BODY MASS INDEX (BMI) DOCUMENTED: ICD-10-PCS | Mod: CPTII,S$GLB,, | Performed by: PHYSICIAN ASSISTANT

## 2023-02-01 PROCEDURE — 3078F PR MOST RECENT DIASTOLIC BLOOD PRESSURE < 80 MM HG: ICD-10-PCS | Mod: CPTII,S$GLB,, | Performed by: PHYSICIAN ASSISTANT

## 2023-02-01 PROCEDURE — 3008F BODY MASS INDEX DOCD: CPT | Mod: CPTII,S$GLB,, | Performed by: PHYSICIAN ASSISTANT

## 2023-02-01 PROCEDURE — 1125F PR PAIN SEVERITY QUANTIFIED, PAIN PRESENT: ICD-10-PCS | Mod: CPTII,S$GLB,, | Performed by: PHYSICIAN ASSISTANT

## 2023-02-01 PROCEDURE — 3288F FALL RISK ASSESSMENT DOCD: CPT | Mod: CPTII,S$GLB,, | Performed by: PHYSICIAN ASSISTANT

## 2023-02-01 PROCEDURE — 99024 PR POST-OP FOLLOW-UP VISIT: ICD-10-PCS | Mod: S$GLB,,, | Performed by: PHYSICIAN ASSISTANT

## 2023-02-01 PROCEDURE — 1159F MED LIST DOCD IN RCRD: CPT | Mod: CPTII,S$GLB,, | Performed by: PHYSICIAN ASSISTANT

## 2023-02-01 PROCEDURE — 99999 PR PBB SHADOW E&M-EST. PATIENT-LVL IV: ICD-10-PCS | Mod: PBBFAC,,, | Performed by: PHYSICIAN ASSISTANT

## 2023-02-01 PROCEDURE — 1125F AMNT PAIN NOTED PAIN PRSNT: CPT | Mod: CPTII,S$GLB,, | Performed by: PHYSICIAN ASSISTANT

## 2023-02-01 PROCEDURE — 1101F PR PT FALLS ASSESS DOC 0-1 FALLS W/OUT INJ PAST YR: ICD-10-PCS | Mod: CPTII,S$GLB,, | Performed by: PHYSICIAN ASSISTANT

## 2023-02-01 PROCEDURE — 1160F PR REVIEW ALL MEDS BY PRESCRIBER/CLIN PHARMACIST DOCUMENTED: ICD-10-PCS | Mod: CPTII,S$GLB,, | Performed by: PHYSICIAN ASSISTANT

## 2023-02-01 PROCEDURE — 3078F DIAST BP <80 MM HG: CPT | Mod: CPTII,S$GLB,, | Performed by: PHYSICIAN ASSISTANT

## 2023-02-01 PROCEDURE — 3066F PR DOCUMENTATION OF TREATMENT FOR NEPHROPATHY: ICD-10-PCS | Mod: CPTII,S$GLB,, | Performed by: PHYSICIAN ASSISTANT

## 2023-02-01 PROCEDURE — 99024 POSTOP FOLLOW-UP VISIT: CPT | Mod: S$GLB,,, | Performed by: PHYSICIAN ASSISTANT

## 2023-02-01 PROCEDURE — 99999 PR PBB SHADOW E&M-EST. PATIENT-LVL IV: CPT | Mod: PBBFAC,,, | Performed by: PHYSICIAN ASSISTANT

## 2023-02-01 PROCEDURE — 1160F RVW MEDS BY RX/DR IN RCRD: CPT | Mod: CPTII,S$GLB,, | Performed by: PHYSICIAN ASSISTANT

## 2023-02-01 RX ORDER — HYDROCODONE BITARTRATE AND ACETAMINOPHEN 5; 325 MG/1; MG/1
1 TABLET ORAL
COMMUNITY
Start: 2023-01-04 | End: 2023-06-16

## 2023-02-01 RX ORDER — AMOXICILLIN 250 MG
1 CAPSULE ORAL
COMMUNITY
Start: 2023-01-05

## 2023-02-01 NOTE — PROGRESS NOTES
Subjective:      Patient ID: Meaghan Potter is a 67 y.o. female.    Chief Complaint: Pain and Post-op Evaluation of the Left Hip      HPI: Meaghan Potter is a 67-year-old female in clinic today for postoperative follow-up.  Patient is 6 weeks status post incision and drainage of left hip for septic arthritis.  She is doing well this time.  She is staying at skilled nursing facility where she is receiving therapy.  She does not report pain of the left hip, but she is still experiencing weakness of the leg and a decrease in function relative to her activity level prior to this infection.  Of note, the patient has bone cyst of the right femoral neck is not causing any discomfort at this time.  Patient has no new complaints    Past Medical History:   Diagnosis Date    ESRD (end stage renal disease) on dialysis     GERD (gastroesophageal reflux disease)     Hypertension        Current Outpatient Medications:     apixaban (ELIQUIS) 5 mg Tab, Take 1 tablet (5 mg total) by mouth 2 (two) times daily., Disp: 60 tablet, Rfl: 0    brimonidine 0.2% (ALPHAGAN) 0.2 % Drop, Place 1 drop into both eyes 2 (two) times daily., Disp: , Rfl:     ergocalciferol (ERGOCALCIFEROL) 50,000 unit Cap, Take 50,000 Units by mouth every 7 days., Disp: , Rfl:     famotidine (PEPCID) 10 MG tablet, Take 1 tablet (10 mg total) by mouth once daily., Disp: , Rfl:     methoxy peg-epoetin beta (MIRCERA INJ), 60 mcg., Disp: , Rfl:     metoprolol tartrate (LOPRESSOR) 25 MG tablet, Take 0.5 tablets (12.5 mg total) by mouth 2 (two) times daily., Disp: 30 tablet, Rfl: 0    midodrine (PROAMATINE) 5 MG Tab, Take 1 tablet (5 mg total) by mouth 3 (three) times daily with meals., Disp: 90 tablet, Rfl: 0    senna-docusate 8.6-50 mg (PERICOLACE) 8.6-50 mg per tablet, Take 1 tablet by mouth., Disp: , Rfl:     VENTOLIN HFA 90 mcg/actuation inhaler, 2 puffs 3 (three) times daily as needed., Disp: , Rfl: 6    vitamin renal formula, B-complex-vitamin c-folic acid,  "(NEPHROCAP) 1 mg Cap, Take 1 capsule by mouth once daily., Disp: , Rfl: 0    escitalopram oxalate (LEXAPRO) 5 MG Tab, Take 5 mg by mouth once daily., Disp: , Rfl: 3    HYDROcodone-acetaminophen (NORCO) 5-325 mg per tablet, Take 1 tablet by mouth., Disp: , Rfl:     sevelamer carbonate (RENVELA) 800 mg Tab, Take 1 tablet (800 mg total) by mouth 3 (three) times daily with meals. (Patient not taking: Reported on 2/1/2023), Disp: 90 tablet, Rfl: 0  Review of patient's allergies indicates:   Allergen Reactions    Amoxicillin Rash       /70   Pulse 73   Ht 5' 4" (1.626 m)   Wt 73.5 kg (162 lb)   BMI 27.81 kg/m²     ROS      Objective:    Ortho Exam   Left hip:  Incision well healed, no signs of infection   No edema   No TTP  No pain with internal/external rotation  Hip ROM is normal passively   Significant weakness decreased active range of motion of the left hip is noted   Calf and compartments are soft and compressible   Motor exam normal   Sensation and pulses intact    GEN: Well developed, well nourished female. AAOX3. No acute distress.   Head: Normocephalic, atraumatic.   Eyes: RIGO  Neck: Trachea is midline, no adenopathy  Resp: Breathing unlabored.  Neuro: Motor function normal, Cranial nerves intact  Psych: Mood and affect appropriate.       Assessment:     Imaging:  No new imaging ordered today        1. Pyogenic arthritis of left hip, due to unspecified organism    2. Bone cyst of right femur          Plan:       Encouraged patient progress she is making.  Recommended she continue to work with therapy on ROM, strength, and ambulation.  Once again discussed the bone cyst in the right femoral neck with the patient explained the this could cause fracture in the future.  Patient is not interested in treatment of this problem at this time.  We will continue to follow the patient.  See her back in about 6 weeks for further evaluation       Follow up in about 6 weeks (around 3/15/2023).          Patient note " was created using MModal Dictation.  Any errors in syntax or even information may not have been identified and edited on initial review prior to signing this note.

## 2023-02-11 LAB
ACID FAST MOD KINY STN SPEC: NORMAL
MYCOBACTERIUM SPEC QL CULT: NORMAL

## 2023-02-13 ENCOUNTER — OFFICE VISIT (OUTPATIENT)
Dept: CARDIOLOGY | Facility: CLINIC | Age: 68
End: 2023-02-13
Payer: MEDICARE

## 2023-02-13 VITALS
BODY MASS INDEX: 27.31 KG/M2 | WEIGHT: 160 LBS | DIASTOLIC BLOOD PRESSURE: 64 MMHG | SYSTOLIC BLOOD PRESSURE: 120 MMHG | HEIGHT: 64 IN | HEART RATE: 80 BPM | OXYGEN SATURATION: 99 %

## 2023-02-13 DIAGNOSIS — I10 PRIMARY HYPERTENSION: Primary | ICD-10-CM

## 2023-02-13 DIAGNOSIS — R78.81 STREPTOCOCCAL BACTEREMIA: ICD-10-CM

## 2023-02-13 DIAGNOSIS — B95.5 STREPTOCOCCAL BACTEREMIA: ICD-10-CM

## 2023-02-13 DIAGNOSIS — I31.39 PERICARDIAL EFFUSION: ICD-10-CM

## 2023-02-13 DIAGNOSIS — N18.6 ESRD (END STAGE RENAL DISEASE) ON DIALYSIS: ICD-10-CM

## 2023-02-13 DIAGNOSIS — Z99.2 ESRD (END STAGE RENAL DISEASE) ON DIALYSIS: ICD-10-CM

## 2023-02-13 DIAGNOSIS — M00.9 PYOGENIC ARTHRITIS OF LEFT HIP, DUE TO UNSPECIFIED ORGANISM: ICD-10-CM

## 2023-02-13 DIAGNOSIS — I48.91 ATRIAL FIBRILLATION: ICD-10-CM

## 2023-02-13 PROCEDURE — 99214 PR OFFICE/OUTPT VISIT, EST, LEVL IV, 30-39 MIN: ICD-10-PCS | Mod: S$GLB,,, | Performed by: STUDENT IN AN ORGANIZED HEALTH CARE EDUCATION/TRAINING PROGRAM

## 2023-02-13 PROCEDURE — 1159F PR MEDICATION LIST DOCUMENTED IN MEDICAL RECORD: ICD-10-PCS | Mod: CPTII,S$GLB,, | Performed by: STUDENT IN AN ORGANIZED HEALTH CARE EDUCATION/TRAINING PROGRAM

## 2023-02-13 PROCEDURE — 99214 OFFICE O/P EST MOD 30 MIN: CPT | Mod: S$GLB,,, | Performed by: STUDENT IN AN ORGANIZED HEALTH CARE EDUCATION/TRAINING PROGRAM

## 2023-02-13 PROCEDURE — 3008F BODY MASS INDEX DOCD: CPT | Mod: CPTII,S$GLB,, | Performed by: STUDENT IN AN ORGANIZED HEALTH CARE EDUCATION/TRAINING PROGRAM

## 2023-02-13 PROCEDURE — 3078F DIAST BP <80 MM HG: CPT | Mod: CPTII,S$GLB,, | Performed by: STUDENT IN AN ORGANIZED HEALTH CARE EDUCATION/TRAINING PROGRAM

## 2023-02-13 PROCEDURE — 3008F PR BODY MASS INDEX (BMI) DOCUMENTED: ICD-10-PCS | Mod: CPTII,S$GLB,, | Performed by: STUDENT IN AN ORGANIZED HEALTH CARE EDUCATION/TRAINING PROGRAM

## 2023-02-13 PROCEDURE — 1125F AMNT PAIN NOTED PAIN PRSNT: CPT | Mod: CPTII,S$GLB,, | Performed by: STUDENT IN AN ORGANIZED HEALTH CARE EDUCATION/TRAINING PROGRAM

## 2023-02-13 PROCEDURE — 3074F SYST BP LT 130 MM HG: CPT | Mod: CPTII,S$GLB,, | Performed by: STUDENT IN AN ORGANIZED HEALTH CARE EDUCATION/TRAINING PROGRAM

## 2023-02-13 PROCEDURE — 3066F PR DOCUMENTATION OF TREATMENT FOR NEPHROPATHY: ICD-10-PCS | Mod: CPTII,S$GLB,, | Performed by: STUDENT IN AN ORGANIZED HEALTH CARE EDUCATION/TRAINING PROGRAM

## 2023-02-13 PROCEDURE — 99999 PR PBB SHADOW E&M-EST. PATIENT-LVL IV: CPT | Mod: PBBFAC,,, | Performed by: STUDENT IN AN ORGANIZED HEALTH CARE EDUCATION/TRAINING PROGRAM

## 2023-02-13 PROCEDURE — 99999 PR PBB SHADOW E&M-EST. PATIENT-LVL IV: ICD-10-PCS | Mod: PBBFAC,,, | Performed by: STUDENT IN AN ORGANIZED HEALTH CARE EDUCATION/TRAINING PROGRAM

## 2023-02-13 PROCEDURE — 1125F PR PAIN SEVERITY QUANTIFIED, PAIN PRESENT: ICD-10-PCS | Mod: CPTII,S$GLB,, | Performed by: STUDENT IN AN ORGANIZED HEALTH CARE EDUCATION/TRAINING PROGRAM

## 2023-02-13 PROCEDURE — 3066F NEPHROPATHY DOC TX: CPT | Mod: CPTII,S$GLB,, | Performed by: STUDENT IN AN ORGANIZED HEALTH CARE EDUCATION/TRAINING PROGRAM

## 2023-02-13 PROCEDURE — 1159F MED LIST DOCD IN RCRD: CPT | Mod: CPTII,S$GLB,, | Performed by: STUDENT IN AN ORGANIZED HEALTH CARE EDUCATION/TRAINING PROGRAM

## 2023-02-13 PROCEDURE — 3078F PR MOST RECENT DIASTOLIC BLOOD PRESSURE < 80 MM HG: ICD-10-PCS | Mod: CPTII,S$GLB,, | Performed by: STUDENT IN AN ORGANIZED HEALTH CARE EDUCATION/TRAINING PROGRAM

## 2023-02-13 PROCEDURE — 3074F PR MOST RECENT SYSTOLIC BLOOD PRESSURE < 130 MM HG: ICD-10-PCS | Mod: CPTII,S$GLB,, | Performed by: STUDENT IN AN ORGANIZED HEALTH CARE EDUCATION/TRAINING PROGRAM

## 2023-02-13 RX ORDER — APIXABAN 2.5 MG/1
2.5 TABLET, FILM COATED ORAL 2 TIMES DAILY
COMMUNITY
Start: 2023-02-02

## 2023-02-13 NOTE — PROGRESS NOTES
Section of Cardiology                  Cardiac Clinic Note    Chief Complaint/Reason for consultation: hospital follow up      HPI:   Meaghan Potter is a 67 y.o. female with h/o PMHx ESRD on dialysis, HTN and GERD who presented to [facility] ED transfer 12/22 from [facility] for cardiac workup following elevated troponin and BNP in the emergency department as well as orthopedic care after patient sustained left superior labral tear of her acetabulum noted on MRI. Cardiology was consulted for new onset afib on diltiazem gtt. Pt seen and examined today resting in bed. Patient reported being in her usual state of health prior to onset of symptoms and reported acute onset of left hip pain while performing leg exercises in bed earlier today. While at outside facility, patient became short of breath while lying flat while obtaining MRI of hip and was found to be in atrial fibrillation with RVR requiring diltiazem drip. She denied endorsing any chest pain, lightheadedness, dizziness, visual disturbances, fever, chills, sweats, nausea, vomiting, abdominal pain, changes in bowel/bladder habits, or onset neurological deficits.  All other review of systems negative except as noted above.  Patient reports being back at her baseline and continues to complain only of left hip pain. Pt missed HD yesterday, planned for today. Labs reviewed K 6.1, Crt 10.6 troponin .076->.095 BNP 4702. Echo pending, repeat EKG ordered  Hospital Course:   12/21/22-Patient seen and examined today, resting in bed. Feels ok. Still complains of hip pain. No SOB/chest pain. Still in afib but HR controlled. Cardizem switched to po. Will re-evaluate pericardial effusion with repeat echo IP vs OP post additional HD.    12/22/22- Patient seen and examined today. Feels ok, fatigue. Pt went down to have hip drained but had seizure like episode on the table. CT negative. Pt still in afib tachycardic during exam, denies any CP, SOB at this time, repeat Echo  tomorrow to assess effusion. Will need eliquis once done with hip procedure, cont hept gtt    12/23/22-Patient seen and examined today, sitting up in bedside chair. Feeling better, s/p hip aspiration, will likely need surgery/washout tmw. Remains in afib but HR controlled. HD scheduled today, will plan on repeat echo after volume status optimized. BC pending.    12/27/22- Patient seen and examined today, reconsulted for KATE. Left hip fluid drained, Blood cultures and culture from left hip reveal Gram-positive and Gram-negative. Labs reviewed Crt 9.1 BUN 69. Will plan for KATE tomorrow morning 9am             2/13/23  Comes in with her friend   Ambulates with walker  Doing well otherwise   Had KATE which did not show vegetation     Wants to follow up in [facility], closer to home     Denies LE swelling, SOB, chest pain, syncope       EKG 12/28/22  afib, TWI in lateral leads, LAD, nonspecific intraventricular block       Limited echo 12/24/22  Normal systolic function.  The estimated ejection fraction is 55%.  Normal left ventricular diastolic function.  Moderate right ventricular enlargement with mildly reduced right ventricular systolic function.  Biatrial enlargement.  Large circumferential pericardial effusion.     Limited echo to evaluate pericardial effusion. Stable from prior.    Echo 12/22  The left ventricle is normal in size with concentric hypertrophy and normal systolic function.  Atrial fibrillation observed.  Biatrial atrial enlargement.  Grade III left ventricular diastolic dysfunction.  The estimated PA systolic pressure is 60 mmHg.  Moderate right ventricular enlargement with normal right ventricular systolic function.  There is pulmonary hypertension.  Normal central venous pressure (3 mmHg).  The estimated ejection fraction is 55%.  Moderate to severe tricuspid regurgitation.  Mild-to-moderate mitral regurgitation.  Severe right atrial enlargement.  Large posterior pericardial effusion. No evidence of  tamponade.    KATE 22  The left ventricle is normal in size with normal systolic function.  Normal left ventricular diastolic function.  Normal right ventricular size with normal right ventricular systolic function.  Normal central venous pressure (3 mmHg).  The estimated ejection fraction is 65%.  Normal appearing left atrial appendage. No thrombus is present in the appendage. Normal appendage velocities.  Mild tricuspid regurgitation.  No vegetations  Moderate posterior pericardial effusion      ECHO      STRESS TEST    LHC      ROS: All 10 systems reviewed. Please refer to the HPI for pertinent positives. All other systems negative.     Past Medical History  Past Medical History:   Diagnosis Date    ESRD (end stage renal disease) on dialysis     GERD (gastroesophageal reflux disease)     Hypertension        Surgical History  Past Surgical History:   Procedure Laterality Date     SECTION      INCISION AND DRAINAGE, LOWER EXTREMITY Left 2022    Procedure: INCISION AND DRAINAGE, LOWER EXTREMITY;  Surgeon: Kim Johnson MD;  Location: [facility] OR;  Service: Orthopedics;  Laterality: Left;    PLACEMENT OF DIALYSIS ACCESS      TRANSESOPHAGEAL ECHOCARDIOGRAPHY N/A 2022    Procedure: ECHOCARDIOGRAM, TRANSESOPHAGEAL;  Surgeon: Mary Silva MD;  Location: [facility] CATH LAB;  Service: Cardiology;  Laterality: N/A;          Allergies:   Review of patient's allergies indicates:   Allergen Reactions    Amoxicillin Rash       Social History:  Social History     Socioeconomic History    Marital status:    Tobacco Use    Smoking status: Former    Smokeless tobacco: Never   Substance and Sexual Activity    Alcohol use: No     Comment: occassionally    Drug use: No    Sexual activity: Yes       Family History:  family history includes Diabetes in her father and mother; Hypertension in her father and mother.    Home Medications:  Current Outpatient Medications on File Prior to Visit   Medication Sig  "Dispense Refill    brimonidine 0.2% (ALPHAGAN) 0.2 % Drop Place 1 drop into both eyes 2 (two) times daily.      ELIQUIS 2.5 mg Tab Take 2.5 mg by mouth 2 (two) times daily.      ergocalciferol (ERGOCALCIFEROL) 50,000 unit Cap Take 50,000 Units by mouth every 7 days.      escitalopram oxalate (LEXAPRO) 5 MG Tab Take 5 mg by mouth once daily.  3    famotidine (PEPCID) 10 MG tablet Take 1 tablet (10 mg total) by mouth once daily.      HYDROcodone-acetaminophen (NORCO) 5-325 mg per tablet Take 1 tablet by mouth.      methoxy peg-epoetin beta (MIRCERA INJ) 60 mcg.      metoprolol tartrate (LOPRESSOR) 25 MG tablet Take 0.5 tablets (12.5 mg total) by mouth 2 (two) times daily. 30 tablet 0    midodrine (PROAMATINE) 5 MG Tab Take 1 tablet (5 mg total) by mouth 3 (three) times daily with meals. 90 tablet 0    senna-docusate 8.6-50 mg (PERICOLACE) 8.6-50 mg per tablet Take 1 tablet by mouth.      VENTOLIN HFA 90 mcg/actuation inhaler 2 puffs 3 (three) times daily as needed.  6    sevelamer carbonate (RENVELA) 800 mg Tab Take 1 tablet (800 mg total) by mouth 3 (three) times daily with meals. (Patient not taking: Reported on 2/1/2023) 90 tablet 0    [DISCONTINUED] apixaban (ELIQUIS) 5 mg Tab Take 1 tablet (5 mg total) by mouth 2 (two) times daily. 60 tablet 0     No current facility-administered medications on file prior to visit.       Physical exam:  /64 (BP Location: Right arm, Patient Position: Sitting, BP Method: Medium (Manual))   Pulse 80   Ht 5' 4" (1.626 m)   Wt 72.6 kg (160 lb)   SpO2 99%   BMI 27.46 kg/m²         General: Pt is a 67 y.o. year old female who is AAOx3, in NAD, is pleasant, well nourished, looks stated age  HEENT: PERRL, EOMI, Oral mucosa pink & moist  CVS  No abnormal cardiac pulsations noted on inspection. JVP not raised. The apical impulse is normal on palpation, and is located in the left 5th intercostal space in the mid - clavicular line. No palpable thrills or abnormal pulsations noted. " Irregular rate and rhythm, S1 - S2 heard, no murmurs, rubs or gallops appreciated.   PUL : CTA B/L. No wheezes/crackles heard   ABD : BS +, soft. No tenderness elicited   LE : No C/C/E. Distal Pulses palpable B/L         LABS:    Chemistry:   Lab Results   Component Value Date     (L) 01/06/2023    K 5.0 01/06/2023     01/06/2023    CO2 20 (L) 01/06/2023    BUN 34 (H) 01/06/2023    CREATININE 6.7 (H) 01/06/2023    CALCIUM 9.3 01/06/2023     Cardiac Markers:   Lab Results   Component Value Date    TROPONINI 0.091 (H) 12/20/2022     Cardiac Markers (Last 3):   Lab Results   Component Value Date    TROPONINI 0.091 (H) 12/20/2022    TROPONINI 0.095 (H) 12/19/2022    TROPONINI 0.076 (H) 12/19/2022     CBC:   Lab Results   Component Value Date    WBC 7.91 01/06/2023    HGB 9.8 (L) 01/06/2023    HCT 30.9 (L) 01/06/2023    MCV 99 (H) 01/06/2023     01/06/2023     Lipids: No results found for: CHOL, TRIG, HDL, LDLDIRECT  Coagulation:   Lab Results   Component Value Date    INR 1.0 12/23/2022    APTT 30.8 12/24/2022           Assessment    1. Primary hypertension    2. Atrial fibrillation    3. Pyogenic arthritis of left hip, due to unspecified organism    4. Streptococcal bacteremia    5. Pericardial effusion    6. ESRD (end stage renal disease) on dialysis         Plan:      Atrial fibrillation  rate controlled  Cont Eliquis, diltiazem    History of Pyogenic arthritis of left hip with bacteremia   Went to rehab  Underwent antibiotics     Pericardial effusion  Repeat limited echo    Primary hypertension  Stable  Continue lopressor  On midodrine for hypotension     ESRD on dialysis  F/u with nephrology       This note was prepared using voice recognition system and is likely to have sound alike errors that may have been overlooked even after proofreading.     I have reviewed all pertinent chart information.  Plans and recommendations have been formulated under my direct supervision. All questions answered  and patient voiced understanding.   If symptoms persist go to the ED.    [facility]  [facility]

## 2023-03-14 ENCOUNTER — HOSPITAL ENCOUNTER (EMERGENCY)
Facility: HOSPITAL | Age: 68
Discharge: HOME | End: 2023-03-15
Attending: EMERGENCY MEDICINE
Payer: MEDICARE

## 2023-03-14 DIAGNOSIS — I48.91 ATRIAL FIBRILLATION WITH RVR: ICD-10-CM

## 2023-03-14 DIAGNOSIS — R06.02 SOB (SHORTNESS OF BREATH): ICD-10-CM

## 2023-03-14 DIAGNOSIS — R04.0 LEFT-SIDED EPISTAXIS: Primary | ICD-10-CM

## 2023-03-14 LAB
ALBUMIN SERPL BCP-MCNC: 3.3 G/DL (ref 3.5–5.2)
ALP SERPL-CCNC: 284 U/L (ref 55–135)
ALT SERPL W/O P-5'-P-CCNC: 230 U/L (ref 10–44)
ANION GAP SERPL CALC-SCNC: 13 MMOL/L (ref 8–16)
AST SERPL-CCNC: 263 U/L (ref 10–40)
BASOPHILS # BLD AUTO: 0.03 K/UL (ref 0–0.2)
BASOPHILS NFR BLD: 0.5 % (ref 0–1.9)
BILIRUB SERPL-MCNC: 1.6 MG/DL (ref 0.1–1)
BUN SERPL-MCNC: 37 MG/DL (ref 8–23)
CALCIUM SERPL-MCNC: 9.8 MG/DL (ref 8.7–10.5)
CHLORIDE SERPL-SCNC: 99 MMOL/L (ref 95–110)
CO2 SERPL-SCNC: 24 MMOL/L (ref 23–29)
CREAT SERPL-MCNC: 6.4 MG/DL (ref 0.5–1.4)
DIFFERENTIAL METHOD: ABNORMAL
EOSINOPHIL # BLD AUTO: 0.1 K/UL (ref 0–0.5)
EOSINOPHIL NFR BLD: 1.2 % (ref 0–8)
ERYTHROCYTE [DISTWIDTH] IN BLOOD BY AUTOMATED COUNT: 17.9 % (ref 11.5–14.5)
EST. GFR  (NO RACE VARIABLE): 6.7 ML/MIN/1.73 M^2
GLUCOSE SERPL-MCNC: 89 MG/DL (ref 70–110)
HCT VFR BLD AUTO: 41.1 % (ref 37–48.5)
HGB BLD-MCNC: 13.1 G/DL (ref 12–16)
IMM GRANULOCYTES # BLD AUTO: 0.02 K/UL (ref 0–0.04)
IMM GRANULOCYTES NFR BLD AUTO: 0.4 % (ref 0–0.5)
LYMPHOCYTES # BLD AUTO: 0.8 K/UL (ref 1–4.8)
LYMPHOCYTES NFR BLD: 13.1 % (ref 18–48)
MCH RBC QN AUTO: 30.8 PG (ref 27–31)
MCHC RBC AUTO-ENTMCNC: 31.9 G/DL (ref 32–36)
MCV RBC AUTO: 97 FL (ref 82–98)
MONOCYTES # BLD AUTO: 0.7 K/UL (ref 0.3–1)
MONOCYTES NFR BLD: 12.3 % (ref 4–15)
NEUTROPHILS # BLD AUTO: 4.1 K/UL (ref 1.8–7.7)
NEUTROPHILS NFR BLD: 72.5 % (ref 38–73)
NRBC BLD-RTO: 0 /100 WBC
PLATELET # BLD AUTO: 133 K/UL (ref 150–450)
PMV BLD AUTO: 11 FL (ref 9.2–12.9)
POTASSIUM SERPL-SCNC: 5.2 MMOL/L (ref 3.5–5.1)
PROT SERPL-MCNC: 8.3 G/DL (ref 6–8.4)
RBC # BLD AUTO: 4.26 M/UL (ref 4–5.4)
SODIUM SERPL-SCNC: 136 MMOL/L (ref 136–145)
WBC # BLD AUTO: 5.71 K/UL (ref 3.9–12.7)

## 2023-03-14 PROCEDURE — 85025 COMPLETE CBC W/AUTO DIFF WBC: CPT | Mod: ER | Performed by: EMERGENCY MEDICINE

## 2023-03-14 PROCEDURE — 25000003 PHARM REV CODE 250: Mod: ER | Performed by: EMERGENCY MEDICINE

## 2023-03-14 PROCEDURE — 99284 EMERGENCY DEPT VISIT MOD MDM: CPT | Mod: ER,25

## 2023-03-14 PROCEDURE — 87389 HIV-1 AG W/HIV-1&-2 AB AG IA: CPT | Performed by: EMERGENCY MEDICINE

## 2023-03-14 PROCEDURE — 96374 THER/PROPH/DIAG INJ IV PUSH: CPT | Mod: ER

## 2023-03-14 PROCEDURE — 93010 ELECTROCARDIOGRAM REPORT: CPT | Mod: ,,, | Performed by: INTERNAL MEDICINE

## 2023-03-14 PROCEDURE — 93005 ELECTROCARDIOGRAM TRACING: CPT | Mod: ER

## 2023-03-14 PROCEDURE — 96361 HYDRATE IV INFUSION ADD-ON: CPT | Mod: ER

## 2023-03-14 PROCEDURE — 93010 EKG 12-LEAD: ICD-10-PCS | Mod: ,,, | Performed by: INTERNAL MEDICINE

## 2023-03-14 PROCEDURE — 80053 COMPREHEN METABOLIC PANEL: CPT | Mod: ER | Performed by: EMERGENCY MEDICINE

## 2023-03-14 RX ORDER — METOPROLOL TARTRATE 1 MG/ML
5 INJECTION, SOLUTION INTRAVENOUS
Status: COMPLETED | OUTPATIENT
Start: 2023-03-14 | End: 2023-03-14

## 2023-03-14 RX ADMIN — METOROPROLOL TARTRATE 5 MG: 5 INJECTION, SOLUTION INTRAVENOUS at 09:03

## 2023-03-14 RX ADMIN — SODIUM CHLORIDE 500 ML: 9 INJECTION, SOLUTION INTRAVENOUS at 09:03

## 2023-03-14 RX ADMIN — PHENYLEPHRINE HYDROCHLORIDE 2 SPRAY: 0.5 SPRAY NASAL at 09:03

## 2023-03-14 RX ADMIN — LIDOCAINE HYDROCHLORIDE 10 ML: 10; .005 INJECTION, SOLUTION EPIDURAL; INFILTRATION; INTRACAUDAL; PERINEURAL at 09:03

## 2023-03-15 VITALS
RESPIRATION RATE: 21 BRPM | SYSTOLIC BLOOD PRESSURE: 143 MMHG | HEART RATE: 97 BPM | WEIGHT: 151.25 LBS | DIASTOLIC BLOOD PRESSURE: 74 MMHG | BODY MASS INDEX: 25.96 KG/M2 | OXYGEN SATURATION: 99 % | TEMPERATURE: 98 F

## 2023-03-15 LAB — HIV 1+2 AB+HIV1 P24 AG SERPL QL IA: NEGATIVE

## 2023-03-15 NOTE — ED PROVIDER NOTES
Encounter Date: 3/14/2023       History     Chief Complaint   Patient presents with    Epistaxis     On Eliquis, has been having slow steady nose bleeds for the past couple nights.      The history is provided by the patient.   Epistaxis   This is a new problem. The current episode started two days ago. The problem occurs intermittently. The problem has been unchanged. The problem is associated with anticoagulants. The bleeding has been from the left nare. She has tried nothing for the symptoms. The treatment provided mild relief. Her past medical history is significant for hypertension.   Review of patient's allergies indicates:   Allergen Reactions    Azithromycin     Amoxicillin Rash     Past Medical History:   Diagnosis Date    ESRD (end stage renal disease) on dialysis     GERD (gastroesophageal reflux disease)     Hypertension      Past Surgical History:   Procedure Laterality Date     SECTION      INCISION AND DRAINAGE, LOWER EXTREMITY Left 2022    Procedure: INCISION AND DRAINAGE, LOWER EXTREMITY;  Surgeon: Kim Johnson MD;  Location: [facility] OR;  Service: Orthopedics;  Laterality: Left;    PLACEMENT OF DIALYSIS ACCESS      TRANSESOPHAGEAL ECHOCARDIOGRAPHY N/A 2022    Procedure: ECHOCARDIOGRAM, TRANSESOPHAGEAL;  Surgeon: Mary Silva MD;  Location: [facility] CATH LAB;  Service: Cardiology;  Laterality: N/A;     Family History   Problem Relation Age of Onset    Diabetes Mother     Hypertension Mother     Diabetes Father     Hypertension Father      Social History     Tobacco Use    Smoking status: Former    Smokeless tobacco: Never   Substance Use Topics    Alcohol use: No     Comment: occassionally    Drug use: No     Review of Systems   Constitutional:  Negative for fever.   HENT:  Positive for nosebleeds. Negative for sore throat.    Respiratory:  Negative for shortness of breath.    Cardiovascular:  Negative for chest pain.   Gastrointestinal:  Negative for nausea.   Genitourinary:   Negative for dysuria.   Musculoskeletal:  Negative for back pain.   Skin:  Negative for rash.   Neurological:  Negative for weakness.   Hematological:  Does not bruise/bleed easily.   All other systems reviewed and are negative.    Physical Exam     Initial Vitals [03/14/23 2025]   BP Pulse Resp Temp SpO2   137/83 (!) 122 16 97.5 °F (36.4 °C) 99 %      MAP       --         Physical Exam    Nursing note and vitals reviewed.  Constitutional: She appears well-developed and well-nourished.   HENT:   Head: Normocephalic and atraumatic.   Nose: Epistaxis (oozing of blood left nare) is observed.   Mouth/Throat: Uvula is midline, oropharynx is clear and moist and mucous membranes are normal. No oropharyngeal exudate.   Eyes: Conjunctivae and EOM are normal. Pupils are equal, round, and reactive to light.   Neck: Neck supple. No thyromegaly present.   Normal range of motion.  Cardiovascular:  Normal heart sounds and intact distal pulses. An irregularly irregular rhythm present.   Tachycardia present.   Exam reveals no gallop and no friction rub.       No murmur heard.  Pulmonary/Chest: Breath sounds normal. No respiratory distress. She has no wheezes. She has no rhonchi. She exhibits no tenderness.   Abdominal: Abdomen is soft. Bowel sounds are normal. She exhibits no distension. There is no abdominal tenderness. There is no rebound and no guarding.   Musculoskeletal:         General: No tenderness or edema. Normal range of motion.      Right wrist: Normal. Normal pulse.      Left wrist: Normal. Normal pulse.      Right hand: Normal. Normal capillary refill. Normal pulse.      Left hand: Normal. Normal capillary refill. Normal pulse.      Cervical back: Normal, normal range of motion and neck supple.      Thoracic back: Normal.      Lumbar back: Normal.     Lymphadenopathy:     She has no cervical adenopathy.   Neurological: She is alert and oriented to person, place, and time. She has normal strength. No cranial nerve  deficit or sensory deficit. GCS score is 15. GCS eye subscore is 4. GCS verbal subscore is 5. GCS motor subscore is 6.   No acute focal neuro deficits   Skin: Skin is warm and dry. Capillary refill takes less than 2 seconds. No rash noted.   Psychiatric: She has a normal mood and affect. Her behavior is normal. Judgment and thought content normal.       ED Course   Procedures  Labs Reviewed   CBC W/ AUTO DIFFERENTIAL - Abnormal; Notable for the following components:       Result Value    MCHC 31.9 (*)     RDW 17.9 (*)     Platelets 133 (*)     Lymph # 0.8 (*)     Lymph % 13.1 (*)     All other components within normal limits   COMPREHENSIVE METABOLIC PANEL - Abnormal; Notable for the following components:    Potassium 5.2 (*)     BUN 37 (*)     Creatinine 6.4 (*)     Albumin 3.3 (*)     Total Bilirubin 1.6 (*)     Alkaline Phosphatase 284 (*)      (*)      (*)     eGFR 6.7 (*)     All other components within normal limits   HIV 1 / 2 ANTIBODY     EKG Readings: (Independently Interpreted)   Initial Reading: No STEMI. Rhythm: Atrial Fibrillation. Heart Rate: 111. Ectopy: No Ectopy. Conduction: RBBB. ST Segments: Normal ST Segments. T Waves: Normal. Axis: Left Axis Deviation. Clinical Impression: Atrial Fibrillation with RVR with RBBB     Imaging Results    None          Medications   phenylephrine HCL 0.5% nasal spray 2 spray (2 sprays Each Nostril Given 3/14/23 2137)   LIDOcaine-EPINEPHrine (PF) 1%-1:200,000 injection 10 mL (10 mLs Other Given 3/14/23 2115)   metoprolol injection 5 mg (5 mg Intravenous Given 3/14/23 2136)   sodium chloride 0.9% bolus 500 mL 500 mL (0 mLs Intravenous Stopped 3/14/23 2244)                    Vitals:    03/14/23 2025 03/14/23 2136   BP: 137/83 127/75   Pulse: (!) 122    Resp: 16    Temp: 97.5 °F (36.4 °C)    TempSrc: Oral    SpO2: 99%    Weight: 68.6 kg (151 lb 3.8 oz)        Results for orders placed or performed during the hospital encounter of 03/14/23   CBC auto  differential   Result Value Ref Range    WBC 5.71 3.90 - 12.70 K/uL    RBC 4.26 4.00 - 5.40 M/uL    Hemoglobin 13.1 12.0 - 16.0 g/dL    Hematocrit 41.1 37.0 - 48.5 %    MCV 97 82 - 98 fL    MCH 30.8 27.0 - 31.0 pg    MCHC 31.9 (L) 32.0 - 36.0 g/dL    RDW 17.9 (H) 11.5 - 14.5 %    Platelets 133 (L) 150 - 450 K/uL    MPV 11.0 9.2 - 12.9 fL    Immature Granulocytes 0.4 0.0 - 0.5 %    Gran # (ANC) 4.1 1.8 - 7.7 K/uL    Immature Grans (Abs) 0.02 0.00 - 0.04 K/uL    Lymph # 0.8 (L) 1.0 - 4.8 K/uL    Mono # 0.7 0.3 - 1.0 K/uL    Eos # 0.1 0.0 - 0.5 K/uL    Baso # 0.03 0.00 - 0.20 K/uL    nRBC 0 0 /100 WBC    Gran % 72.5 38.0 - 73.0 %    Lymph % 13.1 (L) 18.0 - 48.0 %    Mono % 12.3 4.0 - 15.0 %    Eosinophil % 1.2 0.0 - 8.0 %    Basophil % 0.5 0.0 - 1.9 %    Differential Method Automated    Comprehensive metabolic panel   Result Value Ref Range    Sodium 136 136 - 145 mmol/L    Potassium 5.2 (H) 3.5 - 5.1 mmol/L    Chloride 99 95 - 110 mmol/L    CO2 24 23 - 29 mmol/L    Glucose 89 70 - 110 mg/dL    BUN 37 (H) 8 - 23 mg/dL    Creatinine 6.4 (H) 0.5 - 1.4 mg/dL    Calcium 9.8 8.7 - 10.5 mg/dL    Total Protein 8.3 6.0 - 8.4 g/dL    Albumin 3.3 (L) 3.5 - 5.2 g/dL    Total Bilirubin 1.6 (H) 0.1 - 1.0 mg/dL    Alkaline Phosphatase 284 (H) 55 - 135 U/L     (H) 10 - 40 U/L     (H) 10 - 44 U/L    Anion Gap 13 8 - 16 mmol/L    eGFR 6.7 (A) >60 mL/min/1.73 m^2         Imaging Results    None         Medications   phenylephrine HCL 0.5% nasal spray 2 spray (2 sprays Each Nostril Given 3/14/23 2137)   LIDOcaine-EPINEPHrine (PF) 1%-1:200,000 injection 10 mL (10 mLs Other Given 3/14/23 2115)   metoprolol injection 5 mg (5 mg Intravenous Given 3/14/23 2136)   sodium chloride 0.9% bolus 500 mL 500 mL (0 mLs Intravenous Stopped 3/14/23 2244)         11:18 PM - Re-evaluation:  The patient is resting comfortably and is in no acute distress. Pt denies any abd pain.  Nose sprayed with afrin/lido-epi mixture.  Bleeding has  improved.  Discussed test results and notified of pending labs. Answered questions at this time.     11:42 PM - Re-evaluation: The patient is resting comfortably and is in no acute distress. She states that her symptoms have improved after treatment within ER.  Epistaxis controlled.  Pt's afib is rate controlled.  Denies any abd pain, chest pain, or sob.  Discussed test results, shared treatment plan, specific conditions for return, and importance of follow up with patient and family.  Advised close f/u c pcp within one week and to return to ER if any issues arise.  She understands and agrees with the plan as discussed. Answered  her questions at this time. She has remained hemodynamically stable throughout the ED course and is appropriate for discharge home.     Regarding EPISTAXIS, for treatment, I advised the patient to: sit up and lean forward to prevent the swallowing of blood; spit blood and saliva into a bowl; apply pressure to nose using 2 fingers to pinch nose shut for 10 minutes; breathe through your mouth; apply ice (use a cold pack or put crushed ice in a bag, cover with a towel, and place on the bridge of nose); and consider nasal packing by using a cotton ball, tissue, or gauze bandage inserted into nostril to stop the bleeding. To prevent epistaxis: instructed patient to avoid nose picking and blowing nose too hard; avoid irritants such as tobacco smoke and chemical sprays such as  that contain ammonia; use a cool mist humidifier to add moisture to the air and help keep nose moist; and apply a small amount of petroleum jelly inside nostrils to help keep nose from drying out or getting irritated. Instructed patient to follow up with primary healthcare provider or otolaryngologist as soon as possible for further evaluation and treatment and to return to the ER if they experience difficulty breathing or talking, weakness, or for any complications or concerns.     Pre-hypertension/Hypertension:  The pt has been informed that they may have pre-hypertension or hypertension based on a blood pressure reading in the ED. I recommend that the pt call the PCP listed on their discharge instructions or a physician of their choice this week to arrange f/u for further evaluation of possible pre-hypertension or hypertension.     Meaghan Potter was given a handout which discussed their disease process, precautions, and instructions for follow-up and therapy.     Follow-up Information       [facility]. Schedule an appointment as soon as possible for a visit in 1 week.    Contact information:  [address]  [place] [address]  [phone]               [facility] - Internal Medicine. Schedule an appointment as soon as possible for a visit in 1 week.    Specialty: Internal Medicine  Contact information:  [address]  [facility] [address] [phone]  Additional information:  Please park in surface lot and check in at main registration.             [facility] Emergency Dept.    Specialty: Emergency Medicine  Why: As needed, If symptoms worsen  Contact information:  [address]  [facility] [address] [phone]                              Medication List        ASK your doctor about these medications      brimonidine 0.2% 0.2 % Drop  Commonly known as: ALPHAGAN     ELIQUIS 2.5 mg Tab  Generic drug: apixaban     ergocalciferol 50,000 unit Cap  Commonly known as: ERGOCALCIFEROL     EScitalopram oxalate 5 MG Tab  Commonly known as: LEXAPRO     famotidine 10 MG tablet  Commonly known as: PEPCID  Take 1 tablet (10 mg total) by mouth once daily.     HYDROcodone-acetaminophen 5-325 mg per tablet  Commonly known as: NORCO     metoprolol tartrate 25 MG tablet  Commonly known as: LOPRESSOR  Take 0.5 tablets (12.5 mg total) by mouth 2 (two) times daily.     midodrine 5 MG Tab  Commonly known as: PROAMATINE  Take 1 tablet (5 mg total) by mouth 3 (three) times daily with meals.      MIRCERA INJ     senna-docusate 8.6-50 mg 8.6-50 mg per tablet  Commonly known as: PERICOLACE     sevelamer carbonate 800 mg Tab  Commonly known as: RENVELA  Take 1 tablet (800 mg total) by mouth 3 (three) times daily with meals.     VENTOLIN HFA 90 mcg/actuation inhaler  Generic drug: albuterol             Current Discharge Medication List            ED Diagnosis  1. Left-sided epistaxis    2. Atrial fibrillation with RVR    3. SOB (shortness of breath)                  Clinical Impression:   Final diagnoses:  [R04.0] Left-sided epistaxis (Primary)  [I48.91] Atrial fibrillation with RVR - currently rate controlled  [R06.02] SOB (shortness of breath)        ED Disposition Condition    Discharge Stable          ED Prescriptions    None       Follow-up Information       Follow up With Specialties Details Why Contact Info Additional Information    [facility]  Schedule an appointment as soon as possible for a visit in 1 week  [address]  [place] [address]  [phone]       [facility] Internal Medicine Internal Medicine Schedule an appointment as soon as possible for a visit in 1 week  [address] [facility] [address]  [facility] [id number] [phone] Please park in surface lot and check in at main registration.    [facility] - Emergency Dept Emergency Medicine  As needed, If symptoms worsen [address]  [facility] [id number] [phone]              Zia Greco Jr., MD  03/14/23 9993

## 2023-03-29 ENCOUNTER — HOSPITAL ENCOUNTER (OUTPATIENT)
Dept: CARDIOLOGY | Facility: HOSPITAL | Age: 68
Discharge: HOME | End: 2023-03-29
Attending: STUDENT IN AN ORGANIZED HEALTH CARE EDUCATION/TRAINING PROGRAM
Payer: MEDICARE

## 2023-03-29 VITALS
DIASTOLIC BLOOD PRESSURE: 74 MMHG | WEIGHT: 151 LBS | HEIGHT: 64 IN | BODY MASS INDEX: 25.78 KG/M2 | HEART RATE: 80 BPM | SYSTOLIC BLOOD PRESSURE: 143 MMHG

## 2023-03-29 DIAGNOSIS — R78.81 STREPTOCOCCAL BACTEREMIA: ICD-10-CM

## 2023-03-29 DIAGNOSIS — I48.91 ATRIAL FIBRILLATION: ICD-10-CM

## 2023-03-29 DIAGNOSIS — M00.9 PYOGENIC ARTHRITIS OF LEFT HIP, DUE TO UNSPECIFIED ORGANISM: ICD-10-CM

## 2023-03-29 DIAGNOSIS — Z99.2 ESRD (END STAGE RENAL DISEASE) ON DIALYSIS: ICD-10-CM

## 2023-03-29 DIAGNOSIS — B95.5 STREPTOCOCCAL BACTEREMIA: ICD-10-CM

## 2023-03-29 DIAGNOSIS — I31.39 PERICARDIAL EFFUSION: ICD-10-CM

## 2023-03-29 DIAGNOSIS — I10 PRIMARY HYPERTENSION: ICD-10-CM

## 2023-03-29 DIAGNOSIS — N18.6 ESRD (END STAGE RENAL DISEASE) ON DIALYSIS: ICD-10-CM

## 2023-03-29 LAB
AORTIC ROOT ANNULUS: 3.09 CM
AORTIC VALVE CUSP SEPERATION: 0 CM
ASCENDING AORTA: 3.18 CM
AV INDEX (PROSTH): 0.83
AV MEAN GRADIENT: 6 MMHG
AV PEAK GRADIENT: 10 MMHG
AV VALVE AREA: 2.68 CM2
AV VELOCITY RATIO: 0.83
BSA FOR ECHO PROCEDURE: 1.76 M2
CV ECHO LV RWT: 0.5 CM
DOP CALC AO PEAK VEL: 1.62 M/S
DOP CALC AO VTI: 25.1 CM
DOP CALC LVOT AREA: 3.2 CM2
DOP CALC LVOT DIAMETER: 2.03 CM
DOP CALC LVOT PEAK VEL: 1.34 M/S
DOP CALC LVOT STROKE VOLUME: 67.29 CM3
DOP CALC RVOT PEAK VEL: 0.63 M/S
DOP CALC RVOT VTI: 12.6 CM
DOP CALCLVOT PEAK VEL VTI: 20.8 CM
ECHO LV POSTERIOR WALL: 1.17 CM (ref 0.6–1.1)
EJECTION FRACTION: 50 %
FRACTIONAL SHORTENING: 27 % (ref 28–44)
HCV RNA # SERPL NAA+PROBE: 313.8 MMHG/S
INTERVENTRICULAR SEPTUM: 0.92 CM (ref 0.6–1.1)
IVRT: 107.27 MSEC
LA MAJOR: 4.93 CM
LA MINOR: 4.88 CM
LA WIDTH: 4.1 CM
LEFT ATRIUM SIZE: 4.52 CM
LEFT ATRIUM VOLUME INDEX: 44.4 ML/M2
LEFT ATRIUM VOLUME: 77.26 CM3
LEFT INTERNAL DIMENSION IN SYSTOLE: 3.38 CM (ref 2.1–4)
LEFT VENTRICLE DIASTOLIC VOLUME INDEX: 57.71 ML/M2
LEFT VENTRICLE DIASTOLIC VOLUME: 100.42 ML
LEFT VENTRICLE MASS INDEX: 99 G/M2
LEFT VENTRICLE SYSTOLIC VOLUME INDEX: 26.8 ML/M2
LEFT VENTRICLE SYSTOLIC VOLUME: 46.7 ML
LEFT VENTRICULAR INTERNAL DIMENSION IN DIASTOLE: 4.66 CM (ref 3.5–6)
LEFT VENTRICULAR MASS: 172.29 G
LVOT MG: 3.66 MMHG
LVOT MV: 0.87 CM/S
PISA TR MAX VEL: 2.73 M/S
PV MEAN GRADIENT: 0.88 MMHG
PV PEAK VELOCITY: 0.83 CM/S
RA MAJOR: 5.11 CM
RA PRESSURE: 8 MMHG
RA WIDTH: 4.37 CM
RIGHT VENTRICULAR END-DIASTOLIC DIMENSION: 4.99 CM
SINUS: 3.1 CM
STJ: 2.62 CM
TR MAX PG: 30 MMHG
TRICUSPID ANNULAR PLANE SYSTOLIC EXCURSION: 1.7 CM
TV REST PULMONARY ARTERY PRESSURE: 38 MMHG

## 2023-03-29 PROCEDURE — 93308 TTE F-UP OR LMTD: CPT | Mod: PO

## 2023-03-29 PROCEDURE — 93308 TTE F-UP OR LMTD: CPT | Mod: 26,,, | Performed by: INTERNAL MEDICINE

## 2023-03-29 PROCEDURE — 93308 ECHO (CUPID ONLY): ICD-10-PCS | Mod: 26,,, | Performed by: INTERNAL MEDICINE

## 2023-05-04 ENCOUNTER — TELEPHONE (OUTPATIENT)
Dept: CARDIOLOGY | Facility: CLINIC | Age: 68
End: 2023-05-04
Payer: MEDICARE

## 2023-05-04 NOTE — TELEPHONE ENCOUNTER
Called patient so she could get her results from echo. Patient received and understood results with no questions or concerns. Also advised patient of upcoming appointment.     ----- Message from Kyung Aragon MD sent at 5/4/2023  4:18 PM CDT -----  Regarding: RE: Echo Results  Contact: Self  Her echo looks the same as prior. She still has the fluid around her heart. This is unchanged. Is she has significant shortness of breath, she needs to present to the ED. Otherwise, she will be seen by Dr. Silva next month in [facility] to further address this.   ----- Message -----  From: Daniel Branch  Sent: 5/4/2023   2:45 PM CDT  To: Kyung Aragon MD  Subject: FW: Echo Results                                 Please advise  ----- Message -----  From: Mary Silva MD  Sent: 5/4/2023   2:44 PM CDT  To: Daniel Branch  Subject: RE: Echo Results                                 It looks like She sees Dr. Aragon in the office.  Thank you  ----- Message -----  From: Daniel Branch  Sent: 5/4/2023   2:27 PM CDT  To: Mary Silva MD  Subject: Echo Results                                     Can you please interpret  her echo results when you get a chance? Thanks.   ----- Message -----  From: Jennifer Pearson  Sent: 5/4/2023  12:58 PM CDT  To: Ricardo [facility] Staff    Patient is calling to get the results of her ultrasound. Please call back at [phone]

## 2023-06-06 ENCOUNTER — TELEPHONE (OUTPATIENT)
Dept: CARDIOLOGY | Facility: CLINIC | Age: 68
End: 2023-06-06
Payer: MEDICARE

## 2023-06-06 NOTE — TELEPHONE ENCOUNTER
Spoke with patient she will fax over pre op clearance form.    ----- Message from Lindsey Wu sent at 6/5/2023  2:14 PM CDT -----  Contact: [phone]    Patient: Meaghan Potter-    Reason: The patient is requesting a call back from the nurse to get assistance with getting a     clearance for dental work.    Comments: Please call the patient back to advise.

## 2023-06-15 ENCOUNTER — OFFICE VISIT (OUTPATIENT)
Dept: CARDIOLOGY | Facility: CLINIC | Age: 68
End: 2023-06-15
Payer: MEDICARE

## 2023-06-15 VITALS
HEIGHT: 64 IN | BODY MASS INDEX: 26.69 KG/M2 | DIASTOLIC BLOOD PRESSURE: 78 MMHG | SYSTOLIC BLOOD PRESSURE: 124 MMHG | HEART RATE: 86 BPM | OXYGEN SATURATION: 96 % | WEIGHT: 156.31 LBS

## 2023-06-15 DIAGNOSIS — I31.39 PERICARDIAL EFFUSION: Primary | ICD-10-CM

## 2023-06-15 DIAGNOSIS — I10 PRIMARY HYPERTENSION: ICD-10-CM

## 2023-06-15 DIAGNOSIS — Z01.810 PREOP CARDIOVASCULAR EXAM: ICD-10-CM

## 2023-06-15 DIAGNOSIS — I48.0 PAROXYSMAL ATRIAL FIBRILLATION: ICD-10-CM

## 2023-06-15 DIAGNOSIS — N18.6 ESRD (END STAGE RENAL DISEASE) ON DIALYSIS: ICD-10-CM

## 2023-06-15 DIAGNOSIS — Z99.2 ESRD (END STAGE RENAL DISEASE) ON DIALYSIS: ICD-10-CM

## 2023-06-15 PROCEDURE — 3066F NEPHROPATHY DOC TX: CPT | Mod: CPTII,S$GLB,, | Performed by: INTERNAL MEDICINE

## 2023-06-15 PROCEDURE — 3008F BODY MASS INDEX DOCD: CPT | Mod: CPTII,S$GLB,, | Performed by: INTERNAL MEDICINE

## 2023-06-15 PROCEDURE — 3066F PR DOCUMENTATION OF TREATMENT FOR NEPHROPATHY: ICD-10-PCS | Mod: CPTII,S$GLB,, | Performed by: INTERNAL MEDICINE

## 2023-06-15 PROCEDURE — 3074F SYST BP LT 130 MM HG: CPT | Mod: CPTII,S$GLB,, | Performed by: INTERNAL MEDICINE

## 2023-06-15 PROCEDURE — 99214 PR OFFICE/OUTPT VISIT, EST, LEVL IV, 30-39 MIN: ICD-10-PCS | Mod: S$GLB,,, | Performed by: INTERNAL MEDICINE

## 2023-06-15 PROCEDURE — 3078F DIAST BP <80 MM HG: CPT | Mod: CPTII,S$GLB,, | Performed by: INTERNAL MEDICINE

## 2023-06-15 PROCEDURE — 3074F PR MOST RECENT SYSTOLIC BLOOD PRESSURE < 130 MM HG: ICD-10-PCS | Mod: CPTII,S$GLB,, | Performed by: INTERNAL MEDICINE

## 2023-06-15 PROCEDURE — 99214 OFFICE O/P EST MOD 30 MIN: CPT | Mod: S$GLB,,, | Performed by: INTERNAL MEDICINE

## 2023-06-15 PROCEDURE — 1159F PR MEDICATION LIST DOCUMENTED IN MEDICAL RECORD: ICD-10-PCS | Mod: CPTII,S$GLB,, | Performed by: INTERNAL MEDICINE

## 2023-06-15 PROCEDURE — 3008F PR BODY MASS INDEX (BMI) DOCUMENTED: ICD-10-PCS | Mod: CPTII,S$GLB,, | Performed by: INTERNAL MEDICINE

## 2023-06-15 PROCEDURE — 99999 PR PBB SHADOW E&M-EST. PATIENT-LVL III: ICD-10-PCS | Mod: PBBFAC,,, | Performed by: INTERNAL MEDICINE

## 2023-06-15 PROCEDURE — 99999 PR PBB SHADOW E&M-EST. PATIENT-LVL III: CPT | Mod: PBBFAC,,, | Performed by: INTERNAL MEDICINE

## 2023-06-15 PROCEDURE — 1126F PR PAIN SEVERITY QUANTIFIED, NO PAIN PRESENT: ICD-10-PCS | Mod: CPTII,S$GLB,, | Performed by: INTERNAL MEDICINE

## 2023-06-15 PROCEDURE — 3288F FALL RISK ASSESSMENT DOCD: CPT | Mod: CPTII,S$GLB,, | Performed by: INTERNAL MEDICINE

## 2023-06-15 PROCEDURE — 3288F PR FALLS RISK ASSESSMENT DOCUMENTED: ICD-10-PCS | Mod: CPTII,S$GLB,, | Performed by: INTERNAL MEDICINE

## 2023-06-15 PROCEDURE — 1159F MED LIST DOCD IN RCRD: CPT | Mod: CPTII,S$GLB,, | Performed by: INTERNAL MEDICINE

## 2023-06-15 PROCEDURE — 1101F PT FALLS ASSESS-DOCD LE1/YR: CPT | Mod: CPTII,S$GLB,, | Performed by: INTERNAL MEDICINE

## 2023-06-15 PROCEDURE — 1126F AMNT PAIN NOTED NONE PRSNT: CPT | Mod: CPTII,S$GLB,, | Performed by: INTERNAL MEDICINE

## 2023-06-15 PROCEDURE — 1101F PR PT FALLS ASSESS DOC 0-1 FALLS W/OUT INJ PAST YR: ICD-10-PCS | Mod: CPTII,S$GLB,, | Performed by: INTERNAL MEDICINE

## 2023-06-15 PROCEDURE — 3078F PR MOST RECENT DIASTOLIC BLOOD PRESSURE < 80 MM HG: ICD-10-PCS | Mod: CPTII,S$GLB,, | Performed by: INTERNAL MEDICINE

## 2023-06-15 NOTE — PROGRESS NOTES
Subjective:   Patient ID:  Meaghan Potter is a 68 y.o. female who presents for evaluation of No chief complaint on file.      HPI  68-year-old female.  On dialysis.    Diagnosed earlier this year with AFib.    She had a KATE to rule out vegetation.    She has a moderate to large pericardial effusion.  Though her last echocardiogram read as large effusion I reviewed it seems to be stable compared to her prior echocardiograms in December.    Denies any chest pain, dyspnea on exertion.    Compliant with dialysis.    Going for tooth extraction.  And sinus mass removal under general anesthesia.  She denies orthopnea or PND.  No lower extremity swelling.    No syncope or presyncope.      Past Medical History:   Diagnosis Date    ESRD (end stage renal disease) on dialysis     GERD (gastroesophageal reflux disease)     Hypertension        Past Surgical History:   Procedure Laterality Date     SECTION      INCISION AND DRAINAGE, LOWER EXTREMITY Left 2022    Procedure: INCISION AND DRAINAGE, LOWER EXTREMITY;  Surgeon: Kim Johnson MD;  Location: [facility] OR;  Service: Orthopedics;  Laterality: Left;    PLACEMENT OF DIALYSIS ACCESS      TRANSESOPHAGEAL ECHOCARDIOGRAPHY N/A 2022    Procedure: ECHOCARDIOGRAM, TRANSESOPHAGEAL;  Surgeon: Mary Silva MD;  Location: [facility] CATH LAB;  Service: Cardiology;  Laterality: N/A;       Social History     Tobacco Use    Smoking status: Former    Smokeless tobacco: Never   Substance Use Topics    Alcohol use: No     Comment: occassionally    Drug use: No       Family History   Problem Relation Age of Onset    Diabetes Mother     Hypertension Mother     Diabetes Father     Hypertension Father        Review of Systems   Cardiovascular:  Negative for chest pain, dyspnea on exertion, palpitations and syncope.   Genitourinary: Negative.    Neurological: Negative.      Current Outpatient Medications on File Prior to Visit   Medication Sig    brimonidine 0.2% (ALPHAGAN) 0.2  % Drop Place 1 drop into both eyes 2 (two) times daily.    ELIQUIS 2.5 mg Tab Take 2.5 mg by mouth 2 (two) times daily.    ergocalciferol (ERGOCALCIFEROL) 50,000 unit Cap Take 50,000 Units by mouth every 7 days.    escitalopram oxalate (LEXAPRO) 5 MG Tab Take 5 mg by mouth once daily.    famotidine (PEPCID) 10 MG tablet Take 1 tablet (10 mg total) by mouth once daily.    HYDROcodone-acetaminophen (NORCO) 5-325 mg per tablet Take 1 tablet by mouth.    methoxy peg-epoetin beta (MIRCERA INJ) 60 mcg.    metoprolol tartrate (LOPRESSOR) 25 MG tablet Take 0.5 tablets (12.5 mg total) by mouth 2 (two) times daily.    midodrine (PROAMATINE) 5 MG Tab Take 1 tablet (5 mg total) by mouth 3 (three) times daily with meals.    senna-docusate 8.6-50 mg (PERICOLACE) 8.6-50 mg per tablet Take 1 tablet by mouth.    VENTOLIN HFA 90 mcg/actuation inhaler 2 puffs 3 (three) times daily as needed.    sevelamer carbonate (RENVELA) 800 mg Tab Take 1 tablet (800 mg total) by mouth 3 (three) times daily with meals. (Patient not taking: Reported on 2/1/2023)     No current facility-administered medications on file prior to visit.       Objective:   Objective:  Wt Readings from Last 3 Encounters:   06/15/23 70.9 kg (156 lb 4.9 oz)   03/29/23 68.5 kg (151 lb)   03/14/23 68.6 kg (151 lb 3.8 oz)     Temp Readings from Last 3 Encounters:   03/14/23 97.5 °F (36.4 °C) (Oral)   01/07/23 97.9 °F (36.6 °C) (Oral)   06/03/20 98.6 °F (37 °C) (Oral)     BP Readings from Last 3 Encounters:   06/15/23 124/78   03/29/23 (!) 143/74   03/14/23 (!) 143/74     Pulse Readings from Last 3 Encounters:   06/15/23 86   03/29/23 80   03/14/23 97       Physical Exam  Vitals reviewed.   Constitutional:       Appearance: She is well-developed.   Neck:      Vascular: No carotid bruit.   Cardiovascular:      Rate and Rhythm: Normal rate and regular rhythm.      Pulses: Intact distal pulses.      Heart sounds: Normal heart sounds. No murmur heard.  Pulmonary:      Breath  sounds: Normal breath sounds.   Neurological:      Mental Status: She is oriented to person, place, and time.       No results found for: CHOL  No results found for: HDL  No results found for: LDLCALC  No results found for: TRIG  No results found for: CHOLHDL    Chemistry        Component Value Date/Time     03/14/2023 2133    K 5.2 (H) 03/14/2023 2133    CL 99 03/14/2023 2133    CO2 24 03/14/2023 2133    BUN 37 (H) 03/14/2023 2133    CREATININE 6.4 (H) 03/14/2023 2133    GLU 89 03/14/2023 2133        Component Value Date/Time    CALCIUM 9.8 03/14/2023 2133    ALKPHOS 284 (H) 03/14/2023 2133     (H) 03/14/2023 2133     (H) 03/14/2023 2133    BILITOT 1.6 (H) 03/14/2023 2133    ESTGFRAFRICA 15.1 (A) 05/31/2019 1236    EGFRNONAA 13.1 (A) 05/31/2019 1236          No results found for: TSH  Lab Results   Component Value Date    INR 1.0 12/23/2022    INR 1.1 12/20/2022    INR 1.1 03/19/2019     Lab Results   Component Value Date    WBC 5.71 03/14/2023    HGB 13.1 03/14/2023    HCT 41.1 03/14/2023    MCV 97 03/14/2023     (L) 03/14/2023     BNP  @LABRCNTIP(BNP,BNPTRIAGEBLO)@  CrCl cannot be calculated (Patient's most recent lab result is older than the maximum 7 days allowed.).     Imaging:  ======  Results for orders placed during the hospital encounter of 03/29/23    Echo    Interpretation Summary  · The left ventricle is normal in size with concentric hypertrophy and low normal systolic function.  · Moderate left atrial enlargement.  · The estimated ejection fraction is 50%.  · Indeterminate left ventricular diastolic function.  · Moderate right ventricular enlargement with moderately to severely reduced right ventricular systolic function.  · Moderate right atrial enlargement.  · There is abnormal septal wall motion. There is systolic and diastolic flattening of the interventricular septum consistent with right ventricle pressure and volume overload.  · Intermediate central venous pressure (8  mmHg).  · The estimated PA systolic pressure is 38 mmHg.  · Large pericardial effusion.  · There is no evidence of tamponade.    Known pericardial effusion. Limited echo to look at effusion.    No results found for this or any previous visit.    No results found for this or any previous visit.    Results for orders placed during the hospital encounter of 02/20/18    X-Ray Chest PA And Lateral    Narrative  Chest two views.  No comparison.    Findings: There is moderate cardiomegaly.  Lungs are clear bilaterally.  Surgical clips and vascular stent in the left axilla.  Multilevel thoracic spondylosis.  IMPRESSION:  No acute disease.      Electronically signed by: PASCUAL VELAZQUEZ MD  Date:     02/20/18  Time:    10:39    No results found for this or any previous visit.    No valid procedures specified.    Diagnostic Results:  ECG: Reviewed    The ASCVD Risk score (Jen BANEGAS, et al., 2019) failed to calculate for the following reasons:    Cannot find a previous HDL lab    Cannot find a previous total cholesterol lab    Assessment and Plan:   Pericardial effusion  -     Echo; Future    Preop cardiovascular exam    Paroxysmal atrial fibrillation    ESRD (end stage renal disease) on dialysis    Primary hypertension      Reviewed all tests and above medical conditions with patient in detail and formulated treatment plan.  Risk factor modification discussed.   Cardiac low salt diet discussed.  Maintaining healthy weight and weight loss goals were discussed in clinic.  Will repeat her echocardiogram prior to clearing her for her general anesthesia.    It will be okay to stop her Eliquis for 3 days prior to her upcoming surgery.  Follow up in six-month

## 2023-06-16 ENCOUNTER — HOSPITAL ENCOUNTER (EMERGENCY)
Facility: HOSPITAL | Age: 68
Discharge: HOME | End: 2023-06-16
Attending: EMERGENCY MEDICINE
Payer: MEDICARE

## 2023-06-16 VITALS
RESPIRATION RATE: 16 BRPM | SYSTOLIC BLOOD PRESSURE: 117 MMHG | HEART RATE: 85 BPM | BODY MASS INDEX: 25.92 KG/M2 | DIASTOLIC BLOOD PRESSURE: 65 MMHG | TEMPERATURE: 98 F | WEIGHT: 151 LBS | OXYGEN SATURATION: 99 %

## 2023-06-16 DIAGNOSIS — L08.9 SKIN INFECTION: Primary | ICD-10-CM

## 2023-06-16 PROCEDURE — 99284 EMERGENCY DEPT VISIT MOD MDM: CPT | Mod: ER

## 2023-06-16 RX ORDER — HYDROCODONE BITARTRATE AND ACETAMINOPHEN 5; 325 MG/1; MG/1
1 TABLET ORAL EVERY 6 HOURS PRN
Qty: 8 TABLET | Refills: 0 | Status: SHIPPED | OUTPATIENT
Start: 2023-06-16 | End: 2023-06-18

## 2023-06-16 RX ORDER — CLINDAMYCIN HYDROCHLORIDE 150 MG/1
450 CAPSULE ORAL EVERY 8 HOURS
Qty: 63 CAPSULE | Refills: 0 | Status: SHIPPED | OUTPATIENT
Start: 2023-06-16 | End: 2023-06-23

## 2023-06-16 RX ORDER — MUPIROCIN 20 MG/G
OINTMENT TOPICAL 3 TIMES DAILY
Qty: 1 EACH | Refills: 0 | Status: SHIPPED | OUTPATIENT
Start: 2023-06-16 | End: 2023-06-26

## 2023-06-16 NOTE — ED PROVIDER NOTES
Encounter Date: 2023       History     Chief Complaint   Patient presents with    Wound Check     Cut knuckles on left hand 2 weeks ago cutting onions. Wounds not healing     Patient presents to ER for skin infection.  Patient states she accidentally cut right 2nd and 3rd fingers 2 weeks ago with a knife while cutting onions.  She states she was evaluated by her PCP was placed on antibiotics but she is unsure of the name of antibiotic.  She is concerned that wounds are infected.  She reports tenderness and swelling to right 2nd and 3rd fingers.  She denies fever, chills, generalized body aches, numbness, tingling, joint immobility, joint instability, drainage.    The history is provided by the patient.   Review of patient's allergies indicates:   Allergen Reactions    Azithromycin     Amoxicillin Rash     Past Medical History:   Diagnosis Date    ESRD (end stage renal disease) on dialysis     GERD (gastroesophageal reflux disease)     Hypertension      Past Surgical History:   Procedure Laterality Date     SECTION      INCISION AND DRAINAGE, LOWER EXTREMITY Left 2022    Procedure: INCISION AND DRAINAGE, LOWER EXTREMITY;  Surgeon: Kim Johnson MD;  Location: [facility] OR;  Service: Orthopedics;  Laterality: Left;    PLACEMENT OF DIALYSIS ACCESS      TRANSESOPHAGEAL ECHOCARDIOGRAPHY N/A 2022    Procedure: ECHOCARDIOGRAM, TRANSESOPHAGEAL;  Surgeon: Mary Silav MD;  Location: [facility] CATH LAB;  Service: Cardiology;  Laterality: N/A;     Family History   Problem Relation Age of Onset    Diabetes Mother     Hypertension Mother     Diabetes Father     Hypertension Father      Social History     Tobacco Use    Smoking status: Former    Smokeless tobacco: Never   Substance Use Topics    Alcohol use: No     Comment: occassionally    Drug use: No     Review of Systems   Constitutional:  Negative for chills, fatigue and fever.   Respiratory:  Negative for cough and shortness of breath.     Cardiovascular:  Negative for chest pain.   Gastrointestinal:  Negative for abdominal pain, nausea and vomiting.   Musculoskeletal:  Negative for back pain, myalgias and neck pain.   Skin:  Positive for wound. Negative for rash.   Neurological:  Negative for weakness, numbness and headaches.   All other systems reviewed and are negative.    Physical Exam     Initial Vitals   BP Pulse Resp Temp SpO2   06/16/23 1340 06/16/23 1340 06/16/23 1340 06/16/23 1340 06/16/23 1342   117/65 85 16 97.7 °F (36.5 °C) 99 %      MAP       --                Physical Exam    Nursing note and vitals reviewed.  Constitutional: She appears well-developed and well-nourished. She is not diaphoretic. She is cooperative.  Non-toxic appearance. She does not have a sickly appearance. No distress.   HENT:   Head: Normocephalic and atraumatic.   Neck: Neck supple.   Normal range of motion.  Cardiovascular:  Normal rate, regular rhythm and intact distal pulses.           Pulmonary/Chest: Breath sounds normal. No respiratory distress.   Musculoskeletal:         General: Normal range of motion.      Cervical back: Normal range of motion and neck supple.     Neurological: She is alert and oriented to person, place, and time. She has normal strength. No sensory deficit. GCS score is 15. GCS eye subscore is 4. GCS verbal subscore is 5. GCS motor subscore is 6.   Skin: Skin is warm and dry. Capillary refill takes less than 2 seconds.   + small open wound noted to right 2nd and 3rd finger.  No active drainage.  +mild edema of right 2nd and 3rd finger compared to left.  +tenderness upon palpation.  No fluctuance or induration is noted.  Distal sensation is intact.  +mild erythema is noted to affected fingers.       ED Course   Procedures  Labs Reviewed - No data to display       Imaging Results              X-Ray Hand 3 view Left (Final result)  Result time 06/16/23 14:27:23      Final result by Humberto Gilliland MD (06/16/23 14:27:23)                    Impression:      As above.      Electronically signed by: Humberto Gilliland  Date:    06/16/2023  Time:    14:27               Narrative:    EXAMINATION:  XR HAND COMPLETE 3 VIEW LEFT    CLINICAL HISTORY:  left 2nd and 3rd finger infection.;.    TECHNIQUE:  PA, lateral, and oblique views of the left hand were performed.    COMPARISON:  None    FINDINGS:  No acute fracture or suspicious osseous lesion.  No dislocation.  Advanced degenerative changes at the thumb CMC joint.  Mild negative ulnar variance.  Mild radiocarpal joint space narrowing.  Mild soft tissue swelling at the 2nd and 3rd digits.  No definite subcutaneous gas or radiopaque object visualized possible small wound at the radial aspect of the 2nd digit soft tissues.                                       Medications - No data to display                  Vital signs stable, patient non ill/nontoxic-appearing.  Imaging results reviewed and discussed with patient.  Patient unsure previous antibiotics that she was prescribed.  Will provide patient with clindamycin and mupirocin ointment.  Discussed close follow-up with PCP.  Discussed signs and symptoms to return to ER.  Patient agrees with plan and voices no further concerns.  I discussed wound care precautions with patient and family/caretaker; specifically that all wounds have risk of infection despite efforts to cleanse and debride the wound; and there is a risk of an occult foreign body (and thus increased risk of infection) despite a negative examination.  I discussed with patient need to return for any signs of infection, specifically redness, increased pain, fever, drainage of pus, or any concern, immediately.       Clinical Impression:   Final diagnoses:  [L08.9] Skin infection (Primary)        ED Disposition Condition    Discharge Stable          ED Prescriptions       Medication Sig Dispense Start Date End Date Auth. Provider    HYDROcodone-acetaminophen (NORCO) 5-325 mg per tablet Take 1 tablet  by mouth every 6 (six) hours as needed for Pain. 8 tablet 6/16/2023 6/18/2023 Jose Trujillo NP    clindamycin (CLEOCIN) 150 MG capsule Take 3 capsules (450 mg total) by mouth every 8 (eight) hours. for 7 days 63 capsule 6/16/2023 6/23/2023 Jose Trujillo NP    mupirocin (BACTROBAN) 2 % ointment Apply topically 3 (three) times daily. for 10 days 1 each 6/16/2023 6/26/2023 Jose Trujillo NP          Follow-up Information       Follow up With Specialties Details Why Contact [facility] [place]    [id number] [place] [id number] [phone]      [facility] - Emergency Dept Emergency Medicine  As needed, If symptoms worsen [address]  [facility] [address] [phone]             Jose Trujillo NP  06/16/23 [id number]

## 2023-06-22 ENCOUNTER — HOSPITAL ENCOUNTER (OUTPATIENT)
Dept: CARDIOLOGY | Facility: HOSPITAL | Age: 68
Discharge: HOME | End: 2023-06-22
Attending: INTERNAL MEDICINE
Payer: MEDICARE

## 2023-06-22 VITALS
DIASTOLIC BLOOD PRESSURE: 65 MMHG | HEIGHT: 64 IN | SYSTOLIC BLOOD PRESSURE: 117 MMHG | BODY MASS INDEX: 25.78 KG/M2 | HEART RATE: 60 BPM | WEIGHT: 151 LBS

## 2023-06-22 DIAGNOSIS — I31.39 PERICARDIAL EFFUSION: ICD-10-CM

## 2023-06-22 PROCEDURE — 93306 TTE W/DOPPLER COMPLETE: CPT | Mod: 26,,, | Performed by: INTERNAL MEDICINE

## 2023-06-22 PROCEDURE — 93306 TTE W/DOPPLER COMPLETE: CPT

## 2023-06-22 PROCEDURE — 93306 ECHO (CUPID ONLY): ICD-10-PCS | Mod: 26,,, | Performed by: INTERNAL MEDICINE

## 2023-06-23 ENCOUNTER — TELEPHONE (OUTPATIENT)
Dept: CARDIOLOGY | Facility: CLINIC | Age: 68
End: 2023-06-23
Payer: MEDICARE

## 2023-06-23 LAB
AORTIC ROOT ANNULUS: 3.05 CM
ASCENDING AORTA: 3.32 CM
AV INDEX (PROSTH): 0.52
AV MEAN GRADIENT: 6 MMHG
AV PEAK GRADIENT: 13 MMHG
AV VALVE AREA: 1.67 CM2
AV VELOCITY RATIO: 0.56
BSA FOR ECHO PROCEDURE: 1.76 M2
CV ECHO LV RWT: 0.75 CM
DOP CALC AO PEAK VEL: 1.8 M/S
DOP CALC AO VTI: 35.4 CM
DOP CALC LVOT AREA: 3.2 CM2
DOP CALC LVOT DIAMETER: 2.02 CM
DOP CALC LVOT PEAK VEL: 1 M/S
DOP CALC LVOT STROKE VOLUME: 59.26 CM3
DOP CALC RVOT PEAK VEL: 0.39 M/S
DOP CALC RVOT VTI: 8.9 CM
DOP CALCLVOT PEAK VEL VTI: 18.5 CM
E WAVE DECELERATION TIME: 182.92 MSEC
E/A RATIO: 5.2
E/E' RATIO: 13.87 M/S
ECHO LV POSTERIOR WALL: 1.68 CM (ref 0.6–1.1)
EJECTION FRACTION: 55 %
FRACTIONAL SHORTENING: 28 % (ref 28–44)
INTERVENTRICULAR SEPTUM: 1.62 CM (ref 0.6–1.1)
IVC DIAMETER: 0.69 CM
IVRT: 85.63 MSEC
LA MAJOR: 7.81 CM
LA MINOR: 7.42 CM
LA WIDTH: 4.4 CM
LEFT ATRIUM SIZE: 4.05 CM
LEFT ATRIUM VOLUME INDEX MOD: 62.5 ML/M2
LEFT ATRIUM VOLUME INDEX: 66.2 ML/M2
LEFT ATRIUM VOLUME MOD: 108.75 CM3
LEFT ATRIUM VOLUME: 115.27 CM3
LEFT INTERNAL DIMENSION IN SYSTOLE: 3.22 CM (ref 2.1–4)
LEFT VENTRICLE DIASTOLIC VOLUME INDEX: 52.66 ML/M2
LEFT VENTRICLE DIASTOLIC VOLUME: 91.62 ML
LEFT VENTRICLE MASS INDEX: 183 G/M2
LEFT VENTRICLE SYSTOLIC VOLUME INDEX: 24 ML/M2
LEFT VENTRICLE SYSTOLIC VOLUME: 41.7 ML
LEFT VENTRICULAR INTERNAL DIMENSION IN DIASTOLE: 4.48 CM (ref 3.5–6)
LEFT VENTRICULAR MASS: 317.59 G
LV LATERAL E/E' RATIO: 11.56 M/S
LV SEPTAL E/E' RATIO: 17.33 M/S
LVOT MG: 1.81 MMHG
LVOT MV: 0.61 CM/S
MV PEAK A VEL: 0.2 M/S
MV PEAK E VEL: 1.04 M/S
MV STENOSIS PRESSURE HALF TIME: 53.05 MS
MV VALVE AREA P 1/2 METHOD: 4.15 CM2
PISA TR MAX VEL: 3.9 M/S
PULM VEIN S/D RATIO: 0.79
PV MEAN GRADIENT: 0.36 MMHG
PV PEAK D VEL: 0.57 M/S
PV PEAK S VEL: 0.45 M/S
PV PEAK VELOCITY: 1.04 CM/S
RA MAJOR: 5.5 CM
RA PRESSURE: 3 MMHG
RA WIDTH: 3.62 CM
RIGHT VENTRICULAR END-DIASTOLIC DIMENSION: 4.07 CM
SINUS: 3.04 CM
STJ: 2.99 CM
TDI LATERAL: 0.09 M/S
TDI SEPTAL: 0.06 M/S
TDI: 0.08 M/S
TR MAX PG: 61 MMHG
TRICUSPID ANNULAR PLANE SYSTOLIC EXCURSION: 1.23 CM
TV REST PULMONARY ARTERY PRESSURE: 64 MMHG

## 2023-06-23 NOTE — TELEPHONE ENCOUNTER
Pt would like for Echo results fax to be sent to Fax # [phone]    ---- Message from Mansoor Harris sent at 6/23/2023 12:13 PM CDT -----  Contact: [phone]  Patient is calling for Echo  results. Please call patient at [phone]. Thanks KB

## 2023-10-11 ENCOUNTER — TELEPHONE (OUTPATIENT)
Dept: CARDIOLOGY | Facility: CLINIC | Age: 68
End: 2023-10-11
Payer: MEDICARE

## 2023-10-11 NOTE — TELEPHONE ENCOUNTER
Attempted to contact patient; LVM for patient to call back.     ----- Message from Shameka Gibbs sent at 10/11/2023  3:09 PM CDT -----  Contact: Meaghan   Meaghan would like a call back. She said whenever she is on the diaclasis machine her blood pressure drops

## 2024-03-06 ENCOUNTER — OFFICE VISIT (OUTPATIENT)
Dept: CARDIOLOGY | Facility: CLINIC | Age: 69
End: 2024-03-06
Payer: MEDICARE

## 2024-03-06 VITALS
BODY MASS INDEX: 28.19 KG/M2 | OXYGEN SATURATION: 97 % | DIASTOLIC BLOOD PRESSURE: 68 MMHG | SYSTOLIC BLOOD PRESSURE: 114 MMHG | WEIGHT: 165.13 LBS | HEART RATE: 62 BPM | HEIGHT: 64 IN

## 2024-03-06 DIAGNOSIS — I77.0 ARTERIOVENOUS FISTULA, ACQUIRED: ICD-10-CM

## 2024-03-06 DIAGNOSIS — N18.6 ESRD (END STAGE RENAL DISEASE) ON DIALYSIS: ICD-10-CM

## 2024-03-06 DIAGNOSIS — I50.42 CHRONIC COMBINED SYSTOLIC AND DIASTOLIC CONGESTIVE HEART FAILURE: Primary | ICD-10-CM

## 2024-03-06 DIAGNOSIS — I48.0 PAROXYSMAL ATRIAL FIBRILLATION: ICD-10-CM

## 2024-03-06 DIAGNOSIS — Z99.2 ESRD (END STAGE RENAL DISEASE) ON DIALYSIS: ICD-10-CM

## 2024-03-06 DIAGNOSIS — I10 PRIMARY HYPERTENSION: ICD-10-CM

## 2024-03-06 DIAGNOSIS — I48.11 LONGSTANDING PERSISTENT ATRIAL FIBRILLATION: ICD-10-CM

## 2024-03-06 DIAGNOSIS — I31.39 PERICARDIAL EFFUSION: ICD-10-CM

## 2024-03-06 PROCEDURE — 99214 OFFICE O/P EST MOD 30 MIN: CPT | Mod: HCNC,S$GLB,, | Performed by: INTERNAL MEDICINE

## 2024-03-06 PROCEDURE — 99213 OFFICE O/P EST LOW 20 MIN: CPT | Mod: PBBFAC,PO | Performed by: INTERNAL MEDICINE

## 2024-03-06 PROCEDURE — 99999 PR PBB SHADOW E&M-EST. PATIENT-LVL III: CPT | Mod: PBBFAC,HCNC,, | Performed by: INTERNAL MEDICINE

## 2024-03-06 NOTE — PROGRESS NOTES
Subjective:   Patient ID:  Meaghan Potter is a 68 y.o. female who presents for evaluation of No chief complaint on file.      HPI  3.  Seen in .  She had her echocardiogram which showed stable large effusion of the heart.  She does dialysis  in the morning.    He is on Eliquis for history of AFib.    She denies any dyspnea on exertion, exertional angina.  Edema.  Orthopnea or PND.    No palpitations syncope or presyncope.    Underwent her tooth surgery.        68-year-old female.  On dialysis.    Diagnosed earlier this year with AFib.    She had a KATE to rule out vegetation.    She has a moderate to large pericardial effusion.  Though her last echocardiogram read as large effusion I reviewed it seems to be stable compared to her prior echocardiograms in December.    Denies any chest pain, dyspnea on exertion.    Compliant with dialysis.    Going for tooth extraction.  And sinus mass removal under general anesthesia.  She denies orthopnea or PND.  No lower extremity swelling.    No syncope or presyncope.      Past Medical History:   Diagnosis Date    ESRD (end stage renal disease) on dialysis     GERD (gastroesophageal reflux disease)     Hypertension        Past Surgical History:   Procedure Laterality Date     SECTION      INCISION AND DRAINAGE, LOWER EXTREMITY Left 2022    Procedure: INCISION AND DRAINAGE, LOWER EXTREMITY;  Surgeon: Kim Johnson MD;  Location: [facility] OR;  Service: Orthopedics;  Laterality: Left;    PLACEMENT OF DIALYSIS ACCESS      TRANSESOPHAGEAL ECHOCARDIOGRAPHY N/A 2022    Procedure: ECHOCARDIOGRAM, TRANSESOPHAGEAL;  Surgeon: Mary Silva MD;  Location: [facility] CATH LAB;  Service: Cardiology;  Laterality: N/A;       Social History     Tobacco Use    Smoking status: Former    Smokeless tobacco: Never   Substance Use Topics    Alcohol use: No     Comment: occassionally    Drug use: No       Family History   Problem Relation Age of  Onset    Diabetes Mother     Hypertension Mother     Diabetes Father     Hypertension Father        Review of Systems   Cardiovascular:  Negative for chest pain, dyspnea on exertion, palpitations and syncope.   Genitourinary: Negative.    Neurological: Negative.        Current Outpatient Medications on File Prior to Visit   Medication Sig    brimonidine 0.2% (ALPHAGAN) 0.2 % Drop Place 1 drop into both eyes 2 (two) times daily.    ELIQUIS 2.5 mg Tab Take 2.5 mg by mouth 2 (two) times daily.    ergocalciferol (ERGOCALCIFEROL) 50,000 unit Cap Take 50,000 Units by mouth every 7 days.    escitalopram oxalate (LEXAPRO) 5 MG Tab Take 5 mg by mouth once daily.    famotidine (PEPCID) 10 MG tablet Take 1 tablet (10 mg total) by mouth once daily.    metoprolol tartrate (LOPRESSOR) 25 MG tablet Take 0.5 tablets (12.5 mg total) by mouth 2 (two) times daily.    midodrine (PROAMATINE) 5 MG Tab Take 1 tablet (5 mg total) by mouth 3 (three) times daily with meals.    senna-docusate 8.6-50 mg (PERICOLACE) 8.6-50 mg per tablet Take 1 tablet by mouth.    VENTOLIN HFA 90 mcg/actuation inhaler 2 puffs 3 (three) times daily as needed.    sevelamer carbonate (RENVELA) 800 mg Tab Take 1 tablet (800 mg total) by mouth 3 (three) times daily with meals. (Patient not taking: Reported on 2/1/2023)     No current facility-administered medications on file prior to visit.       Objective:   Objective:  Wt Readings from Last 3 Encounters:   03/06/24 74.9 kg (165 lb 2 oz)   06/22/23 68.5 kg (151 lb)   06/16/23 68.5 kg (151 lb 0.2 oz)     Temp Readings from Last 3 Encounters:   06/16/23 97.7 °F (36.5 °C) (Oral)   03/14/23 97.5 °F (36.4 °C) (Oral)   01/07/23 97.9 °F (36.6 °C) (Oral)     BP Readings from Last 3 Encounters:   03/06/24 114/68   06/22/23 117/65   06/16/23 117/65     Pulse Readings from Last 3 Encounters:   03/06/24 62   06/22/23 60   06/16/23 85       Physical Exam  Vitals reviewed.   Constitutional:       Appearance: She is  "well-developed.   Neck:      Vascular: No carotid bruit.   Cardiovascular:      Rate and Rhythm: Normal rate and regular rhythm.      Pulses: Intact distal pulses.      Heart sounds: Normal heart sounds. No murmur heard.  Pulmonary:      Breath sounds: Normal breath sounds.   Neurological:      Mental Status: She is oriented to person, place, and time.         No results found for: "CHOL"  No results found for: "HDL"  No results found for: "LDLCALC"  No results found for: "TRIG"  No results found for: "CHOLHDL"    Chemistry        Component Value Date/Time     03/14/2023 2133    K 5.2 (H) 03/14/2023 2133    CL 99 03/14/2023 2133    CO2 24 03/14/2023 2133    BUN 37 (H) 03/14/2023 2133    CREATININE 6.4 (H) 03/14/2023 2133    GLU 89 03/14/2023 2133        Component Value Date/Time    CALCIUM 9.8 03/14/2023 2133    ALKPHOS 284 (H) 03/14/2023 2133     (H) 03/14/2023 2133     (H) 03/14/2023 2133    BILITOT 1.6 (H) 03/14/2023 2133    ESTGFRAFRICA 15.1 (A) 05/31/2019 1236    EGFRNONAA 13.1 (A) 05/31/2019 1236          No results found for: "TSH"  Lab Results   Component Value Date    INR 1.0 12/23/2022    INR 1.1 12/20/2022    INR 1.1 03/19/2019     Lab Results   Component Value Date    WBC 5.71 03/14/2023    HGB 13.1 03/14/2023    HCT 41.1 03/14/2023    MCV 97 03/14/2023     (L) 03/14/2023     BNP  @LABRCNTIP(BNP,BNPTRIAGEBLO)@  CrCl cannot be calculated (Patient's most recent lab result is older than the maximum 7 days allowed.).     Imaging:  ======  Results for orders placed during the hospital encounter of 03/29/23    Echo    Interpretation Summary  · The left ventricle is normal in size with concentric hypertrophy and low normal systolic function.  · Moderate left atrial enlargement.  · The estimated ejection fraction is 50%.  · Indeterminate left ventricular diastolic function.  · Moderate right ventricular enlargement with moderately to severely reduced right ventricular systolic " function.  · Moderate right atrial enlargement.  · There is abnormal septal wall motion. There is systolic and diastolic flattening of the interventricular septum consistent with right ventricle pressure and volume overload.  · Intermediate central venous pressure (8 mmHg).  · The estimated PA systolic pressure is 38 mmHg.  · Large pericardial effusion.  · There is no evidence of tamponade.    Known pericardial effusion. Limited echo to look at effusion.    No results found for this or any previous visit.    No results found for this or any previous visit.    Results for orders placed during the hospital encounter of 02/20/18    X-Ray Chest PA And Lateral    Narrative  Chest two views.  No comparison.    Findings: There is moderate cardiomegaly.  Lungs are clear bilaterally.  Surgical clips and vascular stent in the left axilla.  Multilevel thoracic spondylosis.  IMPRESSION:  No acute disease.      Electronically signed by: PASCUAL VELAZQUEZ MD  Date:     02/20/18  Time:    10:39    No results found for this or any previous visit.    No valid procedures specified.    Diagnostic Results:  ECG: Reviewed    Results for orders placed during the hospital encounter of 06/22/23    Echo    Interpretation Summary  · The left ventricle is normal in size with concentric hypertrophy and normal systolic function.  · Severe left atrial enlargement.  · The estimated ejection fraction is 55%.  · Grade III left ventricular diastolic dysfunction.  · Moderate right ventricular enlargement with moderately reduced right ventricular systolic function.  · Moderate to severe tricuspid regurgitation.  · There is abnormal septal wall motion. There is systolic and diastolic flattening of the interventricular septum consistent with right ventricle pressure and volume overload.  · Moderate right atrial enlargement.  · Normal central venous pressure (3 mmHg).  · The estimated PA systolic pressure is 64 mmHg.  · There is pulmonary hypertension.  ·  Large circumferential pericardial effusion.  · There is no evidence of tamponade.    The ASCVD Risk score (Jen DK, et al., 2019) failed to calculate for the following reasons:    Cannot find a previous HDL lab    Cannot find a previous total cholesterol lab    Assessment and Plan:   Chronic combined systolic and diastolic congestive heart failure    Arteriovenous fistula, acquired    Paroxysmal atrial fibrillation    Pericardial effusion  -     Echo; Future    ESRD (end stage renal disease) on dialysis    Primary hypertension    Longstanding persistent atrial fibrillation      Reviewed all tests and above medical conditions with patient in detail and formulated treatment plan.  Risk factor modification discussed.   Cardiac low salt diet discussed.  Maintaining healthy weight and weight loss goals were discussed in clinic.  Repeat echo periodically , currently asymptomatic   Discussed possible pericardial window in the future if becomes symptomatic   Angina free, htn controlled, euvolemic    Follow up in six-month

## 2024-03-20 ENCOUNTER — HOSPITAL ENCOUNTER (OUTPATIENT)
Dept: CARDIOLOGY | Facility: HOSPITAL | Age: 69
Discharge: HOME | End: 2024-03-20
Attending: INTERNAL MEDICINE
Payer: MEDICARE

## 2024-03-20 VITALS
HEIGHT: 64 IN | WEIGHT: 165 LBS | BODY MASS INDEX: 28.17 KG/M2 | DIASTOLIC BLOOD PRESSURE: 68 MMHG | HEART RATE: 80 BPM | SYSTOLIC BLOOD PRESSURE: 114 MMHG

## 2024-03-20 DIAGNOSIS — I31.39 PERICARDIAL EFFUSION: ICD-10-CM

## 2024-03-20 LAB
AORTIC ROOT ANNULUS: 3.08 CM
AORTIC VALVE CUSP SEPERATION: 0 CM
AV INDEX (PROSTH): 0.87
AV MEAN GRADIENT: 3 MMHG
AV PEAK GRADIENT: 6 MMHG
AV VALVE AREA BY VELOCITY RATIO: 2.45 CM²
AV VALVE AREA: 2.76 CM²
AV VELOCITY RATIO: 0.77
BSA FOR ECHO PROCEDURE: 1.84 M2
CV ECHO LV RWT: 0.47 CM
DOP CALC AO PEAK VEL: 1.23 M/S
DOP CALC AO VTI: 21 CM
DOP CALC LVOT AREA: 3.2 CM2
DOP CALC LVOT DIAMETER: 2.01 CM
DOP CALC LVOT PEAK VEL: 0.95 M/S
DOP CALC LVOT STROKE VOLUME: 58.04 CM3
DOP CALC RVOT PEAK VEL: 0.81 M/S
DOP CALC RVOT VTI: 15.4 CM
DOP CALCLVOT PEAK VEL VTI: 18.3 CM
E WAVE DECELERATION TIME: 237.25 MSEC
E/A RATIO: 0.81
E/E' RATIO: 7.82 M/S
ECHO LV POSTERIOR WALL: 0.98 CM (ref 0.6–1.1)
FRACTIONAL SHORTENING: 35 % (ref 28–44)
HCV RNA # SERPL NAA+PROBE: 3661.4 MMHG/S
INTERVENTRICULAR SEPTUM: 1.03 CM (ref 0.6–1.1)
IVRT: 190.31 MSEC
LA MAJOR: 3.9 CM
LA MINOR: 4.67 CM
LA WIDTH: 3.3 CM
LEFT ATRIUM SIZE: 4.03 CM
LEFT ATRIUM VOLUME INDEX: 26.7 ML/M2
LEFT ATRIUM VOLUME: 48.05 CM3
LEFT INTERNAL DIMENSION IN SYSTOLE: 2.72 CM (ref 2.1–4)
LEFT VENTRICLE DIASTOLIC VOLUME INDEX: 42.81 ML/M2
LEFT VENTRICLE DIASTOLIC VOLUME: 77.06 ML
LEFT VENTRICLE MASS INDEX: 76 G/M2
LEFT VENTRICLE SYSTOLIC VOLUME INDEX: 15.3 ML/M2
LEFT VENTRICLE SYSTOLIC VOLUME: 27.62 ML
LEFT VENTRICULAR INTERNAL DIMENSION IN DIASTOLE: 4.17 CM (ref 3.5–6)
LEFT VENTRICULAR MASS: 136.65 G
LV LATERAL E/E' RATIO: 5.38 M/S
LV SEPTAL E/E' RATIO: 14.33 M/S
LVOT MG: 2.01 MMHG
LVOT MV: 0.66 CM/S
MV PEAK A VEL: 0.53 M/S
MV PEAK E VEL: 0.43 M/S
MV STENOSIS PRESSURE HALF TIME: 68.8 MS
MV VALVE AREA P 1/2 METHOD: 3.2 CM2
PISA TR MAX VEL: 3.42 M/S
PULM VEIN S/D RATIO: 1.25
PV MEAN GRADIENT: 2 MMHG
PV PEAK D VEL: 0.55 M/S
PV PEAK GRADIENT: 4 MMHG
PV PEAK S VEL: 0.69 M/S
PV PEAK VELOCITY: 0.95 M/S
RA MAJOR: 3.65 CM
RA PRESSURE ESTIMATED: 3 MMHG
RA WIDTH: 3.62 CM
RIGHT VENTRICULAR END-DIASTOLIC DIMENSION: 3.11 CM
RV TB RVSP: 6 MMHG
SINUS: 3.1 CM
STJ: 3.01 CM
TDI LATERAL: 0.08 M/S
TDI SEPTAL: 0.03 M/S
TDI: 0.06 M/S
TR MAX PG: 47 MMHG
TRICUSPID ANNULAR PLANE SYSTOLIC EXCURSION: 1.1 CM
TRICUSPID VALVE PEAK A WAVE VELOCITY: 0 M/S
TV PEAK E VEL: 0 M/S
TV REST PULMONARY ARTERY PRESSURE: 50 MMHG
TV STENOSIS PRESSURE HALF TIME: 0 MS
Z-SCORE OF LEFT VENTRICULAR DIMENSION IN END DIASTOLE: -1.77
Z-SCORE OF LEFT VENTRICULAR DIMENSION IN END SYSTOLE: -0.97

## 2024-03-20 PROCEDURE — 93306 TTE W/DOPPLER COMPLETE: CPT | Mod: PO

## 2024-03-20 PROCEDURE — 93306 TTE W/DOPPLER COMPLETE: CPT | Mod: 26,,, | Performed by: INTERNAL MEDICINE

## 2024-04-30 ENCOUNTER — TELEPHONE (OUTPATIENT)
Dept: CARDIOLOGY | Facility: CLINIC | Age: 69
End: 2024-04-30
Payer: MEDICARE

## 2024-04-30 DIAGNOSIS — Z99.2 DIALYSIS PATIENT: Primary | ICD-10-CM

## 2024-04-30 NOTE — TELEPHONE ENCOUNTER
Faxed over referral for vascular surgery to Dr. Gomez's office       ----- Message from Tammy Mancilla sent at 4/30/2024 12:54 PM CDT -----  Who Called: Vascular Lab, Augustina Hernandez    What is the request in detail: Requesting call back to discuss referral for pt, [facility] patient. Please fax, pt started insurance March 1. Fax: [phone]. Please advise    Can the clinic reply by [facility]? No    Best Call Back Number: [phone]       Additional Information: Fax: [phone]

## 2024-05-30 ENCOUNTER — TELEPHONE (OUTPATIENT)
Dept: INTERNAL MEDICINE | Facility: CLINIC | Age: 69
End: 2024-05-30
Payer: MEDICARE

## 2024-05-30 NOTE — TELEPHONE ENCOUNTER
The patient need an appointment at the [facility]. The patient was transferred to the appointment desk

## 2024-05-30 NOTE — TELEPHONE ENCOUNTER
----- Message from Socorro Camacho sent at 5/30/2024 12:37 PM CDT -----  Contact: Megahan Shea is calling in regards to getting an appt scheduled at the [facility] and would like to get a call back to discuss appt availability at .[phone]       Thanks

## 2024-06-04 ENCOUNTER — HOSPITAL ENCOUNTER (OUTPATIENT)
Dept: RADIOLOGY | Facility: HOSPITAL | Age: 69
Discharge: HOME | End: 2024-06-04
Attending: STUDENT IN AN ORGANIZED HEALTH CARE EDUCATION/TRAINING PROGRAM
Payer: MEDICARE

## 2024-06-04 ENCOUNTER — ANCILLARY ORDERS (OUTPATIENT)
Dept: INTERNAL MEDICINE | Facility: CLINIC | Age: 69
End: 2024-06-04

## 2024-06-04 ENCOUNTER — OFFICE VISIT (OUTPATIENT)
Dept: INTERNAL MEDICINE | Facility: CLINIC | Age: 69
End: 2024-06-04
Payer: MEDICARE

## 2024-06-04 VITALS
HEART RATE: 90 BPM | SYSTOLIC BLOOD PRESSURE: 110 MMHG | BODY MASS INDEX: 28.68 KG/M2 | HEIGHT: 64 IN | OXYGEN SATURATION: 94 % | WEIGHT: 168 LBS | TEMPERATURE: 97 F | DIASTOLIC BLOOD PRESSURE: 60 MMHG

## 2024-06-04 DIAGNOSIS — Z13.820 SCREENING FOR OSTEOPOROSIS: ICD-10-CM

## 2024-06-04 DIAGNOSIS — E88.09 HYPOALBUMINEMIA DUE TO PROTEIN-CALORIE MALNUTRITION: ICD-10-CM

## 2024-06-04 DIAGNOSIS — N18.6 ESRD (END STAGE RENAL DISEASE) ON DIALYSIS: ICD-10-CM

## 2024-06-04 DIAGNOSIS — Z12.11 SCREENING FOR COLON CANCER: ICD-10-CM

## 2024-06-04 DIAGNOSIS — E11.42 DIABETIC PERIPHERAL NEUROPATHY: Primary | ICD-10-CM

## 2024-06-04 DIAGNOSIS — Z12.31 SCREENING MAMMOGRAM FOR BREAST CANCER: ICD-10-CM

## 2024-06-04 DIAGNOSIS — Z99.2 ESRD (END STAGE RENAL DISEASE) ON DIALYSIS: ICD-10-CM

## 2024-06-04 DIAGNOSIS — D68.59 THROMBOPHILIA: ICD-10-CM

## 2024-06-04 DIAGNOSIS — I10 PRIMARY HYPERTENSION: ICD-10-CM

## 2024-06-04 DIAGNOSIS — M81.0 OSTEOPOROSIS, UNSPECIFIED OSTEOPOROSIS TYPE, UNSPECIFIED PATHOLOGICAL FRACTURE PRESENCE: ICD-10-CM

## 2024-06-04 DIAGNOSIS — I48.0 PAROXYSMAL ATRIAL FIBRILLATION: ICD-10-CM

## 2024-06-04 DIAGNOSIS — Z78.0 ASYMPTOMATIC MENOPAUSAL STATE: ICD-10-CM

## 2024-06-04 DIAGNOSIS — J44.9 CHRONIC OBSTRUCTIVE PULMONARY DISEASE, UNSPECIFIED COPD TYPE: ICD-10-CM

## 2024-06-04 DIAGNOSIS — E46 HYPOALBUMINEMIA DUE TO PROTEIN-CALORIE MALNUTRITION: ICD-10-CM

## 2024-06-04 PROCEDURE — 1159F MED LIST DOCD IN RCRD: CPT | Mod: CPTII,S$GLB,, | Performed by: STUDENT IN AN ORGANIZED HEALTH CARE EDUCATION/TRAINING PROGRAM

## 2024-06-04 PROCEDURE — 77091 TBS TECHL CALCULATION ONLY: CPT | Mod: HCNC,PO

## 2024-06-04 PROCEDURE — 1126F AMNT PAIN NOTED NONE PRSNT: CPT | Mod: CPTII,S$GLB,, | Performed by: STUDENT IN AN ORGANIZED HEALTH CARE EDUCATION/TRAINING PROGRAM

## 2024-06-04 PROCEDURE — 1160F RVW MEDS BY RX/DR IN RCRD: CPT | Mod: CPTII,S$GLB,, | Performed by: STUDENT IN AN ORGANIZED HEALTH CARE EDUCATION/TRAINING PROGRAM

## 2024-06-04 PROCEDURE — 1101F PT FALLS ASSESS-DOCD LE1/YR: CPT | Mod: CPTII,S$GLB,, | Performed by: STUDENT IN AN ORGANIZED HEALTH CARE EDUCATION/TRAINING PROGRAM

## 2024-06-04 PROCEDURE — 3288F FALL RISK ASSESSMENT DOCD: CPT | Mod: CPTII,S$GLB,, | Performed by: STUDENT IN AN ORGANIZED HEALTH CARE EDUCATION/TRAINING PROGRAM

## 2024-06-04 PROCEDURE — 99999 PR PBB SHADOW E&M-EST. PATIENT-LVL V: CPT | Mod: PBBFAC,HCNC,, | Performed by: STUDENT IN AN ORGANIZED HEALTH CARE EDUCATION/TRAINING PROGRAM

## 2024-06-04 PROCEDURE — 3078F DIAST BP <80 MM HG: CPT | Mod: CPTII,S$GLB,, | Performed by: STUDENT IN AN ORGANIZED HEALTH CARE EDUCATION/TRAINING PROGRAM

## 2024-06-04 PROCEDURE — 77067 SCR MAMMO BI INCL CAD: CPT | Mod: 26,HCNC,, | Performed by: RADIOLOGY

## 2024-06-04 PROCEDURE — 77063 BREAST TOMOSYNTHESIS BI: CPT | Mod: TC,HCNC,PO

## 2024-06-04 PROCEDURE — 77092 TBS I&R FX RSK QHP: CPT | Mod: HCNC,,, | Performed by: RADIOLOGY

## 2024-06-04 PROCEDURE — 77080 DXA BONE DENSITY AXIAL: CPT | Mod: 26,HCNC,, | Performed by: RADIOLOGY

## 2024-06-04 PROCEDURE — 99214 OFFICE O/P EST MOD 30 MIN: CPT | Mod: S$GLB,,, | Performed by: STUDENT IN AN ORGANIZED HEALTH CARE EDUCATION/TRAINING PROGRAM

## 2024-06-04 PROCEDURE — 3074F SYST BP LT 130 MM HG: CPT | Mod: CPTII,S$GLB,, | Performed by: STUDENT IN AN ORGANIZED HEALTH CARE EDUCATION/TRAINING PROGRAM

## 2024-06-04 PROCEDURE — 3008F BODY MASS INDEX DOCD: CPT | Mod: CPTII,S$GLB,, | Performed by: STUDENT IN AN ORGANIZED HEALTH CARE EDUCATION/TRAINING PROGRAM

## 2024-06-04 PROCEDURE — 77063 BREAST TOMOSYNTHESIS BI: CPT | Mod: 26,HCNC,, | Performed by: RADIOLOGY

## 2024-06-04 RX ORDER — GABAPENTIN 400 MG/1
400 CAPSULE ORAL NIGHTLY PRN
COMMUNITY

## 2024-06-04 RX ORDER — FLUTICASONE PROPIONATE AND SALMETEROL 250; 50 UG/1; UG/1
POWDER RESPIRATORY (INHALATION)
COMMUNITY

## 2024-06-04 RX ORDER — METOPROLOL TARTRATE 25 MG/1
12.5 TABLET, FILM COATED ORAL 2 TIMES DAILY
Qty: 90 TABLET | Refills: 0 | Status: SHIPPED | OUTPATIENT
Start: 2024-06-04 | End: 2024-09-02

## 2024-06-04 RX ORDER — ONDANSETRON 4 MG/1
TABLET, ORALLY DISINTEGRATING ORAL
COMMUNITY

## 2024-06-04 RX ORDER — FAMOTIDINE 20 MG/1
20 TABLET, FILM COATED ORAL
COMMUNITY

## 2024-06-04 RX ORDER — MELOXICAM 15 MG/1
15 TABLET ORAL DAILY PRN
COMMUNITY
Start: 2023-12-20

## 2024-06-04 RX ORDER — FLUTICASONE PROPIONATE 50 MCG
SPRAY, SUSPENSION (ML) NASAL
COMMUNITY

## 2024-06-04 RX ORDER — OXYCODONE AND ACETAMINOPHEN 10; 325 MG/1; MG/1
1 TABLET ORAL EVERY 6 HOURS PRN
COMMUNITY
Start: 2024-03-31

## 2024-06-04 RX ORDER — MIDODRINE HYDROCHLORIDE 5 MG/1
5 TABLET ORAL
Qty: 270 TABLET | Refills: 0 | Status: SHIPPED | OUTPATIENT
Start: 2024-06-04 | End: 2024-09-02

## 2024-06-04 NOTE — PROGRESS NOTES
Chief Complaint   Patient presents with    Establish Care     HPI: Meaghan Potter is a 69 y.o. female  with Pmhx listed below who presents to clinic for establishing care. Her pmhx is significant for ESRD on hemodialysis on Monday, Wednesday and Friday. She follows [facility] [place] for that. Besides she also has CHF and atrial fibrillation for which she follows cardiology. Today, She reports to be doing well. Denies having any shortness of breath, chest pain, abdominal pain, palpitation, leg swelling, syncopal episode, nausea, vomiting, fever and other complains at present. Medication list has been reviewed and updated along with her. She reports to be compliant with her medication and has no issues taking it. No other questions and queries today. Labs to be repeated today.         Problem List:  Patient Active Problem List   Diagnosis    Atrial fibrillation    ESRD (end stage renal disease) on dialysis    Primary hypertension    Gastroesophageal reflux disease without esophagitis    Pyogenic arthritis of left hip    Tear of left acetabular labrum    Shortness of breath    Bone cyst of right femur    Pericardial effusion    Hyperkalemia, diminished renal excretion    Streptococcal bacteremia    Constipation    Chronic combined systolic and diastolic congestive heart failure    Arteriovenous fistula, acquired    Chronic obstructive pulmonary disease, unspecified COPD type    Diabetic peripheral neuropathy    Hypoalbuminemia due to protein-calorie malnutrition    Thrombophilia       ROS: Negative except as noted above.       Current Meds:  Current Outpatient Medications   Medication Sig Dispense Refill    brimonidine 0.2% (ALPHAGAN) 0.2 % Drop Place 1 drop into both eyes 2 (two) times daily.      ELIQUIS 2.5 mg Tab Take 2.5 mg by mouth 2 (two) times daily.      ergocalciferol (ERGOCALCIFEROL) 50,000 unit Cap Take 50,000 Units by mouth every 7 days.      escitalopram oxalate (LEXAPRO) 5 MG Tab Take 5 mg  by mouth once daily.  3    famotidine (PEPCID) 20 MG tablet Take 20 mg by mouth.      fluticasone propionate (FLONASE) 50 mcg/actuation nasal spray USE ONE SPRAY INTO EACH NOSTRIL TWICE DAILY , MAX 2 SPRAYS PER NOSTRIL IN 24 HOURS      fluticasone-salmeterol diskus inhaler 250-50 mcg Inhale into the lungs.      gabapentin (NEURONTIN) 400 MG capsule Take 400 mg by mouth nightly as needed.      meloxicam (MOBIC) 15 MG tablet Take 15 mg by mouth daily as needed.      ondansetron (ZOFRAN-ODT) 4 MG TbDL       oxyCODONE-acetaminophen (PERCOCET)  mg per tablet Take 1 tablet by mouth every 6 (six) hours as needed.      senna-docusate 8.6-50 mg (PERICOLACE) 8.6-50 mg per tablet Take 1 tablet by mouth.      sevelamer carbonate (RENVELA) 800 mg Tab Take 1 tablet (800 mg total) by mouth 3 (three) times daily with meals. 90 tablet 0    VENTOLIN HFA 90 mcg/actuation inhaler 2 puffs 3 (three) times daily as needed.  6    metoprolol tartrate (LOPRESSOR) 25 MG tablet Take 0.5 tablets (12.5 mg total) by mouth 2 (two) times daily. 90 tablet 0    midodrine (PROAMATINE) 5 MG Tab Take 1 tablet (5 mg total) by mouth 3 (three) times daily with meals. 270 tablet 0     No current facility-administered medications for this visit.      PE:  BP: 110/60  Pulse: 90     Temp: 97 °F (36.1 °C)  Weight: 76.2 kg (167 lb 15.9 oz) Body mass index is 28.84 kg/m².    Wt Readings from Last 5 Encounters:   06/04/24 76.2 kg (167 lb 15.9 oz)   03/20/24 74.8 kg (165 lb)   03/06/24 74.9 kg (165 lb 2 oz)   06/22/23 68.5 kg (151 lb)   06/16/23 68.5 kg (151 lb 0.2 oz)     General appearance: alert and cooperative, not in acute distress  Head: normocephalic, without obvious abnormality, atraumatic  Eyes: conjunctivae/corneas clear. PERRL, EOM's intact.  Ears: clear tympanic membranes   Neck: no adenopathy, supple, symmetrical, trachea midline and thyroid not enlarged, symmetric, no tenderness/mass/nodules, no JVD  Throat: lips, mucosa, and tongue normal;  teeth and gums normal; no thrush  Chest: no reproducible chest pain   Heart: regular rate and rhythm, S1, S2 normal, no murmur, click, rub or gallop  Lungs: unlabored respiration, bilateral equal air entry, normal vesicular breath sound heard, no wheezing, rhonchi   Abdomen: soft, non-tender, non-distended; bowel sounds +; no masses,  no organomegaly, no ascites   Extremities: normal, atraumatic, no cyanosis or edema noted B/L upper and lower extremities.  Skin: skin color, texture, turgor normal. No rashes or lesions noted.  Neurologic: grossly intact      Lab:  Lab Results   Component Value Date    WBC 5.71 03/14/2023    HGB 10.8 (L) 05/27/2024    HCT 41.1 03/14/2023    MCV 97 03/14/2023     (L) 03/14/2023     03/14/2023    K 5.2 (H) 03/14/2023    CL 99 03/14/2023    CO2 24 03/14/2023    BUN 58 (H) 05/06/2024    GLU 89 03/14/2023    CALCIUM 9.8 03/14/2023    PHOS 4.6 (H) 01/06/2023     (H) 03/14/2023     (H) 03/14/2023    INR 1.0 12/23/2022       Impression:    ICD-10-CM ICD-9-CM    1. Diabetic peripheral neuropathy  E11.42 250.60 LIPID PANEL     357.2 HEMOGLOBIN A1C      2. Chronic obstructive pulmonary disease, unspecified COPD type  J44.9 496       3. Hypoalbuminemia due to protein-calorie malnutrition  E88.09 273.8     E46 263.9       4. Thrombophilia  D68.59 289.81       5. ESRD (end stage renal disease) on dialysis  N18.6 585.6 COMPREHENSIVE METABOLIC PANEL    Z99.2 V45.11       6. Paroxysmal atrial fibrillation  I48.0 427.31 metoprolol tartrate (LOPRESSOR) 25 MG tablet      midodrine (PROAMATINE) 5 MG Tab      7. Primary hypertension  I10 401.9       8. Screening for colon cancer  Z12.11 V76.51 Cologuard Screening (Multitarget Stool DNA)      Cologuard Screening (Multitarget Stool DNA)      9. Screening for osteoporosis  Z13.820 V82.81 DXA Bone Density Axial Skeleton 1 or more sites      10. Asymptomatic menopausal state  Z78.0 V49.81 DXA Bone Density Axial Skeleton 1 or more  sites      11. Screening mammogram for breast cancer  Z12.31 V76.12 CANCELED: Mammo Digital Screening Bilat        1. Chronic obstructive pulmonary disease, unspecified COPD type  On albuterol to be continued  Continue fluticasone-salmeterol as prescribed    2. Diabetic peripheral neuropathy    - LIPID PANEL; Future  - HEMOGLOBIN A1C; Future    3. Hypoalbuminemia due to protein-calorie malnutrition  Reviewed albumin panel from 5/13/2024 from ( care every where)stable     4. Thrombophilia  Stable  No evidence of bleeding today  No rashes, ecchymoses, petechial rashes noted    5. ESRD (end stage renal disease) on dialysis  Following [facility] [place]  Dialysis in Monday, Wednesday and Friday  No evidence of fluid overload on physical examination today.  - COMPREHENSIVE METABOLIC PANEL; Future    6. Paroxysmal atrial fibrillation  On eliquis to be continued   HR stable and well controlled  - metoprolol tartrate (LOPRESSOR) 25 MG tablet; Take 0.5 tablets (12.5 mg total) by mouth 2 (two) times daily.  Dispense: 90 tablet; Refill: 0  - midodrine (PROAMATINE) 5 MG Tab; Take 1 tablet (5 mg total) by mouth 3 (three) times daily with meals.  Dispense: 270 tablet; Refill: 0    7. Primary hypertension  Normotensive today  Low salt diet.  Enouraged to increase in physical activity at least 30 minutes of brisk walking/day , 5 days a week  Advised weight loss    Advised for DASH diet - The Dietary Approaches to Stop Hypertension (DASH) is high in vegetables, fruits, low-fat dairy products, whole grains, poultry, fish, and nuts and low in sweets, sugar-sweetened beverages, and red meats   Stop smoking.  Limit caffeine intake  Limit alcohol intake <3/day for men and <2/day for women.  Advised to be compliant with medication.  Please monitor your blood pressure regularly at home   Return precaution provided  On lopressor to be continued    8. Screening for colon cancer  - Cologuard Screening (Multitarget Stool DNA);  Future  - Cologuard Screening (Multitarget Stool DNA)    9. Screening for osteoporosis  - DXA Bone Density Axial Skeleton 1 or more sites; Future    10. Asymptomatic menopausal state  - DXA Bone Density Axial Skeleton 1 or more sites; Future    11. Screening mammogram for breast cancer  Mammogram to be done today.    Future Appointments   Date Time Provider Department Center   6/4/2024 10:45 AM [facility] DEXA1 [facility] B[facility] [place]   6/4/2024 11:00 AM [facility] LABORATORY [facility] LAB [place]   11/6/2024 10:20 AM Mary Silva MD [facility] CARDIO [place]   12/4/2024 10:00 AM Terrence Elise MD [facility] IM [place]       I spent a total of 37 minutes on the day of the visit.This includes face to face time and non-face to face time preparing to see the patient (eg, review of tests), obtaining and/or reviewing separately obtained history, documenting clinical information in the electronic or other health record, independently interpreting results and communicating results to the patient/family/caregiver, or care coordinator.  Visit today included increased complexity associated with the care of the episodic problem   addressed and managing the longitudinal care of the patient due to the serious and/or complex managed problem(s) .     Terrence Elise MD   [facility]

## 2024-06-05 DIAGNOSIS — R92.8 ABNORMAL MAMMOGRAM OF LEFT BREAST: Primary | ICD-10-CM

## 2024-06-18 ENCOUNTER — HOSPITAL ENCOUNTER (OUTPATIENT)
Dept: RADIOLOGY | Facility: HOSPITAL | Age: 69
Discharge: HOME | End: 2024-06-18
Attending: STUDENT IN AN ORGANIZED HEALTH CARE EDUCATION/TRAINING PROGRAM
Payer: MEDICARE

## 2024-06-18 DIAGNOSIS — R92.8 ABNORMAL MAMMOGRAM OF LEFT BREAST: ICD-10-CM

## 2024-06-18 DIAGNOSIS — R92.8 ABNORMAL MAMMOGRAM OF LEFT BREAST: Primary | ICD-10-CM

## 2024-06-18 PROCEDURE — 76642 ULTRASOUND BREAST LIMITED: CPT | Mod: TC,LT

## 2024-06-18 PROCEDURE — 76642 ULTRASOUND BREAST LIMITED: CPT | Mod: 26,LT,, | Performed by: RADIOLOGY

## 2024-06-18 PROCEDURE — 77061 BREAST TOMOSYNTHESIS UNI: CPT | Mod: 26,LT,, | Performed by: RADIOLOGY

## 2024-06-18 PROCEDURE — 77065 DX MAMMO INCL CAD UNI: CPT | Mod: 26,LT,, | Performed by: RADIOLOGY

## 2024-06-18 PROCEDURE — 77061 BREAST TOMOSYNTHESIS UNI: CPT | Mod: TC,LT

## 2024-06-20 ENCOUNTER — TELEPHONE (OUTPATIENT)
Dept: INTERNAL MEDICINE | Facility: CLINIC | Age: 69
End: 2024-06-20
Payer: MEDICARE

## 2024-06-26 DIAGNOSIS — I77.0 ARTERIOVENOUS FISTULA, ACQUIRED: Primary | ICD-10-CM

## 2024-08-01 ENCOUNTER — OFFICE VISIT (OUTPATIENT)
Dept: INTERNAL MEDICINE | Facility: CLINIC | Age: 69
End: 2024-08-01
Payer: MEDICARE

## 2024-08-01 VITALS
HEIGHT: 64 IN | SYSTOLIC BLOOD PRESSURE: 80 MMHG | DIASTOLIC BLOOD PRESSURE: 50 MMHG | BODY MASS INDEX: 28.64 KG/M2 | WEIGHT: 167.75 LBS | OXYGEN SATURATION: 94 % | TEMPERATURE: 98 F | HEART RATE: 68 BPM

## 2024-08-01 DIAGNOSIS — Z99.2 ESRD (END STAGE RENAL DISEASE) ON DIALYSIS: ICD-10-CM

## 2024-08-01 DIAGNOSIS — I95.89 OTHER SPECIFIED HYPOTENSION: Primary | ICD-10-CM

## 2024-08-01 DIAGNOSIS — I50.42 CHRONIC COMBINED SYSTOLIC AND DIASTOLIC CONGESTIVE HEART FAILURE: ICD-10-CM

## 2024-08-01 DIAGNOSIS — I48.0 PAROXYSMAL ATRIAL FIBRILLATION: ICD-10-CM

## 2024-08-01 DIAGNOSIS — N18.6 ESRD (END STAGE RENAL DISEASE) ON DIALYSIS: ICD-10-CM

## 2024-08-01 PROCEDURE — 99999 PR PBB SHADOW E&M-EST. PATIENT-LVL V: CPT | Mod: PBBFAC,,, | Performed by: STUDENT IN AN ORGANIZED HEALTH CARE EDUCATION/TRAINING PROGRAM

## 2024-08-01 NOTE — PROGRESS NOTES
Chief Complaint   Patient presents with    Hypotension     HPI: Meaghan Potter is a 69 y.o. female  with Pmhx listed below who presents to clinic for evaluation of hypotension. As per the patient, she reports to have low blood pressure at home mostly in 80/50's mmHg. She reports that it goes further down during dialysis. She has ESRD and is on dialysis three days a week. She report that her dialysis was stopped last time due to her being hypotensive. With regards to symptoms, she does have light headedness of occasion. Denies having it today, she denies having dizziness, palpitation, confusion, fever and other complains today. Her medication list consists of midodrine which she was prescribed 5 mg TID. She self increased the dose to 10 mg TID despite which reports to have had lower blood pressure reading. In addition, she also lowered down her lopressor to half and is only taking in the night, despite which she remains hypotension. She reports to have adequate hydration however.       Problem List:  Patient Active Problem List   Diagnosis    Atrial fibrillation    ESRD (end stage renal disease) on dialysis    Primary hypertension    Gastroesophageal reflux disease without esophagitis    Pyogenic arthritis of left hip    Tear of left acetabular labrum    Shortness of breath    Bone cyst of right femur    Pericardial effusion    Hyperkalemia, diminished renal excretion    Streptococcal bacteremia    Constipation    Chronic combined systolic and diastolic congestive heart failure    Arteriovenous fistula, acquired    Chronic obstructive pulmonary disease, unspecified COPD type    Diabetic peripheral neuropathy    Hypoalbuminemia due to protein-calorie malnutrition    Thrombophilia       ROS: Negative except as noted above.       Current Meds:  Current Outpatient Medications   Medication Sig Dispense Refill    brimonidine 0.2% (ALPHAGAN) 0.2 % Drop Place 1 drop into both eyes 2 (two) times daily.      ELIQUIS 2.5 mg  Tab Take 2.5 mg by mouth 2 (two) times daily.      ergocalciferol (ERGOCALCIFEROL) 50,000 unit Cap Take 50,000 Units by mouth every 7 days.      escitalopram oxalate (LEXAPRO) 5 MG Tab Take 5 mg by mouth once daily.  3    famotidine (PEPCID) 20 MG tablet Take 20 mg by mouth.      fluticasone propionate (FLONASE) 50 mcg/actuation nasal spray USE ONE SPRAY INTO EACH NOSTRIL TWICE DAILY , MAX 2 SPRAYS PER NOSTRIL IN 24 HOURS      fluticasone-salmeterol diskus inhaler 250-50 mcg Inhale into the lungs.      gabapentin (NEURONTIN) 400 MG capsule Take 400 mg by mouth nightly as needed.      meloxicam (MOBIC) 15 MG tablet Take 15 mg by mouth daily as needed.      metoprolol tartrate (LOPRESSOR) 25 MG tablet Take 0.5 tablets (12.5 mg total) by mouth 2 (two) times daily. 90 tablet 0    midodrine (PROAMATINE) 5 MG Tab Take 1 tablet (5 mg total) by mouth 3 (three) times daily with meals. 270 tablet 0    ondansetron (ZOFRAN-ODT) 4 MG TbDL       oxyCODONE-acetaminophen (PERCOCET)  mg per tablet Take 1 tablet by mouth every 6 (six) hours as needed.      senna-docusate 8.6-50 mg (PERICOLACE) 8.6-50 mg per tablet Take 1 tablet by mouth.      sevelamer carbonate (RENVELA) 800 mg Tab Take 1 tablet (800 mg total) by mouth 3 (three) times daily with meals. 90 tablet 0    VENTOLIN HFA 90 mcg/actuation inhaler 2 puffs 3 (three) times daily as needed.  6     No current facility-administered medications for this visit.      PE:  BP: (!) 80/50  Pulse: 68     Temp: 97.6 °F (36.4 °C)  Weight: 76.1 kg (167 lb 12.3 oz) Body mass index is 28.8 kg/m².    Wt Readings from Last 5 Encounters:   08/01/24 76.1 kg (167 lb 12.3 oz)   06/04/24 76.2 kg (167 lb 15.9 oz)   03/20/24 74.8 kg (165 lb)   03/06/24 74.9 kg (165 lb 2 oz)   06/22/23 68.5 kg (151 lb)     General appearance: alert and cooperative, not in acute distress  Head: normocephalic, without obvious abnormality, atraumatic  Eyes: conjunctivae/corneas clear. PERRL, EOM's intact.  Ears:  clear tympanic membranes   Neck: no adenopathy, supple, symmetrical, trachea midline and thyroid not enlarged, symmetric, no tenderness/mass/nodules, no JVD  Throat: lips, mucosa, and tongue normal; teeth and gums normal; no thrush  Chest: no reproducible chest pain   Heart: regular rate and rhythm, S1, S2 normal, no murmur, click, rub or gallop  Lungs: unlabored respiration, bilateral equal air entry, normal vesicular breath sound heard, no wheezing, rhonchi   Abdomen: soft, non-tender, non-distended; bowel sounds +; no masses,  no organomegaly, no ascites   Extremities: normal, atraumatic, no cyanosis or edema noted B/L upper and lower extremities.  Skin: skin color, texture, turgor normal. No rashes or lesions noted.  Neurologic: grossly intact      Lab:  Lab Results   Component Value Date    WBC 5.71 03/14/2023    HGB 13.0 07/29/2024    HCT 41.1 03/14/2023    MCV 97 03/14/2023     (L) 03/14/2023     06/04/2024    K 4.8 06/04/2024     06/04/2024    CO2 18 (L) 06/04/2024    BUN 56 (H) 07/01/2024    GLU 86 06/04/2024    CALCIUM 9.6 06/04/2024    PHOS 4.6 (H) 01/06/2023    AST 25 06/04/2024    ALT 12 06/04/2024    CHOL 183 06/04/2024    HDL 59 06/04/2024    LDLCALC 99.0 06/04/2024    TRIG 125 06/04/2024    INR 1.0 12/23/2022       Impression:    ICD-10-CM ICD-9-CM    1. Paroxysmal atrial fibrillation  I48.0 427.31 Ambulatory referral/consult to Cardiology      1. Paroxysmal atrial fibrillation  On lopressor , now taking 1 pill a day due to hypotensive epises  - Ambulatory referral/consult to Cardiology; Future    2. ESRD (end stage renal disease) on dialysis  Following [facility] [place]  Dialysis in Monday, Wednesday and Friday    3. Chronic combined systolic and diastolic congestive heart failure  No evidence of fluid overload on examination    4. Other specified hypotension  Now on lopressor once a day 12.5 mg in total due to hypotensive episodes  Already on midodrine 10 mg TID,  without relief  Reports to have adequate hydration  Has normal albumin on her last lab work from 06/04/2024    Future Appointments   Date Time Provider Department Center   8/15/2024 10:00 AM Gus Arriaga MD [facility] CARDIO [facility]   9/24/2024  8:00 AM Gerardo Mcwilliams MD [facility] RHEUM [facility]   11/6/2024 10:20 AM Mary Silva MD [facility] CARDIO [place]   12/5/2024 10:00 AM Terrence Elise MD [facility] IM [place]       I spent a total of 30 minutes on the day of the visit.This includes face to face time and non-face to face time preparing to see the patient (eg, review of tests), obtaining and/or reviewing separately obtained history, documenting clinical information in the electronic or other health record, independently interpreting results and communicating results to the patient/family/caregiver, or care coordinator.  Visit today included increased complexity associated with the care of the episodic problem   addressed and managing the longitudinal care of the patient due to the serious and/or complex managed problem(s) .     Terrence Elise MD

## 2024-08-08 DIAGNOSIS — I10 PRIMARY HYPERTENSION: Primary | ICD-10-CM

## 2024-08-08 DIAGNOSIS — I50.42 CHRONIC COMBINED SYSTOLIC AND DIASTOLIC CONGESTIVE HEART FAILURE: ICD-10-CM

## 2024-08-15 ENCOUNTER — HOSPITAL ENCOUNTER (OUTPATIENT)
Dept: CARDIOLOGY | Facility: HOSPITAL | Age: 69
Discharge: HOME | End: 2024-08-15
Attending: INTERNAL MEDICINE
Payer: MEDICARE

## 2024-08-15 ENCOUNTER — OFFICE VISIT (OUTPATIENT)
Dept: CARDIOLOGY | Facility: CLINIC | Age: 69
End: 2024-08-15
Payer: MEDICARE

## 2024-08-15 VITALS
HEIGHT: 64 IN | SYSTOLIC BLOOD PRESSURE: 98 MMHG | OXYGEN SATURATION: 99 % | BODY MASS INDEX: 26.76 KG/M2 | WEIGHT: 156.75 LBS | HEART RATE: 65 BPM | DIASTOLIC BLOOD PRESSURE: 70 MMHG

## 2024-08-15 DIAGNOSIS — I48.0 PAROXYSMAL ATRIAL FIBRILLATION: ICD-10-CM

## 2024-08-15 DIAGNOSIS — I95.89 OTHER SPECIFIED HYPOTENSION: Primary | ICD-10-CM

## 2024-08-15 DIAGNOSIS — Z99.2 ESRD (END STAGE RENAL DISEASE) ON DIALYSIS: ICD-10-CM

## 2024-08-15 DIAGNOSIS — I10 PRIMARY HYPERTENSION: ICD-10-CM

## 2024-08-15 DIAGNOSIS — I50.42 CHRONIC COMBINED SYSTOLIC AND DIASTOLIC CONGESTIVE HEART FAILURE: ICD-10-CM

## 2024-08-15 DIAGNOSIS — I31.39 PERICARDIAL EFFUSION: ICD-10-CM

## 2024-08-15 DIAGNOSIS — N18.6 ESRD (END STAGE RENAL DISEASE) ON DIALYSIS: ICD-10-CM

## 2024-08-15 PROBLEM — I95.9 HYPOTENSION: Status: ACTIVE | Noted: 2024-08-15

## 2024-08-15 PROCEDURE — 93005 ELECTROCARDIOGRAM TRACING: CPT

## 2024-08-15 PROCEDURE — 99999 PR PBB SHADOW E&M-EST. PATIENT-LVL IV: CPT | Mod: PBBFAC,,, | Performed by: INTERNAL MEDICINE

## 2024-08-15 NOTE — PROGRESS NOTES
Subjective:   Patient ID:  Meaghan Potter is a 69 y.o. female who presents for follow up of No chief complaint on file.      70 yo female, came in for hypotension.  PMH HTN CHFpEF  PAF,, ESRD on HD, off DM med over 10yrs.   F/u Dr. Silva    Recently BP low and held Metoprolol 2 weeks ago.   C/o dizziness lightheadedness.   echo EF 60%  EKG reviewed by myself today reveals NSR LVH nonspecific STT change  Weight loss 9 lbs in 6 m           Past Medical History:   Diagnosis Date    ESRD (end stage renal disease) on dialysis     GERD (gastroesophageal reflux disease)     Hypertension        Past Surgical History:   Procedure Laterality Date     SECTION      INCISION AND DRAINAGE, LOWER EXTREMITY Left 2022    Procedure: INCISION AND DRAINAGE, LOWER EXTREMITY;  Surgeon: Kim Johnson MD;  Location: [facility] OR;  Service: Orthopedics;  Laterality: Left;    PLACEMENT OF DIALYSIS ACCESS      TRANSESOPHAGEAL ECHOCARDIOGRAPHY N/A 2022    Procedure: ECHOCARDIOGRAM, TRANSESOPHAGEAL;  Surgeon: Mary Silva MD;  Location: [facility] CATH LAB;  Service: Cardiology;  Laterality: N/A;       Social History     Tobacco Use    Smoking status: Former    Smokeless tobacco: Never   Substance Use Topics    Alcohol use: No     Comment: occassionally    Drug use: No       Family History   Problem Relation Name Age of Onset    Diabetes Mother      Hypertension Mother      Diabetes Father      Hypertension Father      Breast cancer Sister           ROS    Objective:   Physical Exam  Vitals reviewed.   Constitutional:       Appearance: She is well-developed.   Neck:      Vascular: No carotid bruit.   Cardiovascular:      Rate and Rhythm: Normal rate and regular rhythm.      Pulses: Intact distal pulses.      Heart sounds: Normal heart sounds. No murmur heard.  Pulmonary:      Breath sounds: Normal breath sounds.   Neurological:      Mental Status: She is oriented to person, place, and time.         Lab Results   Component  "Value Date    CHOL 183 06/04/2024     Lab Results   Component Value Date    HDL 59 06/04/2024     Lab Results   Component Value Date    LDLCALC 99.0 06/04/2024     Lab Results   Component Value Date    TRIG 125 06/04/2024     Lab Results   Component Value Date    CHOLHDL 32.2 06/04/2024       Chemistry        Component Value Date/Time     06/04/2024 1109    K 4.8 06/04/2024 1109     06/04/2024 1109    CO2 18 (L) 06/04/2024 1109    BUN 49 (H) 08/05/2024 0000    BUN 26 (H) 06/04/2024 1109    CREATININE 7.4 (H) 06/04/2024 1109    GLU 86 06/04/2024 1109        Component Value Date/Time    CALCIUM 9.6 06/04/2024 1109    ALKPHOS 143 (H) 06/04/2024 1109    AST 25 06/04/2024 1109    ALT 12 06/04/2024 1109    BILITOT 0.8 06/04/2024 1109    ESTGFRAFRICA 15.1 (A) 05/31/2019 1236    EGFRNONAA 13.1 (A) 05/31/2019 1236          Lab Results   Component Value Date    HGBA1C 4.9 06/10/2024     No results found for: "TSH"  Lab Results   Component Value Date    INR 1.0 12/23/2022    INR 1.1 12/20/2022    INR 1.1 03/19/2019     Lab Results   Component Value Date    WBC 5.71 03/14/2023    HGB 12.8 08/05/2024    HCT 41.1 03/14/2023    MCV 97 03/14/2023     (L) 03/14/2023     BMP  Sodium   Date Value Ref Range Status   06/04/2024 138 136 - 145 mmol/L Final     Potassium   Date Value Ref Range Status   06/04/2024 4.8 3.5 - 5.1 mmol/L Final     Chloride   Date Value Ref Range Status   06/04/2024 104 95 - 110 mmol/L Final     CO2   Date Value Ref Range Status   06/04/2024 18 (L) 23 - 29 mmol/L Final     BUN   Date Value Ref Range Status   06/04/2024 26 (H) 8 - 23 mg/dL Final     Blood Urea Nitrogen   Date Value Ref Range Status   08/05/2024 49 (H) 6 - 19 mg/dL Final     Creatinine   Date Value Ref Range Status   06/04/2024 7.4 (H) 0.5 - 1.4 mg/dL Final     Calcium   Date Value Ref Range Status   06/04/2024 9.6 8.7 - 10.5 mg/dL Final     Anion Gap   Date Value Ref Range Status   06/04/2024 16 8 - 16 mmol/L Final     eGFR if "    Date Value Ref Range Status   05/31/2019 15.1 (A) >60 mL/min/1.73 m^2 Final     eGFR if non    Date Value Ref Range Status   05/31/2019 13.1 (A) >60 mL/min/1.73 m^2 Final     Comment:     Calculation used to obtain the estimated glomerular filtration  rate (eGFR) is the CKD-EPI equation.        BNP  @LABRCNTIP(BNP,BNPTRIAGEBLO)@  @LABRCNTIP(troponini)@  CrCl cannot be calculated (Patient's most recent lab result is older than the maximum 7 days allowed.).  No results found in the last 24 hours.  No results found in the last 24 hours.  No results found in the last 24 hours.    Assessment:      1. Other specified hypotension    2. Paroxysmal atrial fibrillation    3. ESRD (end stage renal disease) on dialysis    4. Primary hypertension    5. Pericardial effusion    6. Chronic combined systolic and diastolic congestive heart failure        Plan:   Ok to hold metoprolol   Repeat echo for hypotension  Continue midodrine 10 mg tid as needed  Continue eliquis 5 mg bid  HD per nephrology  Rtc as scheduled

## 2024-08-16 LAB
OHS QRS DURATION: 108 MS
OHS QTC CALCULATION: 424 MS

## 2024-09-13 ENCOUNTER — OFFICE VISIT (OUTPATIENT)
Dept: INTERNAL MEDICINE | Facility: CLINIC | Age: 69
End: 2024-09-13
Payer: MEDICARE

## 2024-09-13 VITALS
WEIGHT: 169.75 LBS | TEMPERATURE: 97 F | HEIGHT: 64 IN | DIASTOLIC BLOOD PRESSURE: 60 MMHG | OXYGEN SATURATION: 99 % | BODY MASS INDEX: 28.98 KG/M2 | SYSTOLIC BLOOD PRESSURE: 100 MMHG | HEART RATE: 81 BPM

## 2024-09-13 DIAGNOSIS — N18.6 ESRD (END STAGE RENAL DISEASE) ON DIALYSIS: ICD-10-CM

## 2024-09-13 DIAGNOSIS — J44.9 CHRONIC OBSTRUCTIVE PULMONARY DISEASE, UNSPECIFIED COPD TYPE: ICD-10-CM

## 2024-09-13 DIAGNOSIS — I50.42 CHRONIC COMBINED SYSTOLIC AND DIASTOLIC CONGESTIVE HEART FAILURE: ICD-10-CM

## 2024-09-13 DIAGNOSIS — N89.8 VAGINAL DISCHARGE: Primary | ICD-10-CM

## 2024-09-13 DIAGNOSIS — I48.0 PAROXYSMAL ATRIAL FIBRILLATION: ICD-10-CM

## 2024-09-13 DIAGNOSIS — N76.0 ACUTE VAGINITIS: ICD-10-CM

## 2024-09-13 DIAGNOSIS — Z99.2 ESRD (END STAGE RENAL DISEASE) ON DIALYSIS: ICD-10-CM

## 2024-09-13 DIAGNOSIS — I95.9 HYPOTENSION, UNSPECIFIED HYPOTENSION TYPE: ICD-10-CM

## 2024-09-13 PROCEDURE — 87491 CHLMYD TRACH DNA AMP PROBE: CPT | Performed by: NURSE PRACTITIONER

## 2024-09-13 PROCEDURE — 99999 PR PBB SHADOW E&M-EST. PATIENT-LVL IV: CPT | Mod: PBBFAC,,, | Performed by: NURSE PRACTITIONER

## 2024-09-13 PROCEDURE — 81514 NFCT DS BV&VAGINITIS DNA ALG: CPT | Performed by: NURSE PRACTITIONER

## 2024-09-13 RX ORDER — MIDODRINE HYDROCHLORIDE 5 MG/1
5 TABLET ORAL
Qty: 270 TABLET | Refills: 0 | Status: SHIPPED | OUTPATIENT
Start: 2024-09-13 | End: 2024-12-12

## 2024-09-13 RX ORDER — FLUCONAZOLE 150 MG/1
150 TABLET ORAL ONCE
Qty: 2 TABLET | Refills: 0 | Status: SHIPPED | OUTPATIENT
Start: 2024-09-13 | End: 2024-09-13

## 2024-09-13 NOTE — PROGRESS NOTES
Subjective:       Patient ID: Meaghan Potter is a 69 y.o. female.    Chief Complaint: Vaginal Discharge (Itching, greenish discharge)      Ms. Potter presents to clinic for vaginal irritation and discharge. Symptoms started few weeks prior. Thin discharge greenish in color. Discharge is improving. She was recently sexually active for the first time in over 8 months. No urine output due to HD.    Vaginal Discharge  The patient's primary symptoms include genital itching and vaginal discharge. The patient's pertinent negatives include no genital lesions, genital odor, pelvic pain or vaginal bleeding. This is a new problem. The current episode started in the past 7 days. Associated symptoms include chills (at times) and nausea. Pertinent negatives include no abdominal pain, fever, sore throat or vomiting.       Patient Active Problem List   Diagnosis    Atrial fibrillation    ESRD (end stage renal disease) on dialysis    Primary hypertension    Gastroesophageal reflux disease without esophagitis    Pyogenic arthritis of left hip    Tear of left acetabular labrum    Shortness of breath    Bone cyst of right femur    Pericardial effusion    Hyperkalemia, diminished renal excretion    Streptococcal bacteremia    Constipation    Chronic combined systolic and diastolic congestive heart failure    Arteriovenous fistula, acquired    Chronic obstructive pulmonary disease, unspecified COPD type    Diabetic peripheral neuropathy    Hypoalbuminemia due to protein-calorie malnutrition    Thrombophilia    Hypotension       Family History   Problem Relation Name Age of Onset    Diabetes Mother      Hypertension Mother      Diabetes Father      Hypertension Father      Breast cancer Sister       Past Surgical History:   Procedure Laterality Date     SECTION      INCISION AND DRAINAGE, LOWER EXTREMITY Left 2022    Procedure: INCISION AND DRAINAGE, LOWER EXTREMITY;  Surgeon: Kim Johnson MD;  Location: [facility] OR;  Service:  Orthopedics;  Laterality: Left;    PLACEMENT OF DIALYSIS ACCESS      TRANSESOPHAGEAL ECHOCARDIOGRAPHY N/A 12/29/2022    Procedure: ECHOCARDIOGRAM, TRANSESOPHAGEAL;  Surgeon: Mary Silva MD;  Location: [facility] CATH LAB;  Service: Cardiology;  Laterality: N/A;         Current Outpatient Medications:     brimonidine 0.2% (ALPHAGAN) 0.2 % Drop, Place 1 drop into both eyes 2 (two) times daily., Disp: , Rfl:     ELIQUIS 2.5 mg Tab, Take 2.5 mg by mouth 2 (two) times daily., Disp: , Rfl:     ergocalciferol (ERGOCALCIFEROL) 50,000 unit Cap, Take 50,000 Units by mouth every 7 days., Disp: , Rfl:     famotidine (PEPCID) 20 MG tablet, Take 20 mg by mouth., Disp: , Rfl:     fluticasone propionate (FLONASE) 50 mcg/actuation nasal spray, USE ONE SPRAY INTO EACH NOSTRIL TWICE DAILY , MAX 2 SPRAYS PER NOSTRIL IN 24 HOURS, Disp: , Rfl:     fluticasone-salmeterol diskus inhaler 250-50 mcg, Inhale into the lungs., Disp: , Rfl:     gabapentin (NEURONTIN) 400 MG capsule, Take 400 mg by mouth nightly as needed., Disp: , Rfl:     meloxicam (MOBIC) 15 MG tablet, Take 15 mg by mouth daily as needed., Disp: , Rfl:     ondansetron (ZOFRAN-ODT) 4 MG TbDL, , Disp: , Rfl:     oxyCODONE-acetaminophen (PERCOCET)  mg per tablet, Take 1 tablet by mouth every 6 (six) hours as needed., Disp: , Rfl:     senna-docusate 8.6-50 mg (PERICOLACE) 8.6-50 mg per tablet, Take 1 tablet by mouth., Disp: , Rfl:     VENTOLIN HFA 90 mcg/actuation inhaler, 2 puffs 3 (three) times daily as needed., Disp: , Rfl: 6    escitalopram oxalate (LEXAPRO) 5 MG Tab, Take 5 mg by mouth once daily. (Patient not taking: Reported on 9/13/2024), Disp: , Rfl: 3    metroNIDAZOLE (FLAGYL) 500 MG tablet, Take 1 tablet (500 mg total) by mouth every 12 (twelve) hours. for 7 days, Disp: 14 tablet, Rfl: 0    midodrine (PROAMATINE) 5 MG Tab, Take 1 tablet (5 mg total) by mouth 3 (three) times daily with meals., Disp: 270 tablet, Rfl: 0    sevelamer carbonate (RENVELA) 800 mg Tab,  "Take 1 tablet (800 mg total) by mouth 3 (three) times daily with meals., Disp: 90 tablet, Rfl: 0    Review of Systems   Constitutional:  Positive for chills (at times). Negative for fever.   HENT:  Negative for sore throat.    Gastrointestinal:  Positive for nausea. Negative for abdominal pain and vomiting.   Genitourinary:  Positive for vaginal discharge and vaginal pain. Negative for genital sores, pelvic pain and vaginal bleeding.       Objective:   /60 (BP Location: Left arm, Patient Position: Sitting, BP Method: Medium (Manual))   Pulse 81   Temp 97 °F (36.1 °C) (Tympanic)   Ht 5' 4" (1.626 m)   Wt 77 kg (169 lb 12.1 oz)   SpO2 99%   BMI 29.14 kg/m²      Physical Exam  Exam conducted with a chaperone present.   Constitutional:       General: She is not in acute distress.     Appearance: Normal appearance. She is not ill-appearing.   Pulmonary:      Effort: Pulmonary effort is normal. No respiratory distress.   Genitourinary:     Labia:         Right: No rash or tenderness.         Left: No rash or tenderness.       Vagina: Vaginal discharge (thin, white discharge) and tenderness present.   Neurological:      Mental Status: She is alert and oriented to person, place, and time.   Psychiatric:         Mood and Affect: Mood normal.         Behavior: Behavior normal.         Assessment & Plan       1. Vaginal discharge  -     Vaginosis Screen by DNA Probe; Future; Expected date: 09/13/2024  -     C. trachomatis/N. gonorrhoeae by AMP DNA [facility]; Vagina    2. Acute vaginitis  -     fluconazole (DIFLUCAN) 150 MG Tab; Take 1 tablet (150 mg total) by mouth once. May repeat in 72 hours if needed. for 1 dose  Dispense: 2 tablet; Refill: 0    3. Paroxysmal atrial fibrillation  Assessment & Plan:  Rate controlled. Continue Eliquis. Followed by cardiology.    Orders:  -     midodrine (PROAMATINE) 5 MG Tab; Take 1 tablet (5 mg total) by mouth 3 (three) times daily with meals.  Dispense: 270 tablet; Refill: 0    4. " "ESRD (end stage renal disease) on dialysis  Assessment & Plan:  Chronic. Continue HD M/W/F. Followed by Dr. Ribera      5. Chronic obstructive pulmonary disease, unspecified COPD type  Assessment & Plan:  Chronic/stable. No signs of exacerbation. Continue inhalers      6. Chronic combined systolic and diastolic congestive heart failure  Assessment & Plan:  Latest ECHO performed and demonstrates- Results for orders placed during the hospital encounter of 03/20/24    Echo    Interpretation Summary    Left Ventricle: The left ventricle is normal in size. Normal wall thickness. There is concentric remodeling. There is normal systolic function with a visually estimated ejection fraction of 60 - 65%. There is normal diastolic function.    Right Ventricle: Normal right ventricular cavity size. Wall thickness is normal. Right ventricle wall motion  is normal. Systolic function is normal.    Tricuspid Valve: There is mild regurgitation with a centrally directed jet.    Pulmonary Artery: The estimated pulmonary artery systolic pressure is 50 mmHg.    IVC/SVC: Normal venous pressure at 3 mmHg.    Chronic/stable. No signs of decompensation. Continue current medications. Followed by cardiology.      7. Hypotension, unspecified hypotension type  Assessment & Plan:  Stable. Continue midodrine.          RAMÓN Diamond        Portions of this note may have been created with voice recognition software. Occasional "wrong-word" or "sound-a-like" substitutions may have occurred due to the inherent limitations of voice recognition software. Please, read the note carefully and recognize, using context, where substitutions have occurred.        "

## 2024-09-14 LAB
C TRACH DNA SPEC QL NAA+PROBE: NOT DETECTED
N GONORRHOEA DNA SPEC QL NAA+PROBE: NOT DETECTED

## 2024-09-15 LAB
BACTERIAL VAGINOSIS DNA: NEGATIVE
CANDIDA GLABRATA DNA: NEGATIVE
CANDIDA KRUSEI DNA: NEGATIVE
CANDIDA RRNA VAG QL PROBE: NEGATIVE
T VAGINALIS RRNA GENITAL QL PROBE: POSITIVE

## 2024-09-16 DIAGNOSIS — A59.9 TRICHOMONIASIS: Primary | ICD-10-CM

## 2024-09-16 RX ORDER — METRONIDAZOLE 500 MG/1
500 TABLET ORAL EVERY 12 HOURS
Qty: 14 TABLET | Refills: 0 | Status: SHIPPED | OUTPATIENT
Start: 2024-09-16 | End: 2024-09-23

## 2024-09-16 NOTE — ASSESSMENT & PLAN NOTE
Latest ECHO performed and demonstrates- Results for orders placed during the hospital encounter of 03/20/24    Echo    Interpretation Summary    Left Ventricle: The left ventricle is normal in size. Normal wall thickness. There is concentric remodeling. There is normal systolic function with a visually estimated ejection fraction of 60 - 65%. There is normal diastolic function.    Right Ventricle: Normal right ventricular cavity size. Wall thickness is normal. Right ventricle wall motion  is normal. Systolic function is normal.    Tricuspid Valve: There is mild regurgitation with a centrally directed jet.    Pulmonary Artery: The estimated pulmonary artery systolic pressure is 50 mmHg.    IVC/SVC: Normal venous pressure at 3 mmHg.    Chronic/stable. No signs of decompensation. Continue current medications. Followed by cardiology.

## 2024-09-18 ENCOUNTER — TELEPHONE (OUTPATIENT)
Dept: RHEUMATOLOGY | Facility: CLINIC | Age: 69
End: 2024-09-18
Payer: MEDICARE

## 2024-09-19 ENCOUNTER — HOSPITAL ENCOUNTER (OUTPATIENT)
Dept: CARDIOLOGY | Facility: HOSPITAL | Age: 69
Discharge: HOME | End: 2024-09-19
Attending: INTERNAL MEDICINE
Payer: MEDICARE

## 2024-09-19 VITALS
BODY MASS INDEX: 28.85 KG/M2 | WEIGHT: 169 LBS | SYSTOLIC BLOOD PRESSURE: 100 MMHG | DIASTOLIC BLOOD PRESSURE: 60 MMHG | HEART RATE: 80 BPM | HEIGHT: 64 IN

## 2024-09-19 DIAGNOSIS — I95.89 OTHER SPECIFIED HYPOTENSION: ICD-10-CM

## 2024-09-19 LAB
AORTIC ROOT ANNULUS: 3.3 CM
AORTIC VALVE CUSP SEPERATION: 0 CM
AV INDEX (PROSTH): 0.87
AV MEAN GRADIENT: 5 MMHG
AV PEAK GRADIENT: 8 MMHG
AV VALVE AREA BY VELOCITY RATIO: 2.64 CM²
AV VALVE AREA: 2.68 CM²
AV VELOCITY RATIO: 0.86
BSA FOR ECHO PROCEDURE: 1.86 M2
CV ECHO LV RWT: 0.54 CM
DOP CALC AO PEAK VEL: 1.4 M/S
DOP CALC AO VTI: 22.7 CM
DOP CALC LVOT AREA: 3.1 CM2
DOP CALC LVOT DIAMETER: 1.98 CM
DOP CALC LVOT PEAK VEL: 1.2 M/S
DOP CALC LVOT STROKE VOLUME: 60.93 CM3
DOP CALC RVOT PEAK VEL: 0.97 M/S
DOP CALC RVOT VTI: 22.6 CM
DOP CALCLVOT PEAK VEL VTI: 19.8 CM
E WAVE DECELERATION TIME: 176.17 MSEC
E/A RATIO: 0.57
E/E' RATIO: 7.09 M/S
ECHO LV POSTERIOR WALL: 1.18 CM (ref 0.6–1.1)
EJECTION FRACTION: 60 %
FRACTIONAL SHORTENING: 28 % (ref 28–44)
INTERVENTRICULAR SEPTUM: 1.18 CM (ref 0.6–1.1)
IVRT: 162.63 MSEC
LA MAJOR: 4.38 CM
LA MINOR: 4.07 CM
LA WIDTH: 3.6 CM
LEFT ATRIUM AREA SYSTOLIC (APICAL 2 CHAMBER): 16.98 CM2
LEFT ATRIUM AREA SYSTOLIC (APICAL 4 CHAMBER): 20 CM2
LEFT ATRIUM SIZE: 3.88 CM
LEFT ATRIUM VOLUME INDEX: 27.5 ML/M2
LEFT ATRIUM VOLUME: 50.1 CM3
LEFT INTERNAL DIMENSION IN SYSTOLE: 3.15 CM (ref 2.1–4)
LEFT VENTRICLE DIASTOLIC VOLUME INDEX: 47.42 ML/M2
LEFT VENTRICLE DIASTOLIC VOLUME: 86.3 ML
LEFT VENTRICLE END SYSTOLIC VOLUME APICAL 2 CHAMBER: 45.78 ML
LEFT VENTRICLE END SYSTOLIC VOLUME APICAL 4 CHAMBER: 61.31 ML
LEFT VENTRICLE MASS INDEX: 102 G/M2
LEFT VENTRICLE SYSTOLIC VOLUME INDEX: 21.6 ML/M2
LEFT VENTRICLE SYSTOLIC VOLUME: 39.29 ML
LEFT VENTRICULAR INTERNAL DIMENSION IN DIASTOLE: 4.37 CM (ref 3.5–6)
LEFT VENTRICULAR MASS: 184.78 G
LV LATERAL E/E' RATIO: 5.57 M/S
LV SEPTAL E/E' RATIO: 9.75 M/S
LVED V (TEICH): 86.3 ML
LVES V (TEICH): 39.29 ML
LVOT MG: 3.58 MMHG
LVOT MV: 0.9 CM/S
MV PEAK A VEL: 0.68 M/S
MV PEAK E VEL: 0.39 M/S
MV STENOSIS PRESSURE HALF TIME: 51.09 MS
MV VALVE AREA P 1/2 METHOD: 4.31 CM2
OHS CV RV/LV RATIO: 0.76 CM
PISA TR MAX VEL: 2.88 M/S
PV MEAN GRADIENT: 2 MMHG
PV PEAK GRADIENT: 5 MMHG
PV PEAK VELOCITY: 1.1 M/S
RA MAJOR: 4.21 CM
RA PRESSURE ESTIMATED: 3 MMHG
RA WIDTH: 2.73 CM
RIGHT VENTRICULAR END-DIASTOLIC DIMENSION: 3.33 CM
RV TB RVSP: 6 MMHG
SINUS: 3.1 CM
STJ: 3.04 CM
TDI LATERAL: 0.07 M/S
TDI SEPTAL: 0.04 M/S
TDI: 0.06 M/S
TR MAX PG: 33 MMHG
TRICUSPID ANNULAR PLANE SYSTOLIC EXCURSION: 1.1 CM
TV REST PULMONARY ARTERY PRESSURE: 36 MMHG
Z-SCORE OF LEFT VENTRICULAR DIMENSION IN END DIASTOLE: -1.42
Z-SCORE OF LEFT VENTRICULAR DIMENSION IN END SYSTOLE: 0.1

## 2024-09-19 PROCEDURE — 93306 TTE W/DOPPLER COMPLETE: CPT | Mod: 26,,, | Performed by: INTERNAL MEDICINE

## 2024-09-19 PROCEDURE — 93306 TTE W/DOPPLER COMPLETE: CPT | Mod: PO

## 2024-09-20 ENCOUNTER — TELEPHONE (OUTPATIENT)
Dept: CARDIOLOGY | Facility: CLINIC | Age: 69
End: 2024-09-20
Payer: MEDICARE

## 2024-09-20 NOTE — TELEPHONE ENCOUNTER
Spoke with pt in regard to test results. Pt verbalized understanding information Without any concerns.             ----- Message from Gus Arriaga MD sent at 9/19/2024 11:06 PM CDT -----  The Echo showed normal function   Continue current Rx.   F/U as scheduled

## 2024-10-07 DIAGNOSIS — I95.9 HYPOTENSION, UNSPECIFIED HYPOTENSION TYPE: Primary | ICD-10-CM

## 2024-10-07 DIAGNOSIS — I48.0 PAROXYSMAL ATRIAL FIBRILLATION: ICD-10-CM

## 2024-10-07 RX ORDER — MIDODRINE HYDROCHLORIDE 5 MG/1
TABLET ORAL
Qty: 270 TABLET | Refills: 0 | Status: SHIPPED | OUTPATIENT
Start: 2024-10-07

## 2024-10-10 ENCOUNTER — OFFICE VISIT (OUTPATIENT)
Dept: INTERNAL MEDICINE | Facility: CLINIC | Age: 69
End: 2024-10-10
Payer: MEDICARE

## 2024-10-10 VITALS
TEMPERATURE: 98 F | RESPIRATION RATE: 18 BRPM | WEIGHT: 170 LBS | HEIGHT: 64 IN | SYSTOLIC BLOOD PRESSURE: 84 MMHG | OXYGEN SATURATION: 95 % | DIASTOLIC BLOOD PRESSURE: 48 MMHG | BODY MASS INDEX: 29.02 KG/M2 | HEART RATE: 84 BPM

## 2024-10-10 DIAGNOSIS — Z99.2 ESRD (END STAGE RENAL DISEASE) ON DIALYSIS: ICD-10-CM

## 2024-10-10 DIAGNOSIS — I50.42 CHRONIC COMBINED SYSTOLIC AND DIASTOLIC CONGESTIVE HEART FAILURE: ICD-10-CM

## 2024-10-10 DIAGNOSIS — I95.9 HYPOTENSION, UNSPECIFIED HYPOTENSION TYPE: ICD-10-CM

## 2024-10-10 DIAGNOSIS — N18.6 ESRD (END STAGE RENAL DISEASE) ON DIALYSIS: ICD-10-CM

## 2024-10-10 DIAGNOSIS — Z86.19 HISTORY OF TRICHOMONIASIS: Primary | ICD-10-CM

## 2024-10-10 PROCEDURE — 99999 PR PBB SHADOW E&M-EST. PATIENT-LVL IV: CPT | Mod: PBBFAC,,, | Performed by: NURSE PRACTITIONER

## 2024-10-10 NOTE — PROGRESS NOTES
Subjective:       Patient ID: Meaghan Potter is a 69 y.o. female.    Chief Complaint: vaginal swab    History of Present Illness             Ms. Potter returns to clinic to repeat vaginosis screen. She tested positive for trich and treated with metronidazole. Reports vaginal irritation and discharge have resolved. She notified partner of positive results. She has abstained from intercourse.      Patient Active Problem List   Diagnosis    Atrial fibrillation    ESRD (end stage renal disease) on dialysis    Primary hypertension    Gastroesophageal reflux disease without esophagitis    Pyogenic arthritis of left hip    Tear of left acetabular labrum    Shortness of breath    Bone cyst of right femur    Pericardial effusion    Hyperkalemia, diminished renal excretion    Streptococcal bacteremia    Constipation    Chronic combined systolic and diastolic congestive heart failure    Arteriovenous fistula, acquired    Chronic obstructive pulmonary disease, unspecified COPD type    Diabetic peripheral neuropathy    Hypoalbuminemia due to protein-calorie malnutrition    Thrombophilia    Hypotension       Family History   Problem Relation Name Age of Onset    Diabetes Mother      Hypertension Mother      Diabetes Father      Hypertension Father      Breast cancer Sister       Past Surgical History:   Procedure Laterality Date     SECTION      INCISION AND DRAINAGE, LOWER EXTREMITY Left 2022    Procedure: INCISION AND DRAINAGE, LOWER EXTREMITY;  Surgeon: Kim Johnson MD;  Location: [facility] OR;  Service: Orthopedics;  Laterality: Left;    PLACEMENT OF DIALYSIS ACCESS      TRANSESOPHAGEAL ECHOCARDIOGRAPHY N/A 2022    Procedure: ECHOCARDIOGRAM, TRANSESOPHAGEAL;  Surgeon: Mary Silva MD;  Location: [facility] CATH LAB;  Service: Cardiology;  Laterality: N/A;         Current Outpatient Medications:     brimonidine 0.2% (ALPHAGAN) 0.2 % Drop, Place 1 drop into both eyes 2 (two) times daily., Disp: , Rfl:     ELIQUIS  2.5 mg Tab, Take 2.5 mg by mouth 2 (two) times daily., Disp: , Rfl:     ergocalciferol (ERGOCALCIFEROL) 50,000 unit Cap, Take 50,000 Units by mouth every 7 days., Disp: , Rfl:     escitalopram oxalate (LEXAPRO) 5 MG Tab, Take 5 mg by mouth once daily., Disp: , Rfl: 3    famotidine (PEPCID) 20 MG tablet, Take 20 mg by mouth., Disp: , Rfl:     fluticasone propionate (FLONASE) 50 mcg/actuation nasal spray, USE ONE SPRAY INTO EACH NOSTRIL TWICE DAILY , MAX 2 SPRAYS PER NOSTRIL IN 24 HOURS, Disp: , Rfl:     fluticasone-salmeterol diskus inhaler 250-50 mcg, Inhale into the lungs., Disp: , Rfl:     gabapentin (NEURONTIN) 400 MG capsule, Take 400 mg by mouth nightly as needed., Disp: , Rfl:     meloxicam (MOBIC) 15 MG tablet, Take 15 mg by mouth daily as needed., Disp: , Rfl:     midodrine (PROAMATINE) 5 MG Tab, TAKE 1 TABLET (5 MG TOTAL) BY MOUTH 3 TIMES DAILY WITH MEALS., Disp: 270 tablet, Rfl: 0    ondansetron (ZOFRAN-ODT) 4 MG TbDL, , Disp: , Rfl:     oxyCODONE-acetaminophen (PERCOCET)  mg per tablet, Take 1 tablet by mouth every 6 (six) hours as needed., Disp: , Rfl:     senna-docusate 8.6-50 mg (PERICOLACE) 8.6-50 mg per tablet, Take 1 tablet by mouth., Disp: , Rfl:     VENTOLIN HFA 90 mcg/actuation inhaler, 2 puffs 3 (three) times daily as needed., Disp: , Rfl: 6    sevelamer carbonate (RENVELA) 800 mg Tab, Take 1 tablet (800 mg total) by mouth 3 (three) times daily with meals., Disp: 90 tablet, Rfl: 0    Review of Systems   Constitutional:  Negative for chills, fatigue and fever.   HENT:  Negative for sore throat.    Cardiovascular:  Negative for chest pain.   Gastrointestinal:  Negative for abdominal pain, nausea and vomiting.   Genitourinary:  Negative for genital sores, pelvic pain, vaginal bleeding, vaginal discharge and vaginal pain.   Neurological:  Negative for dizziness, syncope, weakness, light-headedness and headaches.       Objective:   BP (!) 84/48 Comment: asymptomatic  Pulse 84   Temp 97.5 °F  "(36.4 °C) (Tympanic)   Resp 18   Ht 5' 4" (1.626 m)   Wt 77.1 kg (169 lb 15.6 oz)   SpO2 95%   BMI 29.18 kg/m²      Physical Exam  Exam conducted with a chaperone present.   Constitutional:       General: She is not in acute distress.     Appearance: Normal appearance. She is not ill-appearing.   Cardiovascular:      Rate and Rhythm: Normal rate.   Pulmonary:      Effort: Pulmonary effort is normal. No respiratory distress.   Genitourinary:     Labia:         Right: No rash or tenderness.         Left: No rash or tenderness.       Vagina: No foreign body. No vaginal discharge, erythema or tenderness.   Neurological:      Mental Status: She is alert and oriented to person, place, and time.   Psychiatric:         Mood and Affect: Mood normal.         Behavior: Behavior normal.         Assessment & Plan     Assessment & Plan             1. History of trichomoniasis  Comments:  Repeat vaginosis screen today. Completed metronidazole.  Orders:  -     Vaginosis Screen by DNA Probe; Future; Expected date: 10/10/2024    2. Hypotension, unspecified hypotension type  Assessment & Plan:  BP low today in clinic. Asymptomatic. Took midodrine prior to arrival.      3. ESRD (end stage renal disease) on dialysis  Assessment & Plan:  Chronic. Continue HD M/W/F. Followed by Dr. Ribera      4. Chronic combined systolic and diastolic congestive heart failure  Assessment & Plan:  Latest ECHO performed and demonstrates- Results for orders placed during the hospital encounter of 03/20/24    Echo    Interpretation Summary    Left Ventricle: The left ventricle is normal in size. Normal wall thickness. There is concentric remodeling. There is normal systolic function with a visually estimated ejection fraction of 60 - 65%. There is normal diastolic function.    Right Ventricle: Normal right ventricular cavity size. Wall thickness is normal. Right ventricle wall motion  is normal. Systolic function is normal.    Tricuspid Valve: There is " "mild regurgitation with a centrally directed jet.    Pulmonary Artery: The estimated pulmonary artery systolic pressure is 50 mmHg.    IVC/SVC: Normal venous pressure at 3 mmHg.    Chronic/stable. No signs of decompensation. Continue current medications. Followed by cardiology.          RAMÓN Diamond           Portions of this note may have been created with voice recognition software. Occasional "wrong-word" or "sound-a-like" substitutions may have occurred due to the inherent limitations of voice recognition software. Please, read the note carefully and recognize, using context, where substitutions have occurred.        "

## 2024-10-14 DIAGNOSIS — B96.89 BACTERIAL VAGINOSIS: Primary | ICD-10-CM

## 2024-10-14 DIAGNOSIS — N76.0 BACTERIAL VAGINOSIS: Primary | ICD-10-CM

## 2024-10-14 RX ORDER — METRONIDAZOLE 500 MG/1
500 TABLET ORAL EVERY 12 HOURS
Qty: 14 TABLET | Refills: 0 | Status: SHIPPED | OUTPATIENT
Start: 2024-10-14 | End: 2024-10-21

## 2024-10-31 ENCOUNTER — OFFICE VISIT (OUTPATIENT)
Dept: INTERNAL MEDICINE | Facility: CLINIC | Age: 69
End: 2024-10-31
Payer: MEDICARE

## 2024-10-31 VITALS
SYSTOLIC BLOOD PRESSURE: 100 MMHG | OXYGEN SATURATION: 99 % | BODY MASS INDEX: 29.06 KG/M2 | DIASTOLIC BLOOD PRESSURE: 62 MMHG | RESPIRATION RATE: 18 BRPM | HEIGHT: 64 IN | WEIGHT: 170.19 LBS | HEART RATE: 88 BPM | TEMPERATURE: 98 F

## 2024-10-31 DIAGNOSIS — J44.9 CHRONIC OBSTRUCTIVE PULMONARY DISEASE, UNSPECIFIED COPD TYPE: ICD-10-CM

## 2024-10-31 DIAGNOSIS — Z99.2 ESRD (END STAGE RENAL DISEASE) ON DIALYSIS: ICD-10-CM

## 2024-10-31 DIAGNOSIS — E46 HYPOALBUMINEMIA DUE TO PROTEIN-CALORIE MALNUTRITION: ICD-10-CM

## 2024-10-31 DIAGNOSIS — E11.42 DIABETIC PERIPHERAL NEUROPATHY: ICD-10-CM

## 2024-10-31 DIAGNOSIS — M81.0 AGE-RELATED OSTEOPOROSIS WITHOUT CURRENT PATHOLOGICAL FRACTURE: ICD-10-CM

## 2024-10-31 DIAGNOSIS — D63.1 ANEMIA IN CHRONIC KIDNEY DISEASE, UNSPECIFIED CKD STAGE: ICD-10-CM

## 2024-10-31 DIAGNOSIS — E11.22 TYPE 2 DIABETES MELLITUS WITH CHRONIC KIDNEY DISEASE, WITHOUT LONG-TERM CURRENT USE OF INSULIN, UNSPECIFIED CKD STAGE: ICD-10-CM

## 2024-10-31 DIAGNOSIS — N18.6 ESRD (END STAGE RENAL DISEASE) ON DIALYSIS: ICD-10-CM

## 2024-10-31 DIAGNOSIS — N18.9 ANEMIA IN CHRONIC KIDNEY DISEASE, UNSPECIFIED CKD STAGE: ICD-10-CM

## 2024-10-31 DIAGNOSIS — I50.42 CHRONIC COMBINED SYSTOLIC AND DIASTOLIC CONGESTIVE HEART FAILURE: ICD-10-CM

## 2024-10-31 DIAGNOSIS — N25.81 SECONDARY HYPERPARATHYROIDISM OF RENAL ORIGIN: Chronic | ICD-10-CM

## 2024-10-31 DIAGNOSIS — Z91.89 POTENTIAL FOR COGNITIVE IMPAIRMENT: ICD-10-CM

## 2024-10-31 DIAGNOSIS — Z00.00 ENCOUNTER FOR PREVENTIVE HEALTH EXAMINATION: Primary | ICD-10-CM

## 2024-10-31 DIAGNOSIS — D68.59 THROMBOPHILIA: ICD-10-CM

## 2024-10-31 DIAGNOSIS — K21.9 GASTROESOPHAGEAL REFLUX DISEASE WITHOUT ESOPHAGITIS: ICD-10-CM

## 2024-10-31 DIAGNOSIS — E88.09 HYPOALBUMINEMIA DUE TO PROTEIN-CALORIE MALNUTRITION: ICD-10-CM

## 2024-10-31 DIAGNOSIS — H40.9 GLAUCOMA, UNSPECIFIED GLAUCOMA TYPE, UNSPECIFIED LATERALITY: ICD-10-CM

## 2024-10-31 DIAGNOSIS — Z13.9 ENCOUNTER FOR SCREENING INVOLVING SOCIAL DETERMINANTS OF HEALTH (SDOH): ICD-10-CM

## 2024-10-31 DIAGNOSIS — I95.9 HYPOTENSION, UNSPECIFIED HYPOTENSION TYPE: ICD-10-CM

## 2024-10-31 DIAGNOSIS — I77.0 ARTERIOVENOUS FISTULA, ACQUIRED: ICD-10-CM

## 2024-10-31 DIAGNOSIS — F33.42 RECURRENT MAJOR DEPRESSIVE DISORDER, IN FULL REMISSION: ICD-10-CM

## 2024-10-31 DIAGNOSIS — I48.0 PAROXYSMAL ATRIAL FIBRILLATION: ICD-10-CM

## 2024-10-31 PROBLEM — R06.02 SHORTNESS OF BREATH: Status: RESOLVED | Noted: 2022-12-20 | Resolved: 2024-10-31

## 2024-10-31 PROBLEM — N63.24 MASS OF LOWER INNER QUADRANT OF LEFT BREAST: Status: ACTIVE | Noted: 2024-10-31

## 2024-10-31 PROBLEM — R78.81 STREPTOCOCCAL BACTEREMIA: Status: RESOLVED | Noted: 2022-12-27 | Resolved: 2024-10-31

## 2024-10-31 PROBLEM — N63.24 MASS OF LOWER INNER QUADRANT OF LEFT BREAST: Status: RESOLVED | Noted: 2024-10-31 | Resolved: 2024-10-31

## 2024-10-31 PROBLEM — B95.5 STREPTOCOCCAL BACTEREMIA: Status: RESOLVED | Noted: 2022-12-27 | Resolved: 2024-10-31

## 2024-10-31 PROBLEM — I10 PRIMARY HYPERTENSION: Status: RESOLVED | Noted: 2022-12-20 | Resolved: 2024-10-31

## 2024-10-31 PROBLEM — E83.39 HYPERPHOSPHATEMIA: Status: ACTIVE | Noted: 2023-10-21

## 2024-10-31 PROBLEM — K59.00 CONSTIPATION: Status: RESOLVED | Noted: 2023-01-01 | Resolved: 2024-10-31

## 2024-10-31 PROBLEM — F32.9 MAJOR DEPRESSIVE DISORDER: Status: ACTIVE | Noted: 2023-10-21

## 2024-10-31 PROBLEM — S73.192A TEAR OF LEFT ACETABULAR LABRUM: Status: RESOLVED | Noted: 2022-12-20 | Resolved: 2024-10-31

## 2024-10-31 PROCEDURE — 99999 PR PBB SHADOW E&M-EST. PATIENT-LVL V: CPT | Mod: PBBFAC,,, | Performed by: NURSE PRACTITIONER

## 2024-10-31 RX ORDER — MIDODRINE HYDROCHLORIDE 10 MG/1
1 TABLET ORAL 3 TIMES DAILY
COMMUNITY
Start: 2024-09-25

## 2024-11-06 ENCOUNTER — OFFICE VISIT (OUTPATIENT)
Dept: CARDIOLOGY | Facility: CLINIC | Age: 69
End: 2024-11-06
Attending: INTERNAL MEDICINE
Payer: MEDICARE

## 2024-11-06 VITALS
OXYGEN SATURATION: 99 % | SYSTOLIC BLOOD PRESSURE: 102 MMHG | HEART RATE: 75 BPM | WEIGHT: 168.88 LBS | DIASTOLIC BLOOD PRESSURE: 62 MMHG | BODY MASS INDEX: 28.99 KG/M2

## 2024-11-06 DIAGNOSIS — Z99.2 DIALYSIS PATIENT: ICD-10-CM

## 2024-11-06 DIAGNOSIS — N18.6 ESRD (END STAGE RENAL DISEASE) ON DIALYSIS: ICD-10-CM

## 2024-11-06 DIAGNOSIS — I50.42 CHRONIC COMBINED SYSTOLIC AND DIASTOLIC CONGESTIVE HEART FAILURE: ICD-10-CM

## 2024-11-06 DIAGNOSIS — I48.0 PAROXYSMAL ATRIAL FIBRILLATION: ICD-10-CM

## 2024-11-06 DIAGNOSIS — Z99.2 ESRD (END STAGE RENAL DISEASE) ON DIALYSIS: ICD-10-CM

## 2024-11-06 DIAGNOSIS — I31.39 PERICARDIAL EFFUSION: ICD-10-CM

## 2024-11-06 DIAGNOSIS — I95.89 OTHER SPECIFIED HYPOTENSION: Primary | ICD-10-CM

## 2024-11-06 PROCEDURE — 1126F AMNT PAIN NOTED NONE PRSNT: CPT | Mod: CPTII,S$GLB,, | Performed by: INTERNAL MEDICINE

## 2024-11-06 PROCEDURE — 1101F PT FALLS ASSESS-DOCD LE1/YR: CPT | Mod: CPTII,S$GLB,, | Performed by: INTERNAL MEDICINE

## 2024-11-06 PROCEDURE — 3078F DIAST BP <80 MM HG: CPT | Mod: CPTII,S$GLB,, | Performed by: INTERNAL MEDICINE

## 2024-11-06 PROCEDURE — 3288F FALL RISK ASSESSMENT DOCD: CPT | Mod: CPTII,S$GLB,, | Performed by: INTERNAL MEDICINE

## 2024-11-06 PROCEDURE — 99214 OFFICE O/P EST MOD 30 MIN: CPT | Mod: S$GLB,,, | Performed by: INTERNAL MEDICINE

## 2024-11-06 PROCEDURE — 99999 PR PBB SHADOW E&M-EST. PATIENT-LVL III: CPT | Mod: PBBFAC,,, | Performed by: INTERNAL MEDICINE

## 2024-11-06 PROCEDURE — 3074F SYST BP LT 130 MM HG: CPT | Mod: CPTII,S$GLB,, | Performed by: INTERNAL MEDICINE

## 2024-11-06 PROCEDURE — 3044F HG A1C LEVEL LT 7.0%: CPT | Mod: CPTII,S$GLB,, | Performed by: INTERNAL MEDICINE

## 2024-11-06 PROCEDURE — 1159F MED LIST DOCD IN RCRD: CPT | Mod: CPTII,S$GLB,, | Performed by: INTERNAL MEDICINE

## 2024-11-06 PROCEDURE — 3008F BODY MASS INDEX DOCD: CPT | Mod: CPTII,S$GLB,, | Performed by: INTERNAL MEDICINE

## 2024-11-06 NOTE — PROGRESS NOTES
Subjective:   Patient ID:  Meaghan Potter is a 69 y.o. female who presents for evaluation of Follow-up      Follow-up  Pertinent negatives include no chest pain.   2024  Comes in for six-month visit.    Denies any significant CALLE, angina, palpitations, lower extremity swelling syncope or presyncope   Compliant with medications.    Active   Recently seen dr lafleur and metroprolol stopped due to hypotension   Recent repeat echo showed no effusion     3.  Seen in .  She had her echocardiogram which showed stable large effusion of the heart.  She does dialysis  in the morning.    He is on Eliquis for history of AFib.    She denies any dyspnea on exertion, exertional angina.  Edema.  Orthopnea or PND.    No palpitations syncope or presyncope.    Underwent her tooth surgery.        68-year-old female.  On dialysis.    Diagnosed earlier this year with AFib.    She had a KATE to rule out vegetation.    She has a moderate to large pericardial effusion.  Though her last echocardiogram read as large effusion I reviewed it seems to be stable compared to her prior echocardiograms in December.    Denies any chest pain, dyspnea on exertion.    Compliant with dialysis.    Going for tooth extraction.  And sinus mass removal under general anesthesia.  She denies orthopnea or PND.  No lower extremity swelling.    No syncope or presyncope.      Past Medical History:   Diagnosis Date    ESRD (end stage renal disease) on dialysis     GERD (gastroesophageal reflux disease)     Hypertension        Past Surgical History:   Procedure Laterality Date     SECTION      INCISION AND DRAINAGE, LOWER EXTREMITY Left 2022    Procedure: INCISION AND DRAINAGE, LOWER EXTREMITY;  Surgeon: Kim Johnson MD;  [facility]: [facility];  Service: Orthopedics;  Laterality: Left;    PLACEMENT OF DIALYSIS ACCESS      TRANSESOPHAGEAL ECHOCARDIOGRAPHY N/A 2022    Procedure: ECHOCARDIOGRAM, TRANSESOPHAGEAL;   Surgeon: Mary Silva MD;  Location: [facility] CATH LAB;  Service: Cardiology;  Laterality: N/A;       Social History     Tobacco Use    Smoking status: Former    Smokeless tobacco: Never   Substance Use Topics    Alcohol use: No     Comment: occassionally    Drug use: No       Family History   Problem Relation Name Age of Onset    Diabetes Mother      Hypertension Mother      Diabetes Father      Hypertension Father      Breast cancer Sister         Review of Systems   Cardiovascular:  Negative for chest pain, dyspnea on exertion, palpitations and syncope.   Genitourinary: Negative.    Neurological: Negative.        Current Outpatient Medications on File Prior to Visit   Medication Sig    brimonidine 0.2% (ALPHAGAN) 0.2 % Drop Place 1 drop into both eyes 2 (two) times daily.    cinacalcet HCl (SENSIPAR ORAL) Take 30 mg by mouth.    ELIQUIS 2.5 mg Tab Take 2.5 mg by mouth 2 (two) times daily.    ergocalciferol (ERGOCALCIFEROL) 50,000 unit Cap Take 50,000 Units by mouth every 7 days.    famotidine (PEPCID) 20 MG tablet Take 20 mg by mouth.    fluticasone propionate (FLONASE) 50 mcg/actuation nasal spray USE ONE SPRAY INTO EACH NOSTRIL TWICE DAILY , MAX 2 SPRAYS PER NOSTRIL IN 24 HOURS    fluticasone-salmeterol diskus inhaler 250-50 mcg Inhale into the lungs.    gabapentin (NEURONTIN) 400 MG capsule Take 400 mg by mouth nightly as needed.    meloxicam (MOBIC) 15 MG tablet Take 15 mg by mouth daily as needed.    midodrine (PROAMATINE) 10 MG tablet Take 1 tablet by mouth 3 (three) times daily.    ondansetron (ZOFRAN-ODT) 4 MG TbDL     senna-docusate 8.6-50 mg (PERICOLACE) 8.6-50 mg per tablet Take 1 tablet by mouth.    VENTOLIN HFA 90 mcg/actuation inhaler 2 puffs 3 (three) times daily as needed.    sevelamer carbonate (RENVELA) 800 mg Tab Take 1 tablet (800 mg total) by mouth 3 (three) times daily with meals.     No current facility-administered medications on file prior to visit.       Objective:   Objective:  Wt  "Readings from Last 3 Encounters:   11/06/24 76.6 kg (168 lb 14 oz)   10/31/24 77.2 kg (170 lb 3.1 oz)   10/10/24 77.1 kg (169 lb 15.6 oz)     Temp Readings from Last 3 Encounters:   10/31/24 98.2 °F (36.8 °C) (Oral)   10/10/24 97.5 °F (36.4 °C) (Tympanic)   09/13/24 97 °F (36.1 °C) (Tympanic)     BP Readings from Last 3 Encounters:   11/06/24 102/62   10/31/24 100/62   10/10/24 (!) 84/48     Pulse Readings from Last 3 Encounters:   11/06/24 75   10/31/24 88   10/10/24 84       Physical Exam  Vitals reviewed.   Constitutional:       Appearance: She is well-developed.   Neck:      Vascular: No carotid bruit.   Cardiovascular:      Rate and Rhythm: Normal rate and regular rhythm.      Pulses: Intact distal pulses.      Heart sounds: Normal heart sounds. No murmur heard.  Pulmonary:      Breath sounds: Normal breath sounds.   Neurological:      Mental Status: She is oriented to person, place, and time.         Lab Results   Component Value Date    CHOL 183 06/04/2024     Lab Results   Component Value Date    HDL 59 06/04/2024     Lab Results   Component Value Date    LDLCALC 99.0 06/04/2024     Lab Results   Component Value Date    TRIG 125 06/04/2024     Lab Results   Component Value Date    CHOLHDL 32.2 06/04/2024       Chemistry        Component Value Date/Time     06/04/2024 1109    K 4.8 06/04/2024 1109     06/04/2024 1109    CO2 18 (L) 06/04/2024 1109    BUN 49 (H) 11/04/2024 0000    BUN 26 (H) 06/04/2024 1109    CREATININE 7.4 (H) 06/04/2024 1109    GLU 86 06/04/2024 1109        Component Value Date/Time    CALCIUM 9.6 06/04/2024 1109    ALKPHOS 143 (H) 06/04/2024 1109    AST 25 06/04/2024 1109    ALT 12 06/04/2024 1109    BILITOT 0.8 06/04/2024 1109    ESTGFRAFRICA 15.1 (A) 05/31/2019 1236    EGFRNONAA 13.1 (A) 05/31/2019 1236          No results found for: "TSH"  Lab Results   Component Value Date    INR 1.0 12/23/2022    INR 1.1 12/20/2022    INR 1.1 03/19/2019     Lab Results   Component Value " Date    WBC 5.71 03/14/2023    HGB 11.9 (L) 11/04/2024    HCT 41.1 03/14/2023    MCV 97 03/14/2023     (L) 03/14/2023     BNP  @LABRCNTIP(BNP,BNPTRIAGEBLO)@  CrCl cannot be calculated (Patient's most recent lab result is older than the maximum 7 days allowed.).     Imaging:  ======  Results for orders placed during the hospital encounter of 03/29/23    Echo    Interpretation Summary  · The left ventricle is normal in size with concentric hypertrophy and low normal systolic function.  · Moderate left atrial enlargement.  · The estimated ejection fraction is 50%.  · Indeterminate left ventricular diastolic function.  · Moderate right ventricular enlargement with moderately to severely reduced right ventricular systolic function.  · Moderate right atrial enlargement.  · There is abnormal septal wall motion. There is systolic and diastolic flattening of the interventricular septum consistent with right ventricle pressure and volume overload.  · Intermediate central venous pressure (8 mmHg).  · The estimated PA systolic pressure is 38 mmHg.  · Large pericardial effusion.  · There is no evidence of tamponade.    Known pericardial effusion. Limited echo to look at effusion.    No results found for this or any previous visit.    No results found for this or any previous visit.    Results for orders placed during the hospital encounter of 02/20/18    X-Ray Chest PA And Lateral    Narrative  Chest two views.  No comparison.    Findings: There is moderate cardiomegaly.  Lungs are clear bilaterally.  Surgical clips and vascular stent in the left axilla.  Multilevel thoracic spondylosis.  IMPRESSION:  No acute disease.      Electronically signed by: PASCUAL VELAZQUEZ MD  Date:     02/20/18  Time:    10:39    No results found for this or any previous visit.    No valid procedures specified.    Diagnostic Results:  ECG: Reviewed    Results for orders placed during the hospital encounter of 06/22/23    Echo    Interpretation  Summary  · The left ventricle is normal in size with concentric hypertrophy and normal systolic function.  · Severe left atrial enlargement.  · The estimated ejection fraction is 55%.  · Grade III left ventricular diastolic dysfunction.  · Moderate right ventricular enlargement with moderately reduced right ventricular systolic function.  · Moderate to severe tricuspid regurgitation.  · There is abnormal septal wall motion. There is systolic and diastolic flattening of the interventricular septum consistent with right ventricle pressure and volume overload.  · Moderate right atrial enlargement.  · Normal central venous pressure (3 mmHg).  · The estimated PA systolic pressure is 64 mmHg.  · There is pulmonary hypertension.  · Large circumferential pericardial effusion.  · There is no evidence of tamponade.      Results for orders placed during the hospital encounter of 09/19/24    Echo    Interpretation Summary    Left Ventricle: The left ventricle is normal in size. Normal wall thickness. There is concentric hypertrophy. There is normal systolic function. Ejection fraction by visual approximation is 60%.    Right Ventricle: Normal right ventricular cavity size. Wall thickness is normal. Systolic function is normal.    Pulmonary Artery: The estimated pulmonary artery systolic pressure is 36 mmHg.    IVC/SVC: Normal venous pressure at 3 mmHg.    The 10-year ASCVD risk score (Jen DK, et al., 2019) is: 14.7%    Values used to calculate the score:      Age: 69 years      Sex: Female      Is Non- : Yes      Diabetic: Yes      Tobacco smoker: No      Systolic Blood Pressure: 102 mmHg      Is BP treated: No      HDL Cholesterol: 59 mg/dL      Total Cholesterol: 183 mg/dL    Assessment and Plan:   Other specified hypotension    ESRD (end stage renal disease) on dialysis    Chronic combined systolic and diastolic congestive heart failure    Dialysis patient    Paroxysmal atrial  fibrillation    Atrial fibrillation, unspecified type    Pericardial effusion  Comments:  RESOLVED      Most recent EKG 8.2024 nsr  Reviewed all tests and above medical conditions with patient in detail and formulated treatment plan.  Risk factor modification discussed.   Cardiac low salt diet discussed.  Maintaining healthy weight and weight loss goals were discussed in clinic.  Repeat echo recenlty no effusion   Bp better off metoprolol was causing hypotension   On midodrine  Angina free, htn controlled, euvolemic    Follow up in six-month

## 2024-11-25 ENCOUNTER — PATIENT OUTREACH (OUTPATIENT)
Dept: ADMINISTRATIVE | Facility: OTHER | Age: 69
End: 2024-11-25
Payer: MEDICARE

## 2024-12-02 NOTE — PATIENT INSTRUCTIONS
Food [place]     [facility]  [address] [place] [address]  [phone]    [facility]  [address] [place] [address]  [phone]    [facility]  [address] [place] [address]  [phone]    [facility]  [address] [place] [address]  [phone]    [facility]  [address] [facility] [place] [address]  [phone]    [facility]  [address] [place] [address]  [phone]    [facility]  [address] [place] [address]    [facility]  [phone]    [employer]    [facility]  [address]  [place] [address]  Hours: 2nd Friday 8:00 a.m. - 9:30 a.m.    [facility]  [address] [place] [address]  Hours: 3rd Wednesday 8:00 a.m. - 9:30 a.m.    [facility]eth RE[facility]  [address] [facility] [place] [address]  Hours: 4th Friday 8:00 a.m. - 9:30 a.m.  [employer]  [id number] [place] [address]  Hours: 3rd Wednesday 8:00 a.m. - 12:00 p.m.    [place] [facility]  [address] [place] [address]  Hours: 4th Friday 9:00 a.m. - 11:00 a.m.     These distributions are contingent upon availability. To qualify for the program, clients must bring the following:  Photo ID: any picture with name (ex: 's license, passport, work ID, school ID)  Utility bills- electric, gas, water  Rental agreement or receipt, or mortgage statement  Clients may be registered at one location only per USDA guidelines.    Commodities  [phone] Option 1    Rental Assistance    [facility]  [phone]    [facility]  [phone]

## 2024-12-02 NOTE — PROGRESS NOTES
CHW - Initial Contact    This LPN verified the accuracy of the Social Determinant of Health questionnaire with patient via telephone today.    Pt identified barriers of most importance are: Rental assistance and food resources   Referrals to community agencies completed with patient/caregiver consent outside of [facility] include:  None  Referrals were put through [facility] - no:   Support and Services: LPN verified the accuracy of the Social Determinant of Health questionnaire, mailed rental and food resources  Other information discussed the patient needs / wants help with: None   Follow up required: Yes  Follow-up Outreach - Due: 12/9/2024        LPN spoke to patient/caregiver [facility] for: Food, Utilities    Does the patient consent to completing the assessment/enrollment: Yes  Does the patient consent for LPN to speak to a caregiver? No    Health Insurance Coverage:     Does the patient have adequate health insurance coverage? Yes  Education provided: No    PCP Follow-Up Appointments:    Does the patient have a primary care provider? yes - Dr. Terrence Elise  Date of last PCP appointment? 10/10/24  Next PCP appointment:  12/5/24  Was patient provided with education surrounding PCP services/creating a medical home? yes - Educated on the benefits of having a PCP.  Educated on the use of urgent care clinic for non emergent issues when PCP unavailable.  Patient verbalized understanding.      Specialist Appointments:     Does the patient have a pending specialist referral? no  Does the patient have an upcoming specialist appointment? yes - Rheumatology 2/12/25, Cardiology 5/28/25  Is the patient pregnant? No  Does the patient have a mental health provider? No  She denies SI/HI.  Instructed if she develops SI/HI to present to the ED for evaluation, educated on 988.  Patient states she is not having issues with depression.  Patient verbalized understanding to all information and instructions.       Home  Medications:     Reviewed medication list with patient? No  Is the patient able to afford their medications? Yes    Care Gaps:     Does the patient have any care gaps that are due? Yes    Care Gaps     Overdue          Never done Foot Exam (Yearly)     Never done Eye Exam (Yearly)     Never done TETANUS VACCINE (Every 10 Years)     Never done Colorectal Cancer Screening (View topic details)   Last ordered: Jun 4, 2024     Never done Shingles Vaccine (1 of 2)     Never done RSV Vaccine (Age 60+ and Pregnant patients) (1 - Risk 60-74 years 1-dose series)     SEP 1  2024 COVID-19 Vaccine (4 - 2024-25 season)  Last completed: Nov 8, 2021         Recent lab results:  Blood Sugar: Patient states she is no longer diabetic.  On no medications for diabetes.   Provided education: No  Blood Pressure:130/60   Provided education: Yes  Instructed to continue low salt, dialysis diet.  Patient verbalized understanding.        Social Determinants of Health (SDOH)    Patient's identified areas of need:    Rental assistance and food resources   Education/Resources provided:    Yes      Home Health/DME:    Current Home Health: No  Patient has all healthcare equipment/supplies indicated: Yes  Current DME:  BP cuff    Additional Documentation:   Patient states she is doing well and she just returned from dialysis.  She states she attends dialysis M/W/F.  Patient states she needs assistance with food resources.  Offered to provide food bank resources, patient accepted.  Patient states she needs assistance paying for rent.  Offered to research resources, patient accepted.  Patient denies any other needs at present.  Patient verbalized understanding to all information and instructions.      Follow up:   Patient agrees to scheduled follow up call. Yes

## 2024-12-03 ENCOUNTER — PATIENT OUTREACH (OUTPATIENT)
Dept: ADMINISTRATIVE | Facility: HOSPITAL | Age: 69
End: 2024-12-03
Payer: MEDICARE

## 2024-12-03 NOTE — PROGRESS NOTES
VBHM Score: 3     Colon Cancer Screening  Eye Exam  Foot Exam    Tetanus Vaccine  Shingles/Zoster Vaccine  RSV Vaccine            LVM for return call. Made note for upcoming PCP appt on 12.5.24 to discuss HM topics due.

## 2024-12-09 ENCOUNTER — PATIENT OUTREACH (OUTPATIENT)
Dept: ADMINISTRATIVE | Facility: OTHER | Age: 69
End: 2024-12-09
Payer: MEDICARE

## 2024-12-09 NOTE — PROGRESS NOTES
CHW - Follow Up    This LPN completed a follow up visit with patient via telephone today.  Pt/Caregiver reported: Identity verified.  Patient denies any SOB, SI/HI.  /60.  Patient states she has not received the resources mailed to her.  Patient states she did have dialysis today.  Patient denies any other needs at present.  LPN provided: none  Follow up required: Yes  Follow-up Outreach - Due: 12/30/2024

## 2024-12-30 ENCOUNTER — PATIENT OUTREACH (OUTPATIENT)
Dept: ADMINISTRATIVE | Facility: OTHER | Age: 69
End: 2024-12-30
Payer: MEDICARE

## 2025-01-07 ENCOUNTER — PATIENT OUTREACH (OUTPATIENT)
Dept: ADMINISTRATIVE | Facility: OTHER | Age: 70
End: 2025-01-07
Payer: MEDICARE

## 2025-01-07 ENCOUNTER — TELEPHONE (OUTPATIENT)
Dept: PHARMACY | Facility: CLINIC | Age: 70
End: 2025-01-07
Payer: MEDICARE

## 2025-01-07 ENCOUNTER — TELEPHONE (OUTPATIENT)
Dept: ADMINISTRATIVE | Facility: OTHER | Age: 70
End: 2025-01-07
Payer: MEDICARE

## 2025-01-07 NOTE — PROGRESS NOTES
CHW - Follow Up    This LPN completed a follow up visit with patient via telephone today.  Pt/Caregiver reported: Identity verified.  Informed patient that [facility] Patient Assistance Team has sent her a letter with information required to help her apply for assistance with Eliquis, and provided community resources verbally ([facility], [employer] [place], 211).  Patient states she takes Eliquis daily and does not have time for the assistance process.  Instructed to try 211 for assistance.  Patient verbalized understanding to all information and instructions.  LPN provided: see above  Follow up required: Yes  Follow-up Outreach - Due: 1/21/2025

## 2025-01-07 NOTE — PROGRESS NOTES
CHW - Follow Up    This LPN completed a follow up visit with patient via telephone today.  Pt/Caregiver reported: Identity verified.  Patient denies SOB, SI/HI, BP did not check yet.  Patient states she has not received the resources mailed to her.  Will mail again.  Patient states she has a $300.00 deductible for her medications and needs assistance paying for Eliquis and Midodrine.  Offered to refer to [facility] Patient Assistance Team, patient accepted.  Also offered to find other resources to assist paying for medications, patient accepted.  Patient states she was getting $23.00 in SNAP benefits, but she has since lost the benefit because she is working.  Patient verbalized understanding to all information and instructions.  LPN provided: Referral to [facility] Patient Assistance [facility], mailed resources  Follow up required: Yes  Follow-up Outreach - Due: 1/14/2025

## 2025-01-07 NOTE — PATIENT INSTRUCTIONS
Food [place]      [facility]  [address] [place] [address]  [phone]     [facility]  [address] [place] [address]  [phone]     [facility]  [address] [place] [address]  [phone]     [facility]  [address] [place] [address]  [phone]     [facility]  [address] [facility] [place] [address]  [phone]     [facility]  [address] [place] [address]  [phone]     [facility]  [address] [facility] [place] [address]     [facility]  [phone]     [employer]     [facility] [place] [facility] [place] [facility]  [address]  [place] [address]  Hours: 2nd Friday 8:00 a.m. - 9:30 a.m.     [facility]  [address] [place] [address]  Hours: 3rd Wednesday 8:00 a.m. - 9:30 a.m.     [facility]eth REGIN[facility]  [address] [facility] [place] [address]  Hours: 4th Friday 8:00 a.m. - 9:30 a.m.  [employer]  [address] [place] [address]  Hours: 3rd Wednesday 8:00 a.m. - 12:00 p.m.     [place] [facility]  [address] [place] [address]  Hours: 4th Friday 9:00 a.m. - 11:00 a.m.     These distributions are contingent upon availability. To qualify for the program, clients must bring the following:  Photo ID: any picture with name (ex: 's license, passport, work ID, school ID)  Utility bills- electric, gas, water  Rental agreement or receipt, or mortgage statement  Clients may be registered at one location only per USDA guidelines.     Commodities  [phone] Option 1     Rental Assistance     [facility]  [phone]     [facility] Agency on Aging [facility]  [phone]      Medication Assistance    [facility]  [phone]  Proof of income, such as Food Stamp printout, SS/SSI, four consecutive check  stubs.  Proof of address, such as utility bill or picture ID.  Proof of legal guardianship if claiming children in the household.    [place]  [phone]  Eligibility is determined on the basis of income and expenses, and only the truly needy receive assistance.  If approved they provide the medication.    Call 211

## 2025-01-07 NOTE — TELEPHONE ENCOUNTER
Good morning,      Mrs. Potter needs assistance paying for the following medications:    midodrine (PROAMATINE) 10 MG tablet  ELIQUIS 2.5 mg Tab    Thank you,    Radha Lopez  [facility] Care Coordinator  [phone]

## 2025-01-07 NOTE — LETTER
January 7, 2025    Meaghan Potter  [phone]  [facility] [address]               Dear Ms. Potter,      My name is Darrenulises Shaila  I am reaching out on behalf of [facility] in regards to your request for medication assistance. Our goal is to assist qualified [facility] patients with obtaining financial assistance for prescribed medications.     Please note that enrollment into available support may require the following documents:      Proof of household Income (such as social security statement, pension statement,most recently filed taxes or 3 consecutive pay stubs)  Copy of all insurance cards (front and back)  Print out from your insurance or pharmacy showing how much you have spent on prescriptions for the current year  Completed Medication Access Center Authorization Forms       Please reach out to my phone number below if you are still in need of assistance with your medications. Follow-up call will be made in 5 business days. We look forward to hearing from you soon!    Thank you for choosing [facility] for your healthcare needs      Sincerely  Kings TUCKER @[phone]  Pharmacy Patient Assistance Team  [address] [place] [address]  [address] [place] [address]  Fax: [phone]  Email: [email or web address]

## 2025-01-22 ENCOUNTER — PATIENT OUTREACH (OUTPATIENT)
Dept: ADMINISTRATIVE | Facility: OTHER | Age: 70
End: 2025-01-22
Payer: MEDICARE

## 2025-01-22 NOTE — PROGRESS NOTES
CHW - Follow Up    This LPN completed a follow up visit with patient via telephone today.  Pt/Caregiver reported: Identity verified.  Patient states she is doing well.  She denies SOB, SI/HI.  BP did not check.  Patient states she has not received the resources or the [facility] letter mailed to her.  Address verified.  Will mail both again.  Patient denies any other needs at present.  LPN provided: mailed resources and [facility] letter again   Follow up required: Yes  Follow-up Outreach - Due: 2/12/2025

## 2025-01-27 NOTE — PATIENT INSTRUCTIONS
[employer] Arroyo      [facility]  [address] [place] [address]  [phone]     [facility]  [address] [place] [address]  [phone]     [facility]  [address] [place] [address]  [phone]     [facility]  [address] [place] [address]  [phone]     [facility]  [address] [facility] [place] [address]  [phone]     [facility]  [address] [place] [address]  [phone]     [facility]  [address] [place] [address]     [facility]  [phone]     [employer]     [facility]  [address]  [place] [address]  Hours: 2nd Friday 8:00 a.m. - 9:30 a.m.     [facility]  [address] [place] [address]  Hours: 3rd Wednesday 8:00 a.m. - 9:30 a.m.     Seth REGINA [facility]  [address] [facility] [place] [address]  Hours: 4th Friday 8:00 a.m. - 9:30 a.m.  [employer]  [address] [place] [address]  Hours: 3rd Wednesday 8:00 a.m. - 12:00 p.m.     [facility]  [id number] [place] [address]  Hours: 4th Friday 9:00 a.m. - 11:00 a.m.     These distributions are contingent upon availability. To qualify for the program, clients must bring the following:  Photo ID: any picture with name (ex: 's license, passport, work ID, school ID)  Utility bills- electric, gas, water  Rental agreement or receipt, or mortgage statement  Clients may be registered at one location only per USDA guidelines.     Commodities  [phone] Option 1     Rental Assistance     [facility]  [phone]     [facility]  [phone]

## 2025-02-12 ENCOUNTER — PATIENT OUTREACH (OUTPATIENT)
Dept: ADMINISTRATIVE | Facility: OTHER | Age: 70
End: 2025-02-12
Payer: MEDICARE

## 2025-02-18 ENCOUNTER — PATIENT OUTREACH (OUTPATIENT)
Dept: ADMINISTRATIVE | Facility: OTHER | Age: 70
End: 2025-02-18
Payer: MEDICARE

## 2025-02-25 ENCOUNTER — PATIENT OUTREACH (OUTPATIENT)
Dept: ADMINISTRATIVE | Facility: OTHER | Age: 70
End: 2025-02-25
Payer: MEDICARE

## 2025-02-25 NOTE — PROGRESS NOTES
CHW - Follow Up and Case Closure    This LPN completed a follow up visit with patient via telephone today.  Pt reported: Identity verified.  Patient states she is doing well.  She denies SI/HI, SOB, /60 2/24/25.  She states she received the resources mailed to her.  She denies any new needs at present.  Follow up required: No  Pt denied any additional needs at this time and agrees with episode closure at this time.  Provided patient with LPN's contact information and encouraged her to contact this LPN if additional needs arise.    Patient verbalized understanding.

## 2025-03-13 ENCOUNTER — OFFICE VISIT (OUTPATIENT)
Dept: INTERNAL MEDICINE | Facility: CLINIC | Age: 70
End: 2025-03-13
Payer: MEDICARE

## 2025-03-13 VITALS
DIASTOLIC BLOOD PRESSURE: 60 MMHG | TEMPERATURE: 98 F | HEIGHT: 64 IN | SYSTOLIC BLOOD PRESSURE: 90 MMHG | BODY MASS INDEX: 29.55 KG/M2 | RESPIRATION RATE: 18 BRPM | WEIGHT: 173.06 LBS

## 2025-03-13 DIAGNOSIS — H40.9 GLAUCOMA, UNSPECIFIED GLAUCOMA TYPE, UNSPECIFIED LATERALITY: ICD-10-CM

## 2025-03-13 DIAGNOSIS — F33.42 RECURRENT MAJOR DEPRESSIVE DISORDER, IN FULL REMISSION: ICD-10-CM

## 2025-03-13 DIAGNOSIS — E11.42 DIABETIC PERIPHERAL NEUROPATHY: ICD-10-CM

## 2025-03-13 DIAGNOSIS — K21.9 GASTROESOPHAGEAL REFLUX DISEASE WITHOUT ESOPHAGITIS: ICD-10-CM

## 2025-03-13 DIAGNOSIS — D63.1 ANEMIA IN CHRONIC KIDNEY DISEASE, UNSPECIFIED CKD STAGE: ICD-10-CM

## 2025-03-13 DIAGNOSIS — E11.22 TYPE 2 DIABETES MELLITUS WITH CHRONIC KIDNEY DISEASE, WITHOUT LONG-TERM CURRENT USE OF INSULIN, UNSPECIFIED CKD STAGE: Primary | ICD-10-CM

## 2025-03-13 DIAGNOSIS — I95.9 HYPOTENSION, UNSPECIFIED HYPOTENSION TYPE: ICD-10-CM

## 2025-03-13 DIAGNOSIS — I48.0 PAROXYSMAL ATRIAL FIBRILLATION: ICD-10-CM

## 2025-03-13 DIAGNOSIS — Z12.11 COLON CANCER SCREENING: ICD-10-CM

## 2025-03-13 DIAGNOSIS — M79.604 PAIN OF RIGHT LOWER EXTREMITY: ICD-10-CM

## 2025-03-13 DIAGNOSIS — N18.6 ESRD (END STAGE RENAL DISEASE) ON DIALYSIS: ICD-10-CM

## 2025-03-13 DIAGNOSIS — Z99.2 ESRD (END STAGE RENAL DISEASE) ON DIALYSIS: ICD-10-CM

## 2025-03-13 DIAGNOSIS — I50.42 CHRONIC COMBINED SYSTOLIC AND DIASTOLIC CONGESTIVE HEART FAILURE: ICD-10-CM

## 2025-03-13 DIAGNOSIS — J44.9 CHRONIC OBSTRUCTIVE PULMONARY DISEASE, UNSPECIFIED COPD TYPE: ICD-10-CM

## 2025-03-13 DIAGNOSIS — N25.81 SECONDARY HYPERPARATHYROIDISM OF RENAL ORIGIN: Chronic | ICD-10-CM

## 2025-03-13 DIAGNOSIS — N18.9 ANEMIA IN CHRONIC KIDNEY DISEASE, UNSPECIFIED CKD STAGE: ICD-10-CM

## 2025-03-13 PROCEDURE — 99214 OFFICE O/P EST MOD 30 MIN: CPT | Mod: S$GLB,,, | Performed by: NURSE PRACTITIONER

## 2025-03-13 PROCEDURE — 3008F BODY MASS INDEX DOCD: CPT | Mod: CPTII,S$GLB,, | Performed by: NURSE PRACTITIONER

## 2025-03-13 PROCEDURE — 99999 PR PBB SHADOW E&M-EST. PATIENT-LVL IV: CPT | Mod: PBBFAC,,, | Performed by: NURSE PRACTITIONER

## 2025-03-13 PROCEDURE — 1125F AMNT PAIN NOTED PAIN PRSNT: CPT | Mod: CPTII,S$GLB,, | Performed by: NURSE PRACTITIONER

## 2025-03-13 PROCEDURE — 1101F PT FALLS ASSESS-DOCD LE1/YR: CPT | Mod: CPTII,S$GLB,, | Performed by: NURSE PRACTITIONER

## 2025-03-13 PROCEDURE — 3288F FALL RISK ASSESSMENT DOCD: CPT | Mod: CPTII,S$GLB,, | Performed by: NURSE PRACTITIONER

## 2025-03-13 PROCEDURE — 1160F RVW MEDS BY RX/DR IN RCRD: CPT | Mod: CPTII,S$GLB,, | Performed by: NURSE PRACTITIONER

## 2025-03-13 PROCEDURE — 3044F HG A1C LEVEL LT 7.0%: CPT | Mod: CPTII,S$GLB,, | Performed by: NURSE PRACTITIONER

## 2025-03-13 PROCEDURE — 1159F MED LIST DOCD IN RCRD: CPT | Mod: CPTII,S$GLB,, | Performed by: NURSE PRACTITIONER

## 2025-03-13 PROCEDURE — 3078F DIAST BP <80 MM HG: CPT | Mod: CPTII,S$GLB,, | Performed by: NURSE PRACTITIONER

## 2025-03-13 PROCEDURE — G2211 COMPLEX E/M VISIT ADD ON: HCPCS | Mod: S$GLB,,, | Performed by: NURSE PRACTITIONER

## 2025-03-13 PROCEDURE — 3074F SYST BP LT 130 MM HG: CPT | Mod: CPTII,S$GLB,, | Performed by: NURSE PRACTITIONER

## 2025-03-13 RX ORDER — GABAPENTIN 400 MG/1
400 CAPSULE ORAL NIGHTLY PRN
Qty: 30 CAPSULE | Refills: 2 | Status: SHIPPED | OUTPATIENT
Start: 2025-03-13 | End: 2025-06-11

## 2025-03-13 RX ORDER — DEXTROMETHORPHAN HYDROBROMIDE, GUAIFENESIN 5; 100 MG/5ML; MG/5ML
650 LIQUID ORAL EVERY 8 HOURS
Qty: 30 TABLET | Refills: 0 | Status: SHIPPED | OUTPATIENT
Start: 2025-03-13 | End: 2025-03-23

## 2025-03-13 NOTE — ASSESSMENT & PLAN NOTE
Lab Results   Component Value Date    HGBA1C 4.5 (L) 03/10/2025   Chronic/stable. Continue to monitor. Followed by PCP

## 2025-03-13 NOTE — ASSESSMENT & PLAN NOTE
Latest ECHO performed and demonstrates- Results for orders placed during the hospital encounter of 03/20/24    Echo    Interpretation Summary    Left Ventricle: The left ventricle is normal in size. Normal wall thickness. There is concentric remodeling. There is normal systolic function with a visually estimated ejection fraction of 60 - 65%. There is normal diastolic function.    Right Ventricle: Normal right ventricular cavity size. Wall thickness is normal. Right ventricle wall motion  is normal. Systolic function is normal.    Tricuspid Valve: There is mild regurgitation with a centrally directed jet.    Pulmonary Artery: The estimated pulmonary artery systolic pressure is 50 mmHg.    IVC/SVC: Normal venous pressure at 3 mmHg.    Chronic/stable. No signs of decompensation. Continue current treatment plan. Followed by cardiology.

## 2025-03-13 NOTE — ASSESSMENT & PLAN NOTE
Lab Results   Component Value Date     (H) 03/10/2025    CALCIUM 9.6 06/04/2024    PHOS 4.6 (H) 01/06/2023     Chronic/stable. Continue current medications. Followed by nephrology.

## 2025-03-13 NOTE — PROGRESS NOTES
Subjective:       Patient ID: Meaghan Potter is a 69 y.o. female.    Chief Complaint: Leg Pain (right)    History of Present Illness    HPI:  Ms. Potter presents to clinic for routine follow up. She reports right thigh/groin pain that started after jumping down from a float. The pain is described as throbbing and is localized to the right groin area and down into the thigh. It began the day after the incident. She denies any pain radiating down to the calf or back pain. She has been taking Tylenol for the pain, which provides some relief but only for a short period. She mentions standing for about 3 hours before the float took off and walking a considerable distance to her car after getting off the float, which may have contributed to her discomfort.    She is otherwise without compliant. She continues HD MWF with Dr. Ribera. She is past due for diabetic eye exam.            HPI    Problem List[1]    Family History   Problem Relation Name Age of Onset    Diabetes Mother      Hypertension Mother      Diabetes Father      Hypertension Father      Breast cancer Sister       Past Surgical History:   Procedure Laterality Date     SECTION      INCISION AND DRAINAGE, LOWER EXTREMITY Left 2022    Procedure: INCISION AND DRAINAGE, LOWER EXTREMITY;  Surgeon: Kim Johnson MD;  Location: [facility] OR;  Service: Orthopedics;  Laterality: Left;    PLACEMENT OF DIALYSIS ACCESS      TRANSESOPHAGEAL ECHOCARDIOGRAPHY N/A 2022    Procedure: ECHOCARDIOGRAM, TRANSESOPHAGEAL;  Surgeon: Mary Silva MD;  Location: [facility] CATH LAB;  Service: Cardiology;  Laterality: N/A;       Current Medications[2]    Review of Systems   Constitutional:  Negative for chills, fatigue and fever.   HENT:  Negative for sore throat.    Respiratory:  Negative for shortness of breath.    Cardiovascular:  Negative for chest pain, palpitations and leg swelling.   Gastrointestinal:  Negative for abdominal pain, nausea and vomiting.  "  Musculoskeletal:  Positive for myalgias. Negative for arthralgias, back pain and gait problem.   Neurological:  Negative for dizziness, syncope, weakness, light-headedness and headaches.       Objective:   BP 90/60   Temp 97.7 °F (36.5 °C) (Tympanic)   Resp 18   Ht 5' 4" (1.626 m)   Wt 78.5 kg (173 lb 1 oz)   BMI 29.71 kg/m²      Physical Exam  Exam conducted with a chaperone present.   Constitutional:       General: She is not in acute distress.     Appearance: Normal appearance. She is not ill-appearing.   Eyes:      Conjunctiva/sclera: Conjunctivae normal.   Cardiovascular:      Rate and Rhythm: Normal rate and regular rhythm.      Heart sounds: Normal heart sounds. No murmur heard.  Pulmonary:      Effort: Pulmonary effort is normal. No respiratory distress.      Breath sounds: Normal breath sounds. No wheezing, rhonchi or rales.   Genitourinary:     Labia:         Right: No rash or tenderness.         Left: No rash or tenderness.       Vagina: No foreign body. No vaginal discharge, erythema or tenderness.   Musculoskeletal:      Lumbar back: Normal range of motion. Negative right straight leg raise test and negative left straight leg raise test.      Right upper leg: No swelling or tenderness.      Right lower leg: No swelling or tenderness. No edema.      Left lower leg: No edema.        Legs:    Feet:      Comments: FOOT EVALUATION: 10 gram monofilament exam with protective sensation intact bilaterally. Nails appropriately trimmed. No ulcers. Distal pulses palpable.    Neurological:      Mental Status: She is alert and oriented to person, place, and time.   Psychiatric:         Mood and Affect: Mood normal.         Behavior: Behavior normal.         Assessment & Plan     Assessment & Plan      LEG PAIN (RIGHT THIGH):  - Assessed leg pain, likely muscle strain from recent recreational activity.  - Performed physical exam, including leg strength test and palpation of the affected area.  - Restarted " gabapentin 400 mg as needed for leg pain, to be taken only at night when severe.  - Discussed potential side effects, including drowsiness.  - Continued and refilled Tylenol prescription for pain relief.  - Considered prescribing steroids (Dosepak) but decided against it due to patient's history of glaucoma.  - Chose gabapentin for pain management, given its compatibility with dialysis.  - Explained limitations on anti-inflammatory medications, including NSAIDs and steroids, due to patient's comorbidity.     GLAUCOMA:  - Recommend scheduling an eye exam with Dr. Garcia for glaucoma follow-up and yearly DM eye exam.    END STAGE RENAL DISEASE AND DIALYSIS:  - Noted patient's regular dialysis treatment on Monday, Wednesday, and Friday.  - Noted that patient receives routine labs and foot checks during dialysis sessions.    DIABETES MANAGEMENT:  - Reviewed recent lab results, including A1C.      FOLLOW-UP AND REFERRALS:  - Discussed colonoscopy .  - Referred to endoscopy schedulers for colonoscopy consultation.          1. Type 2 diabetes mellitus with chronic kidney disease, without long-term current use of insulin, unspecified CKD stage  Assessment & Plan:  Lab Results   Component Value Date    HGBA1C 4.5 (L) 03/10/2025   Chronic/stable. Continue to monitor. Followed by PCP      Orders:  -     Foot Exam Performed    2. ESRD (end stage renal disease) on dialysis  Assessment & Plan:  Chronic. Continue HD M/W/F. Followed by Dr. Ribera      3. Chronic obstructive pulmonary disease, unspecified COPD type  Assessment & Plan:  Chronic/stable. No signs of exacerbation. Continue inhalers. Followed by PCP      4. Chronic combined systolic and diastolic congestive heart failure  Assessment & Plan:  Latest ECHO performed and demonstrates- Results for orders placed during the hospital encounter of 03/20/24    Echo    Interpretation Summary    Left Ventricle: The left ventricle is normal in size. Normal wall thickness. There is  concentric remodeling. There is normal systolic function with a visually estimated ejection fraction of 60 - 65%. There is normal diastolic function.    Right Ventricle: Normal right ventricular cavity size. Wall thickness is normal. Right ventricle wall motion  is normal. Systolic function is normal.    Tricuspid Valve: There is mild regurgitation with a centrally directed jet.    Pulmonary Artery: The estimated pulmonary artery systolic pressure is 50 mmHg.    IVC/SVC: Normal venous pressure at 3 mmHg.    Chronic/stable. No signs of decompensation. Continue current treatment plan. Followed by cardiology.      5. Paroxysmal atrial fibrillation  Assessment & Plan:  Chronic/stable. Rate controlled. Continue Eliquis. Followed by cardiology      6. Diabetic peripheral neuropathy  Assessment & Plan:  Chronic/stable. Continue gabapentin. Followed by PCP    Orders:  -     Foot Exam Performed    7. Secondary hyperparathyroidism of renal origin  Assessment & Plan:  Lab Results   Component Value Date     (H) 03/10/2025    CALCIUM 9.6 06/04/2024    PHOS 4.6 (H) 01/06/2023     Chronic/stable. Continue current medications. Followed by nephrology.      8. Hypotension, unspecified hypotension type  Assessment & Plan:  Chronic. Continue midodrine. Followed by cardiology.      9. Anemia in chronic kidney disease, unspecified CKD stage  Assessment & Plan:  Lab Results   Component Value Date    WBC 5.71 03/14/2023    HGB 11.4 (L) 03/03/2025    HCT 41.1 03/14/2023    MCV 97 03/14/2023     (L) 03/14/2023     Chronic/stable. Continue to monitor. Followed by nephrology.        10. Gastroesophageal reflux disease without esophagitis  Assessment & Plan:  Chronic/stable. Continue pepcid. Followed by PCP      11. Recurrent major depressive disorder, in full remission  Assessment & Plan:  Chronic/stable without medication Followed by PCP      12. Glaucoma, unspecified glaucoma type, unspecified laterality  Assessment &  "Plan:  Chronic/stable. Continue brimonidine. Due for appointment      13. Pain of right lower extremity  -     gabapentin (NEURONTIN) 400 MG capsule; Take 1 capsule (400 mg total) by mouth nightly as needed (pain).  Dispense: 30 capsule; Refill: 2  -     acetaminophen (TYLENOL) 650 MG TbSR; Take 1 tablet (650 mg total) by mouth every 8 (eight) hours. for 10 days  Dispense: 30 tablet; Refill: 0    14. Colon cancer screening  -     Ambulatory referral/consult to Endo Procedure ; Future; Expected date: 03/14/2025        Visit today included increased complexity associated with the care of the episodic problem addressed and managing the longitudinal care of the patient due to the serious and/or complex managed problem(s).      RAMÓN Diamond    This note was generated with the assistance of ambient listening technology. Verbal consent was obtained by the patient and accompanying visitor(s) for the recording of patient appointment to facilitate this note. I attest to having reviewed and edited the generated note for accuracy, though some syntax or spelling errors may persist. Please contact the author of this note for any clarification.       Portions of this note may have been created with voice recognition software. Occasional "wrong-word" or "sound-a-like" substitutions may have occurred due to the inherent limitations of voice recognition software. Please, read the note carefully and recognize, using context, where substitutions have occurred.             [1]   Patient Active Problem List  Diagnosis    Atrial fibrillation    ESRD (end stage renal disease) on dialysis    Gastroesophageal reflux disease without esophagitis    Pyogenic arthritis of left hip    Bone cyst of right femur    Pericardial effusion    Hyperkalemia, diminished renal excretion    Chronic combined systolic and diastolic congestive heart failure    Arteriovenous fistula, acquired    Chronic obstructive pulmonary disease, " unspecified COPD type    Diabetic peripheral neuropathy    Hypoalbuminemia due to protein-calorie malnutrition    Thrombophilia    Hypotension    Anemia in chronic kidney disease    Glaucoma    Hyperphosphatemia    Major depressive disorder    Secondary hyperparathyroidism of renal origin    Type 2 diabetes mellitus with diabetic chronic kidney disease    Potential for cognitive impairment    Age-related osteoporosis without current pathological fracture   [2]   Current Outpatient Medications:     brimonidine 0.2% (ALPHAGAN) 0.2 % Drop, Place 1 drop into both eyes 2 (two) times daily., Disp: , Rfl:     cinacalcet HCl (SENSIPAR ORAL), Take 30 mg by mouth., Disp: , Rfl:     ELIQUIS 2.5 mg Tab, Take 2.5 mg by mouth 2 (two) times daily., Disp: , Rfl:     ergocalciferol (ERGOCALCIFEROL) 50,000 unit Cap, Take 50,000 Units by mouth every 7 days., Disp: , Rfl:     famotidine (PEPCID) 20 MG tablet, Take 20 mg by mouth., Disp: , Rfl:     fluticasone propionate (FLONASE) 50 mcg/actuation nasal spray, USE ONE SPRAY INTO EACH NOSTRIL TWICE DAILY , MAX 2 SPRAYS PER NOSTRIL IN 24 HOURS, Disp: , Rfl:     fluticasone-salmeterol diskus inhaler 250-50 mcg, Inhale into the lungs., Disp: , Rfl:     meloxicam (MOBIC) 15 MG tablet, Take 15 mg by mouth daily as needed., Disp: , Rfl:     midodrine (PROAMATINE) 10 MG tablet, Take 1 tablet by mouth 3 (three) times daily., Disp: , Rfl:     ondansetron (ZOFRAN-ODT) 4 MG TbDL, , Disp: , Rfl:     senna-docusate 8.6-50 mg (PERICOLACE) 8.6-50 mg per tablet, Take 1 tablet by mouth., Disp: , Rfl:     VENTOLIN HFA 90 mcg/actuation inhaler, 2 puffs 3 (three) times daily as needed., Disp: , Rfl: 6    acetaminophen (TYLENOL) 650 MG TbSR, Take 1 tablet (650 mg total) by mouth every 8 (eight) hours. for 10 days, Disp: 30 tablet, Rfl: 0    gabapentin (NEURONTIN) 400 MG capsule, Take 1 capsule (400 mg total) by mouth nightly as needed (pain)., Disp: 30 capsule, Rfl: 2    sevelamer carbonate (RENVELA) 800 mg  Tab, Take 1 tablet (800 mg total) by mouth 3 (three) times daily with meals., Disp: 90 tablet, Rfl: 0

## 2025-03-13 NOTE — ASSESSMENT & PLAN NOTE
Lab Results   Component Value Date    WBC 5.71 03/14/2023    HGB 11.4 (L) 03/03/2025    HCT 41.1 03/14/2023    MCV 97 03/14/2023     (L) 03/14/2023     Chronic/stable. Continue to monitor. Followed by nephrology.

## 2025-03-20 ENCOUNTER — HOSPITAL ENCOUNTER (OUTPATIENT)
Dept: PREADMISSION TESTING | Facility: HOSPITAL | Age: 70
Discharge: HOME | End: 2025-03-20
Attending: COLON & RECTAL SURGERY
Payer: MEDICARE

## 2025-03-20 DIAGNOSIS — Z12.11 COLON CANCER SCREENING: ICD-10-CM

## 2025-03-27 ENCOUNTER — HOSPITAL ENCOUNTER (OUTPATIENT)
Dept: PREADMISSION TESTING | Facility: HOSPITAL | Age: 70
Discharge: HOME | End: 2025-03-27
Attending: COLON & RECTAL SURGERY
Payer: MEDICARE

## 2025-03-27 DIAGNOSIS — Z12.11 COLON CANCER SCREENING: Primary | ICD-10-CM

## 2025-03-27 RX ORDER — SOD SULF/POT CHLORIDE/MAG SULF 1.479 G
12 TABLET ORAL DAILY
Qty: 24 TABLET | Refills: 0 | Status: SHIPPED | OUTPATIENT
Start: 2025-03-27

## 2025-04-04 ENCOUNTER — E-CONSULT (OUTPATIENT)
Dept: CARDIOLOGY | Facility: HOSPITAL | Age: 70
End: 2025-04-04
Payer: MEDICARE

## 2025-04-04 DIAGNOSIS — Z01.810 PREOP CARDIOVASCULAR EXAM: Primary | ICD-10-CM

## 2025-04-04 NOTE — CONSULTS
[facility]  Response for E-Consult     Patient Name: Meaghan Potter  MRN: [id number]  Primary Care Provider: Terrence Elise MD   Requesting Provider: Alexa Haddad NP  E-Consult to General Cardiology  Consult performed by: Gus Arriaga MD  Consult ordered by: Alexa Haddad NP          71 yo female, E consult for preop clearance of colonoscopy  The chart reviewed.  PMH ESRD afib CHFrEF   09/24 echo EF 60%     Plan  Elevated periop risk of CV events for non-high risk procedure.  Ok to proceed the scheduled procedure without further cardiac study.  OK to hold Eliquis 3 days before the procedure and resume postop once hemodynamically stable      Total time of Consultation: 10 minute    I did not speak to the requesting provider verbally about this.     *This eConsult is based on the clinical data available to me and is furnished without benefit of a physical examination. The eConsult will need to be interpreted in light of any clinical issues or changes in patient status not available to me at the time of filing this eConsults. Significant changes in patient condition or level of acuity should result in immediate formal consultation and reevaluation. Please alert me if you have further questions.    Thank you for this eConsult referral.     uGs Arriaga MD  [facility]

## 2025-04-10 ENCOUNTER — PATIENT OUTREACH (OUTPATIENT)
Dept: ADMINISTRATIVE | Facility: HOSPITAL | Age: 70
End: 2025-04-10
Payer: MEDICARE

## 2025-04-15 ENCOUNTER — HOSPITAL ENCOUNTER (OUTPATIENT)
Dept: ENDOSCOPY | Facility: HOSPITAL | Age: 70
Discharge: HOME | End: 2025-04-15
Attending: NURSE PRACTITIONER

## 2025-05-05 NOTE — ASSESSMENT & PLAN NOTE
BP currently ranging from 120s-130s systolic.    Plan:  -Optimize pain control   -Continue home medications, titrate as needed   -Monitor BP  -Low salt/renal diet when not NPO  -IV hydralazine prn for SBP>160 or DBP>90   -f/u nephrology for HD inpatient- HD performed on 12/20/22      [place] questionnaire entered    Sole Qureshi MA

## 2025-05-15 ENCOUNTER — PATIENT OUTREACH (OUTPATIENT)
Dept: ADMINISTRATIVE | Facility: HOSPITAL | Age: 70
End: 2025-05-15
Payer: MEDICARE

## 2025-06-04 ENCOUNTER — PATIENT OUTREACH (OUTPATIENT)
Dept: ADMINISTRATIVE | Facility: HOSPITAL | Age: 70
End: 2025-06-04
Payer: MEDICARE

## 2025-08-15 ENCOUNTER — PATIENT OUTREACH (OUTPATIENT)
Dept: ADMINISTRATIVE | Facility: HOSPITAL | Age: 70
End: 2025-08-15
Payer: MEDICARE

## (undated) DEVICE — SPONGE LAP 18X18 PREWASHED

## (undated) DEVICE — DRAPE ORTH SPLIT 77X108IN

## (undated) DEVICE — COVER LIGHT HANDLE 80/CA

## (undated) DEVICE — GAUZE SPONGE 4X4 12PLY

## (undated) DEVICE — ELECTRODE REM PLYHSV RETURN 9

## (undated) DEVICE — PAD CAST SPECIALIST STRL 4

## (undated) DEVICE — SCRUB DYNA-HEX LIQ 4% CHG 4OZ

## (undated) DEVICE — BLADE SURG CARBON STEEL #10

## (undated) DEVICE — ALCOHOL 70% ISOP RUBBING 4OZ

## (undated) DEVICE — BANDAGE ESMARK ELASTIC ST 6X9

## (undated) DEVICE — DRESSING N ADH OIL EMUL 3X8

## (undated) DEVICE — TOWEL OR DISP STRL BLUE 4/PK

## (undated) DEVICE — COVER CAMERA OPERATING ROOM

## (undated) DEVICE — MANIFOLD 4 PORT

## (undated) DEVICE — DRAPE U SPLIT SHEET 54X76IN

## (undated) DEVICE — SYR ONLY LEUR TIP 30CC

## (undated) DEVICE — APPLICATOR CHLORAPREP ORN 26ML

## (undated) DEVICE — PACK BASIC SETUP SC BR

## (undated) DEVICE — TUBE SUCTION YANKAUER HI CAP

## (undated) DEVICE — SOL IRR NACL .9% 3000ML

## (undated) DEVICE — BANDAGE CONFORM 3IN STRL

## (undated) DEVICE — SYR LUER LOCK STERILE 10ML

## (undated) DEVICE — SUT STRATAFIX 1PDS CTX 18IN

## (undated) DEVICE — SUT PDS II O CTX MONO 60

## (undated) DEVICE — GOWN POLY REINF BRTH SLV XL

## (undated) DEVICE — SYR 30CC LUER LOCK

## (undated) DEVICE — KIT IRR SUCTION HND PIECE

## (undated) DEVICE — SEE MEDLINE ITEM 157216

## (undated) DEVICE — SUT PDS II 2-0 CT-2 VIL

## (undated) DEVICE — PAD ABD 8X10 STERILE